# Patient Record
Sex: FEMALE | Race: WHITE | NOT HISPANIC OR LATINO | Employment: FULL TIME | URBAN - METROPOLITAN AREA
[De-identification: names, ages, dates, MRNs, and addresses within clinical notes are randomized per-mention and may not be internally consistent; named-entity substitution may affect disease eponyms.]

---

## 2023-12-12 ENCOUNTER — TRANSFERRED RECORDS (OUTPATIENT)
Dept: HEALTH INFORMATION MANAGEMENT | Facility: CLINIC | Age: 53
End: 2023-12-12

## 2023-12-27 ENCOUNTER — TRANSCRIBE ORDERS (OUTPATIENT)
Dept: OTHER | Age: 53
End: 2023-12-27

## 2023-12-27 DIAGNOSIS — K70.30 ALCOHOLIC CIRRHOSIS OF LIVER WITHOUT ASCITES (H): ICD-10-CM

## 2023-12-27 DIAGNOSIS — R60.0 LOWER EXTREMITY EDEMA: Primary | ICD-10-CM

## 2023-12-27 DIAGNOSIS — D64.9 ANEMIA, UNSPECIFIED TYPE: ICD-10-CM

## 2023-12-27 DIAGNOSIS — K70.10 ALCOHOLIC HEPATITIS WITHOUT ASCITES (H): ICD-10-CM

## 2024-04-02 DIAGNOSIS — K70.31 ALCOHOLIC CIRRHOSIS OF LIVER WITH ASCITES (H): Primary | ICD-10-CM

## 2024-04-10 ENCOUNTER — OFFICE VISIT (OUTPATIENT)
Dept: GASTROENTEROLOGY | Facility: CLINIC | Age: 54
End: 2024-04-10
Attending: PHYSICIAN ASSISTANT
Payer: COMMERCIAL

## 2024-04-10 VITALS — DIASTOLIC BLOOD PRESSURE: 60 MMHG | SYSTOLIC BLOOD PRESSURE: 99 MMHG | HEART RATE: 68 BPM | WEIGHT: 214 LBS

## 2024-04-10 DIAGNOSIS — R79.89 ELEVATED LFTS: ICD-10-CM

## 2024-04-10 DIAGNOSIS — K70.31 ALCOHOLIC CIRRHOSIS OF LIVER WITH ASCITES (H): Primary | ICD-10-CM

## 2024-04-10 DIAGNOSIS — F10.21 ALCOHOL USE DISORDER, SEVERE, IN EARLY REMISSION (H): ICD-10-CM

## 2024-04-10 PROCEDURE — 99205 OFFICE O/P NEW HI 60 MIN: CPT | Performed by: STUDENT IN AN ORGANIZED HEALTH CARE EDUCATION/TRAINING PROGRAM

## 2024-04-10 RX ORDER — MAGNESIUM OXIDE 400 MG/1
400 TABLET ORAL 2 TIMES DAILY
COMMUNITY
Start: 2024-01-30

## 2024-04-10 RX ORDER — ESCITALOPRAM OXALATE 20 MG/1
1 TABLET ORAL EVERY MORNING
COMMUNITY
Start: 2024-03-05 | End: 2025-03-05

## 2024-04-10 RX ORDER — LANOLIN ALCOHOL/MO/W.PET/CERES
100 CREAM (GRAM) TOPICAL DAILY
COMMUNITY
Start: 2023-12-29

## 2024-04-10 RX ORDER — FUROSEMIDE 20 MG
1 TABLET ORAL EVERY MORNING
COMMUNITY
Start: 2023-12-21 | End: 2024-05-21

## 2024-04-10 RX ORDER — BUDESONIDE 90 UG/1
1 AEROSOL, POWDER RESPIRATORY (INHALATION) PRN
COMMUNITY
Start: 2024-02-14 | End: 2025-02-13

## 2024-04-10 RX ORDER — PANTOPRAZOLE SODIUM 40 MG/1
1 TABLET, DELAYED RELEASE ORAL EVERY MORNING
COMMUNITY
Start: 2024-03-05

## 2024-04-10 RX ORDER — FOLIC ACID 1 MG/1
1 TABLET ORAL EVERY MORNING
COMMUNITY
Start: 2024-01-30

## 2024-04-10 RX ORDER — ALBUTEROL SULFATE 90 UG/1
2 AEROSOL, METERED RESPIRATORY (INHALATION) EVERY 4 HOURS PRN
COMMUNITY
Start: 2024-03-05

## 2024-04-10 RX ORDER — ASCORBIC ACID 100 MG
1 TABLET,CHEWABLE ORAL EVERY MORNING
COMMUNITY
Start: 2024-02-14 | End: 2025-02-08

## 2024-04-10 RX ORDER — SPIRONOLACTONE 25 MG/1
1 TABLET ORAL EVERY MORNING
COMMUNITY
Start: 2023-12-21 | End: 2024-05-21

## 2024-04-10 NOTE — PROGRESS NOTES
[facility] New Patient Visit    Date of Visit: April 10, 2024    Reason for referral: ARLD    Subjective: Ms. Sutherland is a 53-year-old woman with a history of alcohol use disorder, alcohol-related liver disease who presents to [facility] care.  She is joined by her mother today.    She was first noted to have elevated liver enzymes with her primary care provider in the past.  Also had steatosis on ultrasound.  She told me that her primary care doctor told her to cut back on her drinking, it is not clear her doctor is aware that she was drinking 2 bottles of wine a day.  She states she tried but she was unable to.  She has been drinking heavily since her 30s.  She presented the end of November with jaundice.  At that time was found to have a bilirubin of 10.9.  Iron saturation was elevated at 90%.  Ferritin was 351.  She is an H63D heterozygote she had testing for autoimmune liver disease that was negative.  KEY is positive.  Ceruloplasmin is normal.  Alpha-1 antitrypsin is normal.  Peth was 230 on admission November 29.    She has been sober since his admission.  She is doing well with her sobriety.  She engaged in alcohol treatment through [employer].  She started doing outpatient treatment in the evenings after work, has graduated to lower intensity and is about to graduate from that.  She has not done any treatment before.  She is back to working full-time.  Overall is doing better since his admission but feels fatigued at times.    Swelling in legs worse after standing all day, better with exercise.  Taking Lasix and spironolactone    Hepatitis C antibody was - February 2023 and then + November 2023.  RNA was - November 2023.    She has a history of H. pylori infection and is had multiple positive breath test.  Stool test was - March 2024.    She works full-time.  She is not .  She has 6 kids ranging 16-27    She has an apparent history of eosinophilic esophagitis versus  eosinophilia due to acid reflux.  She has had recurrent food impactions.  She had an EGD for food impaction in 2016.  Biopsy showed up to 70 eosinophils per high-power field.  She had a follow-up EGD in  that had mucosal changes concerning for EOE.  Also showed grade D esophagitis.  The biopsies at this time showed eosinophils but less concerning for eosinophilic esophagitis.  Biopsies were taken she had an EGD in  that showed steak in the lower third of esophagus.  They noted mucosal rings.  Also showed LA grade B esophagitis.  Esophageal biopsy showed mild esophagitis with fewer than 5 eosinophils per high-power field.  Biopsies showed H. Pylori. She has required dilation for esophageal stricture before.  EGD at the end of  did not show findings concerning for eosinophilic esophagitis    ROS: 14 point ROS negative except for positives noted in HPI.    PMHx:  No DM  No heart disease  No cancer  Asthma  HPV  Alcohol use disorder  Herniated lumbar disc  Meniscal tear  COVID  Depression  Concern for eosinophilic esophagitis    Past Surgical History:   Procedure Laterality Date   ARTHROSCOPIC MENISCUS REPAIR, KNEE Right 2018   ARTHROSCOPIC SURGERY Left 2023   Meniscus procedue.   TOOTH ROOT REMOVAL     FamHx:  Heart disease - MGF  of MI at age 61, other family members with heart disease  MAunt Lung cancer  No family history of liver disease, liver cancer    SocHx:  Social History     Socioeconomic History     Marital status:      Spouse name: Not on file     Number of children: Not on file     Years of education: Not on file     Highest education level: Not on file   Occupational History     Not on file   Tobacco Use     Smoking status: Not on file     Smokeless tobacco: Not on file   Substance and Sexual Activity     Alcohol use: Not on file     Drug use: Not on file     Sexual activity: Not on file   Other Topics Concern     Not on file   Social History Narrative     Not on file      Social Determinants of Health     Financial Resource Strain: Medium Risk (2/27/2024)    [facility]    Overall Financial Resource Strain (CARDIA)      Difficulty of Paying Living Expenses: Somewhat hard   Food Insecurity: No Food Insecurity (2/27/2024)    [facility]    Hunger Vital Sign      Worried About Running Out of Food in the Last Year: Never true      Ran Out of Food in the Last Year: Never true   Transportation Needs: No Transportation Needs (2/27/2024)    Received from [facility]    Transportation Needs      In the past 12 months, has lack of transportation kept you from medical appointments, meetings, work, or from getting medicines or things needed for daily living?: No   Physical Activity: Insufficiently Active (2/27/2024)    [facility]    Exercise Vital Sign      Days of Exercise per Week: 3 days      Minutes of Exercise per Session: 10 min   Stress: No Stress Concern Present (2/27/2024)    [facility]    [place] [employer] - Occupational Stress Questionnaire      Feeling of Stress : Not at all   Social Connections: Moderately Integrated (2/27/2024)    [facility]    Social Connection and Isolation Panel [NHANES]      Frequency of Communication with Friends and Family: More than three times a week      Frequency of Social Gatherings with Friends and Family: Once a week      Attends Jain Services: More than 4 times per year      Active Member of Clubs or Organizations: Yes      Attends Club or Organization Meetings: More than 4 times per year      Marital Status:    Recent Concern: Social Connections -  Moderately Isolated (12/1/2023)    [facility]    Social Connection and Isolation Panel [NHANES]      Frequency of Communication with Friends and Family: More than three times a week      Frequency of Social Gatherings with Friends and Family: Once a week      Attends Sikhism Services: More than 4 times per year      Active Member of Clubs or Organizations: No      Attends Club or Organization Meetings: Not on file      Marital Status:    Interpersonal Safety: Not At Risk (3/28/2024)    [facility]    Intimate Partner Violence      Are you in a relationship where you are physically hurt, threatened and/or made to feel afraid?: No   Housing Stability: Low Risk  (2/27/2024)    Received [facility], [facility]    Housing Stability      In the last 12 months, was there a time when you were not able to pay the mortgage or rent on time?: No      In the last 12 months, how many places have you lived?: 1      Number of Places Lived in the Last Year (Outpatient): Not on file      Number of Places Lived in the Last Year (Inpatient): Not on file      In the last 12 months, was there a time when you did not have a steady place to sleep or slept in a shelter (including now)?: No       Medications:  Current Outpatient Medications   Medication Sig Dispense Refill     albuterol (VENTOLIN HFA) 108 (90 Base) MCG/ACT inhaler Inhale 2 puffs into the lungs every 4 hours as needed for shortness of breath       budesonide (PULMICORT FLEXHALER) 90 MCG/ACT inhaler Inhale 1 puff into the lungs as needed       escitalopram (LEXAPRO) 20 MG tablet Take 1 tablet by mouth every morning       folic acid (FOLVITE) 1 MG tablet Take 1 tablet by mouth every morning       furosemide (LASIX) 20 MG tablet Take 1 tablet by mouth every morning       magnesium oxide (MAG-OX) 400 MG tablet Take 400  mg by mouth 2 times daily       Multiple Vitamin (QUINTABS) TABS Take 1 tablet by mouth every morning       pantoprazole (PROTONIX) 40 MG EC tablet Take 1 tablet by mouth every morning       spironolactone (ALDACTONE) 25 MG tablet Take 1 tablet by mouth every morning       thiamine (B-1) 100 MG tablet Take 100 mg by mouth daily       No current facility-administered medications for this visit.     No OTCs, herbals    Allergies:  Allergies   Allergen Reactions     Penicillins Rash and Unknown     Latex Other (See Comments)     History of asthma.     Erythromycin Nausea and Vomiting       Objective:  BP 99/60 (BP Location: Right arm, Patient Position: Sitting, Cuff Size: Adult Regular)   Pulse 68   Wt 97.1 kg (214 lb)   Constitutional: pleasant woman in NAD  Eyes: icteric  Respiratory: Normal respiratory excursion   MSK: normal range of motion of visualized extremities  Abd: Non distended  Ext: 1+ edema  Skin: No jaundice  Psychiatric: normal mood and orientation    Labs: Reviewed in EHR - MELD 23    Imaging:    RUQ US 1/2024      IMPRESSION:   Mild surface nodularity of the liver suggestive of underlying cirrhosis.     No gross suspicious hepatic lesions.     Layering echogenic material within the gallbladder most consistent with   sludge/tiny calculi however, no pain was reported over the gallbladder during   scanning.    MRI liver 12/2023    FINDINGS:   Abdomen is obese, which decreases image sensitivity, and there is some   breathing motion artifact also.  Liver is enlarged measuring 20.8 cm   craniocaudally.  Diffuse fatty infiltration of the liver with mildly nodular   contour, but no focal mass or abnormal enhancement is seen in the liver.  No   restricted diffusion.         Splenomegaly, with spleen measuring 14.6 cm in length.  No focal masses.   Adrenals and pancreas are unremarkable.  Gallbladder is moderately distended   with some debris in the dependent portion.  No gallbladder wall thickening or    pericholecystic inflammation.  Aorta is normal in caliber.  Kidneys are normal   in size.  No hydroureteronephrosis.  Some cortical scarring in the left upper   pole is present.  Intra and extrahepatic bile ducts are nondilated.     IMPRESSION:   No definite focal hepatic abnormality.  Diffuse fatty infiltration of the   liver.  Hepatosplenomegaly.      Endoscopy:    EGD 11/2023      Findings:        The esophagus was normal.        Diffuse mildly congested mucosa was found in the entire examined        stomach. Biopsies were taken with a pediatric cold forceps for        Helicobacter pylori cultures (In saline) and stains (in formalin)        The first portion of the duodenum and second portion of the duodenum        were normal.     Impression:            - Normal esophagus. No varices.                          - Congestive gastropathy. Biopsied.                          - Normal first portion of the duodenum and second                          portion of the duodenum.    Final Diagnosis     A. STOMACH, ANTRUM, BIOPSY  --CHRONIC, INACTIVE GASTRITIS  --POSITIVE FOR HELICOBACTER PYLORI              Colonoscopy 3/2024    Findings:        The perianal and digital rectal examinations were normal. Pertinent        negatives include normal sphincter tone, no palpable rectal lesions and        no anal lesion or abnormality.        A 6 mm polyp was found in the ascending colon. The polyp was sessile.        The polyp was removed with a hot snare. Resection and retrieval were        complete. To prevent bleeding considering her portal hypertension/plt,        one hemostatic clip was successfully placed. There was no bleeding at        the end of the procedure.        #2, 3 to 7 mm polyp was found in the descending colon. The polyp were        sessile. The 3mm polyp was removed with a jumbo cold forceps, mild        continous bleeding that stopped with clip. The 7mm polyp was removed        with a hot snare. Resection and  retrieval were complete. To prevent        bleeding post-intervention, another hemostatic clips was successfully        placed. There was no bleeding at the end of the procedure.        A 20 mm polyp was found in the sigmoid colon at about 50cm from the anal        verge. The polyp was pedunculated. The polyp was removed with a hot        snare. Resection and retrieval were complete. Mild continous bleeding.        Area was successfully injected with 2 mL of a 0.1 mg/mL solution of        epinephrine for hemostasis. To prevent bleeding post-intervention, three        hemostatic clips were successfully placed. There was no bleeding at the        end of the procedure. Area was successfully injected with 2 mL Spot        (carbon black) for tattooing.     Impression:            - One 6 mm polyp in the ascending colon, removed with                          a hot snare. Resected and retrieved. Clip was placed.                          - One 3 to 7 mm polyp in the descending colon, removed                          with a jumbo cold forceps and removed with a hot                          snare. Resected and retrieved. Clips were placed.                          - One 20 mm polyp in the sigmoid colon, removed with a                          hot snare. Resected and retrieved. Injected. Clips                          were placed.       Final Diagnosis     A. COLON, ASCENDING/RIGHT, BIOPSY  --TUBULAR ADENOMA     B. COLON, DESCENDING/LEFT, BIOPSY  --TUBULAR ADENOMAS     C. COLON, SIGMOID, BIOPSY  --TUBULOVILLOUS ADENOMA, INKED MARGIN UNINVOLVED           Independently reviewed labs and imaging.     Assessment/Plan:  Ms. Sutherland is a 53-year-old woman with a history of alcohol use disorder, alcohol-related liver disease who presents to [facility].  She is joined by her mother today.     She has a long history of alcohol use disorder and presented with decompensated alcohol-related liver disease the end of November 2023.  She  had elevated liver enzymes and been told about liver disease in the past, she was downplaying how much alcohol she was drinking at the time to provider.  She is since to engage in alcohol treatment and is doing well with this.  She is about 4 and half months sober at this point.  Her MELD is still 23.  Her bilirubin is still moderately elevated although it is partially indirect.  Will recheck her labs later this week after increasing her diuretics.  If her MELD is still greater than 20 will plan to open a liver transplant referral and schedule her eval around her 6 months sobriety point.  She has engaged and is doing well treatment.    Increase Lasix to 40 mg daily and spironolactone 50 mg daily.  Will get repeat MELD labs on Friday.  Discussed the basics of liver transplant evaluation.  She is up-to-date with her cancer screening.  Should continue to follow with [facility]row for her routine liver care including liver cancer screening.    RTC based on MELD labs, likely in transplant evaluation clinic    Elisa Santiago MD MS  Hepatology/Liver Transplant  [facility]    Approximately 30 minutes was spent for the visit with 30 minutes of non face-to-face time were spent in review of the patient's medical record on the day of the visit. This included review of previous: clinic visits, hospital records, lab results, imaging studies, and documentation.  The findings from this review are summarized in the above note.      Pap normal with negative HPV 2024    Mammogram 11/2023      IMPRESSION:   There is no mammographic evidence of malignancy.  Recommend annual screening   mammography.         RECOMMENDATION:  BL: Bilateral RC 1: Screening Mammograms

## 2024-04-12 RX ORDER — SPIRONOLACTONE 50 MG/1
50 TABLET, FILM COATED ORAL DAILY
Qty: 90 TABLET | Refills: 3 | Status: SHIPPED | OUTPATIENT
Start: 2024-04-12 | End: 2025-04-07

## 2024-04-12 RX ORDER — FUROSEMIDE 40 MG
40 TABLET ORAL DAILY
Qty: 90 TABLET | Refills: 3 | Status: SHIPPED | OUTPATIENT
Start: 2024-04-12 | End: 2025-04-07

## 2024-04-15 ENCOUNTER — REFERRAL (OUTPATIENT)
Dept: TRANSPLANT | Facility: CLINIC | Age: 54
End: 2024-04-15
Payer: COMMERCIAL

## 2024-04-15 DIAGNOSIS — K70.30 ALCOHOLIC CIRRHOSIS OF LIVER (H): Primary | ICD-10-CM

## 2024-04-15 DIAGNOSIS — K70.31 ALCOHOLIC CIRRHOSIS OF LIVER WITH ASCITES (H): ICD-10-CM

## 2024-04-15 DIAGNOSIS — K76.6 PORTAL HYPERTENSION (H): ICD-10-CM

## 2024-04-15 PROBLEM — I10 HYPERTENSION: Status: ACTIVE | Noted: 2018-10-30

## 2024-04-15 PROBLEM — K29.50 CHRONIC GASTRITIS: Status: ACTIVE | Noted: 2024-04-15

## 2024-04-15 PROBLEM — J30.2 SEASONAL ALLERGIES: Status: ACTIVE | Noted: 2024-04-15

## 2024-04-15 PROBLEM — J45.20 INTERMITTENT ASTHMA: Status: ACTIVE | Noted: 2024-04-15

## 2024-04-15 PROBLEM — R13.10 DYSPHAGIA: Status: ACTIVE | Noted: 2024-04-15

## 2024-04-15 PROBLEM — T75.3XXA MOTION SICKNESS: Status: ACTIVE | Noted: 2024-02-18

## 2024-04-15 PROBLEM — F10.20 ALCOHOL DEPENDENCE, UNCOMPLICATED (H): Status: ACTIVE | Noted: 2023-12-19

## 2024-04-15 PROBLEM — R19.7 DIARRHEA: Status: ACTIVE | Noted: 2024-02-18

## 2024-04-15 PROBLEM — R05.9 COUGH: Status: ACTIVE | Noted: 2017-02-22

## 2024-04-15 PROBLEM — H54.7 VISION LOSS: Status: ACTIVE | Noted: 2024-04-15

## 2024-04-15 PROBLEM — M25.562 CHRONIC PAIN OF LEFT KNEE: Status: ACTIVE | Noted: 2023-02-28

## 2024-04-15 PROBLEM — E83.42 HYPOMAGNESEMIA: Status: ACTIVE | Noted: 2023-11-30

## 2024-04-15 PROBLEM — M54.9 BACKACHE: Status: ACTIVE | Noted: 2024-04-15

## 2024-04-15 PROBLEM — D69.6 THROMBOCYTOPENIA (H): Status: ACTIVE | Noted: 2023-11-30

## 2024-04-15 PROBLEM — F32.A DEPRESSION: Status: ACTIVE | Noted: 2024-04-15

## 2024-04-15 PROBLEM — K21.9 GERD (GASTROESOPHAGEAL REFLUX DISEASE): Status: ACTIVE | Noted: 2024-04-15

## 2024-04-15 PROBLEM — M19.90 ARTHRITIS: Status: ACTIVE | Noted: 2024-02-18

## 2024-04-15 PROBLEM — R79.89 ELEVATED LIVER FUNCTION TESTS: Status: ACTIVE | Noted: 2024-02-18

## 2024-04-15 PROBLEM — E55.9 VITAMIN D DEFICIENCY: Status: ACTIVE | Noted: 2024-04-15

## 2024-04-15 PROBLEM — R87.619 ABNORMAL PAP SMEAR OF CERVIX: Status: ACTIVE | Noted: 2020-02-03

## 2024-04-15 PROBLEM — N95.1 PERIMENOPAUSAL: Status: ACTIVE | Noted: 2020-03-01

## 2024-04-15 PROBLEM — G89.29 CHRONIC PAIN OF LEFT KNEE: Status: ACTIVE | Noted: 2023-02-28

## 2024-04-15 PROBLEM — K72.10 CHRONIC LIVER FAILURE (H): Status: ACTIVE | Noted: 2024-02-18

## 2024-04-15 PROBLEM — K75.9 INFLAMMATORY LIVER DISEASE: Status: ACTIVE | Noted: 2023-11-29

## 2024-04-15 PROBLEM — R79.1 ABNORMAL INR: Status: ACTIVE | Noted: 2023-11-30

## 2024-04-15 PROBLEM — R17 ELEVATED BILIRUBIN: Status: ACTIVE | Noted: 2024-02-18

## 2024-04-15 PROBLEM — K20.0 EOSINOPHILIC ESOPHAGITIS: Status: ACTIVE | Noted: 2024-04-15

## 2024-04-15 PROBLEM — E78.5 HYPERLIPIDEMIA: Status: ACTIVE | Noted: 2024-03-01

## 2024-04-15 NOTE — LETTER
Hannah Sutherland  [address]  [place] [address]    Dear Hannah,    Thank you for your interest in the Transplant Center at [facility]. We look forward to being a part of your care team and assisting you through the transplant process.    As we discussed, your transplant coordinator is Torin Mendieta (Liver).  You may call your coordinator at any time with questions or concerns.  Your first scheduled call will be on 4/22 at 10:00.  If this needs to change, call [phone].    Please complete the following.    Fill out and return the enclosed forms  Authorization for Electronic Communication  Authorization to Discuss Protected Health Information  Authorization for Release of Protected Health Information    Sign up for:  [employer], access to your electronic medical record (see enclosed pamphlet)  [email or web address], a transplant education website    You can use these tools to learn more about your transplant, communicate with your care team, and track your medical details           My Transplant Place            Sincerely,      Solid Organ Transplant  [facility]    cc: Referring Physician

## 2024-04-17 ENCOUNTER — DOCUMENTATION ONLY (OUTPATIENT)
Dept: TRANSPLANT | Facility: CLINIC | Age: 54
End: 2024-04-17
Payer: COMMERCIAL

## 2024-04-17 NOTE — TELEPHONE ENCOUNTER
Patient was asked the following questions during liver intake call.     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  PCP:  ZOHREH Cordon, CNP     1)Do you know why you have liver disease: yes alcohol       If Alcoholic Cirrhosis is present when was your last drink:  11/723      Have you ever been through treatment for alcohol: yes, still attending meetings   2) Presence of Ascites: no Paracentesis: no  3) Presence of Hepatic Encephalopathy: no  Medications: no   4) History of GI Bleeding: once, stool   5) Oxygen Use: no  6) EGD: yes  7) Colonoscopy: yes  8) MELD Score: 21 per pt   9)  Labs available for review from PCP/GI:  yes  10)HCC Diagnosis: no (if no, go to #11)                                            11)Insurance information: [facility]  ID # [id number], Gr # [id number]                 Referral intake process completed.  Patient is aware that after financial approval is received, medical records will be requested.   Patient confirmed for a callback from transplant coordinator on 4/22.  Tentative evaluation date TBD.     Confirmed coordinator will discuss evaluation process in more detail at the time of their call.   Patient is aware of the need to arrange age appropriate cancer screening, vaccinations, and dental care.  Reminded patient to complete questionnaire, complete medical records release, and review packet prior to evaluation visit .  Assessed patient for special needs (ie--wheelchair, assistance, guardian, and ):   no   Patient instructed to call [phone] with questions.      Patient gave verbal consent during intake call to obtain medical records and documents outside of [facility]:   yes

## 2024-04-23 NOTE — TELEPHONE ENCOUNTER
Hannahmode Sutherland 1970  Referral initial call 4/22/2024  MELD: 23 as of 4/12/24  Blood Type: O  PCP:   ZOHREH Cordon, CNP   Referring: ZOHREH Fitzgerald- CNP   Have you had care at a [facility] facility before/seen by any of our doctors? Dr. Elisa Santiago  Insurance: [facility]    Social Hx: lives in [place]. Originally from [place].  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  Education: a Master's degree in gerontology.  Functional status: very good, working full-time  Family/social support: , son Ceasar. 6 kids (27-16). No grandkids. Parent's alive. Twin sisters, 48. Close friends.    Liver DX/History: first told of liver disease November 2023, saw PCP after Thanksgiving, hospitalized.  Dx: etoh cirrhosis of liver with ascites  HE/Meds: Yes mild HE (looses train of thoughts), No Meds  Hematemesis/GI Bleed: Yes GIB, No Hematemesis  EGD/Colonoscopy: Yes EGD, Yes Colonoscopy   Ascites/Paracentesis/Diuretics/TIPS/SBP: Yes Ascites (yes BLE), No Paracentesis, taking  Etoh & Drug use/Legal issues/Chem Dep: last drink 11/6/2023, mostly drinking wine, about 3 smaller bottles/day, occasionally thinking about drinking, no drug use, no legal problems, doing chem dep currently with [facility].   HCC diagnosis: no diagnosis  Surgeries: Knee surgeries bilaterally, no abdominal surgeries  LDLT discussed: yes discussed in-depth, email sent with Living Donor information.    Other Medical hx:   Neuro/Strokes/Seizures: none  Cardiac/MIs, Echos/Angios: HTN previously, now low. Family history of heart disease on both mother & father side.  Lungs/Smoking/O2 use: asthma, minimal problems. Never smoked. No O2.  Kidneys/Dialysis: none  Cancer/Mammo/Pap: none. Last mammogram 11/2023, last pap smear 3/2024.  Vaccines/Hepatitis/COVID: Vaccinated for Covid (had Covid X2-3, last time 11/2022), Got Hep A & B, needs shingles.  Diabetes: none  Nutrition: appetite is  good.  Dentist: has a dentist, needs a cleanings.  Mental health: mental health has been going good. Low grade depression, taking Lexapro 20mg.    Plan: Do you have someone who can join you for the evaluation? Will plan for evaluation on 5/21, will bring support person with.  Do you prefer to be contacted by scheduling via [employer] message or Phone call? Either/or.  What is your schedule availability like?  Moderately inflexible, working full time.

## 2024-05-13 NOTE — TELEPHONE ENCOUNTER
RECORDS RECEIVED FROM:    DATE RECEIVED:    NOTES STATUS DETAILS   OFFICE NOTE from referring provider  Internal 04/24/24 - Elisa Santiago MD [facility]    OFFICE NOTE from other cardiologists  N/A    RECORDS from hospital/ED Internal 05/02/24    MEDICATION LIST Internal    GENERAL CARDIO RECORDS   (ALL APPOINTMENT TYPES)     LABS (CBC,BMP,CMP, TSH) Internal    EKG (STRIPS & REPORTS) In process Scheduled 05/21/24 03/28/24 - [facility]    ECHOS (IMAGES AND REPORTS) In process Scheduled 05/21/24   CT/CTA (IMAGES AND REPORTS) In process Scheduled 05/28/24

## 2024-05-14 ENCOUNTER — PRE VISIT (OUTPATIENT)
Dept: CARDIOLOGY | Facility: CLINIC | Age: 54
End: 2024-05-14

## 2024-05-14 ENCOUNTER — OFFICE VISIT (OUTPATIENT)
Dept: CARDIOLOGY | Facility: CLINIC | Age: 54
End: 2024-05-14
Attending: STUDENT IN AN ORGANIZED HEALTH CARE EDUCATION/TRAINING PROGRAM
Payer: COMMERCIAL

## 2024-05-14 VITALS
HEART RATE: 63 BPM | WEIGHT: 193.8 LBS | DIASTOLIC BLOOD PRESSURE: 67 MMHG | OXYGEN SATURATION: 100 % | SYSTOLIC BLOOD PRESSURE: 106 MMHG

## 2024-05-14 DIAGNOSIS — Z76.82 ORGAN TRANSPLANT CANDIDATE: Primary | ICD-10-CM

## 2024-05-14 DIAGNOSIS — K76.6 PORTAL HYPERTENSION (H): ICD-10-CM

## 2024-05-14 DIAGNOSIS — K70.31 ALCOHOLIC CIRRHOSIS OF LIVER WITH ASCITES (H): ICD-10-CM

## 2024-05-14 PROCEDURE — 99203 OFFICE O/P NEW LOW 30 MIN: CPT | Performed by: INTERNAL MEDICINE

## 2024-05-14 PROCEDURE — 99213 OFFICE O/P EST LOW 20 MIN: CPT | Performed by: INTERNAL MEDICINE

## 2024-05-14 ASSESSMENT — PAIN SCALES - GENERAL: PAINLEVEL: NO PAIN (0)

## 2024-05-14 NOTE — NURSING NOTE
Chief Complaint   Patient presents with    Follow Up    New Patient      new - liver transplant eval       Vitals were taken, medications reconciled.    Paige Thurner, Facilitator   8:11 AM

## 2024-05-14 NOTE — PATIENT INSTRUCTIONS
Once you have completed already scheduled cardiac testing, Dr. Ramirez will review and advise.  As soon as results are compiled and reviewed, you will be notified.

## 2024-05-14 NOTE — LETTER
5/14/2024      RE: Hannah Sutherland  [address]  [facility] [address]       Dear Colleague,    Thank you for the opportunity to participate in the care of your patient, Hannah Sutherland, at the [facility] [place] [facility]. Please see a copy of my visit note below.       SUBJECTIVE:  Hannah Sutherland is a 53 year old female who presents for liver transplant evaluation.  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  Currently patient is fairly active working full-time at [employer].  Denied cardiac symptoms specifically denied chest pain or shortness of breath.  No prior cardiac history.  Non-smoker.  No diabetes or hypertension.  No premature coronary artery disease in family.  Father had a stent at the age of 75.  Current cardiac related medications are spironolactone and furosemide.  Even though problem list suggest hypertension and hyperlipidemia patient is not on any medication for both.    Patient Active Problem List    Diagnosis Date Noted    Backache 04/15/2024     Priority: Medium     Formatting of this note might be different from the original.   herniated disk in the lumbar area that flairs 3 times per year.      Chronic gastritis 04/15/2024     Priority: Medium    Depression 04/15/2024     Priority: Medium    Dysphagia 04/15/2024     Priority: Medium    Eosinophilic esophagitis 04/15/2024     Priority: Medium    GERD (gastroesophageal reflux disease) 04/15/2024     Priority: Medium    Intermittent asthma 04/15/2024     Priority: Medium    Seasonal allergies 04/15/2024     Priority: Medium     Formatting of this note might be different from the original.   Affecting the eyes yearround, takes OTC's      Vision loss 04/15/2024     Priority: Medium    Vitamin D deficiency 04/15/2024     Priority: Medium    Hyperlipidemia 03/01/2024     Priority: Medium    Chronic liver failure (H) 02/18/2024     Priority:  Medium    Diarrhea 02/18/2024     Priority: Medium     Formatting of this note might be different from the original.   Frequently.      Elevated bilirubin 02/18/2024     Priority: Medium    Elevated liver function tests 02/18/2024     Priority: Medium    Arthritis 02/18/2024     Priority: Medium    Motion sickness 02/18/2024     Priority: Medium    Portal hypertension (H) 02/18/2024     Priority: Medium    Alcohol dependence, uncomplicated (H) 12/19/2023     Priority: Medium    Abnormal INR 11/30/2023     Priority: Medium    Alcoholic cirrhosis (H) 11/30/2023     Priority: Medium    Hypomagnesemia 11/30/2023     Priority: Medium    Thrombocytopenia (H24) 11/30/2023     Priority: Medium    Inflammatory liver disease 11/29/2023     Priority: Medium    Chronic pain of left knee 02/28/2023     Priority: Medium    Perimenopausal 03/01/2020     Priority: Medium     Formatting of this note might be different from the original.   Including hot flashes and moodiness.      Abnormal Pap smear of cervix 02/03/2020     Priority: Medium     Formatting of this note might be different from the original.   HPV      Hypertension 10/30/2018     Priority: Medium     Formatting of this note might be different from the original.   will start meds.      Cough 02/22/2017     Priority: Medium    .  Current Outpatient Medications   Medication Sig Dispense Refill    albuterol (VENTOLIN HFA) 108 (90 Base) MCG/ACT inhaler Inhale 2 puffs into the lungs every 4 hours as needed for shortness of breath      budesonide (PULMICORT FLEXHALER) 90 MCG/ACT inhaler Inhale 1 puff into the lungs as needed      escitalopram (LEXAPRO) 20 MG tablet Take 1 tablet by mouth every morning      folic acid (FOLVITE) 1 MG tablet Take 1 tablet by mouth every morning      furosemide (LASIX) 40 MG tablet Take 1 tablet (40 mg) by mouth daily for 360 days 90 tablet 3    magnesium oxide (MAG-OX) 400 MG tablet Take 400 mg by mouth 2 times daily      Multiple Vitamin  (QUINTABS) TABS Take 1 tablet by mouth every morning      pantoprazole (PROTONIX) 40 MG EC tablet Take 1 tablet by mouth every morning      spironolactone (ALDACTONE) 50 MG tablet Take 1 tablet (50 mg) by mouth daily for 360 days 90 tablet 3    thiamine (B-1) 100 MG tablet Take 100 mg by mouth daily      furosemide (LASIX) 20 MG tablet Take 1 tablet by mouth every morning (Patient not taking: Reported on 5/14/2024)      spironolactone (ALDACTONE) 25 MG tablet Take 1 tablet by mouth every morning (Patient not taking: Reported on 5/14/2024)       No current facility-administered medications for this visit.     No past medical history on file.  No past surgical history on file.  Allergies   Allergen Reactions    Penicillins Rash and Unknown    Latex Other (See Comments)     History of asthma.    Erythromycin Nausea and Vomiting     Social History     Socioeconomic History    Marital status:      Spouse name: Not on file    Number of children: Not on file    Years of education: Not on file    Highest education level: Not on file   Occupational History    Not on file   Tobacco Use    Smoking status: Never    Smokeless tobacco: Never   Substance and Sexual Activity    Alcohol use: Not Currently     Comment: sober 11/7/23    Drug use: Never    Sexual activity: Not on file   Other Topics Concern    Not on file   Social History Narrative    Not on file     Social Determinants of Health     Financial Resource Strain: Medium Risk (2/27/2024)    [facility], [facility] and [facility]    Overall Financial Resource Strain (CARDIA)     Difficulty of Paying Living Expenses: Somewhat hard   Food Insecurity: No Food Insecurity (2/27/2024)    [email or web address], [facility] and [facility]    Hunger Vital Sign     Worried About Running Out of Food in the Last Year: Never true     Ran Out of Food in the Last Year: Never true   Transportation Needs: No  Transportation Needs (2/27/2024)    Received from [facility]    Transportation Needs     In the past 12 months, has lack of transportation kept you from medical appointments, meetings, work, or from getting medicines or things needed for daily living?: No   Physical Activity: Insufficiently Active (2/27/2024)    Received from [facility], [facility]    Exercise Vital Sign     Days of Exercise per Week: 3 days     Minutes of Exercise per Session: 10 min   Stress: No Stress Concern Present (2/27/2024)    Received from [facility], [facility]    [employer] [place] [employer] - Occupational Stress Questionnaire     Feeling of Stress : Not at all   Social Connections: Moderately Integrated (2/27/2024)    Received from [facility], [facility]    Social Connection and Isolation Panel [NHANES]     Frequency of Communication with Friends and Family: More than three times a week     Frequency of Social Gatherings with Friends and Family: Once a week     Attends Yazidism Services: More than 4 times per year     Active Member of Clubs or Organizations: Yes     Attends Club or Organization Meetings: More than 4 times per year     Marital Status:    Recent Concern: Social Connections - Moderately Isolated (12/1/2023)    Received from [facility], [facility]    Social Connection and Isolation Panel [NHANES]     Frequency of Communication with Friends and Family: More than three times a week     Frequency of Social Gatherings with Friends and Family: Once a week     Attends Yazidism Services: More than 4 times per year     Active Member of Clubs or Organizations: No     Attends Club or Organization Meetings: Not on file     Marital Status:    Interpersonal Safety: Not At Risk (5/2/2024)    Received from  [facility]    Intimate Partner Violence     Are you in a relationship where you are physically hurt, threatened and/or made to feel afraid?: No   Housing Stability: Low Risk  (2/27/2024)    Received from [facility], [facility]    Housing Stability     In the last 12 months, was there a time when you were not able to pay the mortgage or rent on time?: No     In the last 12 months, how many places have you lived?: 1     Number of Places Lived in the Last Year (Outpatient): Not on file     Number of Places Lived in the Last Year (Inpatient): Not on file     In the last 12 months, was there a time when you did not have a steady place to sleep or slept in a shelter (including now)?: No     No family history on file.       REVIEW OF SYSTEMS:  General: negative, fever, chills, night sweats  Skin: negative, acne, rash, and scaling  Eyes: negative, double vision, eye pain, and photophobia  Ears/Nose/Throat: negative, nasal congestion, and purulent rhinorrhea  Respiratory: No dyspnea on exertion, No cough, No hemoptysis, and negative  Cardiovascular: negative, palpitations, tachycardia, irregular heart beat, chest pain, exertional chest pain or pressure, paroxysmal nocturnal dyspnea, dyspnea on exertion, and orthopnea       OBJECTIVE:  Blood pressure 106/67, pulse 63, weight 87.9 kg (193 lb 12.8 oz), SpO2 100%.  General Appearance: healthy, alert, active, and no distress  Head: Normocephalic. No masses, lesions, tenderness or abnormalities  Eyes: conjuctiva clear, PERRL, EOM intact  Ears: External ears normal. Canals clear. TM's normal.  Nose: Nares normal  Mouth: normal  Neck: Supple, no cervical adenopathy, no thyromegaly  Lungs: clear to auscultation  Cardiac: regular rate and rhythm, normal S1 and S2, no murmur     ASSESSMENT/PLAN:  Patient here for liver transplant evaluation.  End-stage liver disease secondary to Laënnec cirrhosis.  Currently patient is  fairly active with no cardiac symptoms.  No prior cardiac history.  Only medications are spironolactone and furosemide.  Non-smoker.  Not on any medication for hypertension, hyperlipidemia or diabetes.  No family history of premature coronary artery disease.  Apart from EKG no other cardiac evaluation is available.  Patient's EKG from outside system suggest normal sinus rhythm.  Mild QT prolongation and partial right bundle branch block.  Patient was asked to have an echocardiogram, CT coronary angiogram.  This is scheduled for future.  If this test results are acceptable patient will be able to proceed with the transplant.  No regular follow-up has been arranged.  Total visit duration 30 minutes.  This included face-to-face interview, physical exam, chart review, review of outside records including EKG and documentation.      Please do not hesitate to contact me if you have any questions/concerns.     Sincerely,     TAMI Dowd MD

## 2024-05-14 NOTE — PROGRESS NOTES
SUBJECTIVE:  Hannah Sutherland is a 53 year old female who presents for liver transplant evaluation.  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  Currently patient is fairly active working full-time at [employer].  Denied cardiac symptoms specifically denied chest pain or shortness of breath.  No prior cardiac history.  Non-smoker.  No diabetes or hypertension.  No premature coronary artery disease in family.  Father had a stent at the age of 75.  Current cardiac related medications are spironolactone and furosemide.  Even though problem list suggest hypertension and hyperlipidemia patient is not on any medication for both.    Patient Active Problem List    Diagnosis Date Noted    Backache 04/15/2024     Priority: Medium     Formatting of this note might be different from the original.   herniated disk in the lumbar area that flairs 3 times per year.      Chronic gastritis 04/15/2024     Priority: Medium    Depression 04/15/2024     Priority: Medium    Dysphagia 04/15/2024     Priority: Medium    Eosinophilic esophagitis 04/15/2024     Priority: Medium    GERD (gastroesophageal reflux disease) 04/15/2024     Priority: Medium    Intermittent asthma 04/15/2024     Priority: Medium    Seasonal allergies 04/15/2024     Priority: Medium     Formatting of this note might be different from the original.   Affecting the eyes yearround, takes OTC's      Vision loss 04/15/2024     Priority: Medium    Vitamin D deficiency 04/15/2024     Priority: Medium    Hyperlipidemia 03/01/2024     Priority: Medium    Chronic liver failure (H) 02/18/2024     Priority: Medium    Diarrhea 02/18/2024     Priority: Medium     Formatting of this note might be different from the original.   Frequently.      Elevated bilirubin 02/18/2024     Priority: Medium    Elevated liver function tests 02/18/2024     Priority: Medium    Arthritis 02/18/2024     Priority: Medium    Motion sickness 02/18/2024     Priority: Medium    Portal  hypertension (H) 02/18/2024     Priority: Medium    Alcohol dependence, uncomplicated (H) 12/19/2023     Priority: Medium    Abnormal INR 11/30/2023     Priority: Medium    Alcoholic cirrhosis (H) 11/30/2023     Priority: Medium    Hypomagnesemia 11/30/2023     Priority: Medium    Thrombocytopenia (H24) 11/30/2023     Priority: Medium    Inflammatory liver disease 11/29/2023     Priority: Medium    Chronic pain of left knee 02/28/2023     Priority: Medium    Perimenopausal 03/01/2020     Priority: Medium     Formatting of this note might be different from the original.   Including hot flashes and moodiness.      Abnormal Pap smear of cervix 02/03/2020     Priority: Medium     Formatting of this note might be different from the original.   HPV      Hypertension 10/30/2018     Priority: Medium     Formatting of this note might be different from the original.   will start meds.      Cough 02/22/2017     Priority: Medium    .  Current Outpatient Medications   Medication Sig Dispense Refill    albuterol (VENTOLIN HFA) 108 (90 Base) MCG/ACT inhaler Inhale 2 puffs into the lungs every 4 hours as needed for shortness of breath      budesonide (PULMICORT FLEXHALER) 90 MCG/ACT inhaler Inhale 1 puff into the lungs as needed      escitalopram (LEXAPRO) 20 MG tablet Take 1 tablet by mouth every morning      folic acid (FOLVITE) 1 MG tablet Take 1 tablet by mouth every morning      furosemide (LASIX) 40 MG tablet Take 1 tablet (40 mg) by mouth daily for 360 days 90 tablet 3    magnesium oxide (MAG-OX) 400 MG tablet Take 400 mg by mouth 2 times daily      Multiple Vitamin (QUINTABS) TABS Take 1 tablet by mouth every morning      pantoprazole (PROTONIX) 40 MG EC tablet Take 1 tablet by mouth every morning      spironolactone (ALDACTONE) 50 MG tablet Take 1 tablet (50 mg) by mouth daily for 360 days 90 tablet 3    thiamine (B-1) 100 MG tablet Take 100 mg by mouth daily      furosemide (LASIX) 20 MG tablet Take 1 tablet by mouth  every morning (Patient not taking: Reported on 5/14/2024)      spironolactone (ALDACTONE) 25 MG tablet Take 1 tablet by mouth every morning (Patient not taking: Reported on 5/14/2024)       No current facility-administered medications for this visit.     No past medical history on file.  No past surgical history on file.  Allergies   Allergen Reactions    Penicillins Rash and Unknown    Latex Other (See Comments)     History of asthma.    Erythromycin Nausea and Vomiting     Social History     Socioeconomic History    Marital status:      Spouse name: Not on file    Number of children: Not on file    Years of education: Not on file    Highest education level: Not on file   Occupational History    Not on file   Tobacco Use    Smoking status: Never    Smokeless tobacco: Never   Substance and Sexual Activity    Alcohol use: Not Currently     Comment: sober 11/7/23    Drug use: Never    Sexual activity: Not on file   Other Topics Concern    Not on file   Social History Narrative    Not on file     Social Determinants of Health     Financial Resource Strain: Medium Risk (2/27/2024)    [facility], [facility]    Overall Financial Resource Strain (CARDIA)     Difficulty of Paying Living Expenses: Somewhat hard   Food Insecurity: No Food Insecurity (2/27/2024)    [facility], [facility]    Hunger Vital Sign     Worried About Running Out of Food in the Last Year: Never true     Ran Out of Food in the Last Year: Never true   Transportation Needs: No Transportation Needs (2/27/2024)    [facility]    Transportation Needs     In the past 12 months, has lack of transportation kept you from medical appointments, meetings, work, or from getting medicines or things needed for daily living?: No   Physical Activity: Insufficiently Active (2/27/2024)    [facility]  [facility], [facility]    Exercise Vital Sign     Days of Exercise per Week: 3 days     Minutes of Exercise per Session: 10 min   Stress: No Stress Concern Present (2/27/2024)    Received from [facility], [facility]    [place] [employer] - Occupational Stress Questionnaire     Feeling of Stress : Not at all   Social Connections: Moderately Integrated (2/27/2024)    Received from [facility], [facility]    Social Connection and Isolation Panel [NHANES]     Frequency of Communication with Friends and Family: More than three times a week     Frequency of Social Gatherings with Friends and Family: Once a week     Attends Islam Services: More than 4 times per year     Active Member of Clubs or Organizations: Yes     Attends Club or Organization Meetings: More than 4 times per year     Marital Status:    Recent Concern: Social Connections - Moderately Isolated (12/1/2023)    Received from [facility], [facility]    Social Connection and Isolation Panel [NHANES]     Frequency of Communication with Friends and Family: More than three times a week     Frequency of Social Gatherings with Friends and Family: Once a week     Attends Islam Services: More than 4 times per year     Active Member of Clubs or Organizations: No     Attends Club or Organization Meetings: Not on file     Marital Status:    Interpersonal Safety: Not At Risk (5/2/2024)    Received from [facility]    Intimate Partner Violence     Are you in a relationship where you are physically hurt, threatened and/or made to feel afraid?: No   Housing Stability: Low Risk  (2/27/2024)    Received from [facility], [facility]    Housing Stability     In the last 12 months, was there a time when you  were not able to pay the mortgage or rent on time?: No     In the last 12 months, how many places have you lived?: 1     Number of Places Lived in the Last Year (Outpatient): Not on file     Number of Places Lived in the Last Year (Inpatient): Not on file     In the last 12 months, was there a time when you did not have a steady place to sleep or slept in a shelter (including now)?: No     No family history on file.       REVIEW OF SYSTEMS:  General: negative, fever, chills, night sweats  Skin: negative, acne, rash, and scaling  Eyes: negative, double vision, eye pain, and photophobia  Ears/Nose/Throat: negative, nasal congestion, and purulent rhinorrhea  Respiratory: No dyspnea on exertion, No cough, No hemoptysis, and negative  Cardiovascular: negative, palpitations, tachycardia, irregular heart beat, chest pain, exertional chest pain or pressure, paroxysmal nocturnal dyspnea, dyspnea on exertion, and orthopnea       OBJECTIVE:  Blood pressure 106/67, pulse 63, weight 87.9 kg (193 lb 12.8 oz), SpO2 100%.  General Appearance: healthy, alert, active, and no distress  Head: Normocephalic. No masses, lesions, tenderness or abnormalities  Eyes: conjuctiva clear, PERRL, EOM intact  Ears: External ears normal. Canals clear. TM's normal.  Nose: Nares normal  Mouth: normal  Neck: Supple, no cervical adenopathy, no thyromegaly  Lungs: clear to auscultation  Cardiac: regular rate and rhythm, normal S1 and S2, no murmur     ASSESSMENT/PLAN:  Patient here for liver transplant evaluation.  End-stage liver disease secondary to Laënnec cirrhosis.  Currently patient is fairly active with no cardiac symptoms.  No prior cardiac history.  Only medications are spironolactone and furosemide.  Non-smoker.  Not on any medication for hypertension, hyperlipidemia or diabetes.  No family history of premature coronary artery disease.  Apart from EKG no other cardiac evaluation is available.  Patient's EKG from outside system suggest normal  sinus rhythm.  Mild QT prolongation and partial right bundle branch block.  Patient was asked to have an echocardiogram, CT coronary angiogram.  This is scheduled for future.  If this test results are acceptable patient will be able to proceed with the transplant.  No regular follow-up has been arranged.  Total visit duration 30 minutes.  This included face-to-face interview, physical exam, chart review, review of outside records including EKG and documentation.  CT coronary angiogram reviewed.  Total calcium score 0.  No significant coronary artery disease.  Patient may proceed with transplant.  Patient's echo reviewed.  Normal biventricular function.  A very small membranous ventricular septal defect is noted.  This VSD has no impact on upcoming liver transplant surgery.

## 2024-05-20 LAB
ABO/RH(D): NORMAL
ANTIBODY SCREEN: NEGATIVE
SPECIMEN EXPIRATION DATE: NORMAL

## 2024-05-20 NOTE — PROGRESS NOTES
[facility] SOLID ORGAN TRANSPLANT  OUTPATIENT MNT: LIVER TRANSPLANT EVALUATION    Current BMI: 32.9 (HT 63.5 in,  lbs/86 kg)  BMI is within recommendation of <45 for liver transplant    Handgrip Strength (average of 3 using dominant hand): 16  Frailty Assessment-- Frail (3/5 points)- low activity, unintended wt loss, low      Reference:  Score of 0-2 = Not Frail  Score of 3-5 = Frail     FraiLT-- Pre frail (LFI: 3.64)  [email or web address]     TIME SPENT: 15 minutes  VISIT TYPE: Initial   REFERRING PHYSICIAN: Angelika   PT ACCOMPANIED BY: her mom     History of previous txp: none     NUTRITION ASSESSMENT  H/o etoh, dysphagia noted 4/2024 in chart (pt reports this was many years ago after a few instances of food feeling stuck in her throat).   Pt has made significant diet changes over the past 6 months. She met with RD after her liver dz dx.     - Meal prep & grocery shopping: pt does   - Previous RD education: yes   - Appetite: good/baseline   - Food allergies/intolerances: no  - Issues chewing or swallowing: no  - N/V/D/C: no  - Food access concerns: not asked     Vitamins, Supplements, Pertinent Meds: folic acid, magnesium, MVI, thiamin  Herbal Medicines/Supplements: none   Protein supplements: protein drinks (Premier) 1/day- Jan 2024     Weight hx // fluid retention:   - 255 lbs prior UBW  - No muscle loss noted per pt; mild loss inferred from reduced  strength   - CLAUDIA improved with diuretics     Wt Readings from Last 10 Encounters:   05/21/24 85.9 kg (189 lb 6 oz)   05/14/24 87.9 kg (193 lb 12.8 oz)   04/10/24 97.1 kg (214 lb)     PHYSICAL ACTIVITY   Hand weights 3x/week for 15 min  Walking 3-5x/week 20-30 min  This is new activity X 2 months   Nods off at desk at work, although energy OK if moving around during the day     - Falls: none   - Physical therapy: none   - Cane/walker/wheelchair: no    DIET RECALL  Breakfast Protein shake    Lunch Moody with WW bread & chicken  or egg salad; chicken    Dinner Chicken with side of broccoli/carrots, salad; less beef; protein most nights     Snacks Fruit, nuts, HS- yogurt (regular)     Beverages Water    Dining out 1x/week - [employer] chicken salad      NUTRITION DIAGNOSIS   No nutrition diagnosis identified at this time     NUTRITION INTERVENTION   Nutrition education provided:  Discussed sodium intake (low sodium foods and drinks, seasoning food without salt and tips for low sodium diet).    Reviewed adequate protein intake. Encouraged receiving protein from both animal and plant based sources.     Reviewed post txp diet guidelines in brief (will review in further detail post txp):  (1) Review of proper food safety measures d/t immunosuppressant therapy post-op and increased risk for food-borne illness    (2) Avoid the following post txp d/t risk for rejection, unknown effects on the organs, and/or potential interactions with immunosuppressants:  - Herbal, Chinese, holistic, chiropractic, natural, alternative medicines and supplements  - Detoxes and cleanses  - Weight loss pills  - Protein powders or other products with extracts or herbs (ie green tea extract)    (3) Med regimen and possible side effects    Patient Understanding: Pt verbalized understanding of education provided.  Expected Engagement: Good  Follow-Up Plans: PRN     NUTRITION GOALS   No nutrition goals identified at this time    Shagufta Echeverria, RD, LD, CCTD

## 2024-05-21 ENCOUNTER — ALLIED HEALTH/NURSE VISIT (OUTPATIENT)
Dept: TRANSPLANT | Facility: CLINIC | Age: 54
End: 2024-05-21
Attending: STUDENT IN AN ORGANIZED HEALTH CARE EDUCATION/TRAINING PROGRAM
Payer: COMMERCIAL

## 2024-05-21 ENCOUNTER — LAB (OUTPATIENT)
Dept: LAB | Facility: CLINIC | Age: 54
End: 2024-05-21
Attending: STUDENT IN AN ORGANIZED HEALTH CARE EDUCATION/TRAINING PROGRAM
Payer: COMMERCIAL

## 2024-05-21 ENCOUNTER — ANCILLARY PROCEDURE (OUTPATIENT)
Dept: CARDIOLOGY | Facility: CLINIC | Age: 54
End: 2024-05-21
Attending: STUDENT IN AN ORGANIZED HEALTH CARE EDUCATION/TRAINING PROGRAM
Payer: COMMERCIAL

## 2024-05-21 ENCOUNTER — ANCILLARY PROCEDURE (OUTPATIENT)
Dept: ULTRASOUND IMAGING | Facility: CLINIC | Age: 54
End: 2024-05-21
Attending: STUDENT IN AN ORGANIZED HEALTH CARE EDUCATION/TRAINING PROGRAM
Payer: COMMERCIAL

## 2024-05-21 ENCOUNTER — ANCILLARY PROCEDURE (OUTPATIENT)
Dept: GENERAL RADIOLOGY | Facility: CLINIC | Age: 54
End: 2024-05-21
Attending: STUDENT IN AN ORGANIZED HEALTH CARE EDUCATION/TRAINING PROGRAM
Payer: COMMERCIAL

## 2024-05-21 VITALS
TEMPERATURE: 98 F | WEIGHT: 189.38 LBS | OXYGEN SATURATION: 99 % | DIASTOLIC BLOOD PRESSURE: 62 MMHG | BODY MASS INDEX: 32.33 KG/M2 | SYSTOLIC BLOOD PRESSURE: 108 MMHG | HEART RATE: 70 BPM | HEIGHT: 64 IN

## 2024-05-21 DIAGNOSIS — K70.31 ALCOHOLIC CIRRHOSIS OF LIVER WITH ASCITES (H): ICD-10-CM

## 2024-05-21 DIAGNOSIS — K70.30 ALCOHOLIC CIRRHOSIS OF LIVER (H): ICD-10-CM

## 2024-05-21 DIAGNOSIS — K76.6 PORTAL HYPERTENSION (H): ICD-10-CM

## 2024-05-21 DIAGNOSIS — K70.31 ALCOHOLIC CIRRHOSIS OF LIVER WITH ASCITES (H): Primary | ICD-10-CM

## 2024-05-21 DIAGNOSIS — K70.30 ALCOHOLIC CIRRHOSIS OF LIVER (H): Primary | ICD-10-CM

## 2024-05-21 LAB
A1 AB TITR SERPL: >256 {TITER}
A1 AB TITR SERPL: >256 {TITER}
ABO/RH(D): NORMAL
AFP SERPL-MCNC: 4.2 NG/ML
ALBUMIN MFR UR ELPH: 18.8 MG/DL
ALBUMIN SERPL BCG-MCNC: 3.2 G/DL (ref 3.5–5.2)
ALBUMIN UR-MCNC: NEGATIVE MG/DL
ALP SERPL-CCNC: 109 U/L (ref 40–150)
ALT SERPL W P-5'-P-CCNC: 13 U/L (ref 0–50)
ANION GAP SERPL CALCULATED.3IONS-SCNC: 8 MMOL/L (ref 7–15)
ANTIBODY TITER IGM SCREEN: NEGATIVE
APPEARANCE UR: CLEAR
AST SERPL W P-5'-P-CCNC: 46 U/L (ref 0–45)
B IGG TITR SERPL: >256 {TITER}
BILIRUB DIRECT SERPL-MCNC: 2.33 MG/DL (ref 0–0.3)
BILIRUB SERPL-MCNC: 5.4 MG/DL
BILIRUB UR QL STRIP: NEGATIVE
BUN SERPL-MCNC: 10.3 MG/DL (ref 6–20)
CALCIUM SERPL-MCNC: 9.3 MG/DL (ref 8.6–10)
CHLORIDE SERPL-SCNC: 100 MMOL/L (ref 98–107)
CHOLEST SERPL-MCNC: 234 MG/DL
CMV IGG SERPL IA-ACNC: >10 U/ML
CMV IGG SERPL IA-ACNC: ABNORMAL
COLOR UR AUTO: YELLOW
CREAT SERPL-MCNC: 0.71 MG/DL (ref 0.51–0.95)
CREAT UR-MCNC: 189 MG/DL
DEPRECATED HCO3 PLAS-SCNC: 27 MMOL/L (ref 22–29)
EGFRCR SERPLBLD CKD-EPI 2021: >90 ML/MIN/1.73M2
ERYTHROCYTE [DISTWIDTH] IN BLOOD BY AUTOMATED COUNT: 17.3 % (ref 10–15)
FASTING STATUS PATIENT QL REPORTED: YES
FASTING STATUS PATIENT QL REPORTED: YES
FERRITIN SERPL-MCNC: 31 NG/ML (ref 11–328)
GLUCOSE SERPL-MCNC: 104 MG/DL (ref 70–99)
GLUCOSE UR STRIP-MCNC: NEGATIVE MG/DL
HAV AB SER QL IA: REACTIVE
HBA1C MFR BLD: 4.6 %
HBV CORE AB SERPL QL IA: NONREACTIVE
HBV SURFACE AB SERPL IA-ACNC: <3.5 M[IU]/ML
HBV SURFACE AB SERPL IA-ACNC: NONREACTIVE M[IU]/ML
HBV SURFACE AG SERPL QL IA: NONREACTIVE
HCG SERPL QL: NEGATIVE
HCT VFR BLD AUTO: 26.8 % (ref 35–47)
HCV AB SERPL QL IA: NONREACTIVE
HDLC SERPL-MCNC: 57 MG/DL
HGB BLD-MCNC: 8.5 G/DL (ref 11.7–15.7)
HGB UR QL STRIP: NEGATIVE
HIV 1+2 AB+HIV1 P24 AG SERPL QL IA: NONREACTIVE
INR PPP: 1.93 (ref 0.85–1.15)
IRON BINDING CAPACITY (ROCHE): 354 UG/DL (ref 240–430)
IRON SATN MFR SERPL: 13 % (ref 15–46)
IRON SERPL-MCNC: 47 UG/DL (ref 37–145)
KETONES UR STRIP-MCNC: NEGATIVE MG/DL
LDLC SERPL CALC-MCNC: 160 MG/DL
LEUKOCYTE ESTERASE UR QL STRIP: ABNORMAL
LVEF ECHO: NORMAL
MCH RBC QN AUTO: 27.9 PG (ref 26.5–33)
MCHC RBC AUTO-ENTMCNC: 31.7 G/DL (ref 31.5–36.5)
MCV RBC AUTO: 88 FL (ref 78–100)
MUCOUS THREADS #/AREA URNS LPF: PRESENT /LPF
NITRATE UR QL: NEGATIVE
NONHDLC SERPL-MCNC: 177 MG/DL
PH UR STRIP: 6.5 [PH] (ref 5–7)
PHOSPHATE SERPL-MCNC: 3.6 MG/DL (ref 2.5–4.5)
PLATELET # BLD AUTO: 84 10E3/UL (ref 150–450)
POTASSIUM SERPL-SCNC: 4 MMOL/L (ref 3.4–5.3)
PROT SERPL-MCNC: 7.5 G/DL (ref 6.4–8.3)
PROT/CREAT 24H UR: 0.1 MG/MG CR (ref 0–0.2)
RBC # BLD AUTO: 3.05 10E6/UL (ref 3.8–5.2)
RBC URINE: <1 /HPF
SODIUM SERPL-SCNC: 135 MMOL/L (ref 135–145)
SP GR UR STRIP: 1.02 (ref 1–1.03)
SPECIMEN EXPIRATION DATE: NORMAL
SPECIMEN EXPIRATION DATE: NORMAL
SQUAMOUS EPITHELIAL: 9 /HPF
T PALLIDUM AB SER QL: NONREACTIVE
TRANSFERRIN SERPL-MCNC: 304 MG/DL (ref 200–360)
TRANSITIONAL EPI: <1 /HPF
TRIGL SERPL-MCNC: 86 MG/DL
TSH SERPL DL<=0.005 MIU/L-ACNC: 2.08 UIU/ML (ref 0.3–4.2)
UROBILINOGEN UR STRIP-MCNC: 3 MG/DL
VIT D+METAB SERPL-MCNC: 23 NG/ML (ref 20–50)
WBC # BLD AUTO: 3.2 10E3/UL (ref 4–11)
WBC URINE: 7 /HPF

## 2024-05-21 PROCEDURE — 84466 ASSAY OF TRANSFERRIN: CPT | Performed by: STUDENT IN AN ORGANIZED HEALTH CARE EDUCATION/TRAINING PROGRAM

## 2024-05-21 PROCEDURE — 84100 ASSAY OF PHOSPHORUS: CPT | Performed by: PATHOLOGY

## 2024-05-21 PROCEDURE — 86481 TB AG RESPONSE T-CELL SUSP: CPT | Performed by: STUDENT IN AN ORGANIZED HEALTH CARE EDUCATION/TRAINING PROGRAM

## 2024-05-21 PROCEDURE — 83550 IRON BINDING TEST: CPT | Performed by: PATHOLOGY

## 2024-05-21 PROCEDURE — 86706 HEP B SURFACE ANTIBODY: CPT | Performed by: STUDENT IN AN ORGANIZED HEALTH CARE EDUCATION/TRAINING PROGRAM

## 2024-05-21 PROCEDURE — 84443 ASSAY THYROID STIM HORMONE: CPT | Performed by: PATHOLOGY

## 2024-05-21 PROCEDURE — 82306 VITAMIN D 25 HYDROXY: CPT | Performed by: STUDENT IN AN ORGANIZED HEALTH CARE EDUCATION/TRAINING PROGRAM

## 2024-05-21 PROCEDURE — 83036 HEMOGLOBIN GLYCOSYLATED A1C: CPT | Performed by: STUDENT IN AN ORGANIZED HEALTH CARE EDUCATION/TRAINING PROGRAM

## 2024-05-21 PROCEDURE — 86780 TREPONEMA PALLIDUM: CPT | Performed by: STUDENT IN AN ORGANIZED HEALTH CARE EDUCATION/TRAINING PROGRAM

## 2024-05-21 PROCEDURE — 99204 OFFICE O/P NEW MOD 45 MIN: CPT | Performed by: TRANSPLANT SURGERY

## 2024-05-21 PROCEDURE — 82248 BILIRUBIN DIRECT: CPT | Performed by: PATHOLOGY

## 2024-05-21 PROCEDURE — 87340 HEPATITIS B SURFACE AG IA: CPT | Performed by: STUDENT IN AN ORGANIZED HEALTH CARE EDUCATION/TRAINING PROGRAM

## 2024-05-21 PROCEDURE — 93306 TTE W/DOPPLER COMPLETE: CPT | Performed by: INTERNAL MEDICINE

## 2024-05-21 PROCEDURE — 86644 CMV ANTIBODY: CPT | Performed by: STUDENT IN AN ORGANIZED HEALTH CARE EDUCATION/TRAINING PROGRAM

## 2024-05-21 PROCEDURE — 83540 ASSAY OF IRON: CPT | Performed by: PATHOLOGY

## 2024-05-21 PROCEDURE — 81001 URINALYSIS AUTO W/SCOPE: CPT | Mod: XU | Performed by: PATHOLOGY

## 2024-05-21 PROCEDURE — 86886 COOMBS TEST INDIRECT TITER: CPT | Performed by: STUDENT IN AN ORGANIZED HEALTH CARE EDUCATION/TRAINING PROGRAM

## 2024-05-21 PROCEDURE — 93975 VASCULAR STUDY: CPT | Mod: GC | Performed by: RADIOLOGY

## 2024-05-21 PROCEDURE — 86665 EPSTEIN-BARR CAPSID VCA: CPT | Performed by: STUDENT IN AN ORGANIZED HEALTH CARE EDUCATION/TRAINING PROGRAM

## 2024-05-21 PROCEDURE — 76700 US EXAM ABDOM COMPLETE: CPT | Mod: GC | Performed by: RADIOLOGY

## 2024-05-21 PROCEDURE — 86708 HEPATITIS A ANTIBODY: CPT | Performed by: STUDENT IN AN ORGANIZED HEALTH CARE EDUCATION/TRAINING PROGRAM

## 2024-05-21 PROCEDURE — 93000 ELECTROCARDIOGRAM COMPLETE: CPT | Performed by: INTERNAL MEDICINE

## 2024-05-21 PROCEDURE — 99213 OFFICE O/P EST LOW 20 MIN: CPT | Performed by: TRANSPLANT SURGERY

## 2024-05-21 PROCEDURE — 71046 X-RAY EXAM CHEST 2 VIEWS: CPT | Performed by: RADIOLOGY

## 2024-05-21 PROCEDURE — 84703 CHORIONIC GONADOTROPIN ASSAY: CPT | Performed by: PATHOLOGY

## 2024-05-21 PROCEDURE — 86900 BLOOD TYPING SEROLOGIC ABO: CPT | Performed by: STUDENT IN AN ORGANIZED HEALTH CARE EDUCATION/TRAINING PROGRAM

## 2024-05-21 PROCEDURE — 85610 PROTHROMBIN TIME: CPT | Performed by: PATHOLOGY

## 2024-05-21 PROCEDURE — 80053 COMPREHEN METABOLIC PANEL: CPT | Performed by: PATHOLOGY

## 2024-05-21 PROCEDURE — 36415 COLL VENOUS BLD VENIPUNCTURE: CPT | Performed by: PATHOLOGY

## 2024-05-21 PROCEDURE — 86704 HEP B CORE ANTIBODY TOTAL: CPT | Performed by: STUDENT IN AN ORGANIZED HEALTH CARE EDUCATION/TRAINING PROGRAM

## 2024-05-21 PROCEDURE — 82105 ALPHA-FETOPROTEIN SERUM: CPT | Performed by: STUDENT IN AN ORGANIZED HEALTH CARE EDUCATION/TRAINING PROGRAM

## 2024-05-21 PROCEDURE — 80307 DRUG TEST PRSMV CHEM ANLYZR: CPT | Mod: 90 | Performed by: PATHOLOGY

## 2024-05-21 PROCEDURE — 80061 LIPID PANEL: CPT | Performed by: PATHOLOGY

## 2024-05-21 PROCEDURE — 99000 SPECIMEN HANDLING OFFICE-LAB: CPT | Performed by: PATHOLOGY

## 2024-05-21 PROCEDURE — 82728 ASSAY OF FERRITIN: CPT | Performed by: PATHOLOGY

## 2024-05-21 PROCEDURE — 86803 HEPATITIS C AB TEST: CPT | Performed by: STUDENT IN AN ORGANIZED HEALTH CARE EDUCATION/TRAINING PROGRAM

## 2024-05-21 PROCEDURE — 85027 COMPLETE CBC AUTOMATED: CPT | Performed by: PATHOLOGY

## 2024-05-21 PROCEDURE — 84156 ASSAY OF PROTEIN URINE: CPT | Mod: XU | Performed by: PATHOLOGY

## 2024-05-21 PROCEDURE — G0480 DRUG TEST DEF 1-7 CLASSES: HCPCS | Mod: 90 | Performed by: PATHOLOGY

## 2024-05-21 NOTE — LETTER
"    5/21/2024         RE: Hannah Sutherland  [address]  [place] [facility] [place] [address]        Dear Colleague,    Thank you for referring your patient, Hannah Sutherland, to the [facility]. Please see a copy of my visit note below.    \"Much or all of the text in this note was generated through the use of Dragon Dictate voice to text software. Errors in spelling or words which appear to be out of context are unintentional, may be present due having escaped editing\"    Assessment and Plan:  1. liver transplant evaluation - patient is a good candidate overall. Benefits and surgical risks of a liver transplantation were discussed.  2.  End stage liver disease due to Laennec's    Surgical evaluation:  1. Portal Vein:Patent  2. Hepatic Artery: Open  3. TIPS: absent  4. Previous Abdominal Surgery: Yes  5. Hepatocellular Carcinoma: None  6. Ascites: Present - minimal  7. Costal Angle: wide  8. Portopulmonary Hypertension: absent  9. Hepatopulmonary Syndrome: Asthma  10. Cardiac Evaluation: needs stress echocardiogram  11. Nutritional Status: Moderate  12. Diabetes: no  13.Hypertension past  14. Smoker:no  14: Fraility index:no  15. Meets guidelines to receive Living Donor  Yes -   ...  16. Potential Living donors Yes -   a few potentials  MELD 3.0: 22 at 5/21/2024  7:57 AM  MELD-Na: 21 at 5/21/2024  7:57 AM  Calculated from:  Serum Creatinine: 0.71 mg/dL (Using min of 1 mg/dL) at 5/21/2024  7:57 AM  Serum Sodium: 135 mmol/L at 5/21/2024  7:57 AM  Total Bilirubin: 5.4 mg/dL at 5/21/2024  7:57 AM  Serum Albumin: 3.2 g/dL at 5/21/2024  7:57 AM  INR(ratio): 1.93 at 5/21/2024  7:57 AM  Age at listing (hypothetical): 53 years  Sex: Female at 5/21/2024  7:57 AM     Recommendations:   Complete evaluation and consider living donation.  No surgical contraindications Elisa Zimmerman overall evaluation will be discussed at the Liver Transplant selection committee meeting with a final " recommendation on the patients suitability for transplant to be made at that time.    Consult Full  Details:  Hannah Sutherland was seen in consultation at the request of Dr. Elisa Mantilla for evaluation as a potential liver transplant recipient.    Reason for Visit:  Hannah Sutherland is a 53 year old year old female with Laennec's, who presents for liver transplant evaluation.    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  She was admitted with alcoholic acute hepatitis.  She was medically managed.  She then went on to develop ascites as decompensation of her liver disease.  She is currently on diuretics.  No history of variceal bleeding.  She does give history suggestive of mild encephalopathy.    She describes her last alcoholic drink as November 2023.    Present was her mother who was very supportive.    No past medical history on file.  No past surgical history on file.  No past surgical history on file.  No family history on file.  Allergies   Allergen Reactions     Penicillins Rash and Unknown     Latex Other (See Comments)     History of asthma.     Erythromycin Nausea and Vomiting     Prior to Admission medications    Medication Sig Start Date End Date Taking? Authorizing Provider   albuterol (VENTOLIN HFA) 108 (90 Base) MCG/ACT inhaler Inhale 2 puffs into the lungs every 4 hours as needed for shortness of breath 3/5/24  Yes Reported, Patient   budesonide (PULMICORT FLEXHALER) 90 MCG/ACT inhaler Inhale 1 puff into the lungs as needed 2/14/24 2/13/25 Yes Reported, Patient   escitalopram (LEXAPRO) 20 MG tablet Take 1 tablet by mouth every morning 3/5/24 3/5/25 Yes Reported, Patient   folic acid (FOLVITE) 1 MG tablet Take 1 tablet by mouth every morning 1/30/24  Yes Reported, Patient   furosemide (LASIX) 40 MG tablet Take 1 tablet (40 mg) by mouth daily for 360 days 4/12/24 4/7/25 Yes Elisa Santiago MD   magnesium oxide (MAG-OX) 400 MG  "tablet Take 400 mg by mouth 2 times daily 1/30/24  Yes Reported, Patient   Multiple Vitamin (QUINTABS) TABS Take 1 tablet by mouth every morning 2/14/24 2/8/25 Yes Reported, Patient   pantoprazole (PROTONIX) 40 MG EC tablet Take 1 tablet by mouth every morning 3/5/24  Yes Reported, Patient   spironolactone (ALDACTONE) 50 MG tablet Take 1 tablet (50 mg) by mouth daily for 360 days 4/12/24 4/7/25 Yes Elisa Santiago MD   thiamine (B-1) 100 MG tablet Take 100 mg by mouth daily 12/29/23  Yes Reported, Patient       Previous Transplant Hx: No    Cardiovascular Hx:       h/o Cardiac Issues: No       Exercise Tolerance: no chest pain or shortness of breath with exertion.    Potential Donor(s): No    ROS:    REVIEW OF SYSTEMS (check box if normal)  [x]                GENERAL  [x]                  PULMONARY [x]                 GENITOURINARY  [x]                 CNS                 [x]                  CARDIAC  [x]                  ENDOCRINE  [x]                 EARS,NOSE,THROAT [x]                  GASTROINTESTINAL [x]                  NEUROLOGIC    [x]                 MUSCLOSKELTAL  [x]                   HEMATOLOGY    Examination:     Vitals:  /62 (BP Location: Right arm, Patient Position: Chair, Cuff Size: Adult Regular)   Pulse 70   Temp 98  F (36.7  C) (Oral)   Ht 1.615 m (5' 3.58\")   Wt 85.9 kg (189 lb 6 oz)   SpO2 99%   BMI 32.93 kg/m      GENERAL APPEARANCE: alert and no distress; icteric  EYES: PERRL  HENT: mouth without ulcers or lesions  NECK: supple, no adenopathy  RESP: lungs clear to auscultation - no rales, rhonchi or wheezes  CV: regular rhythm, normal rate, no rub   ABDOMEN:  soft, nontender, no HSM or masses and bowel sounds normal  MS: extremities normal- no gross deformities noted, no evidence of inflammation in joints, no muscle tenderness  SKIN: no rash  NEURO: Normal strength and tone, sensory exam grossly normal, mentation intact and speech normal  PSYCH: mentation appears normal. and affect " normal/bright      Results:   Recent Results (from the past 168 hour(s))   ABO and Rh    Collection Time: 05/21/24  7:55 AM   Result Value Ref Range    ABO/RH(D) O POS     SPECIMEN EXPIRATION DATE [id number]    Lipid Profile    Collection Time: 05/21/24  7:57 AM   Result Value Ref Range    Cholesterol 234 (H) <200 mg/dL    Triglycerides 86 <150 mg/dL    Direct Measure HDL 57 >=50 mg/dL    LDL Cholesterol Calculated 160 (H) <=100 mg/dL    Non HDL Cholesterol 177 (H) <130 mg/dL    Patient Fasting > 8hrs? Yes    Basic metabolic panel    Collection Time: 05/21/24  7:57 AM   Result Value Ref Range    Sodium 135 135 - 145 mmol/L    Potassium 4.0 3.4 - 5.3 mmol/L    Chloride 100 98 - 107 mmol/L    Carbon Dioxide (CO2) 27 22 - 29 mmol/L    Anion Gap 8 7 - 15 mmol/L    Urea Nitrogen 10.3 6.0 - 20.0 mg/dL    Creatinine 0.71 0.51 - 0.95 mg/dL    GFR Estimate >90 >60 mL/min/1.73m2    Calcium 9.3 8.6 - 10.0 mg/dL    Glucose 104 (H) 70 - 99 mg/dL    Patient Fasting > 8hrs? Yes    Hepatic panel    Collection Time: 05/21/24  7:57 AM   Result Value Ref Range    Protein Total 7.5 6.4 - 8.3 g/dL    Albumin 3.2 (L) 3.5 - 5.2 g/dL    Bilirubin Total 5.4 (H) <=1.2 mg/dL    Alkaline Phosphatase 109 40 - 150 U/L    AST 46 (H) 0 - 45 U/L    ALT 13 0 - 50 U/L    Bilirubin Direct 2.33 (H) 0.00 - 0.30 mg/dL   Ferritin    Collection Time: 05/21/24  7:57 AM   Result Value Ref Range    Ferritin 31 11 - 328 ng/mL   HCG qualitative (female only)    Collection Time: 05/21/24  7:57 AM   Result Value Ref Range    hCG Serum Qualitative Negative Negative   Iron and iron binding capacity    Collection Time: 05/21/24  7:57 AM   Result Value Ref Range    Iron 47 37 - 145 ug/dL    Iron Binding Capacity 354 240 - 430 ug/dL    Iron Sat Index 13 (L) 15 - 46 %   Phosphorus    Collection Time: 05/21/24  7:57 AM   Result Value Ref Range    Phosphorus 3.6 2.5 - 4.5 mg/dL   TSH with free T4 reflex    Collection Time: 05/21/24  7:57 AM   Result Value Ref Range     TSH 2.08 0.30 - 4.20 uIU/mL   INR    Collection Time: 05/21/24  7:57 AM   Result Value Ref Range    INR 1.93 (H) 0.85 - 1.15   CBC with platelets    Collection Time: 05/21/24  7:57 AM   Result Value Ref Range    WBC Count 3.2 (L) 4.0 - 11.0 10e3/uL    RBC Count 3.05 (L) 3.80 - 5.20 10e6/uL    Hemoglobin 8.5 (L) 11.7 - 15.7 g/dL    Hematocrit 26.8 (L) 35.0 - 47.0 %    MCV 88 78 - 100 fL    MCH 27.9 26.5 - 33.0 pg    MCHC 31.7 31.5 - 36.5 g/dL    RDW 17.3 (H) 10.0 - 15.0 %    Platelet Count 84 (L) 150 - 450 10e3/uL   Adult Type and Screen    Collection Time: 05/21/24  7:57 AM   Result Value Ref Range    ABO/RH(D) O POS     Antibody Screen Negative Negative    SPECIMEN EXPIRATION DATE [id number]    Protein  random urine    Collection Time: 05/21/24  7:59 AM   Result Value Ref Range    Total Protein Urine mg/dL 18.8   mg/dL    Total Protein Urine mg/mg Creat 0.10 0.00 - 0.20 mg/mg Cr    Creatinine Urine mg/dL 189.0 mg/dL   UA Macroscopic with reflex to Microscopic and Culture    Collection Time: 05/21/24  7:59 AM    Specimen: Urine, Midstream   Result Value Ref Range    Color Urine Yellow Colorless, Straw, Light Yellow, Yellow    Appearance Urine Clear Clear    Glucose Urine Negative Negative mg/dL    Bilirubin Urine Negative Negative    Ketones Urine Negative Negative mg/dL    Specific Gravity Urine 1.019 1.003 - 1.035    Blood Urine Negative Negative    pH Urine 6.5 5.0 - 7.0    Protein Albumin Urine Negative Negative mg/dL    Urobilinogen Urine 3.0 (A) Normal, 2.0 mg/dL    Nitrite Urine Negative Negative    Leukocyte Esterase Urine Trace (A) Negative    Mucus Urine Present (A) None Seen /LPF    RBC Urine <1 <=2 /HPF    WBC Urine 7 (H) <=5 /HPF    Squamous Epithelials Urine 9 (H) <=1 /HPF    Transitional Epithelials Urine <1 <=1 /HPF   EKG 12-lead, tracing only [EKG1]    Collection Time: 05/21/24  8:08 AM   Result Value Ref Range    Systolic Blood Pressure  mmHg    Diastolic Blood Pressure  mmHg    Ventricular  Rate 69 BPM    Atrial Rate 69 BPM    NC Interval 162 ms    QRS Duration 98 ms     ms    QTc 516 ms    P Axis 58 degrees    R AXIS -17 degrees    T Axis 31 degrees    Interpretation ECG       Sinus rhythm  Possible Left atrial enlargement  Septal infarct , age undetermined  Prolonged QT  Abnormal ECG  No previous ECGs available       I had a long discussion with the patient regarding liver transplantation which included but was not limited to  the following points:    Liver transplant selection committee process.  The federal rules for cadaveric waiting list, the size and blood type matching of the organ. The availability of living-related donor transplantation.  The types of donors: brain death donors, non-heart beating donors, partial liver grafts: splits and living donor grafts  Extended criteria  Donors (older age, steasosis) and the increased  risk of primary non-function using the extended criteria donors  [facility] high risk donors,  Risk of donor transmitted infections and donor transmitted malignancy  The liver transplant operation and the associated risks and technical complications which can include intraoperative death, post operative death,  Primary non-function, bleeding requiring re-operations, arterial and biliary complications, bowel perforations, and intra abdominal abscess. Some of these complicaitons may require a second operation.  The postoperative course, the ICU stay and risk of postoperative complications which can include sepsis, MI, stroke, brain injury, pneumonia, pleural effusions, and renal dysfunction.  The current 1 year and 5 year graft and patient survivals.  The need for life long immunosuppressive therapy and the side effects of these medications, including the possibility of toxicity, opportunistic infections, risk of cancer including lymphoma, and the possibility of rejection even if the patient is taking the medication exactly as prescribed.  The need for compliance with  medications and follow-up visits in the clinic and thereafter.  The patient and family understand these risks and wish to proceed to transplantation              Again, thank you for allowing me to participate in the care of your patient.        Sincerely,        Blas Carney MD

## 2024-05-21 NOTE — PROGRESS NOTES
"\"Much or all of the text in this note was generated through the use of Dragon Dictate voice to text software. Errors in spelling or words which appear to be out of context are unintentional, may be present due having escaped editing\"    Assessment and Plan:  1. liver transplant evaluation - patient is a good candidate overall. Benefits and surgical risks of a liver transplantation were discussed.  2.  End stage liver disease due to Laennec's    Surgical evaluation:  1. Portal Vein:Patent  2. Hepatic Artery: Open  3. TIPS: absent  4. Previous Abdominal Surgery: Yes  5. Hepatocellular Carcinoma: None  6. Ascites: Present - minimal  7. Costal Angle: wide  8. Portopulmonary Hypertension: absent  9. Hepatopulmonary Syndrome: Asthma  10. Cardiac Evaluation: needs stress echocardiogram  11. Nutritional Status: Moderate  12. Diabetes: no  13.Hypertension past  14. Smoker:no  14: Fraility index:no  15. Meets guidelines to receive Living Donor  Yes -   ...  16. Potential Living donors Yes -   a few potentials  MELD 3.0: 22 at 5/21/2024  7:57 AM  MELD-Na: 21 at 5/21/2024  7:57 AM  Calculated from:  Serum Creatinine: 0.71 mg/dL (Using min of 1 mg/dL) at 5/21/2024  7:57 AM  Serum Sodium: 135 mmol/L at 5/21/2024  7:57 AM  Total Bilirubin: 5.4 mg/dL at 5/21/2024  7:57 AM  Serum Albumin: 3.2 g/dL at 5/21/2024  7:57 AM  INR(ratio): 1.93 at 5/21/2024  7:57 AM  Age at listing (hypothetical): 53 years  Sex: Female at 5/21/2024  7:57 AM     Recommendations:   Complete evaluation and consider living donation.  No surgical contraindications Elisa Mantilla      Patients overall evaluation will be discussed at the Liver Transplant selection committee meeting with a final recommendation on the patients suitability for transplant to be made at that time.    Consult Full  Details:  Hannah Sutherland was seen in consultation at the request of Dr. Elisa Mantilla for evaluation as a potential liver transplant recipient.    Reason for " Visit:  Hannah Sutherland is a 53 year old year old female with Laennec's, who presents for liver transplant evaluation.    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  She was admitted with alcoholic acute hepatitis.  She was medically managed.  She then went on to develop ascites as decompensation of her liver disease.  She is currently on diuretics.  No history of variceal bleeding.  She does give history suggestive of mild encephalopathy.    She describes her last alcoholic drink as November 2023.    Present was her mother who was very supportive.    No past medical history on file.  No past surgical history on file.  No past surgical history on file.  No family history on file.  Allergies   Allergen Reactions    Penicillins Rash and Unknown    Latex Other (See Comments)     History of asthma.    Erythromycin Nausea and Vomiting     Prior to Admission medications    Medication Sig Start Date End Date Taking? Authorizing Provider   albuterol (VENTOLIN HFA) 108 (90 Base) MCG/ACT inhaler Inhale 2 puffs into the lungs every 4 hours as needed for shortness of breath 3/5/24  Yes Reported, Patient   budesonide (PULMICORT FLEXHALER) 90 MCG/ACT inhaler Inhale 1 puff into the lungs as needed 2/14/24 2/13/25 Yes Reported, Patient   escitalopram (LEXAPRO) 20 MG tablet Take 1 tablet by mouth every morning 3/5/24 3/5/25 Yes Reported, Patient   folic acid (FOLVITE) 1 MG tablet Take 1 tablet by mouth every morning 1/30/24  Yes Reported, Patient   furosemide (LASIX) 40 MG tablet Take 1 tablet (40 mg) by mouth daily for 360 days 4/12/24 4/7/25 Yes Elisa Santiago MD   magnesium oxide (MAG-OX) 400 MG tablet Take 400 mg by mouth 2 times daily 1/30/24  Yes Reported, Patient   Multiple Vitamin (QUINTABS) TABS Take 1 tablet by mouth every morning 2/14/24 2/8/25 Yes Reported, Patient   pantoprazole (PROTONIX) 40 MG EC tablet Take 1 tablet by mouth every morning 3/5/24  " Yes Reported, Patient   spironolactone (ALDACTONE) 50 MG tablet Take 1 tablet (50 mg) by mouth daily for 360 days 4/12/24 4/7/25 Yes Elisa Santiago MD   thiamine (B-1) 100 MG tablet Take 100 mg by mouth daily 12/29/23  Yes Reported, Patient       Previous Transplant Hx: No    Cardiovascular Hx:       h/o Cardiac Issues: No       Exercise Tolerance: no chest pain or shortness of breath with exertion.    Potential Donor(s): No    ROS:    REVIEW OF SYSTEMS (check box if normal)  [x]                GENERAL  [x]                  PULMONARY [x]                 GENITOURINARY  [x]                 CNS                 [x]                  CARDIAC  [x]                  ENDOCRINE  [x]                 EARS,NOSE,THROAT [x]                  GASTROINTESTINAL [x]                  NEUROLOGIC    [x]                 MUSCLOSKELTAL  [x]                   HEMATOLOGY    Examination:     Vitals:  /62 (BP Location: Right arm, Patient Position: Chair, Cuff Size: Adult Regular)   Pulse 70   Temp 98  F (36.7  C) (Oral)   Ht 1.615 m (5' 3.58\")   Wt 85.9 kg (189 lb 6 oz)   SpO2 99%   BMI 32.93 kg/m      GENERAL APPEARANCE: alert and no distress; icteric  EYES: PERRL  HENT: mouth without ulcers or lesions  NECK: supple, no adenopathy  RESP: lungs clear to auscultation - no rales, rhonchi or wheezes  CV: regular rhythm, normal rate, no rub   ABDOMEN:  soft, nontender, no HSM or masses and bowel sounds normal  MS: extremities normal- no gross deformities noted, no evidence of inflammation in joints, no muscle tenderness  SKIN: no rash  NEURO: Normal strength and tone, sensory exam grossly normal, mentation intact and speech normal  PSYCH: mentation appears normal. and affect normal/bright      Results:   Recent Results (from the past 168 hour(s))   ABO and Rh    Collection Time: 05/21/24  7:55 AM   Result Value Ref Range    ABO/RH(D) O POS     SPECIMEN EXPIRATION DATE [id number]    Lipid Profile    Collection Time: 05/21/24  7:57 AM "   Result Value Ref Range    Cholesterol 234 (H) <200 mg/dL    Triglycerides 86 <150 mg/dL    Direct Measure HDL 57 >=50 mg/dL    LDL Cholesterol Calculated 160 (H) <=100 mg/dL    Non HDL Cholesterol 177 (H) <130 mg/dL    Patient Fasting > 8hrs? Yes    Basic metabolic panel    Collection Time: 05/21/24  7:57 AM   Result Value Ref Range    Sodium 135 135 - 145 mmol/L    Potassium 4.0 3.4 - 5.3 mmol/L    Chloride 100 98 - 107 mmol/L    Carbon Dioxide (CO2) 27 22 - 29 mmol/L    Anion Gap 8 7 - 15 mmol/L    Urea Nitrogen 10.3 6.0 - 20.0 mg/dL    Creatinine 0.71 0.51 - 0.95 mg/dL    GFR Estimate >90 >60 mL/min/1.73m2    Calcium 9.3 8.6 - 10.0 mg/dL    Glucose 104 (H) 70 - 99 mg/dL    Patient Fasting > 8hrs? Yes    Hepatic panel    Collection Time: 05/21/24  7:57 AM   Result Value Ref Range    Protein Total 7.5 6.4 - 8.3 g/dL    Albumin 3.2 (L) 3.5 - 5.2 g/dL    Bilirubin Total 5.4 (H) <=1.2 mg/dL    Alkaline Phosphatase 109 40 - 150 U/L    AST 46 (H) 0 - 45 U/L    ALT 13 0 - 50 U/L    Bilirubin Direct 2.33 (H) 0.00 - 0.30 mg/dL   Ferritin    Collection Time: 05/21/24  7:57 AM   Result Value Ref Range    Ferritin 31 11 - 328 ng/mL   HCG qualitative (female only)    Collection Time: 05/21/24  7:57 AM   Result Value Ref Range    hCG Serum Qualitative Negative Negative   Iron and iron binding capacity    Collection Time: 05/21/24  7:57 AM   Result Value Ref Range    Iron 47 37 - 145 ug/dL    Iron Binding Capacity 354 240 - 430 ug/dL    Iron Sat Index 13 (L) 15 - 46 %   Phosphorus    Collection Time: 05/21/24  7:57 AM   Result Value Ref Range    Phosphorus 3.6 2.5 - 4.5 mg/dL   TSH with free T4 reflex    Collection Time: 05/21/24  7:57 AM   Result Value Ref Range    TSH 2.08 0.30 - 4.20 uIU/mL   INR    Collection Time: 05/21/24  7:57 AM   Result Value Ref Range    INR 1.93 (H) 0.85 - 1.15   CBC with platelets    Collection Time: 05/21/24  7:57 AM   Result Value Ref Range    WBC Count 3.2 (L) 4.0 - 11.0 10e3/uL    RBC Count  3.05 (L) 3.80 - 5.20 10e6/uL    Hemoglobin 8.5 (L) 11.7 - 15.7 g/dL    Hematocrit 26.8 (L) 35.0 - 47.0 %    MCV 88 78 - 100 fL    MCH 27.9 26.5 - 33.0 pg    MCHC 31.7 31.5 - 36.5 g/dL    RDW 17.3 (H) 10.0 - 15.0 %    Platelet Count 84 (L) 150 - 450 10e3/uL   Adult Type and Screen    Collection Time: 05/21/24  7:57 AM   Result Value Ref Range    ABO/RH(D) O POS     Antibody Screen Negative Negative    SPECIMEN EXPIRATION DATE [id number]    Protein  random urine    Collection Time: 05/21/24  7:59 AM   Result Value Ref Range    Total Protein Urine mg/dL 18.8   mg/dL    Total Protein Urine mg/mg Creat 0.10 0.00 - 0.20 mg/mg Cr    Creatinine Urine mg/dL 189.0 mg/dL   UA Macroscopic with reflex to Microscopic and Culture    Collection Time: 05/21/24  7:59 AM    Specimen: Urine, Midstream   Result Value Ref Range    Color Urine Yellow Colorless, Straw, Light Yellow, Yellow    Appearance Urine Clear Clear    Glucose Urine Negative Negative mg/dL    Bilirubin Urine Negative Negative    Ketones Urine Negative Negative mg/dL    Specific Gravity Urine 1.019 1.003 - 1.035    Blood Urine Negative Negative    pH Urine 6.5 5.0 - 7.0    Protein Albumin Urine Negative Negative mg/dL    Urobilinogen Urine 3.0 (A) Normal, 2.0 mg/dL    Nitrite Urine Negative Negative    Leukocyte Esterase Urine Trace (A) Negative    Mucus Urine Present (A) None Seen /LPF    RBC Urine <1 <=2 /HPF    WBC Urine 7 (H) <=5 /HPF    Squamous Epithelials Urine 9 (H) <=1 /HPF    Transitional Epithelials Urine <1 <=1 /HPF   EKG 12-lead, tracing only [EKG1]    Collection Time: 05/21/24  8:08 AM   Result Value Ref Range    Systolic Blood Pressure  mmHg    Diastolic Blood Pressure  mmHg    Ventricular Rate 69 BPM    Atrial Rate 69 BPM    WY Interval 162 ms    QRS Duration 98 ms     ms    QTc 516 ms    P Axis 58 degrees    R AXIS -17 degrees    T Axis 31 degrees    Interpretation ECG       Sinus rhythm  Possible Left atrial enlargement  Septal infarct , age  undetermined  Prolonged QT  Abnormal ECG  No previous ECGs available       I had a long discussion with the patient regarding liver transplantation which included but was not limited to  the following points:    Liver transplant selection committee process.  The federal rules for cadaveric waiting list, the size and blood type matching of the organ. The availability of living-related donor transplantation.  The types of donors: brain death donors, non-heart beating donors, partial liver grafts: splits and living donor grafts  Extended criteria  Donors (older age, steasosis) and the increased  risk of primary non-function using the extended criteria donors  [facility] high risk donors,  Risk of donor transmitted infections and donor transmitted malignancy  The liver transplant operation and the associated risks and technical complications which can include intraoperative death, post operative death,  Primary non-function, bleeding requiring re-operations, arterial and biliary complications, bowel perforations, and intra abdominal abscess. Some of these complicaitons may require a second operation.  The postoperative course, the ICU stay and risk of postoperative complications which can include sepsis, MI, stroke, brain injury, pneumonia, pleural effusions, and renal dysfunction.  The current 1 year and 5 year graft and patient survivals.  The need for life long immunosuppressive therapy and the side effects of these medications, including the possibility of toxicity, opportunistic infections, risk of cancer including lymphoma, and the possibility of rejection even if the patient is taking the medication exactly as prescribed.  The need for compliance with medications and follow-up visits in the clinic and thereafter.  The patient and family understand these risks and wish to proceed to transplantation

## 2024-05-21 NOTE — NURSING NOTE
"Chief Complaint   Patient presents with    Transplant Evaluation     Liver transplant evaluation     Vital signs:  Temp: 98  F (36.7  C) Temp src: Oral BP: 108/62 Pulse: 70     SpO2: 99 %     Height: 161.5 cm (5' 3.58\") Weight: 85.9 kg (189 lb 6 oz)  Estimated body mass index is 32.93 kg/m  as calculated from the following:    Height as of this encounter: 1.615 m (5' 3.58\").    Weight as of this encounter: 85.9 kg (189 lb 6 oz).      Carlitos Gaines RN on 5/21/2024 at 9:36 AM    "

## 2024-05-21 NOTE — PROGRESS NOTES
Psychosocial Assessment  Hannah Sutherland was seen in the Transplant Center as part of her evaluation as a potential liver transplant recipient.  Her mother, Elsa accompanied her to the appointment.   Living Situation: Hannah lives in an apartment in [place], [place] with two of her children, Hope (20), and Henryterocio (16). She does not report any concerns related to her living environment.   [place], [place] is about 72 miles away from our transplant center. We discussed remaining local post transplant with a caregiver for about a month. We reviewed lodging options, and funding. Her mother would be her primary caregiver post transplant and Hannah voiced that she would be able to support a local stay if needed.   Education/Employment:  Hannah completed her masters degree. She currently works for [employer] and previously worked for the [employer]. She's been with her current company for the past 6.5 years. She is not a .   Financial /Income: Hannah's primary and sole source of income is through her employment. She does have some financial concerns related to not working. She does not have access to short term/long term or FMLA due to the size of her company. Her mother voiced that she would be able to assist financially if needed.   Health Insurance: Hannah has BCBS through her employer. We discussed contacting HR re: potential COBRA if her employment were to be terminated. We reviewed market place options.    This writer talked with Hannah about the financial risks of transplant, particularly about the high cost of transplant related medications and the importance of maintaining adequate health insurance coverage.  Family/Social Support: Hannah is single and has six children. Ceasar ([place], [place]), Anand ([place], [place]), Gricelda ([place], [place]), Areli ([place], [place]), and Hope/Mattea live with her. Her mother, Elsa lives in [place], [place] and her father Randell lives in [facility]  [place]. She reports a close relationship with her children and mother. Hannah identified a few friends who would be able to assist.   Her primary caregiver would be her mother, Elsa.   This writer stressed the importance of having a stable and involved support network before and after transplant.  Provided Hannah  with education about the relationship between a stable support system and better surgical and post-transplant outcomes compared to patients with a limited support system.    Functional Status: No functional concerns related to transplantation.   Chemical Dependency:  Hannah's last consumption of alcohol was 11/7/2023. Prior to that she was drinking three 375mL bottles of wine per day (about six drinks per day). She reported that this level of consumption has been occurring for the past 10-12 years. No history of tobacco use, misuse/over use of pain medication or illicit substances. Hannah indicated that she was was told to stop drinking prior to November, 2023 however it wasn't presented to her in an urgent way.   Hannah is currently in treatment through [facility]. She is in phase three of this program and attends 2x/week (one group and one individual). No legal consequences related to substances.   Mental Health: Hannah reports a history of depression. She is prescribed Lexapro through her PCP. She participated in psychotherapy related to her divorce for a brief period of time. No history of psychiatric hospitalizations or current suicidal thoughts.   Adjustment to Illness:  This writer provided Hannah  with supportive counseling throughout this interview.   Impression/Recommendations:   Hannah verbalizes understanding the psychosocial risks of transplant and teaching provided during this evaluation.  Hannah has met the sobriety criteria recommended for listing. She signed a release of information for [facility]. This writer will obtain collateral from her program. MANUELA  from 11/29/23 was 230. All subsequent PEThs have been negative.   Hannah has adequate finances and health insurance for transplant, and an intact support system. Pending records from patient's program, Hannah is an acceptable transplant candidate from a psychosocial perspective.  This writer will remain available to assist patient throughout the evaluation process and will follow patient through transplant if she is listed.  It was a pleasure to evaluate this patient for liver transplant.   Teaching completed during assessment:  1.     Housing and relocation needs post transplant.  2.     Caregiver needs post transplant.  3.     Financial issues related to transplant.  4.     Risks of alcohol use post transplant.  5.     Common psychosocial stressors pre/post transplant.        6.     Liver Transplant support group availability.        7.     Advanced Health Care Directive - She does not have a current health care directive. She reported that she has one filled out, but it is not notarized. She named her Ceasar (son), and Anand (daughter) as her primary health care agents.              Psychosocial Risks of Transplant Reviewed:  1.     Increased stress related to your emotional, family, social, employment, or   financial situation.  2.     Affect on work and/or disability benefits.  3.     Affect on future health and life insurance.  4.     Transplant outcome expectations may not be met.  5.     Mental Health risks: anxiety, depression, PTSD, guilt, grief and chronic fatigue.     NEVIN Torres, LICSW

## 2024-05-22 LAB
ATRIAL RATE - MUSE: 69 BPM
DIASTOLIC BLOOD PRESSURE - MUSE: NORMAL MMHG
EBV VCA IGG SER IA-ACNC: >750 U/ML
EBV VCA IGG SER IA-ACNC: POSITIVE
ETHYL GLUCURONIDE UR QL SCN: NEGATIVE NG/ML
GAMMA INTERFERON BACKGROUND BLD IA-ACNC: 0.03 IU/ML
INTERPRETATION ECG - MUSE: NORMAL
M TB IFN-G BLD-IMP: NEGATIVE
M TB IFN-G CD4+ BCKGRND COR BLD-ACNC: 8.4 IU/ML
MITOGEN IGNF BCKGRD COR BLD-ACNC: 0.2 IU/ML
MITOGEN IGNF BCKGRD COR BLD-ACNC: 0.29 IU/ML
P AXIS - MUSE: 58 DEGREES
PR INTERVAL - MUSE: 162 MS
QRS DURATION - MUSE: 98 MS
QT - MUSE: 482 MS
QTC - MUSE: 516 MS
QUANTIFERON MITOGEN: 8.43 IU/ML
QUANTIFERON NIL TUBE: 0.03 IU/ML
QUANTIFERON TB1 TUBE: 0.23 IU/ML
QUANTIFERON TB2 TUBE: 0.32
R AXIS - MUSE: -17 DEGREES
SYSTOLIC BLOOD PRESSURE - MUSE: NORMAL MMHG
T AXIS - MUSE: 31 DEGREES
VENTRICULAR RATE- MUSE: 69 BPM

## 2024-05-23 LAB
LABORATORY REPORT: NORMAL
PETH INTERPRETATION: NORMAL
PLPETH BLD-MCNC: <10 NG/ML
POPETH BLD-MCNC: <10 NG/ML

## 2024-05-28 ENCOUNTER — HOSPITAL ENCOUNTER (OUTPATIENT)
Dept: CT IMAGING | Facility: CLINIC | Age: 54
Discharge: HOME | End: 2024-05-28
Attending: STUDENT IN AN ORGANIZED HEALTH CARE EDUCATION/TRAINING PROGRAM | Admitting: STUDENT IN AN ORGANIZED HEALTH CARE EDUCATION/TRAINING PROGRAM
Payer: COMMERCIAL

## 2024-05-28 VITALS — SYSTOLIC BLOOD PRESSURE: 90 MMHG | HEART RATE: 61 BPM | DIASTOLIC BLOOD PRESSURE: 47 MMHG | RESPIRATION RATE: 16 BRPM

## 2024-05-28 DIAGNOSIS — K76.6 PORTAL HYPERTENSION (H): ICD-10-CM

## 2024-05-28 DIAGNOSIS — K70.31 ALCOHOLIC CIRRHOSIS OF LIVER WITH ASCITES (H): ICD-10-CM

## 2024-05-28 DIAGNOSIS — K70.30 ALCOHOLIC CIRRHOSIS OF LIVER (H): ICD-10-CM

## 2024-05-28 PROCEDURE — 75574 CT ANGIO HRT W/3D IMAGE: CPT | Mod: 26 | Performed by: STUDENT IN AN ORGANIZED HEALTH CARE EDUCATION/TRAINING PROGRAM

## 2024-05-28 PROCEDURE — 250N000011 HC RX IP 250 OP 636: Performed by: STUDENT IN AN ORGANIZED HEALTH CARE EDUCATION/TRAINING PROGRAM

## 2024-05-28 PROCEDURE — 75574 CT ANGIO HRT W/3D IMAGE: CPT

## 2024-05-28 PROCEDURE — 250N000009 HC RX 250: Performed by: STUDENT IN AN ORGANIZED HEALTH CARE EDUCATION/TRAINING PROGRAM

## 2024-05-28 PROCEDURE — 250N000013 HC RX MED GY IP 250 OP 250 PS 637: Performed by: STUDENT IN AN ORGANIZED HEALTH CARE EDUCATION/TRAINING PROGRAM

## 2024-05-28 RX ORDER — METHYLPREDNISOLONE SODIUM SUCCINATE 125 MG/2ML
125 INJECTION, POWDER, LYOPHILIZED, FOR SOLUTION INTRAMUSCULAR; INTRAVENOUS
Status: DISCONTINUED | OUTPATIENT
Start: 2024-05-28 | End: 2024-05-29 | Stop reason: HOSPADM

## 2024-05-28 RX ORDER — DIPHENHYDRAMINE HYDROCHLORIDE 50 MG/ML
25-50 INJECTION INTRAMUSCULAR; INTRAVENOUS
Status: DISCONTINUED | OUTPATIENT
Start: 2024-05-28 | End: 2024-05-29 | Stop reason: HOSPADM

## 2024-05-28 RX ORDER — ACYCLOVIR 200 MG/1
0-1 CAPSULE ORAL
Status: DISCONTINUED | OUTPATIENT
Start: 2024-05-28 | End: 2024-05-29 | Stop reason: HOSPADM

## 2024-05-28 RX ORDER — METOPROLOL TARTRATE 25 MG/1
25-100 TABLET, FILM COATED ORAL
Status: DISCONTINUED | OUTPATIENT
Start: 2024-05-28 | End: 2024-05-29 | Stop reason: HOSPADM

## 2024-05-28 RX ORDER — METOPROLOL TARTRATE 1 MG/ML
5-15 INJECTION, SOLUTION INTRAVENOUS
Status: DISCONTINUED | OUTPATIENT
Start: 2024-05-28 | End: 2024-05-29 | Stop reason: HOSPADM

## 2024-05-28 RX ORDER — DILTIAZEM HYDROCHLORIDE 120 MG/1
120 TABLET, FILM COATED ORAL
Status: DISCONTINUED | OUTPATIENT
Start: 2024-05-28 | End: 2024-05-29 | Stop reason: HOSPADM

## 2024-05-28 RX ORDER — DIPHENHYDRAMINE HCL 25 MG
25 CAPSULE ORAL
Status: DISCONTINUED | OUTPATIENT
Start: 2024-05-28 | End: 2024-05-29 | Stop reason: HOSPADM

## 2024-05-28 RX ORDER — IVABRADINE 5 MG/1
5-15 TABLET, FILM COATED ORAL
Status: DISCONTINUED | OUTPATIENT
Start: 2024-05-28 | End: 2024-05-29 | Stop reason: HOSPADM

## 2024-05-28 RX ORDER — IOPAMIDOL 755 MG/ML
110 INJECTION, SOLUTION INTRAVASCULAR ONCE
Status: COMPLETED | OUTPATIENT
Start: 2024-05-28 | End: 2024-05-28

## 2024-05-28 RX ORDER — DILTIAZEM HYDROCHLORIDE 5 MG/ML
10-15 INJECTION INTRAVENOUS
Status: DISCONTINUED | OUTPATIENT
Start: 2024-05-28 | End: 2024-05-29 | Stop reason: HOSPADM

## 2024-05-28 RX ORDER — NITROGLYCERIN 0.4 MG/1
.4-.8 TABLET SUBLINGUAL
Status: DISCONTINUED | OUTPATIENT
Start: 2024-05-28 | End: 2024-05-29 | Stop reason: HOSPADM

## 2024-05-28 RX ORDER — ONDANSETRON 2 MG/ML
4 INJECTION INTRAMUSCULAR; INTRAVENOUS
Status: DISCONTINUED | OUTPATIENT
Start: 2024-05-28 | End: 2024-05-29 | Stop reason: HOSPADM

## 2024-05-28 RX ADMIN — IOPAMIDOL 110 ML: 755 INJECTION, SOLUTION INTRAVENOUS at 09:22

## 2024-05-28 RX ADMIN — METOPROLOL TARTRATE 10 MG: 5 INJECTION INTRAVENOUS at 09:07

## 2024-05-28 RX ADMIN — NITROGLYCERIN 0.8 MG: 0.4 TABLET SUBLINGUAL at 09:11

## 2024-05-28 NOTE — PROGRESS NOTES
Pt arrived for Coronary CT angiogram. Test, meds and side effects reviewed with pt. Resting HR 58 bpm. Administered 0.8 mg SL nitro and 10 mg IV metoprolol on CTA table per order. CTA completed. Patient tolerated procedure well and denies symptoms of allergic reaction. Post monitoring completed and VSS. D/C instructions reviewed with pt whom verbalized understanding of need to increase PO fluids today. D/C to gold waiting room accompanied by staff.

## 2024-05-30 ENCOUNTER — TELEPHONE (OUTPATIENT)
Dept: TRANSPLANT | Facility: CLINIC | Age: 54
End: 2024-05-30
Payer: COMMERCIAL

## 2024-05-30 NOTE — TELEPHONE ENCOUNTER
Transplant Social Work Services Phone Call    Data: Hannah is in evaluation for liver transplant   Intervention:   Huma through [facility] addiction recovery services (ph: [phone]). I spoke with Huma via phone regarding patients progress and participation in treatment. Huma confirmed that she started treatment in December 2023 and has been engaged in all sessions. Huma feels like she has made progress in her sobriety and does not have any ongoing concerns. No concerns with her participation or compliance with treatment. Huma will send me her comprehensive assessment and treatment plan. I also requested the discharge summary when available. Huma will be done with treatment near the end of June. I provided some education about transplantation with our center. No further questions.     Assessment: Hannah has been appropriately engaged in substance use treatment.   Education provided by JADEN: See above.   Plan: I will continue to follow for transplant evaluation. Waiting for comprehensive assessment, treatment plan and discharge summary (when available)     NEVIN Torres, [facility]  Liver Transplant   [facility]  Phone: [phone]

## 2024-05-31 NOTE — LETTER
PHYSICIAN ORDERS    Faxed to [facility] - Attn: Orders at [phone]  DATE & TIME ISSUED: May 31, 2024 2:25 PM  PATIENT NAME: Hannah Sutherland   : 1970     [facility] MR# : [id number]     DIAGNOSIS:  etoh cirrhosis  ICD-10 CODE: K70.30     Orders  1 year after issue. Please enter as Standing Orders to be drawn Once Weekly as needed, for up to 52 occurrences, at patient/transplant coordinator discretion.These labs must be drawn all together on the same day when done:   INR   BMP        Hepatic Panel        CBC with platelets        Phosphatidyethanol (PEth) (to be drawn once every three months, 4 occurrences)     PLEASE FAX RESULTS AS SOON AS POSSIBLE TO [phone], ATTN:LabDE    FOR CLINICAL QUESTIONS, PLEASE CALL Torin Mendieta RN at [phone].    Thank you kindly,    Elisa Santiago MD

## 2024-05-31 NOTE — PROGRESS NOTES
Pre-Liver Transplant Evaluation/Teaching    TEACHING TOPICS: Evaluation Process, Evaluation Items, Diagnostic Studies, Consultation, Chemical Dependency Policy, CD Eval, Donor Source, Liver Allocation, MELD System, UNOS, Waiting List, Follow up while on transplant list, Follow up after transplantation, Infection and Rejection, Immunosuppression , Medication Teaching, Lab Recording after transplant, Laboratory Frequency after transplant , Consent for evaluation and One year survival rates    INSTRUCTIONAL MATERIAL USED/GIVEN:   Liver Transplant Handbook, MELD Booklet, Donor Booklet, Web Sites Options, Verbal instructions, Multiple Listing Brochure , Consent for evaluation signed, One year survival rates and SRTR (Scientific Registry) Data    Person(s) involved in teaching: patient, mother Elsa  Asks Questions: YES  Eager to Learn: YES  Cooperative: YES  Receptive (willing/able to accept information): YES  Reason for the appointment, diagnosis and treatment plan: YES  Knowledge of proper use of medications and conditions for which they are ordered (with special attention to potential side effects or drug interactions): YES  Which situations necessitate calling provider and whom to contact: YES    Teaching Concerns Addressed   Comments: very attentive, asking good pertinent questions.  Nutritional needs and diet plan: YES  How and/when to access community resources: YES  Patient is aware of and agrees to required commitment to post-op care and long term follow-up: YES    Patient open to all Extended Criteria organ offers (underutilized donors): Yes or no: Yes  Details: [facility] IS NOT ACCEPTING OF DCD.    Patient has name and phone number of transplant coordinator.   Time Spent over-the-phone teachin minutes.

## 2024-06-04 ENCOUNTER — TRANSFERRED RECORDS (OUTPATIENT)
Dept: HEALTH INFORMATION MANAGEMENT | Facility: CLINIC | Age: 54
End: 2024-06-04
Payer: COMMERCIAL

## 2024-06-05 NOTE — TELEPHONE ENCOUNTER
I received patient's comprehensive assessment and treatment plan from [facility]. Will wait for discharge summary when available.   Documentation sent to HIM to be scanned into patient's EMR.

## 2024-06-21 ENCOUNTER — DOCUMENTATION ONLY (OUTPATIENT)
Dept: TRANSPLANT | Facility: CLINIC | Age: 54
End: 2024-06-21
Payer: COMMERCIAL

## 2024-06-21 NOTE — LETTER
PHYSICIAN ORDERS    [facility] - Attn: Referral at [phone]  DATE & TIME ISSUED: 2024 10:22 AM  PATIENT NAME: Hannah Sutherland   : 1970     [facility] MR# : [id number]     DIAGNOSIS:  alcoholic cirrhosis, intermittent asthma  ICD-10 CODE: K70.30, J45.20     Orders  1 year after issue. We kindly request your assistance with scheduling the following:    Pulmonary consultation; indication: for liver transplant evaluation & PFT review     PLEASE FAX RESULTS AS SOON AS POSSIBLE TO [phone], ATTN:LabDE    FOR CLINICAL QUESTIONS, PLEASE CALL Torin Mendieta RN at [phone].    Thank you,    Elisa Santiago MD

## 2024-06-21 NOTE — LETTER
PHYSICIAN ORDERS    [facility] - Attn: Referral at [phone]  DATE & TIME ISSUED: 2024 10:22 AM  PATIENT NAME: Hannah Sutherland   : 1970     [facility] MR# : [id number]     DIAGNOSIS:  alcoholic cirrhosis, intermittent asthma  ICD-10 CODE: K70.30, J45.20     Orders  1 year after issue.   Pulmonary consultation (for liver transplant & PFT review)     PLEASE FAX RESULTS AS SOON AS POSSIBLE TO [phone], ATTN:Brendan    FOR CLINICAL QUESTIONS, PLEASE CALL Torin Mendieta RN at [phone].    Thank you,    Elisa Santiago MD

## 2024-06-21 NOTE — LETTER
June 21, 2024    Hannah Sutherland  [address]  [facility] [address]    Dear Ms. Sutherland,    This letter is sent to confirm that you have completed your transplant work-up and you are a candidate in the liver transplant program at the [facility].  You were placed on the liver active waitlist on 6/21/2024.      Now that the evaluation process is complete, the general hepatology team will take over the management of your cares. This team also works with your hepatologist and will be your main contact for symptom management, appointments, medications, and any questions that are not specific to transplant. You can reach this team by calling [phone] or [employer] message your hepatology provider. The transplant team will continue to monitor MELD labs and notify you when MELD labs are due.     When you are active on the waitlist and an organ becomes available, a coordinator will need to speak to you immediately.  You could be contacted at any time during the day or night as an organ could become available at any time.  Please make certain our office always has your current telephone numbers and address.      Items we will need from you:    We have received approval from your insurance company for the transplant procedure.  It is critical that you notify us if there is any change in your insurance.  It is also important that you familiarize yourself with the details of your specific insurance policy.  Our patient  is available to assist you if you should have any questions regarding your coverage.    You will need to have labs drawn frequently while you are listed. The frequency is based on your MELD score. Your current listing MELD is 23. Below is a chart to help you understand how often to have labs drawn.  If you are uncertain of your MELD score/how often you need labs, please contact your Transplant Coordinator.  Additionally, if you  would like to have labs drawn outside the [facility] system, please notify me to make arrangements to have labs ordered.  It will be your responsibility to remind your physician to forward your results to the Transplant Office.                 MELD greater than 25 - Weekly labs              MELD 18-24 - Monthly labs        MELD 17 or less- every 3 months     During this waiting period, we may request additional periodic laboratory tests with your primary physician.  It will be your responsibility to remind your physician to forward your results to the Transplant Office.    We need to be kept informed of any changes in your medical condition such as:  changes in your medications,   significant changes in your health  significant infections (such as pneumonia or abscesses)  blood transfusions  any condition which requires hospitalization  any surgery    Remember to complete any routine cancer screening tests required before your transplant.  This includes colonoscopy; prostate screening for men, and mammogram and gynecologic testing for women, as well as dental work.  Your primary care clinic can assist you with arranging for these exams.  Remind your caregivers to forward copies of the records and final reports.    We want you to know that our program has physician and surgeon coverage 24 hours a day, 365 days a year. In addition, our transplant surgeons and physicians will not be on call for two or more transplant programs more than 30 miles apart unless the circumstances have been reviewed and approved by the [employer] (UN[employer]) Membership and Professional Standards Committee (MPSC). Finally, our primary physician and primary surgeons are not designated as the primary surgeon or primary physician at more than 1 transplant hospital. If this coverage changes or there are substantial program changes, you will be notified in writing by letter.     Attached is a letter from UNOS that describes  the services and information offered to patients by UNOS and the Organ Procurement and Transplantation Network (OPTN).    We appreciate having had the opportunity to participate in your care.  If you have questions, please feel free to call the Transplant Office at [phone] or [phone].      Sincerely,  Torin Mendieta RN  Pre-Liver Transplant Coordinator  [phone] (direct)  [phone] (fax)    Solid Organ Transplant  [facility]      Enclosures: UNOS Letter  cc: Care Team    The Organ Procurement and Transplantation Network Toll-free patient services line:  1-448.936.9321  Your resource for organ transplant information    Staffed 8:30 am - 5:00 pm ET Monday - Friday Leave a message 24/7 to receive a call back    The Organ Procurement and Transplantation Network (OPTN) is the national transplant system. It makes the policies that decide how donated organs are matched to patients waiting for a transplant. The OPTN:    Makes sure donated organs get matched to people on the transplant waiting list  Tells people about the donation and transplant processes  Makes sure that the public knows about the need for more organ and tissue donations    The OPTN has a free patient services line that you can call to:  Get more information about:  Organ donation and organ transplants  Donation and transplant policies  Get an information kit with:  A list of transplant hospitals  Waiting list information  Talk about any questions you may have about your transplant hospital or organ procurement organization. The staff will do their best to help you or point you to others who may help.  Find out how you can volunteer with the OPTN and help shape transplant policy     The patient services line number is: 9-509-525-7477    Patient services line staff CANNOT answer questions about your own medical care,  including:  Waiting list status  Test results  Medical records  You will need to call your transplant hospital for this information.    The following websites have more information about transplantation and donation:    OPTN: https://optn.transplant.hrsa.gov/    For potential living donors and transplant recipients:    Living with transplant: https://www.transplantliving.org/    Living donation process: https://optn.transplant.hrsa.gov/living-donation/    Financial assistance: https://www.livingdonorassistance.org/    Transplantation data: https://www.srtr.org/    Organ donation: https://www.organdonor.gov/    Volunteer with the OPTN: https://optn.transplant.hrsa.gov/get-involved/

## 2024-06-21 NOTE — LETTER
PHYSICIAN ORDERS    Faxed to [facility] PFT Testing -Attn: Orders at [phone].  DATE & TIME ISSUED: 2024 10:18 AM  PATIENT NAME: Hannah Sutherland   : 1970     [facility] MR# : [id number]     DIAGNOSIS:  Alcoholic cirrhosis, intermittent asthma  ICD-10 CODE: K70.30, J45.20    Orders  1 year after issue.   PFT - Pulmonary function testing as part of liver transplant evaluation     PLEASE FAX RESULTS AS SOON AS POSSIBLE TO [phone], ATTN:LabDE    FOR CLINICAL QUESTIONS, PLEASE CALL Torin Mendieta RN at [phone].    Thank you,    Elisa Santiago MD

## 2024-06-21 NOTE — PROGRESS NOTES
June 21, 2024 9:11 AM - Torin Mendieta RN:     New Liver Listing    Listing MELD: 23  ABO: O  Diagnosis: alcoholic cirrhosis of liver    Patient is NOT open to accepting DCD donors.  Patient IS Open to all other Extended Criteria (risk per PHS, HBV, HCV, HCV, 65+ age, 20%+ fatty  liver tissue).    Patient is Eligible for Living Donor.

## 2024-07-01 ENCOUNTER — TRANSFERRED RECORDS (OUTPATIENT)
Dept: HEALTH INFORMATION MANAGEMENT | Facility: CLINIC | Age: 54
End: 2024-07-01
Payer: COMMERCIAL

## 2024-07-16 ENCOUNTER — ANCILLARY PROCEDURE (OUTPATIENT)
Dept: BONE DENSITY | Facility: CLINIC | Age: 54
End: 2024-07-16
Attending: STUDENT IN AN ORGANIZED HEALTH CARE EDUCATION/TRAINING PROGRAM
Payer: COMMERCIAL

## 2024-07-16 DIAGNOSIS — K70.30 ALCOHOLIC CIRRHOSIS OF LIVER (H): ICD-10-CM

## 2024-07-16 DIAGNOSIS — K76.6 PORTAL HYPERTENSION (H): ICD-10-CM

## 2024-07-16 DIAGNOSIS — K70.31 ALCOHOLIC CIRRHOSIS OF LIVER WITH ASCITES (H): ICD-10-CM

## 2024-07-16 PROCEDURE — 77080 DXA BONE DENSITY AXIAL: CPT | Performed by: INTERNAL MEDICINE

## 2024-07-24 ENCOUNTER — DOCUMENTATION ONLY (OUTPATIENT)
Dept: TRANSPLANT | Facility: CLINIC | Age: 54
End: 2024-07-24
Payer: COMMERCIAL

## 2024-07-24 NOTE — PROGRESS NOTES
JADEN received discharge summery from [facility], faxed to [facility].     According to the discharge summary, Hannah successfully completed recommended treatment.     Marilyn ROONEY, [facility]  Specialty Float   Phone: [phone]

## 2024-07-31 ENCOUNTER — MYC MEDICAL ADVICE (OUTPATIENT)
Dept: OTHER | Age: 54
End: 2024-07-31

## 2024-07-31 ENCOUNTER — TELEPHONE (OUTPATIENT)
Dept: TRANSPLANT | Facility: CLINIC | Age: 54
End: 2024-07-31
Payer: COMMERCIAL

## 2024-07-31 ENCOUNTER — DOCUMENTATION ONLY (OUTPATIENT)
Dept: OTHER | Facility: CLINIC | Age: 54
End: 2024-07-31
Payer: COMMERCIAL

## 2024-07-31 NOTE — PROGRESS NOTES
The pharmacy has received the list of medications from Rosy Mcelroy.  Kailey would like a call back to clarify which medications are discontinued.  There are some medications on the discontinue list that were prescribed originally by a different provider.  Call Kailey back regarding this.   Transplant Social Work Services Phone Call      Data: I received a copy of Hannah's health care directive, however it is not notarized or witnessed.  Intervention: I attempted to reach Hannah, but no answer (left voice message).  I also replied to her email with education/instructions.  Assessment: Health Care Directive is currently invalid.  Plan: I will remain available to assist Hannah with her health care directive.      NEVIN Verduzco, [facility]  Liver Transplant   Phone [phone]

## 2024-08-05 ENCOUNTER — DOCUMENTATION ONLY (OUTPATIENT)
Dept: OTHER | Facility: CLINIC | Age: 54
End: 2024-08-05
Payer: COMMERCIAL

## 2024-08-05 ENCOUNTER — DOCUMENTATION ONLY (OUTPATIENT)
Dept: TRANSPLANT | Facility: CLINIC | Age: 54
End: 2024-08-05
Payer: COMMERCIAL

## 2024-08-05 NOTE — PROGRESS NOTES
Health Care Directive received and emailed to [employer] [employer].  Patient aware.        NEVIN Verduzco, [facility]  Liver Transplant   Phone [phone]

## 2024-08-22 ENCOUNTER — MYC MEDICAL ADVICE (OUTPATIENT)
Dept: OTHER | Age: 54
End: 2024-08-22

## 2024-10-03 ENCOUNTER — RESULTS ONLY (OUTPATIENT)
Dept: LAB | Facility: CLINIC | Age: 54
End: 2024-10-03
Payer: COMMERCIAL

## 2024-10-06 LAB
INR (EXTERNAL) - DO NOT USE: 1.7 (ref 0.9–1.1)
PT - PROTHROMBINE TIME (EXTERNAL): 19.8 SEC (ref 9.4–12.5)

## 2024-10-14 DIAGNOSIS — K70.31 ALCOHOLIC CIRRHOSIS OF LIVER WITH ASCITES (H): Primary | ICD-10-CM

## 2024-10-15 ENCOUNTER — OFFICE VISIT (OUTPATIENT)
Dept: GASTROENTEROLOGY | Facility: CLINIC | Age: 54
End: 2024-10-15
Attending: STUDENT IN AN ORGANIZED HEALTH CARE EDUCATION/TRAINING PROGRAM
Payer: COMMERCIAL

## 2024-10-15 VITALS
WEIGHT: 179.19 LBS | HEIGHT: 63 IN | TEMPERATURE: 98.3 F | SYSTOLIC BLOOD PRESSURE: 98 MMHG | DIASTOLIC BLOOD PRESSURE: 59 MMHG | BODY MASS INDEX: 31.75 KG/M2 | OXYGEN SATURATION: 98 %

## 2024-10-15 DIAGNOSIS — I95.1 ORTHOSTATIC HYPOTENSION: Primary | ICD-10-CM

## 2024-10-15 DIAGNOSIS — K70.31 ALCOHOLIC CIRRHOSIS OF LIVER WITH ASCITES (H): ICD-10-CM

## 2024-10-15 PROCEDURE — 99213 OFFICE O/P EST LOW 20 MIN: CPT | Performed by: STUDENT IN AN ORGANIZED HEALTH CARE EDUCATION/TRAINING PROGRAM

## 2024-10-15 PROCEDURE — 99214 OFFICE O/P EST MOD 30 MIN: CPT | Mod: GC | Performed by: STUDENT IN AN ORGANIZED HEALTH CARE EDUCATION/TRAINING PROGRAM

## 2024-10-15 RX ORDER — MIDODRINE HYDROCHLORIDE 5 MG/1
5 TABLET ORAL 3 TIMES DAILY
Qty: 270 TABLET | Refills: 0 | Status: ON HOLD | OUTPATIENT
Start: 2024-10-15 | End: 2025-01-13

## 2024-10-15 ASSESSMENT — PAIN SCALES - GENERAL: PAINLEVEL: NO PAIN (0)

## 2024-10-15 NOTE — LETTER
10/15/2024      Hannah Sutherland  [address]  [facility] [address]      Dear Colleague,    Thank you for referring your patient, Hannah Sutherland, to the [facility] [place]. Please see a copy of my visit note below.    [facility] Follow Up Visit    Date of Visit: October 15, 2024      Chief Complaint: follow up ALD    Subjective: Ms. Sutherland is a 54-year-old woman with a history of alcohol use disorder, alcohol-related liver disease, listed for transplantation, who returns for follow up.     Prior history:  She was first noted to have elevated liver enzymes with her primary care provider in the past.  Also had steatosis on ultrasound.  She told me that her primary care doctor told her to cut back on her drinking, it is not clear her doctor is aware that she was drinking 2 bottles of wine a day.  She states she tried but she was unable to.  She has been drinking heavily since her 30s.  She presented the end of November with jaundice.  At that time was found to have a bilirubin of 10.9.  Iron saturation was elevated at 90%.  Ferritin was 351.  She is an H63D heterozygote she had testing for autoimmune liver disease that was negative.  KEY is positive.  Ceruloplasmin is normal.  Alpha-1 antitrypsin is normal.  Peth was 230 on admission November 29.    She has been sober since his admission.  She is doing well with her sobriety.  She engaged in alcohol treatment through [facility].  She started doing outpatient treatment in the evenings after work, has graduated to lower intensity and is about to graduate from that.  She has not done any treatment before.  She is back to working full-time.  Overall is doing better since his admission but feels fatigued at times.    Swelling in legs worse after standing all day, better with exercise.  Taking Lasix and spironolactone    Hepatitis C antibody was - February 2023 and then + November 2023.  RNA was -  November 2023.    She has a history of H. pylori infection and is had multiple positive breath test.  Stool test was - March 2024.    She works full-time.  She is not .  She has 6 kids ranging 16-27    She has an apparent history of eosinophilic esophagitis versus eosinophilia due to acid reflux.  She has had recurrent food impactions.  She had an EGD for food impaction in 2016.  Biopsy showed up to 70 eosinophils per high-power field.  She had a follow-up EGD in 2016 that had mucosal changes concerning for EOE.  Also showed grade D esophagitis.  The biopsies at this time showed eosinophils but less concerning for eosinophilic esophagitis.  Biopsies were taken she had an EGD in 2018 that showed steak in the lower third of esophagus.  They noted mucosal rings.  Also showed LA grade B esophagitis.  Esophageal biopsy showed mild esophagitis with fewer than 5 eosinophils per high-power field.  Biopsies showed H. Pylori. She has required dilation for esophageal stricture before.  EGD at the end of 2023 did not show findings concerning for eosinophilic esophagitis    Interval history:  Last seen in clinic 6 months ago in April. At that time, MELD was 23 and pt was 4 months into her sobriety. Had ED visit in May for intermittent lightheadedness and low home blood pressures (were normal on ED arrival) and in July after mechanical ground level fall, sustaining R fifth and sixth rib fractures. No hospitalizations in past 6 months. Now listed for transplantation. Her son Chris is currently being evaluated as a potential liver donor. UTD on cancer screenings. Never treated for H. Pylori     Severe fatigue that is starting to affect her work. Endorses confusion and forgetfulness. Has not impacted her work. Itchy chest, upper back, and feet. Bee salve and lotion which only provides short term relief. Intermittent RUQ pain. Denies abdominal distention, nausea/vomiting. Regular Bms, 1-2x a day. Denies blood in stool or  vomit. Denies fevers, chills, night sweats. LE edema is improving but still present. Denies any difficulty breathing. Taking diuretics as prescribed. Endorses lightheadedness several times a day. Checks BP 1-3x a week and typically in 80s/40s.     ROS: 14 point ROS negative except for positives noted in HPI.    PMHx:  No DM  No heart disease  No cancer  Asthma  HPV  Alcohol use disorder  Herniated lumbar disc  Meniscal tear  COVID  Depression  Concern for eosinophilic esophagitis    Past Surgical History:   Procedure Laterality Date   ARTHROSCOPIC MENISCUS REPAIR, KNEE Right 2018   ARTHROSCOPIC SURGERY Left 2023   Meniscus procedue.   TOOTH ROOT REMOVAL     FamHx:  Heart disease - MGF  of MI at age 61, other family members with heart disease  MAunt Lung cancer  No family history of liver disease, liver cancer    SocHx:  Social History     Socioeconomic History     Marital status:      Spouse name: Not on file     Number of children: Not on file     Years of education: Not on file     Highest education level: Not on file   Occupational History     Not on file   Tobacco Use     Smoking status: Never     Smokeless tobacco: Never   Substance and Sexual Activity     Alcohol use: Not Currently     Comment: sober 23     Drug use: Never     Sexual activity: Not on file   Other Topics Concern     Not on file   Social History Narrative     Not on file     Social Determinants of Health     Financial Resource Strain: Medium Risk (2024)    Received from [employer] [facility], [facility]    Overall Financial Resource Strain (CARDIA)      Difficulty of Paying Living Expenses: Somewhat hard   Food Insecurity: No Food Insecurity (2024)    Received from [employer] [facility], [facility]    Hunger Vital Sign      Worried About Running Out of Food in the Last Year: Never true      Ran Out of Food in the Last Year: Never true    Transportation Needs: No Transportation Needs (2/27/2024)    Received from [facility]    Transportation Needs      In the past 12 months, has lack of transportation kept you from medical appointments, meetings, work, or from getting medicines or things needed for daily living?: No   Physical Activity: Insufficiently Active (2/27/2024)    Received from [facility], [facility]    Exercise Vital Sign      Days of Exercise per Week: 3 days      Minutes of Exercise per Session: 10 min   Stress: No Stress Concern Present (2/27/2024)    Received from [facility], [facility]    [employer] [place] [employer] - Occupational Stress Questionnaire      Feeling of Stress : Not at all   Social Connections: Moderately Integrated (2/27/2024)    Received from [facility], [facility]    Social Connection and Isolation Panel [NHANES]      Frequency of Communication with Friends and Family: More than three times a week      Frequency of Social Gatherings with Friends and Family: Once a week      Attends Hinduism Services: More than 4 times per year      Active Member of Clubs or Organizations: Yes      Attends Club or Organization Meetings: More than 4 times per year      Marital Status:    Recent Concern: Social Connections - Moderately Isolated (12/1/2023)    Received from [facility], [facility]    Social Connection and Isolation Panel [NHANES]      Frequency of Communication with Friends and Family: More than three times a week      Frequency of Social Gatherings with Friends and Family: Once a week      Attends Hinduism Services: More than 4 times per year      Active Member of Clubs or Organizations: No      Attends Club or Organization Meetings: Not on file      Marital Status:    Interpersonal Safety: Not At  Risk (7/14/2024)    [facility]    Intimate Partner Violence      Are you in a relationship where you are physically hurt, threatened and/or made to feel afraid?: No   Housing Stability: Unknown (2/27/2024)    Received from [facility]    Housing Stability Vital Sign      Unable to Pay for Housing in the Last Year: No      Number of Times Moved in the Last Year: Not on file      Homeless in the Last Year: Not on file       Medications:  Current Outpatient Medications   Medication Sig Dispense Refill     albuterol (VENTOLIN HFA) 108 (90 Base) MCG/ACT inhaler Inhale 2 puffs into the lungs every 4 hours as needed for shortness of breath       budesonide (PULMICORT FLEXHALER) 90 MCG/ACT inhaler Inhale 1 puff into the lungs as needed       escitalopram (LEXAPRO) 20 MG tablet Take 1 tablet by mouth every morning       folic acid (FOLVITE) 1 MG tablet Take 1 tablet by mouth every morning       furosemide (LASIX) 40 MG tablet Take 1 tablet (40 mg) by mouth daily for 360 days 90 tablet 3     magnesium oxide (MAG-OX) 400 MG tablet Take 400 mg by mouth 2 times daily       Multiple Vitamin (QUINTABS) TABS Take 1 tablet by mouth every morning       pantoprazole (PROTONIX) 40 MG EC tablet Take 1 tablet by mouth every morning       spironolactone (ALDACTONE) 50 MG tablet Take 1 tablet (50 mg) by mouth daily for 360 days 90 tablet 3     thiamine (B-1) 100 MG tablet Take 100 mg by mouth daily       No current facility-administered medications for this visit.     No OTCs, herbals    Allergies:  Allergies   Allergen Reactions     Penicillins Rash and Unknown     Latex Other (See Comments)     History of asthma.     Erythromycin Nausea and Vomiting       Objective:  There were no vitals taken for this visit.  Constitutional: pleasant woman in NAD  Eyes: icteric  Respiratory: Normal respiratory excursion   MSK: normal range of motion of visualized extremities  Abd: Non  distended  Ext: 1+ edema  Skin: No jaundice  Psychiatric: normal mood and orientation    Labs: Reviewed in EHR - MELD 24    Imaging:    RUQ US 1/2024      IMPRESSION:   Mild surface nodularity of the liver suggestive of underlying cirrhosis.     No gross suspicious hepatic lesions.     Layering echogenic material within the gallbladder most consistent with   sludge/tiny calculi however, no pain was reported over the gallbladder during   scanning.    MRI liver 12/2023    FINDINGS:   Abdomen is obese, which decreases image sensitivity, and there is some   breathing motion artifact also.  Liver is enlarged measuring 20.8 cm   craniocaudally.  Diffuse fatty infiltration of the liver with mildly nodular   contour, but no focal mass or abnormal enhancement is seen in the liver.  No   restricted diffusion.         Splenomegaly, with spleen measuring 14.6 cm in length.  No focal masses.   Adrenals and pancreas are unremarkable.  Gallbladder is moderately distended   with some debris in the dependent portion.  No gallbladder wall thickening or   pericholecystic inflammation.  Aorta is normal in caliber.  Kidneys are normal   in size.  No hydroureteronephrosis.  Some cortical scarring in the left upper   pole is present.  Intra and extrahepatic bile ducts are nondilated.     IMPRESSION:   No definite focal hepatic abnormality.  Diffuse fatty infiltration of the   liver.  Hepatosplenomegaly.      Endoscopy:    EGD 11/2023      Findings:        The esophagus was normal.        Diffuse mildly congested mucosa was found in the entire examined        stomach. Biopsies were taken with a pediatric cold forceps for        Helicobacter pylori cultures (In saline) and stains (in formalin)        The first portion of the duodenum and second portion of the duodenum        were normal.     Impression:            - Normal esophagus. No varices.                          - Congestive gastropathy. Biopsied.                          - Normal  first portion of the duodenum and second                          portion of the duodenum.    Final Diagnosis     A. STOMACH, ANTRUM, BIOPSY  --CHRONIC, INACTIVE GASTRITIS  --POSITIVE FOR HELICOBACTER PYLORI              Colonoscopy 3/2024    Findings:        The perianal and digital rectal examinations were normal. Pertinent        negatives include normal sphincter tone, no palpable rectal lesions and        no anal lesion or abnormality.        A 6 mm polyp was found in the ascending colon. The polyp was sessile.        The polyp was removed with a hot snare. Resection and retrieval were        complete. To prevent bleeding considering her portal hypertension/plt,        one hemostatic clip was successfully placed. There was no bleeding at        the end of the procedure.        #2, 3 to 7 mm polyp was found in the descending colon. The polyp were        sessile. The 3mm polyp was removed with a jumbo cold forceps, mild        continous bleeding that stopped with clip. The 7mm polyp was removed        with a hot snare. Resection and retrieval were complete. To prevent        bleeding post-intervention, another hemostatic clips was successfully        placed. There was no bleeding at the end of the procedure.        A 20 mm polyp was found in the sigmoid colon at about 50cm from the anal        verge. The polyp was pedunculated. The polyp was removed with a hot        snare. Resection and retrieval were complete. Mild continous bleeding.        Area was successfully injected with 2 mL of a 0.1 mg/mL solution of        epinephrine for hemostasis. To prevent bleeding post-intervention, three        hemostatic clips were successfully placed. There was no bleeding at the        end of the procedure. Area was successfully injected with 2 mL Spot        (carbon black) for tattooing.     Impression:            - One 6 mm polyp in the ascending colon, removed with                          a hot snare. Resected and  retrieved. Clip was placed.                          - One 3 to 7 mm polyp in the descending colon, removed                          with a jumbo cold forceps and removed with a hot                          snare. Resected and retrieved. Clips were placed.                          - One 20 mm polyp in the sigmoid colon, removed with a                          hot snare. Resected and retrieved. Injected. Clips                          were placed.       Final Diagnosis     A. COLON, ASCENDING/RIGHT, BIOPSY  --TUBULAR ADENOMA     B. COLON, DESCENDING/LEFT, BIOPSY  --TUBULAR ADENOMAS     C. COLON, SIGMOID, BIOPSY  --TUBULOVILLOUS ADENOMA, INKED MARGIN UNINVOLVED           Independently reviewed labs and imaging.     Assessment/Plan:  Ms. Sutherland is a 54-year-old woman with a history of alcohol use disorder and compensated alcohol-related liver disease, currently listed for transplant, who returns for follow up.      She is up-to-date with her cancer screening. HCC screening due in Nov 2024. Last EGD 11/23 w/o varices. Should continue to follow with [facility] for her routine liver care including liver cancer screening.    Reviewed again process of liver transplantation and wait list. Confirmed patient is okay with higher-risk organs. Would also still be a candidate for living organ transplantation. Son is currently being evaluated as possible organ donor.     - Start midodrine 5 mg TID for orthostasis and hypotension   - Will send message to patient's PCP requesting switch from escitalopram to sertraline to see if this can alleviate her pruritis. Second and third line options would be naltrexone and rifampin, respectively. Recommend avoiding hot showers, keeping nails trimmed, wear loose clothing, and keep skin hydrated w/ lotion/salves   - Recommend OTC knee-length compression stockings for lower extremity edema and hypotension   - HCC screening due in Nov with [facility]  - Will plan to repeat EGD post-transplant  with repeat H pylori biopsies.    RTC in 3 months     Patient seen and staffed with Dr. Jack MD, MS    Lucilleellen Price, DO  Internal Medicine, PGY-3      Approximately 30 minutes was spent for the visit with 30 minutes of non face-to-face time were spent in review of the patient's medical record on the day of the visit. This included review of previous: clinic visits, hospital records, lab results, imaging studies, and documentation.  The findings from this review are summarized in the above note.      Pap normal with negative HPV 2024    Mammogram 11/2023      IMPRESSION:   There is no mammographic evidence of malignancy.  Recommend annual screening   mammography.         RECOMMENDATION:  BL: Bilateral RC 1: Screening Mammograms         Attestation signed by Elisa Santiago MD at 10/15/2024  3:26 PM:    Physician Attestation  I saw this patient with the resident and agree with the resident/fellow's findings and plan of care as documented in the note.      Please see A&P for additional details of medical decision making.          Elisa Santiago MD  Date of Service (when I saw the patient): 10/15/24      Again, thank you for allowing me to participate in the care of your patient.        Sincerely,        Elisa Santiago MD

## 2024-10-15 NOTE — PROGRESS NOTES
[facility] Follow Up Visit    Date of Visit: October 15, 2024      Chief Complaint: follow up ALD    Subjective: Ms. Sutherland is a 54-year-old woman with a history of alcohol use disorder, alcohol-related liver disease, listed for transplantation, who returns for follow up.     Prior history:  She was first noted to have elevated liver enzymes with her primary care provider in the past.  Also had steatosis on ultrasound.  She told me that her primary care doctor told her to cut back on her drinking, it is not clear her doctor is aware that she was drinking 2 bottles of wine a day.  She states she tried but she was unable to.  She has been drinking heavily since her 30s.  She presented the end of November with jaundice.  At that time was found to have a bilirubin of 10.9.  Iron saturation was elevated at 90%.  Ferritin was 351.  She is an H63D heterozygote she had testing for autoimmune liver disease that was negative.  KEY is positive.  Ceruloplasmin is normal.  Alpha-1 antitrypsin is normal.  Peth was 230 on admission November 29.    She has been sober since his admission.  She is doing well with her sobriety.  She engaged in alcohol treatment through [facility].  She started doing outpatient treatment in the evenings after work, has graduated to lower intensity and is about to graduate from that.  She has not done any treatment before.  She is back to working full-time.  Overall is doing better since his admission but feels fatigued at times.    Swelling in legs worse after standing all day, better with exercise.  Taking Lasix and spironolactone    Hepatitis C antibody was - February 2023 and then + November 2023.  RNA was - November 2023.    She has a history of H. pylori infection and is had multiple positive breath test.  Stool test was - March 2024.    She works full-time.  She is not .  She has 6 kids ranging 16-27    She has an apparent history of eosinophilic esophagitis versus  eosinophilia due to acid reflux.  She has had recurrent food impactions.  She had an EGD for food impaction in 2016.  Biopsy showed up to 70 eosinophils per high-power field.  She had a follow-up EGD in 2016 that had mucosal changes concerning for EOE.  Also showed grade D esophagitis.  The biopsies at this time showed eosinophils but less concerning for eosinophilic esophagitis.  Biopsies were taken she had an EGD in 2018 that showed steak in the lower third of esophagus.  They noted mucosal rings.  Also showed LA grade B esophagitis.  Esophageal biopsy showed mild esophagitis with fewer than 5 eosinophils per high-power field.  Biopsies showed H. Pylori. She has required dilation for esophageal stricture before.  EGD at the end of 2023 did not show findings concerning for eosinophilic esophagitis    Interval history:  Last seen in clinic 6 months ago in April. At that time, MELD was 23 and pt was 4 months into her sobriety. Had ED visit in May for intermittent lightheadedness and low home blood pressures (were normal on ED arrival) and in July after mechanical ground level fall, sustaining R fifth and sixth rib fractures. No hospitalizations in past 6 months. Now listed for transplantation. Her son Chris is currently being evaluated as a potential liver donor. UTD on cancer screenings. Never treated for H. Pylori     Severe fatigue that is starting to affect her work. Endorses confusion and forgetfulness. Has not impacted her work. Itchy chest, upper back, and feet. Bee salve and lotion which only provides short term relief. Intermittent RUQ pain. Denies abdominal distention, nausea/vomiting. Regular Bms, 1-2x a day. Denies blood in stool or vomit. Denies fevers, chills, night sweats. LE edema is improving but still present. Denies any difficulty breathing. Taking diuretics as prescribed. Endorses lightheadedness several times a day. Checks BP 1-3x a week and typically in 80s/40s.     ROS: 14 point ROS negative  except for positives noted in HPI.    PMHx:  No DM  No heart disease  No cancer  Asthma  HPV  Alcohol use disorder  Herniated lumbar disc  Meniscal tear  COVID  Depression  Concern for eosinophilic esophagitis    Past Surgical History:   Procedure Laterality Date   ARTHROSCOPIC MENISCUS REPAIR, KNEE Right 2018   ARTHROSCOPIC SURGERY Left 2023   Meniscus procedue.   TOOTH ROOT REMOVAL     FamHx:  Heart disease - MGF  of MI at age 61, other family members with heart disease  MAunt Lung cancer  No family history of liver disease, liver cancer    SocHx:  Social History     Socioeconomic History    Marital status:      Spouse name: Not on file    Number of children: Not on file    Years of education: Not on file    Highest education level: Not on file   Occupational History    Not on file   Tobacco Use    Smoking status: Never    Smokeless tobacco: Never   Substance and Sexual Activity    Alcohol use: Not Currently     Comment: sober 23    Drug use: Never    Sexual activity: Not on file   Other Topics Concern    Not on file   Social History Narrative    Not on file     Social Determinants of Health     Financial Resource Strain: Medium Risk (2024)    Received from [facility], [email or web address] and [facility]    Overall Financial Resource Strain (CARDIA)     Difficulty of Paying Living Expenses: Somewhat hard   Food Insecurity: No Food Insecurity (2024)    Received from [facility], [facility] [email or web address] and [facility]    Hunger Vital Sign     Worried About Running Out of Food in the Last Year: Never true     Ran Out of Food in the Last Year: Never true   Transportation Needs: No Transportation Needs (2024)    Received from [facility]    Transportation Needs     In the past 12 months, has lack of transportation kept you from medical appointments, meetings, work, or from getting medicines or things needed for daily  living?: No   Physical Activity: Insufficiently Active (2/27/2024)    [facility], [facility]    Exercise Vital Sign     Days of Exercise per Week: 3 days     Minutes of Exercise per Session: 10 min   Stress: No Stress Concern Present (2/27/2024)    Received from [facility], [facility]    [employer] - Occupational Stress Questionnaire     Feeling of Stress : Not at all   Social Connections: Moderately Integrated (2/27/2024)    Received from [facility], [facility]    Social Connection and Isolation Panel [NHANES]     Frequency of Communication with Friends and Family: More than three times a week     Frequency of Social Gatherings with Friends and Family: Once a week     Attends [employer] Services: More than 4 times per year     Active Member of Clubs or Organizations: Yes     Attends Club or Organization Meetings: More than 4 times per year     Marital Status:    Recent Concern: Social Connections - Moderately Isolated (12/1/2023)    Received from [facility], [facility]    Social Connection and Isolation Panel [NHANES]     Frequency of Communication with Friends and Family: More than three times a week     Frequency of Social Gatherings with Friends and Family: Once a week     Attends [employer] Services: More than 4 times per year     Active Member of Clubs or Organizations: No     Attends Club or Organization Meetings: Not on file     Marital Status:    Interpersonal Safety: Not At Risk (7/14/2024)    Received from [facility]    Intimate Partner Violence     Are you in a relationship where you are physically hurt, threatened and/or made to feel afraid?: No   Housing Stability: Unknown (2/27/2024)    Received from [facility]    Housing  Stability Vital Sign     Unable to Pay for Housing in the Last Year: No     Number of Times Moved in the Last Year: Not on file     Homeless in the Last Year: Not on file       Medications:  Current Outpatient Medications   Medication Sig Dispense Refill    albuterol (VENTOLIN HFA) 108 (90 Base) MCG/ACT inhaler Inhale 2 puffs into the lungs every 4 hours as needed for shortness of breath      budesonide (PULMICORT FLEXHALER) 90 MCG/ACT inhaler Inhale 1 puff into the lungs as needed      escitalopram (LEXAPRO) 20 MG tablet Take 1 tablet by mouth every morning      folic acid (FOLVITE) 1 MG tablet Take 1 tablet by mouth every morning      furosemide (LASIX) 40 MG tablet Take 1 tablet (40 mg) by mouth daily for 360 days 90 tablet 3    magnesium oxide (MAG-OX) 400 MG tablet Take 400 mg by mouth 2 times daily      Multiple Vitamin (QUINTABS) TABS Take 1 tablet by mouth every morning      pantoprazole (PROTONIX) 40 MG EC tablet Take 1 tablet by mouth every morning      spironolactone (ALDACTONE) 50 MG tablet Take 1 tablet (50 mg) by mouth daily for 360 days 90 tablet 3    thiamine (B-1) 100 MG tablet Take 100 mg by mouth daily       No current facility-administered medications for this visit.     No OTCs, herbals    Allergies:  Allergies   Allergen Reactions    Penicillins Rash and Unknown    Latex Other (See Comments)     History of asthma.    Erythromycin Nausea and Vomiting       Objective:  There were no vitals taken for this visit.  Constitutional: pleasant woman in NAD  Eyes: icteric  Respiratory: Normal respiratory excursion   MSK: normal range of motion of visualized extremities  Abd: Non distended  Ext: 1+ edema  Skin: No jaundice  Psychiatric: normal mood and orientation    Labs: Reviewed in EHR - MELD 24    Imaging:    RUQ US 1/2024      IMPRESSION:   Mild surface nodularity of the liver suggestive of underlying cirrhosis.     No gross suspicious hepatic lesions.     Layering echogenic material within the  gallbladder most consistent with   sludge/tiny calculi however, no pain was reported over the gallbladder during   scanning.    MRI liver 12/2023    FINDINGS:   Abdomen is obese, which decreases image sensitivity, and there is some   breathing motion artifact also.  Liver is enlarged measuring 20.8 cm   craniocaudally.  Diffuse fatty infiltration of the liver with mildly nodular   contour, but no focal mass or abnormal enhancement is seen in the liver.  No   restricted diffusion.         Splenomegaly, with spleen measuring 14.6 cm in length.  No focal masses.   Adrenals and pancreas are unremarkable.  Gallbladder is moderately distended   with some debris in the dependent portion.  No gallbladder wall thickening or   pericholecystic inflammation.  Aorta is normal in caliber.  Kidneys are normal   in size.  No hydroureteronephrosis.  Some cortical scarring in the left upper   pole is present.  Intra and extrahepatic bile ducts are nondilated.     IMPRESSION:   No definite focal hepatic abnormality.  Diffuse fatty infiltration of the   liver.  Hepatosplenomegaly.      Endoscopy:    EGD 11/2023      Findings:        The esophagus was normal.        Diffuse mildly congested mucosa was found in the entire examined        stomach. Biopsies were taken with a pediatric cold forceps for        Helicobacter pylori cultures (In saline) and stains (in formalin)        The first portion of the duodenum and second portion of the duodenum        were normal.     Impression:            - Normal esophagus. No varices.                          - Congestive gastropathy. Biopsied.                          - Normal first portion of the duodenum and second                          portion of the duodenum.    Final Diagnosis     A. STOMACH, ANTRUM, BIOPSY  --CHRONIC, INACTIVE GASTRITIS  --POSITIVE FOR HELICOBACTER PYLORI              Colonoscopy 3/2024    Findings:        The perianal and digital rectal examinations were normal. Pertinent         negatives include normal sphincter tone, no palpable rectal lesions and        no anal lesion or abnormality.        A 6 mm polyp was found in the ascending colon. The polyp was sessile.        The polyp was removed with a hot snare. Resection and retrieval were        complete. To prevent bleeding considering her portal hypertension/plt,        one hemostatic clip was successfully placed. There was no bleeding at        the end of the procedure.        #2, 3 to 7 mm polyp was found in the descending colon. The polyp were        sessile. The 3mm polyp was removed with a jumbo cold forceps, mild        continous bleeding that stopped with clip. The 7mm polyp was removed        with a hot snare. Resection and retrieval were complete. To prevent        bleeding post-intervention, another hemostatic clips was successfully        placed. There was no bleeding at the end of the procedure.        A 20 mm polyp was found in the sigmoid colon at about 50cm from the anal        verge. The polyp was pedunculated. The polyp was removed with a hot        snare. Resection and retrieval were complete. Mild continous bleeding.        Area was successfully injected with 2 mL of a 0.1 mg/mL solution of        epinephrine for hemostasis. To prevent bleeding post-intervention, three        hemostatic clips were successfully placed. There was no bleeding at the        end of the procedure. Area was successfully injected with 2 mL Spot        (carbon black) for tattooing.     Impression:            - One 6 mm polyp in the ascending colon, removed with                          a hot snare. Resected and retrieved. Clip was placed.                          - One 3 to 7 mm polyp in the descending colon, removed                          with a jumbo cold forceps and removed with a hot                          snare. Resected and retrieved. Clips were placed.                          - One 20 mm polyp in the sigmoid colon, removed with  a                          hot snare. Resected and retrieved. Injected. Clips                          were placed.       Final Diagnosis     A. COLON, ASCENDING/RIGHT, BIOPSY  --TUBULAR ADENOMA     B. COLON, DESCENDING/LEFT, BIOPSY  --TUBULAR ADENOMAS     C. COLON, SIGMOID, BIOPSY  --TUBULOVILLOUS ADENOMA, INKED MARGIN UNINVOLVED           Independently reviewed labs and imaging.     Assessment/Plan:  Ms. Sutherland is a 54-year-old woman with a history of alcohol use disorder and compensated alcohol-related liver disease, currently listed for transplant, who returns for follow up.      She is up-to-date with her cancer screening. HCC screening due in Nov 2024. Last EGD 11/23 w/o varices. Should continue to follow with [facility] for her routine liver care including liver cancer screening.    Reviewed again process of liver transplantation and wait list. Confirmed patient is okay with higher-risk organs. Would also still be a candidate for living organ transplantation. Son is currently being evaluated as possible organ donor.     - Start midodrine 5 mg TID for orthostasis and hypotension   - Will send message to patient's PCP requesting switch from escitalopram to sertraline to see if this can alleviate her pruritis. Second and third line options would be naltrexone and rifampin, respectively. Recommend avoiding hot showers, keeping nails trimmed, wear loose clothing, and keep skin hydrated w/ lotion/salves   - Recommend OTC knee-length compression stockings for lower extremity edema and hypotension   - HCC screening due in Nov with [facility]  - Will plan to repeat EGD post-transplant with repeat H pylori biopsies.    RTC in 3 months     Patient seen and staffed with Dr. Jack MD, MS Lucille Price, DO  Internal Medicine, PGY-3      Approximately 30 minutes was spent for the visit with 30 minutes of non face-to-face time were spent in review of the patient's medical record on the day of the visit. This included  review of previous: clinic visits, hospital records, lab results, imaging studies, and documentation.  The findings from this review are summarized in the above note.      Pap normal with negative HPV 2024    Mammogram 11/2023      IMPRESSION:   There is no mammographic evidence of malignancy.  Recommend annual screening   mammography.         RECOMMENDATION:  BL: Bilateral RC 1: Screening Mammograms

## 2024-10-15 NOTE — NURSING NOTE
"Chief Complaint   Patient presents with    Non Stress Test    RECHECK     Pre liver transplant      BP 98/59 (BP Location: Left arm, Cuff Size: Adult Regular)   Temp 98.3  F (36.8  C) (Oral)   Ht 1.6 m (5' 3\")   Wt 81.3 kg (179 lb 3 oz)   SpO2 98%   BMI 31.74 kg/m    Berna Hussein, HOLLY on 10/15/2024 at 10:48 AM    "

## 2024-10-17 ENCOUNTER — TELEPHONE (OUTPATIENT)
Dept: GASTROENTEROLOGY | Facility: CLINIC | Age: 54
End: 2024-10-17
Payer: COMMERCIAL

## 2024-10-17 NOTE — TELEPHONE ENCOUNTER
Patient confirmed scheduled appointment:     Date: 1/28/25  Time: 10:00 am  Appointment Type: Liver Eval Return HEPT  Provider: Dr. Elisa Santiago  Location: [place]  Testing/imaging: Labs done locally  Additional Notes:

## 2024-11-10 ENCOUNTER — MYC MEDICAL ADVICE (OUTPATIENT)
Dept: TRANSPLANT | Facility: CLINIC | Age: 54
End: 2024-11-10
Payer: COMMERCIAL

## 2024-11-12 ENCOUNTER — ORGAN (OUTPATIENT)
Dept: TRANSPLANT | Facility: CLINIC | Age: 54
End: 2024-11-12
Payer: COMMERCIAL

## 2024-11-12 ENCOUNTER — HOSPITAL ENCOUNTER (INPATIENT)
Facility: CLINIC | Age: 54
Setting detail: SURGERY ADMIT
End: 2024-11-12
Attending: TRANSPLANT SURGERY | Admitting: TRANSPLANT SURGERY
Payer: COMMERCIAL

## 2024-11-12 ENCOUNTER — ANESTHESIA EVENT (OUTPATIENT)
Dept: SURGERY | Facility: CLINIC | Age: 54
End: 2024-11-12
Payer: COMMERCIAL

## 2024-11-12 RX ORDER — ACETAMINOPHEN 325 MG/1
975 TABLET ORAL ONCE
Status: CANCELLED | OUTPATIENT
Start: 2024-11-13 | End: 2024-11-13

## 2024-11-12 RX ORDER — SODIUM CHLORIDE, SODIUM LACTATE, POTASSIUM CHLORIDE, CALCIUM CHLORIDE 600; 310; 30; 20 MG/100ML; MG/100ML; MG/100ML; MG/100ML
INJECTION, SOLUTION INTRAVENOUS CONTINUOUS
Status: CANCELLED | OUTPATIENT
Start: 2024-11-13

## 2024-11-12 RX ORDER — FIBRINOGEN (HUMAN) 700-1300MG
4 KIT INTRAVENOUS ONCE
Status: CANCELLED | OUTPATIENT
Start: 2024-11-13 | End: 2024-11-13

## 2024-11-12 RX ORDER — LIDOCAINE 40 MG/G
CREAM TOPICAL
Status: CANCELLED | OUTPATIENT
Start: 2024-11-12

## 2024-11-13 ENCOUNTER — APPOINTMENT (OUTPATIENT)
Dept: GENERAL RADIOLOGY | Facility: CLINIC | Age: 54
End: 2024-11-13
Attending: STUDENT IN AN ORGANIZED HEALTH CARE EDUCATION/TRAINING PROGRAM
Payer: COMMERCIAL

## 2024-11-13 ENCOUNTER — MEDICAL CORRESPONDENCE (OUTPATIENT)
Dept: SURGERY | Facility: CLINIC | Age: 54
End: 2024-11-13

## 2024-11-13 ENCOUNTER — DOCUMENTATION ONLY (OUTPATIENT)
Dept: TRANSPLANT | Facility: CLINIC | Age: 54
End: 2024-11-13

## 2024-11-13 ENCOUNTER — APPOINTMENT (OUTPATIENT)
Dept: ULTRASOUND IMAGING | Facility: CLINIC | Age: 54
End: 2024-11-13
Attending: STUDENT IN AN ORGANIZED HEALTH CARE EDUCATION/TRAINING PROGRAM
Payer: COMMERCIAL

## 2024-11-13 ENCOUNTER — HOSPITAL ENCOUNTER (INPATIENT)
Facility: CLINIC | Age: 54
End: 2024-11-13
Attending: TRANSPLANT SURGERY | Admitting: TRANSPLANT SURGERY
Payer: COMMERCIAL

## 2024-11-13 ENCOUNTER — APPOINTMENT (OUTPATIENT)
Dept: GENERAL RADIOLOGY | Facility: CLINIC | Age: 54
End: 2024-11-13
Attending: TRANSPLANT SURGERY
Payer: COMMERCIAL

## 2024-11-13 ENCOUNTER — ANESTHESIA (OUTPATIENT)
Dept: SURGERY | Facility: CLINIC | Age: 54
End: 2024-11-13
Payer: COMMERCIAL

## 2024-11-13 DIAGNOSIS — Z94.4 LIVER REPLACED BY TRANSPLANT (H): Primary | ICD-10-CM

## 2024-11-13 PROBLEM — Z01.818 PRE-TRANSPLANT EVALUATION FOR LIVER TRANSPLANT: Status: ACTIVE | Noted: 2024-11-13

## 2024-11-13 LAB
ABO/RH(D): NORMAL
ABO/RH(D): NORMAL
ALBUMIN SERPL BCG-MCNC: 3 G/DL (ref 3.5–5.2)
ALBUMIN SERPL BCG-MCNC: 3 G/DL (ref 3.5–5.2)
ALBUMIN UR-MCNC: NEGATIVE MG/DL
ALLEN'S TEST: ABNORMAL
ALP SERPL-CCNC: 100 U/L (ref 40–150)
ALP SERPL-CCNC: 147 U/L (ref 40–150)
ALT SERPL W P-5'-P-CCNC: 15 U/L (ref 0–50)
ALT SERPL W P-5'-P-CCNC: 404 U/L (ref 0–50)
AMYLASE SERPL-CCNC: 92 U/L (ref 28–100)
ANION GAP SERPL CALCULATED.3IONS-SCNC: 10 MMOL/L (ref 7–15)
ANION GAP SERPL CALCULATED.3IONS-SCNC: 11 MMOL/L (ref 7–15)
ANION GAP SERPL CALCULATED.3IONS-SCNC: 13 MMOL/L (ref 7–15)
ANTIBODY SCREEN: NEGATIVE
ANTIBODY SCREEN: NEGATIVE
APPEARANCE UR: CLEAR
APTT PPP: 39 SECONDS (ref 22–38)
APTT PPP: 43 SECONDS (ref 22–38)
APTT PPP: 47 SECONDS (ref 22–38)
AST SERPL W P-5'-P-CCNC: 53 U/L (ref 0–45)
AST SERPL W P-5'-P-CCNC: 612 U/L (ref 0–45)
BACTERIA SPEC CULT: NORMAL
BASE EXCESS BLDA CALC-SCNC: 0.9 MMOL/L (ref -3–3)
BASE EXCESS BLDA CALC-SCNC: 1.6 MMOL/L (ref -3–3)
BASE EXCESS BLDA CALC-SCNC: 3.6 MMOL/L (ref -3–3)
BASE EXCESS BLDA CALC-SCNC: 3.8 MMOL/L (ref -3–3)
BASE EXCESS BLDA CALC-SCNC: 4.1 MMOL/L (ref -3–3)
BASE EXCESS BLDA CALC-SCNC: 4.3 MMOL/L (ref -3–3)
BASOPHILS # BLD AUTO: 0 10E3/UL (ref 0–0.2)
BASOPHILS NFR BLD AUTO: 0 %
BASOPHILS NFR BLD AUTO: 0 %
BASOPHILS NFR BLD AUTO: 1 %
BILIRUB DIRECT SERPL-MCNC: 3.53 MG/DL (ref 0–0.3)
BILIRUB SERPL-MCNC: 5.4 MG/DL
BILIRUB SERPL-MCNC: 6.3 MG/DL
BILIRUB UR QL STRIP: NEGATIVE
BLD PROD TYP BPU: NORMAL
BLOOD COMPONENT TYPE: NORMAL
BUN SERPL-MCNC: 12.4 MG/DL (ref 6–20)
BUN SERPL-MCNC: 16.6 MG/DL (ref 6–20)
BUN SERPL-MCNC: 19.8 MG/DL (ref 6–20)
BURR CELLS BLD QL SMEAR: SLIGHT
BURR CELLS BLD QL SMEAR: SLIGHT
CA-I BLD-MCNC: 4.2 MG/DL (ref 4.4–5.2)
CA-I BLD-MCNC: 4.4 MG/DL (ref 4.4–5.2)
CA-I BLD-MCNC: 5.6 MG/DL (ref 4.4–5.2)
CA-I BLD-MCNC: 5.6 MG/DL (ref 4.4–5.2)
CA-I BLD-MCNC: 7 MG/DL (ref 4.4–5.2)
CALCIUM SERPL-MCNC: 8.9 MG/DL (ref 8.8–10.4)
CALCIUM SERPL-MCNC: 9.2 MG/DL (ref 8.8–10.4)
CALCIUM SERPL-MCNC: 9.8 MG/DL (ref 8.8–10.4)
CHLORIDE SERPL-SCNC: 100 MMOL/L (ref 98–107)
CHLORIDE SERPL-SCNC: 103 MMOL/L (ref 98–107)
CHLORIDE SERPL-SCNC: 104 MMOL/L (ref 98–107)
CLOT INIT KAOL IND TO POST HEP NEUT TRTO: 1 {RATIO}
CLOT INIT KAOL IND TO POST HEP NEUT TRTO: 1 {RATIO}
CLOT INIT KAOLIN IND BLD US: 148 SEC (ref 113–166)
CLOT INIT KAOLIN IND BLD US: 164 SEC (ref 121–175)
CLOT INIT KAOLIN IND BLD US: 176 SEC (ref 113–166)
CLOT INIT KAOLIN IND P HEP NEUT BLD US: 100 PERCENT (ref 92–100)
CLOT INIT KAOLIN IND P HEP NEUT BLD US: 141 SEC (ref 103–153)
CLOT INIT KAOLIN IND P HEP NEUT BLD US: 173 SEC (ref 103–153)
CLOT STIFF PLT CONT BLD CALC: 10.4 HPA (ref 11.9–29.8)
CLOT STIFF PLT CONT BLD CALC: 8.2 HPA (ref 11.9–29.8)
CLOT STIFF PLT CONT BLD CALC: 8.6 HPA (ref 12.8–32.3)
CLOT STIFF TF IND P HEP NEUT BLD US: 10.1 HPA (ref 14–35.4)
CLOT STIFF TF IND P HEP NEUT BLD US: 11.8 HPA (ref 13–33.2)
CLOT STIFF TF IND P HEP NEUT BLD US: 9.3 HPA (ref 13–33.2)
CLOT STIFF TF IND+IIB-IIIA INH P HEP NEU: 1.1 HPA (ref 1–3.7)
CLOT STIFF TF IND+IIB-IIIA INH P HEP NEU: 1.4 HPA (ref 1–3.7)
CLOT STIFF TF IND+IIB-IIIA INH P HEP NEU: 1.5 HPA (ref 0.9–4.2)
CMV IGG SERPL IA-ACNC: >10 U/ML
CMV IGG SERPL IA-ACNC: ABNORMAL
CMV IGM SERPL IA-ACNC: 11 AU/ML
CMV IGM SERPL IA-ACNC: NEGATIVE
CODING SYSTEM: NORMAL
COHGB MFR BLD: >100 % (ref 96–97)
COLOR UR AUTO: ABNORMAL
CREAT SERPL-MCNC: 0.73 MG/DL (ref 0.51–0.95)
CREAT SERPL-MCNC: 0.87 MG/DL (ref 0.51–0.95)
CREAT SERPL-MCNC: 0.89 MG/DL (ref 0.51–0.95)
CROSSMATCH: NORMAL
EBV VCA IGG SER IA-ACNC: >750 U/ML
EBV VCA IGG SER IA-ACNC: POSITIVE
EBV VCA IGM SER IA-ACNC: 12.1 U/ML
EBV VCA IGM SER IA-ACNC: NORMAL
EGFRCR SERPLBLD CKD-EPI 2021: 78 ML/MIN/1.73M2
EGFRCR SERPLBLD CKD-EPI 2021: 80 ML/MIN/1.73M2
EGFRCR SERPLBLD CKD-EPI 2021: >90 ML/MIN/1.73M2
EOSINOPHIL # BLD AUTO: 0 10E3/UL (ref 0–0.7)
EOSINOPHIL # BLD AUTO: 0 10E3/UL (ref 0–0.7)
EOSINOPHIL # BLD AUTO: 0.1 10E3/UL (ref 0–0.7)
EOSINOPHIL NFR BLD AUTO: 0 %
EOSINOPHIL NFR BLD AUTO: 0 %
EOSINOPHIL NFR BLD AUTO: 3 %
ERYTHROCYTE [DISTWIDTH] IN BLOOD BY AUTOMATED COUNT: 17.6 % (ref 10–15)
ERYTHROCYTE [DISTWIDTH] IN BLOOD BY AUTOMATED COUNT: 17.7 % (ref 10–15)
ERYTHROCYTE [DISTWIDTH] IN BLOOD BY AUTOMATED COUNT: 18.6 % (ref 10–15)
FIBRINOGEN PPP-MCNC: 156 MG/DL (ref 170–510)
FIBRINOGEN PPP-MCNC: 190 MG/DL (ref 170–510)
FIBRINOGEN PPP-MCNC: 211 MG/DL (ref 170–510)
GLUCOSE BLD-MCNC: 105 MG/DL (ref 70–99)
GLUCOSE BLD-MCNC: 112 MG/DL (ref 70–99)
GLUCOSE BLD-MCNC: 127 MG/DL (ref 70–99)
GLUCOSE BLD-MCNC: 141 MG/DL (ref 70–99)
GLUCOSE BLD-MCNC: 147 MG/DL (ref 70–99)
GLUCOSE BLDC GLUCOMTR-MCNC: 103 MG/DL (ref 70–99)
GLUCOSE BLDC GLUCOMTR-MCNC: 143 MG/DL (ref 70–99)
GLUCOSE BLDC GLUCOMTR-MCNC: 165 MG/DL (ref 70–99)
GLUCOSE BLDC GLUCOMTR-MCNC: 178 MG/DL (ref 70–99)
GLUCOSE BLDC GLUCOMTR-MCNC: 209 MG/DL (ref 70–99)
GLUCOSE BLDC GLUCOMTR-MCNC: 222 MG/DL (ref 70–99)
GLUCOSE SERPL-MCNC: 106 MG/DL (ref 70–99)
GLUCOSE SERPL-MCNC: 151 MG/DL (ref 70–99)
GLUCOSE SERPL-MCNC: 208 MG/DL (ref 70–99)
GLUCOSE UR STRIP-MCNC: NEGATIVE MG/DL
GRAM STAIN RESULT: NORMAL
GRAM STAIN RESULT: NORMAL
HBV CORE AB SERPL QL IA: NONREACTIVE
HBV SURFACE AB SERPL IA-ACNC: 54.8 M[IU]/ML
HBV SURFACE AB SERPL IA-ACNC: REACTIVE M[IU]/ML
HBV SURFACE AG SERPL QL IA: NONREACTIVE
HCO3 BLD-SCNC: 30 MMOL/L (ref 21–28)
HCO3 BLDA-SCNC: 24 MMOL/L (ref 21–28)
HCO3 BLDA-SCNC: 26 MMOL/L (ref 21–28)
HCO3 BLDA-SCNC: 27 MMOL/L (ref 21–28)
HCO3 BLDA-SCNC: 28 MMOL/L (ref 21–28)
HCO3 BLDA-SCNC: 28 MMOL/L (ref 21–28)
HCO3 SERPL-SCNC: 25 MMOL/L (ref 22–29)
HCO3 SERPL-SCNC: 26 MMOL/L (ref 22–29)
HCO3 SERPL-SCNC: 27 MMOL/L (ref 22–29)
HCT VFR BLD AUTO: 23.8 % (ref 35–47)
HCT VFR BLD AUTO: 30 % (ref 35–47)
HCT VFR BLD AUTO: 31.6 % (ref 35–47)
HCV AB SERPL QL IA: NONREACTIVE
HGB BLD-MCNC: 10.3 G/DL (ref 11.7–15.7)
HGB BLD-MCNC: 5.8 G/DL (ref 11.7–15.7)
HGB BLD-MCNC: 7.2 G/DL (ref 11.7–15.7)
HGB BLD-MCNC: 8.5 G/DL (ref 11.7–15.7)
HGB BLD-MCNC: 8.6 G/DL (ref 11.7–15.7)
HGB BLD-MCNC: 8.9 G/DL (ref 11.7–15.7)
HGB BLD-MCNC: 9 G/DL (ref 11.7–15.7)
HGB BLD-MCNC: 9.2 G/DL (ref 11.7–15.7)
HGB BLD-MCNC: 9.7 G/DL (ref 11.7–15.7)
HGB UR QL STRIP: NEGATIVE
HIV 1+2 AB+HIV1 P24 AG SERPL QL IA: NONREACTIVE
HOLD SPECIMEN: NORMAL
HYALINE CASTS: 1 /LPF
IMM GRANULOCYTES # BLD: 0 10E3/UL
IMM GRANULOCYTES # BLD: 0 10E3/UL
IMM GRANULOCYTES # BLD: 0.1 10E3/UL
IMM GRANULOCYTES NFR BLD: 0 %
IMM GRANULOCYTES NFR BLD: 0 %
IMM GRANULOCYTES NFR BLD: 1 %
INR PPP: 1.49 (ref 0.85–1.15)
INR PPP: 1.66 (ref 0.85–1.15)
INR PPP: 1.79 (ref 0.85–1.15)
INR PPP: 1.89 (ref 0.85–1.15)
ISSUE DATE AND TIME: NORMAL
KETONES UR STRIP-MCNC: NEGATIVE MG/DL
LACTATE BLD-SCNC: 0.9 MMOL/L (ref 0.7–2)
LACTATE BLD-SCNC: 1.2 MMOL/L (ref 0.7–2)
LACTATE BLD-SCNC: 1.6 MMOL/L (ref 0.7–2)
LACTATE BLD-SCNC: 1.7 MMOL/L (ref 0.7–2)
LACTATE BLD-SCNC: 2.1 MMOL/L (ref 0.7–2)
LACTATE SERPL-SCNC: 1.2 MMOL/L (ref 0.7–2)
LEUKOCYTE ESTERASE UR QL STRIP: NEGATIVE
LYMPHOCYTES # BLD AUTO: 0.1 10E3/UL (ref 0.8–5.3)
LYMPHOCYTES # BLD AUTO: 0.1 10E3/UL (ref 0.8–5.3)
LYMPHOCYTES # BLD AUTO: 1.5 10E3/UL (ref 0.8–5.3)
LYMPHOCYTES NFR BLD AUTO: 1 %
LYMPHOCYTES NFR BLD AUTO: 2 %
LYMPHOCYTES NFR BLD AUTO: 44 %
MAGNESIUM SERPL-MCNC: 1.7 MG/DL (ref 1.7–2.3)
MCH RBC QN AUTO: 28.7 PG (ref 26.5–33)
MCH RBC QN AUTO: 28.8 PG (ref 26.5–33)
MCH RBC QN AUTO: 29.1 PG (ref 26.5–33)
MCHC RBC AUTO-ENTMCNC: 30.3 G/DL (ref 31.5–36.5)
MCHC RBC AUTO-ENTMCNC: 32.3 G/DL (ref 31.5–36.5)
MCHC RBC AUTO-ENTMCNC: 32.6 G/DL (ref 31.5–36.5)
MCV RBC AUTO: 89 FL (ref 78–100)
MCV RBC AUTO: 89 FL (ref 78–100)
MCV RBC AUTO: 95 FL (ref 78–100)
MONOCYTES # BLD AUTO: 0.1 10E3/UL (ref 0–1.3)
MONOCYTES # BLD AUTO: 0.3 10E3/UL (ref 0–1.3)
MONOCYTES # BLD AUTO: 0.3 10E3/UL (ref 0–1.3)
MONOCYTES NFR BLD AUTO: 1 %
MONOCYTES NFR BLD AUTO: 5 %
MONOCYTES NFR BLD AUTO: 9 %
NEUTROPHILS # BLD AUTO: 1.5 10E3/UL (ref 1.6–8.3)
NEUTROPHILS # BLD AUTO: 10 10E3/UL (ref 1.6–8.3)
NEUTROPHILS # BLD AUTO: 5.3 10E3/UL (ref 1.6–8.3)
NEUTROPHILS NFR BLD AUTO: 43 %
NEUTROPHILS NFR BLD AUTO: 92 %
NEUTROPHILS NFR BLD AUTO: 97 %
NITRATE UR QL: NEGATIVE
NRBC # BLD AUTO: 0 10E3/UL
NRBC BLD AUTO-RTO: 0 /100
O2/TOTAL GAS SETTING VFR VENT: 33 %
O2/TOTAL GAS SETTING VFR VENT: 40 %
O2/TOTAL GAS SETTING VFR VENT: 44 %
O2/TOTAL GAS SETTING VFR VENT: 44 %
O2/TOTAL GAS SETTING VFR VENT: 50 %
O2/TOTAL GAS SETTING VFR VENT: 50 %
OXYHGB MFR BLDA: 97 % (ref 92–100)
OXYHGB MFR BLDA: 97 % (ref 92–100)
OXYHGB MFR BLDA: 98 % (ref 92–100)
PCO2 BLD: 52 MM HG (ref 35–45)
PCO2 BLDA: 33 MM HG (ref 35–45)
PCO2 BLDA: 34 MM HG (ref 35–45)
PCO2 BLDA: 39 MM HG (ref 35–45)
PCO2 BLDA: 40 MM HG (ref 35–45)
PCO2 BLDA: 41 MM HG (ref 35–45)
PH BLD: 7.37 [PH] (ref 7.35–7.45)
PH BLDA: 7.42 [PH] (ref 7.35–7.45)
PH BLDA: 7.46 [PH] (ref 7.35–7.45)
PH BLDA: 7.47 [PH] (ref 7.35–7.45)
PH BLDA: 7.48 [PH] (ref 7.35–7.45)
PH BLDA: 7.52 [PH] (ref 7.35–7.45)
PH UR STRIP: 7.5 [PH] (ref 5–7)
PHOSPHATE SERPL-MCNC: 3.7 MG/DL (ref 2.5–4.5)
PLAT MORPH BLD: ABNORMAL
PLAT MORPH BLD: ABNORMAL
PLATELET # BLD AUTO: 60 10E3/UL (ref 150–450)
PLATELET # BLD AUTO: 62 10E3/UL (ref 150–450)
PLATELET # BLD AUTO: 63 10E3/UL (ref 150–450)
PLATELET # BLD AUTO: 80 10E3/UL (ref 150–450)
PO2 BLD: 191 MM HG (ref 80–105)
PO2 BLDA: 116 MM HG (ref 80–105)
PO2 BLDA: 130 MM HG (ref 80–105)
PO2 BLDA: 150 MM HG (ref 80–105)
PO2 BLDA: 203 MM HG (ref 80–105)
PO2 BLDA: 210 MM HG (ref 80–105)
POTASSIUM BLD-SCNC: 3.1 MMOL/L (ref 3.4–5.3)
POTASSIUM BLD-SCNC: 3.3 MMOL/L (ref 3.4–5.3)
POTASSIUM BLD-SCNC: 3.5 MMOL/L (ref 3.4–5.3)
POTASSIUM SERPL-SCNC: 3.6 MMOL/L (ref 3.4–5.3)
POTASSIUM SERPL-SCNC: 3.7 MMOL/L (ref 3.4–5.3)
POTASSIUM SERPL-SCNC: 4.2 MMOL/L (ref 3.4–5.3)
PROT SERPL-MCNC: 5.5 G/DL (ref 6.4–8.3)
PROT SERPL-MCNC: 6.8 G/DL (ref 6.4–8.3)
RBC # BLD AUTO: 2.51 10E6/UL (ref 3.8–5.2)
RBC # BLD AUTO: 3.37 10E6/UL (ref 3.8–5.2)
RBC # BLD AUTO: 3.54 10E6/UL (ref 3.8–5.2)
RBC MORPH BLD: ABNORMAL
RBC MORPH BLD: ABNORMAL
RBC URINE: 0 /HPF
SAO2 % BLDA: 100 % (ref 96–97)
SAO2 % BLDA: 98 % (ref 92–100)
SODIUM BLD-SCNC: 138 MMOL/L (ref 135–145)
SODIUM BLD-SCNC: 139 MMOL/L (ref 135–145)
SODIUM BLD-SCNC: 140 MMOL/L (ref 135–145)
SODIUM BLD-SCNC: 140 MMOL/L (ref 135–145)
SODIUM BLD-SCNC: 142 MMOL/L (ref 135–145)
SODIUM SERPL-SCNC: 136 MMOL/L (ref 135–145)
SODIUM SERPL-SCNC: 141 MMOL/L (ref 135–145)
SODIUM SERPL-SCNC: 142 MMOL/L (ref 135–145)
SP GR UR STRIP: 1 (ref 1–1.03)
SPECIMEN EXPIRATION DATE: NORMAL
SPECIMEN EXPIRATION DATE: NORMAL
SQUAMOUS EPITHELIAL: 1 /HPF
UNIT ABO/RH: NORMAL
UNIT NUMBER: NORMAL
UNIT STATUS: NORMAL
UNIT TYPE ISBT: 5100
UNIT TYPE ISBT: 600
UNIT TYPE ISBT: 600
UNIT TYPE ISBT: 6200
UNIT TYPE ISBT: 8400
UNIT TYPE ISBT: 8400
UNIT TYPE ISBT: 9500
UROBILINOGEN UR STRIP-MCNC: NORMAL MG/DL
WBC # BLD AUTO: 10.3 10E3/UL (ref 4–11)
WBC # BLD AUTO: 3.4 10E3/UL (ref 4–11)
WBC # BLD AUTO: 5.8 10E3/UL (ref 4–11)
WBC URINE: <1 /HPF

## 2024-11-13 PROCEDURE — 258N000003 HC RX IP 258 OP 636: Performed by: TRANSPLANT SURGERY

## 2024-11-13 PROCEDURE — 0FY00Z0 TRANSPLANTATION OF LIVER, ALLOGENEIC, OPEN APPROACH: ICD-10-PCS | Performed by: TRANSPLANT SURGERY

## 2024-11-13 PROCEDURE — 87516 HEPATITIS B DNA AMP PROBE: CPT

## 2024-11-13 PROCEDURE — 82310 ASSAY OF CALCIUM: CPT | Performed by: PHYSICIAN ASSISTANT

## 2024-11-13 PROCEDURE — 86665 EPSTEIN-BARR CAPSID VCA: CPT

## 2024-11-13 PROCEDURE — 87340 HEPATITIS B SURFACE AG IA: CPT

## 2024-11-13 PROCEDURE — 87389 HIV-1 AG W/HIV-1&-2 AB AG IA: CPT

## 2024-11-13 PROCEDURE — 86706 HEP B SURFACE ANTIBODY: CPT

## 2024-11-13 PROCEDURE — 88313 SPECIAL STAINS GROUP 2: CPT | Mod: TC | Performed by: TRANSPLANT SURGERY

## 2024-11-13 PROCEDURE — 82565 ASSAY OF CREATININE: CPT | Performed by: STUDENT IN AN ORGANIZED HEALTH CARE EDUCATION/TRAINING PROGRAM

## 2024-11-13 PROCEDURE — 82040 ASSAY OF SERUM ALBUMIN: CPT

## 2024-11-13 PROCEDURE — 83605 ASSAY OF LACTIC ACID: CPT | Performed by: STUDENT IN AN ORGANIZED HEALTH CARE EDUCATION/TRAINING PROGRAM

## 2024-11-13 PROCEDURE — 86850 RBC ANTIBODY SCREEN: CPT | Performed by: STUDENT IN AN ORGANIZED HEALTH CARE EDUCATION/TRAINING PROGRAM

## 2024-11-13 PROCEDURE — 250N000013 HC RX MED GY IP 250 OP 250 PS 637: Performed by: TRANSPLANT SURGERY

## 2024-11-13 PROCEDURE — 250N000009 HC RX 250: Performed by: ANESTHESIOLOGY

## 2024-11-13 PROCEDURE — 80053 COMPREHEN METABOLIC PANEL: CPT

## 2024-11-13 PROCEDURE — 85025 COMPLETE CBC W/AUTO DIFF WBC: CPT

## 2024-11-13 PROCEDURE — 250N000011 HC RX IP 250 OP 636: Performed by: TRANSPLANT SURGERY

## 2024-11-13 PROCEDURE — 258N000003 HC RX IP 258 OP 636

## 2024-11-13 PROCEDURE — 88313 SPECIAL STAINS GROUP 2: CPT | Mod: 26 | Performed by: STUDENT IN AN ORGANIZED HEALTH CARE EDUCATION/TRAINING PROGRAM

## 2024-11-13 PROCEDURE — 999N000065 XR CHEST PORT 1 VIEW

## 2024-11-13 PROCEDURE — 82565 ASSAY OF CREATININE: CPT | Performed by: PHYSICIAN ASSISTANT

## 2024-11-13 PROCEDURE — 85610 PROTHROMBIN TIME: CPT | Performed by: STUDENT IN AN ORGANIZED HEALTH CARE EDUCATION/TRAINING PROGRAM

## 2024-11-13 PROCEDURE — 85730 THROMBOPLASTIN TIME PARTIAL: CPT

## 2024-11-13 PROCEDURE — 87535 HIV-1 PROBE&REVERSE TRNSCRPJ: CPT

## 2024-11-13 PROCEDURE — P9045 ALBUMIN (HUMAN), 5%, 250 ML: HCPCS | Mod: JZ

## 2024-11-13 PROCEDURE — 87081 CULTURE SCREEN ONLY: CPT

## 2024-11-13 PROCEDURE — 85384 FIBRINOGEN ACTIVITY: CPT | Performed by: STUDENT IN AN ORGANIZED HEALTH CARE EDUCATION/TRAINING PROGRAM

## 2024-11-13 PROCEDURE — 82570 ASSAY OF URINE CREATININE: CPT | Performed by: SURGERY

## 2024-11-13 PROCEDURE — 82310 ASSAY OF CALCIUM: CPT

## 2024-11-13 PROCEDURE — 250N000011 HC RX IP 250 OP 636: Performed by: ANESTHESIOLOGY

## 2024-11-13 PROCEDURE — 71045 X-RAY EXAM CHEST 1 VIEW: CPT

## 2024-11-13 PROCEDURE — 250N000013 HC RX MED GY IP 250 OP 250 PS 637: Performed by: STUDENT IN AN ORGANIZED HEALTH CARE EDUCATION/TRAINING PROGRAM

## 2024-11-13 PROCEDURE — 85730 THROMBOPLASTIN TIME PARTIAL: CPT | Performed by: TRANSPLANT SURGERY

## 2024-11-13 PROCEDURE — 250N000009 HC RX 250: Performed by: NURSE ANESTHETIST, CERTIFIED REGISTERED

## 2024-11-13 PROCEDURE — 258N000003 HC RX IP 258 OP 636: Performed by: STUDENT IN AN ORGANIZED HEALTH CARE EDUCATION/TRAINING PROGRAM

## 2024-11-13 PROCEDURE — 83735 ASSAY OF MAGNESIUM: CPT

## 2024-11-13 PROCEDURE — 84132 ASSAY OF SERUM POTASSIUM: CPT

## 2024-11-13 PROCEDURE — 88304 TISSUE EXAM BY PATHOLOGIST: CPT | Mod: TC | Performed by: TRANSPLANT SURGERY

## 2024-11-13 PROCEDURE — 04733ZZ DILATION OF HEPATIC ARTERY, PERCUTANEOUS APPROACH: ICD-10-PCS | Performed by: TRANSPLANT SURGERY

## 2024-11-13 PROCEDURE — 85025 COMPLETE CBC W/AUTO DIFF WBC: CPT | Performed by: PHYSICIAN ASSISTANT

## 2024-11-13 PROCEDURE — 86645 CMV ANTIBODY IGM: CPT

## 2024-11-13 PROCEDURE — 93975 VASCULAR STUDY: CPT | Mod: 26 | Performed by: STUDENT IN AN ORGANIZED HEALTH CARE EDUCATION/TRAINING PROGRAM

## 2024-11-13 PROCEDURE — 87521 HEPATITIS C PROBE&RVRS TRNSC: CPT

## 2024-11-13 PROCEDURE — 85384 FIBRINOGEN ACTIVITY: CPT | Performed by: TRANSPLANT SURGERY

## 2024-11-13 PROCEDURE — 80053 COMPREHEN METABOLIC PANEL: CPT | Performed by: STUDENT IN AN ORGANIZED HEALTH CARE EDUCATION/TRAINING PROGRAM

## 2024-11-13 PROCEDURE — 36415 COLL VENOUS BLD VENIPUNCTURE: CPT

## 2024-11-13 PROCEDURE — 250N000011 HC RX IP 250 OP 636: Mod: JZ | Performed by: NURSE ANESTHETIST, CERTIFIED REGISTERED

## 2024-11-13 PROCEDURE — 250N000012 HC RX MED GY IP 250 OP 636 PS 637: Performed by: TRANSPLANT SURGERY

## 2024-11-13 PROCEDURE — 71045 X-RAY EXAM CHEST 1 VIEW: CPT | Mod: 26 | Performed by: RADIOLOGY

## 2024-11-13 PROCEDURE — 82248 BILIRUBIN DIRECT: CPT | Performed by: STUDENT IN AN ORGANIZED HEALTH CARE EDUCATION/TRAINING PROGRAM

## 2024-11-13 PROCEDURE — P9037 PLATE PHERES LEUKOREDU IRRAD: HCPCS

## 2024-11-13 PROCEDURE — 250N000013 HC RX MED GY IP 250 OP 250 PS 637: Performed by: PHYSICIAN ASSISTANT

## 2024-11-13 PROCEDURE — 250N000025 HC SEVOFLURANE, PER MIN: Performed by: TRANSPLANT SURGERY

## 2024-11-13 PROCEDURE — 93005 ELECTROCARDIOGRAM TRACING: CPT

## 2024-11-13 PROCEDURE — 87075 CULTR BACTERIA EXCEPT BLOOD: CPT | Performed by: TRANSPLANT SURGERY

## 2024-11-13 PROCEDURE — 370N000017 HC ANESTHESIA TECHNICAL FEE, PER MIN: Performed by: TRANSPLANT SURGERY

## 2024-11-13 PROCEDURE — P9045 ALBUMIN (HUMAN), 5%, 250 ML: HCPCS | Performed by: TRANSPLANT SURGERY

## 2024-11-13 PROCEDURE — 250N000011 HC RX IP 250 OP 636: Performed by: PHYSICIAN ASSISTANT

## 2024-11-13 PROCEDURE — 85049 AUTOMATED PLATELET COUNT: CPT | Performed by: TRANSPLANT SURGERY

## 2024-11-13 PROCEDURE — 250N000009 HC RX 250

## 2024-11-13 PROCEDURE — 88304 TISSUE EXAM BY PATHOLOGIST: CPT | Mod: 26 | Performed by: STUDENT IN AN ORGANIZED HEALTH CARE EDUCATION/TRAINING PROGRAM

## 2024-11-13 PROCEDURE — 87205 SMEAR GRAM STAIN: CPT | Performed by: TRANSPLANT SURGERY

## 2024-11-13 PROCEDURE — 82150 ASSAY OF AMYLASE: CPT

## 2024-11-13 PROCEDURE — 85396 CLOTTING ASSAY WHOLE BLOOD: CPT

## 2024-11-13 PROCEDURE — 86923 COMPATIBILITY TEST ELECTRIC: CPT

## 2024-11-13 PROCEDURE — P9016 RBC LEUKOCYTES REDUCED: HCPCS

## 2024-11-13 PROCEDURE — 250N000011 HC RX IP 250 OP 636: Performed by: NURSE ANESTHETIST, CERTIFIED REGISTERED

## 2024-11-13 PROCEDURE — 0DNU0ZZ RELEASE OMENTUM, OPEN APPROACH: ICD-10-PCS | Performed by: TRANSPLANT SURGERY

## 2024-11-13 PROCEDURE — C1757 CATH, THROMBECTOMY/EMBOLECT: HCPCS | Performed by: TRANSPLANT SURGERY

## 2024-11-13 PROCEDURE — 85018 HEMOGLOBIN: CPT | Performed by: STUDENT IN AN ORGANIZED HEALTH CARE EDUCATION/TRAINING PROGRAM

## 2024-11-13 PROCEDURE — 86900 BLOOD TYPING SEROLOGIC ABO: CPT

## 2024-11-13 PROCEDURE — 410N000003 HC PER-PERFUSION 1ST 30 MIN: Performed by: TRANSPLANT SURGERY

## 2024-11-13 PROCEDURE — 250N000012 HC RX MED GY IP 250 OP 636 PS 637: Performed by: STUDENT IN AN ORGANIZED HEALTH CARE EDUCATION/TRAINING PROGRAM

## 2024-11-13 PROCEDURE — 88309 TISSUE EXAM BY PATHOLOGIST: CPT | Mod: TC | Performed by: TRANSPLANT SURGERY

## 2024-11-13 PROCEDURE — 82805 BLOOD GASES W/O2 SATURATION: CPT | Performed by: STUDENT IN AN ORGANIZED HEALTH CARE EDUCATION/TRAINING PROGRAM

## 2024-11-13 PROCEDURE — 250N000012 HC RX MED GY IP 250 OP 636 PS 637

## 2024-11-13 PROCEDURE — 812N000006 HC ACQUISITION LIVER CADAVER DONOR

## 2024-11-13 PROCEDURE — 86704 HEP B CORE ANTIBODY TOTAL: CPT

## 2024-11-13 PROCEDURE — 86644 CMV ANTIBODY: CPT

## 2024-11-13 PROCEDURE — 88309 TISSUE EXAM BY PATHOLOGIST: CPT | Mod: 26 | Performed by: STUDENT IN AN ORGANIZED HEALTH CARE EDUCATION/TRAINING PROGRAM

## 2024-11-13 PROCEDURE — 84100 ASSAY OF PHOSPHORUS: CPT

## 2024-11-13 PROCEDURE — 85610 PROTHROMBIN TIME: CPT | Performed by: TRANSPLANT SURGERY

## 2024-11-13 PROCEDURE — 258N000003 HC RX IP 258 OP 636: Performed by: NURSE ANESTHETIST, CERTIFIED REGISTERED

## 2024-11-13 PROCEDURE — 272N000085 HC PACK CELL SAVER CSP: Performed by: TRANSPLANT SURGERY

## 2024-11-13 PROCEDURE — 85610 PROTHROMBIN TIME: CPT

## 2024-11-13 PROCEDURE — 87102 FUNGUS ISOLATION CULTURE: CPT | Performed by: TRANSPLANT SURGERY

## 2024-11-13 PROCEDURE — 250N000009 HC RX 250: Performed by: TRANSPLANT SURGERY

## 2024-11-13 PROCEDURE — P9059 PLASMA, FRZ BETWEEN 8-24HOUR: HCPCS

## 2024-11-13 PROCEDURE — 410N000004: Performed by: TRANSPLANT SURGERY

## 2024-11-13 PROCEDURE — 250N000013 HC RX MED GY IP 250 OP 250 PS 637

## 2024-11-13 PROCEDURE — 85730 THROMBOPLASTIN TIME PARTIAL: CPT | Performed by: STUDENT IN AN ORGANIZED HEALTH CARE EDUCATION/TRAINING PROGRAM

## 2024-11-13 PROCEDURE — 86803 HEPATITIS C AB TEST: CPT

## 2024-11-13 PROCEDURE — 250N000011 HC RX IP 250 OP 636: Mod: JZ

## 2024-11-13 PROCEDURE — 82947 ASSAY GLUCOSE BLOOD QUANT: CPT | Performed by: PHYSICIAN ASSISTANT

## 2024-11-13 PROCEDURE — 250N000011 HC RX IP 250 OP 636

## 2024-11-13 PROCEDURE — 272N000086 HC PACK RI-RAPID INFUSION: Performed by: TRANSPLANT SURGERY

## 2024-11-13 PROCEDURE — 80048 BASIC METABOLIC PNL TOTAL CA: CPT | Performed by: STUDENT IN AN ORGANIZED HEALTH CARE EDUCATION/TRAINING PROGRAM

## 2024-11-13 PROCEDURE — 82962 GLUCOSE BLOOD TEST: CPT

## 2024-11-13 PROCEDURE — 272N000001 HC OR GENERAL SUPPLY STERILE: Performed by: TRANSPLANT SURGERY

## 2024-11-13 PROCEDURE — 81001 URINALYSIS AUTO W/SCOPE: CPT

## 2024-11-13 PROCEDURE — 360N000078 HC SURGERY LEVEL 5, PER MIN: Performed by: TRANSPLANT SURGERY

## 2024-11-13 PROCEDURE — 84300 ASSAY OF URINE SODIUM: CPT | Performed by: SURGERY

## 2024-11-13 PROCEDURE — 82330 ASSAY OF CALCIUM: CPT

## 2024-11-13 PROCEDURE — 85025 COMPLETE CBC W/AUTO DIFF WBC: CPT | Performed by: STUDENT IN AN ORGANIZED HEALTH CARE EDUCATION/TRAINING PROGRAM

## 2024-11-13 PROCEDURE — 85384 FIBRINOGEN ACTIVITY: CPT

## 2024-11-13 PROCEDURE — 87070 CULTURE OTHR SPECIMN AEROBIC: CPT | Performed by: TRANSPLANT SURGERY

## 2024-11-13 PROCEDURE — 999N000248 HC STATISTIC IV INSERT WITH US BY RN

## 2024-11-13 PROCEDURE — 93975 VASCULAR STUDY: CPT

## 2024-11-13 PROCEDURE — 86900 BLOOD TYPING SEROLOGIC ABO: CPT | Performed by: STUDENT IN AN ORGANIZED HEALTH CARE EDUCATION/TRAINING PROGRAM

## 2024-11-13 PROCEDURE — 250N000011 HC RX IP 250 OP 636: Performed by: STUDENT IN AN ORGANIZED HEALTH CARE EDUCATION/TRAINING PROGRAM

## 2024-11-13 RX ORDER — NALOXONE HYDROCHLORIDE 0.4 MG/ML
0.4 INJECTION, SOLUTION INTRAMUSCULAR; INTRAVENOUS; SUBCUTANEOUS
Status: DISCONTINUED | OUTPATIENT
Start: 2024-11-13 | End: 2024-11-20 | Stop reason: HOSPADM

## 2024-11-13 RX ORDER — GUAIFENESIN 600 MG/1
15 TABLET, EXTENDED RELEASE ORAL DAILY
Status: DISCONTINUED | OUTPATIENT
Start: 2024-11-13 | End: 2024-11-13 | Stop reason: ALTCHOICE

## 2024-11-13 RX ORDER — POTASSIUM CHLORIDE 1.5 G/1.58G
20 POWDER, FOR SOLUTION ORAL ONCE
Status: COMPLETED | OUTPATIENT
Start: 2024-11-13 | End: 2024-11-13

## 2024-11-13 RX ORDER — DEXTROSE MONOHYDRATE AND SODIUM CHLORIDE 5; .45 G/100ML; G/100ML
INJECTION, SOLUTION INTRAVENOUS CONTINUOUS
Status: DISCONTINUED | OUTPATIENT
Start: 2024-11-13 | End: 2024-11-13

## 2024-11-13 RX ORDER — VALGANCICLOVIR HYDROCHLORIDE 50 MG/ML
450 POWDER, FOR SOLUTION ORAL DAILY
Status: DISCONTINUED | OUTPATIENT
Start: 2024-11-13 | End: 2024-11-13 | Stop reason: ALTCHOICE

## 2024-11-13 RX ORDER — MYCOPHENOLATE MOFETIL 200 MG/ML
750 POWDER, FOR SUSPENSION ORAL
Status: DISCONTINUED | OUTPATIENT
Start: 2024-11-13 | End: 2024-11-13 | Stop reason: ALTCHOICE

## 2024-11-13 RX ORDER — ONDANSETRON 2 MG/ML
INJECTION INTRAMUSCULAR; INTRAVENOUS
Status: COMPLETED
Start: 2024-11-13 | End: 2024-11-13

## 2024-11-13 RX ORDER — VERAPAMIL HYDROCHLORIDE 2.5 MG/ML
INJECTION, SOLUTION INTRAVENOUS PRN
Status: DISCONTINUED | OUTPATIENT
Start: 2024-11-13 | End: 2024-11-13 | Stop reason: HOSPADM

## 2024-11-13 RX ORDER — SULFAMETHOXAZOLE AND TRIMETHOPRIM 400; 80 MG/1; MG/1
1 TABLET ORAL DAILY
Status: DISCONTINUED | OUTPATIENT
Start: 2024-11-13 | End: 2024-11-20 | Stop reason: HOSPADM

## 2024-11-13 RX ORDER — METHYLPREDNISOLONE SODIUM SUCCINATE 125 MG/2ML
100 INJECTION INTRAMUSCULAR; INTRAVENOUS ONCE
Status: COMPLETED | OUTPATIENT
Start: 2024-11-15 | End: 2024-11-15

## 2024-11-13 RX ORDER — LIDOCAINE 40 MG/G
CREAM TOPICAL
Status: DISCONTINUED | OUTPATIENT
Start: 2024-11-13 | End: 2024-11-13 | Stop reason: HOSPADM

## 2024-11-13 RX ORDER — HYDROMORPHONE HCL IN WATER/PF 6 MG/30 ML
.2-.4 PATIENT CONTROLLED ANALGESIA SYRINGE INTRAVENOUS
Status: DISCONTINUED | OUTPATIENT
Start: 2024-11-13 | End: 2024-11-14

## 2024-11-13 RX ORDER — FLUCONAZOLE 2 MG/ML
400 INJECTION, SOLUTION INTRAVENOUS EVERY 24 HOURS
Status: DISCONTINUED | OUTPATIENT
Start: 2024-11-14 | End: 2024-11-14

## 2024-11-13 RX ORDER — VANCOMYCIN HYDROCHLORIDE 1 G/200ML
1000 INJECTION, SOLUTION INTRAVENOUS EVERY 12 HOURS
Status: DISCONTINUED | OUTPATIENT
Start: 2024-11-13 | End: 2024-11-14

## 2024-11-13 RX ORDER — PREDNISONE 5 MG/1
5 TABLET ORAL ONCE
Status: COMPLETED | OUTPATIENT
Start: 2024-11-19 | End: 2024-11-19

## 2024-11-13 RX ORDER — FIBRINOGEN (HUMAN) 700-1300MG
1150 KIT INTRAVENOUS ONCE
Status: COMPLETED | OUTPATIENT
Start: 2024-11-13 | End: 2024-11-13

## 2024-11-13 RX ORDER — VALGANCICLOVIR 450 MG/1
450 TABLET, FILM COATED ORAL DAILY
Status: DISCONTINUED | OUTPATIENT
Start: 2024-11-13 | End: 2024-11-15

## 2024-11-13 RX ORDER — MYCOPHENOLATE MOFETIL 250 MG/1
750 CAPSULE ORAL 2 TIMES DAILY
Status: DISCONTINUED | OUTPATIENT
Start: 2024-11-13 | End: 2024-11-20 | Stop reason: HOSPADM

## 2024-11-13 RX ORDER — ONDANSETRON 2 MG/ML
4 INJECTION INTRAMUSCULAR; INTRAVENOUS EVERY 6 HOURS PRN
Status: DISCONTINUED | OUTPATIENT
Start: 2024-11-13 | End: 2024-11-13

## 2024-11-13 RX ORDER — ESCITALOPRAM OXALATE 20 MG/1
20 TABLET ORAL DAILY
Status: DISCONTINUED | OUTPATIENT
Start: 2024-11-14 | End: 2024-11-17

## 2024-11-13 RX ORDER — FENTANYL CITRATE 50 UG/ML
INJECTION, SOLUTION INTRAMUSCULAR; INTRAVENOUS PRN
Status: DISCONTINUED | OUTPATIENT
Start: 2024-11-13 | End: 2024-11-13

## 2024-11-13 RX ORDER — FLUCONAZOLE 2 MG/ML
400 INJECTION, SOLUTION INTRAVENOUS ONCE
Status: COMPLETED | OUTPATIENT
Start: 2024-11-13 | End: 2024-11-13

## 2024-11-13 RX ORDER — OXYCODONE HYDROCHLORIDE 5 MG/1
5 TABLET ORAL EVERY 4 HOURS PRN
Status: DISCONTINUED | OUTPATIENT
Start: 2024-11-13 | End: 2024-11-13

## 2024-11-13 RX ORDER — SODIUM CHLORIDE, SODIUM GLUCONATE, SODIUM ACETATE, POTASSIUM CHLORIDE AND MAGNESIUM CHLORIDE 526; 502; 368; 37; 30 MG/100ML; MG/100ML; MG/100ML; MG/100ML; MG/100ML
INJECTION, SOLUTION INTRAVENOUS CONTINUOUS PRN
Status: DISCONTINUED | OUTPATIENT
Start: 2024-11-13 | End: 2024-11-13

## 2024-11-13 RX ORDER — SODIUM CHLORIDE, SODIUM LACTATE, POTASSIUM CHLORIDE, CALCIUM CHLORIDE 600; 310; 30; 20 MG/100ML; MG/100ML; MG/100ML; MG/100ML
INJECTION, SOLUTION INTRAVENOUS CONTINUOUS
Status: DISCONTINUED | OUTPATIENT
Start: 2024-11-13 | End: 2024-11-13 | Stop reason: HOSPADM

## 2024-11-13 RX ORDER — TACROLIMUS 1 MG/1
2 CAPSULE ORAL
Status: DISCONTINUED | OUTPATIENT
Start: 2024-11-13 | End: 2024-11-17

## 2024-11-13 RX ORDER — FIBRINOGEN (HUMAN) 700-1300MG
1150 KIT INTRAVENOUS ONCE
Status: DISCONTINUED | OUTPATIENT
Start: 2024-11-13 | End: 2024-11-13

## 2024-11-13 RX ORDER — ONDANSETRON 2 MG/ML
INJECTION INTRAMUSCULAR; INTRAVENOUS PRN
Status: DISCONTINUED | OUTPATIENT
Start: 2024-11-13 | End: 2024-11-13

## 2024-11-13 RX ORDER — LEVOFLOXACIN 5 MG/ML
500 INJECTION, SOLUTION INTRAVENOUS EVERY 24 HOURS
Status: DISCONTINUED | OUTPATIENT
Start: 2024-11-14 | End: 2024-11-14

## 2024-11-13 RX ORDER — PANTOPRAZOLE SODIUM 40 MG/1
40 TABLET, DELAYED RELEASE ORAL
Status: DISCONTINUED | OUTPATIENT
Start: 2024-11-13 | End: 2024-11-18

## 2024-11-13 RX ORDER — VANCOMYCIN HYDROCHLORIDE
1500 EVERY 8 HOURS PRN
Status: DISCONTINUED | OUTPATIENT
Start: 2024-11-13 | End: 2024-11-13 | Stop reason: HOSPADM

## 2024-11-13 RX ORDER — NALOXONE HYDROCHLORIDE 0.4 MG/ML
0.2 INJECTION, SOLUTION INTRAMUSCULAR; INTRAVENOUS; SUBCUTANEOUS
Status: DISCONTINUED | OUTPATIENT
Start: 2024-11-13 | End: 2024-11-20 | Stop reason: HOSPADM

## 2024-11-13 RX ORDER — OXYCODONE HYDROCHLORIDE 10 MG/1
10 TABLET ORAL EVERY 4 HOURS PRN
Status: DISCONTINUED | OUTPATIENT
Start: 2024-11-13 | End: 2024-11-17

## 2024-11-13 RX ORDER — PROPOFOL 10 MG/ML
INJECTION, EMULSION INTRAVENOUS PRN
Status: DISCONTINUED | OUTPATIENT
Start: 2024-11-13 | End: 2024-11-13

## 2024-11-13 RX ORDER — FIBRINOGEN (HUMAN) 700-1300MG
4 KIT INTRAVENOUS ONCE
Status: DISCONTINUED | OUTPATIENT
Start: 2024-11-13 | End: 2024-11-13

## 2024-11-13 RX ORDER — LEVOFLOXACIN 5 MG/ML
500 INJECTION, SOLUTION INTRAVENOUS ONCE
Status: DISCONTINUED | OUTPATIENT
Start: 2024-11-13 | End: 2024-11-13 | Stop reason: DRUGHIGH

## 2024-11-13 RX ORDER — NOREPINEPHRINE BITARTRATE 0.02 MG/ML
INJECTION, SOLUTION INTRAVENOUS CONTINUOUS PRN
Status: DISCONTINUED | OUTPATIENT
Start: 2024-11-13 | End: 2024-11-13

## 2024-11-13 RX ORDER — OXYCODONE HYDROCHLORIDE 5 MG/1
5 TABLET ORAL EVERY 4 HOURS PRN
Status: DISCONTINUED | OUTPATIENT
Start: 2024-11-13 | End: 2024-11-20 | Stop reason: HOSPADM

## 2024-11-13 RX ORDER — PAPAVERINE HYDROCHLORIDE 30 MG/ML
INJECTION INTRAMUSCULAR; INTRAVENOUS PRN
Status: DISCONTINUED | OUTPATIENT
Start: 2024-11-13 | End: 2024-11-13 | Stop reason: HOSPADM

## 2024-11-13 RX ORDER — VANCOMYCIN HYDROCHLORIDE
1500 ONCE
Status: DISCONTINUED | OUTPATIENT
Start: 2024-11-13 | End: 2024-11-13 | Stop reason: DRUGHIGH

## 2024-11-13 RX ORDER — ASPIRIN 325 MG
325 TABLET ORAL DAILY
Status: DISCONTINUED | OUTPATIENT
Start: 2024-11-13 | End: 2024-11-20 | Stop reason: HOSPADM

## 2024-11-13 RX ORDER — DEXTROSE MONOHYDRATE 25 G/50ML
25-50 INJECTION, SOLUTION INTRAVENOUS
Status: DISCONTINUED | OUTPATIENT
Start: 2024-11-13 | End: 2024-11-20 | Stop reason: HOSPADM

## 2024-11-13 RX ORDER — ONDANSETRON 2 MG/ML
4 INJECTION INTRAMUSCULAR; INTRAVENOUS EVERY 4 HOURS PRN
Status: DISCONTINUED | OUTPATIENT
Start: 2024-11-13 | End: 2024-11-14

## 2024-11-13 RX ORDER — LIDOCAINE HYDROCHLORIDE 20 MG/ML
INJECTION, SOLUTION INFILTRATION; PERINEURAL PRN
Status: DISCONTINUED | OUTPATIENT
Start: 2024-11-13 | End: 2024-11-13

## 2024-11-13 RX ORDER — LEVOFLOXACIN 5 MG/ML
500 INJECTION, SOLUTION INTRAVENOUS EVERY 8 HOURS PRN
Status: DISCONTINUED | OUTPATIENT
Start: 2024-11-13 | End: 2024-11-13 | Stop reason: HOSPADM

## 2024-11-13 RX ORDER — NICOTINE POLACRILEX 4 MG
15-30 LOZENGE BUCCAL
Status: DISCONTINUED | OUTPATIENT
Start: 2024-11-13 | End: 2024-11-20 | Stop reason: HOSPADM

## 2024-11-13 RX ORDER — PREDNISONE 10 MG/1
10 TABLET ORAL ONCE
Status: COMPLETED | OUTPATIENT
Start: 2024-11-18 | End: 2024-11-18

## 2024-11-13 RX ORDER — HYDROMORPHONE HCL IN WATER/PF 6 MG/30 ML
.2-.4 PATIENT CONTROLLED ANALGESIA SYRINGE INTRAVENOUS EVERY 4 HOURS PRN
Status: DISCONTINUED | OUTPATIENT
Start: 2024-11-13 | End: 2024-11-13

## 2024-11-13 RX ORDER — METHYLPREDNISOLONE SODIUM SUCCINATE 125 MG/2ML
50 INJECTION INTRAMUSCULAR; INTRAVENOUS ONCE
Status: COMPLETED | OUTPATIENT
Start: 2024-11-16 | End: 2024-11-16

## 2024-11-13 RX ORDER — ACETAMINOPHEN 325 MG/1
975 TABLET ORAL ONCE
Status: DISCONTINUED | OUTPATIENT
Start: 2024-11-13 | End: 2024-11-13

## 2024-11-13 RX ORDER — MULTIPLE VITAMINS W/ MINERALS TAB 9MG-400MCG
1 TAB ORAL DAILY
Status: DISCONTINUED | OUTPATIENT
Start: 2024-11-13 | End: 2024-11-20 | Stop reason: HOSPADM

## 2024-11-13 RX ORDER — ACETAMINOPHEN 325 MG/1
975 TABLET ORAL ONCE
Status: COMPLETED | OUTPATIENT
Start: 2024-11-13 | End: 2024-11-13

## 2024-11-13 RX ADMIN — DEXTROSE AND SODIUM CHLORIDE: 5; 450 INJECTION, SOLUTION INTRAVENOUS at 09:55

## 2024-11-13 RX ADMIN — Medication 100 MG: at 09:06

## 2024-11-13 RX ADMIN — Medication 100 MG: at 02:37

## 2024-11-13 RX ADMIN — Medication 1000 MG: at 02:49

## 2024-11-13 RX ADMIN — NOREPINEPHRINE BITARTRATE 12.8 MCG: 1 INJECTION, SOLUTION, CONCENTRATE INTRAVENOUS at 05:25

## 2024-11-13 RX ADMIN — PROPOFOL 150 MG: 10 INJECTION, EMULSION INTRAVENOUS at 02:37

## 2024-11-13 RX ADMIN — Medication 20 MG: at 07:33

## 2024-11-13 RX ADMIN — HYDROMORPHONE HYDROCHLORIDE 0.4 MG: 0.2 INJECTION, SOLUTION INTRAMUSCULAR; INTRAVENOUS; SUBCUTANEOUS at 10:06

## 2024-11-13 RX ADMIN — ONDANSETRON 4 MG: 2 INJECTION INTRAMUSCULAR; INTRAVENOUS at 14:07

## 2024-11-13 RX ADMIN — Medication 1 TABLET: at 11:57

## 2024-11-13 RX ADMIN — NOREPINEPHRINE BITARTRATE 12.8 MCG: 1 INJECTION, SOLUTION, CONCENTRATE INTRAVENOUS at 06:14

## 2024-11-13 RX ADMIN — NOREPINEPHRINE BITARTRATE 0.03 MCG/KG/MIN: 0.02 INJECTION, SOLUTION INTRAVENOUS at 05:23

## 2024-11-13 RX ADMIN — FIBRINOGEN (HUMAN) 1150 MG: KIT INTRAVENOUS at 06:20

## 2024-11-13 RX ADMIN — FIBRINOGEN (HUMAN) 1150 MG: KIT INTRAVENOUS at 06:18

## 2024-11-13 RX ADMIN — PHENYLEPHRINE HYDROCHLORIDE 100 MCG: 10 INJECTION INTRAVENOUS at 02:38

## 2024-11-13 RX ADMIN — CALCIUM CHLORIDE 1 G: 100 INJECTION, SOLUTION INTRAVENOUS at 03:51

## 2024-11-13 RX ADMIN — FENTANYL CITRATE 150 MCG: 50 INJECTION INTRAMUSCULAR; INTRAVENOUS at 04:31

## 2024-11-13 RX ADMIN — EPINEPHRINE 10 MCG: 1 INJECTION INTRAMUSCULAR; INTRAVENOUS; SUBCUTANEOUS at 06:13

## 2024-11-13 RX ADMIN — Medication 20 MG: at 07:55

## 2024-11-13 RX ADMIN — LIDOCAINE HYDROCHLORIDE 100 MG: 20 INJECTION, SOLUTION INFILTRATION; PERINEURAL at 02:37

## 2024-11-13 RX ADMIN — MYCOPHENOLATE MOFETIL 750 MG: 250 CAPSULE ORAL at 19:52

## 2024-11-13 RX ADMIN — FENTANYL CITRATE 50 MCG: 50 INJECTION INTRAMUSCULAR; INTRAVENOUS at 09:08

## 2024-11-13 RX ADMIN — SULFAMETHOXAZOLE AND TRIMETHOPRIM 1 TABLET: 400; 80 TABLET ORAL at 19:52

## 2024-11-13 RX ADMIN — OXYCODONE HYDROCHLORIDE 10 MG: 10 TABLET ORAL at 23:59

## 2024-11-13 RX ADMIN — VASOPRESSIN 2 UNITS/HR: 20 INJECTION, SOLUTION INTRAMUSCULAR; SUBCUTANEOUS at 05:27

## 2024-11-13 RX ADMIN — ONDANSETRON 4 MG: 2 INJECTION INTRAMUSCULAR; INTRAVENOUS at 08:45

## 2024-11-13 RX ADMIN — FENTANYL CITRATE 50 MCG: 50 INJECTION INTRAMUSCULAR; INTRAVENOUS at 09:20

## 2024-11-13 RX ADMIN — SODIUM CHLORIDE, SODIUM GLUCONATE, SODIUM ACETATE, POTASSIUM CHLORIDE AND MAGNESIUM CHLORIDE: 526; 502; 368; 37; 30 INJECTION, SOLUTION INTRAVENOUS at 03:20

## 2024-11-13 RX ADMIN — FENTANYL CITRATE 100 MCG: 50 INJECTION INTRAMUSCULAR; INTRAVENOUS at 02:36

## 2024-11-13 RX ADMIN — TACROLIMUS 2 MG: 1 CAPSULE ORAL at 11:57

## 2024-11-13 RX ADMIN — CALCIUM CHLORIDE 1 G: 100 INJECTION, SOLUTION INTRAVENOUS at 05:24

## 2024-11-13 RX ADMIN — SODIUM CHLORIDE, SODIUM GLUCONATE, SODIUM ACETATE, POTASSIUM CHLORIDE AND MAGNESIUM CHLORIDE: 526; 502; 368; 37; 30 INJECTION, SOLUTION INTRAVENOUS at 08:18

## 2024-11-13 RX ADMIN — CALCIUM CHLORIDE 1 G: 100 INJECTION, SOLUTION INTRAVENOUS at 05:55

## 2024-11-13 RX ADMIN — SODIUM BICARBONATE 50 MEQ: 84 INJECTION, SOLUTION INTRAVENOUS at 06:13

## 2024-11-13 RX ADMIN — VANCOMYCIN HYDROCHLORIDE 1000 MG: 1 INJECTION, SOLUTION INTRAVENOUS at 14:59

## 2024-11-13 RX ADMIN — ASPIRIN 325 MG ORAL TABLET 325 MG: 325 PILL ORAL at 11:57

## 2024-11-13 RX ADMIN — SODIUM CHLORIDE 1 G: 9 INJECTION, SOLUTION INTRAVENOUS at 06:40

## 2024-11-13 RX ADMIN — ONDANSETRON 4 MG: 2 INJECTION INTRAMUSCULAR; INTRAVENOUS at 23:59

## 2024-11-13 RX ADMIN — VALGANCICLOVIR HYDROCHLORIDE 450 MG: 450 TABLET ORAL at 19:53

## 2024-11-13 RX ADMIN — INSULIN ASPART 2 UNITS: 100 INJECTION, SOLUTION INTRAVENOUS; SUBCUTANEOUS at 17:32

## 2024-11-13 RX ADMIN — Medication 20 MG: at 06:09

## 2024-11-13 RX ADMIN — ALBUMIN (HUMAN) 50 G: 12.5 INJECTION, SOLUTION INTRAVENOUS at 01:46

## 2024-11-13 RX ADMIN — HYDROMORPHONE HYDROCHLORIDE 0.4 MG: 0.2 INJECTION, SOLUTION INTRAMUSCULAR; INTRAVENOUS; SUBCUTANEOUS at 15:00

## 2024-11-13 RX ADMIN — HYDROMORPHONE HYDROCHLORIDE 0.4 MG: 0.2 INJECTION, SOLUTION INTRAMUSCULAR; INTRAVENOUS; SUBCUTANEOUS at 17:24

## 2024-11-13 RX ADMIN — Medication 20 MG: at 07:01

## 2024-11-13 RX ADMIN — MYCOPHENOLATE MOFETIL 750 MG: 250 CAPSULE ORAL at 11:57

## 2024-11-13 RX ADMIN — FENTANYL CITRATE 100 MCG: 50 INJECTION INTRAMUSCULAR; INTRAVENOUS at 03:58

## 2024-11-13 RX ADMIN — ACETAMINOPHEN 975 MG: 325 TABLET, FILM COATED ORAL at 01:23

## 2024-11-13 RX ADMIN — HYDROMORPHONE HYDROCHLORIDE 0.5 MG: 1 INJECTION, SOLUTION INTRAMUSCULAR; INTRAVENOUS; SUBCUTANEOUS at 08:30

## 2024-11-13 RX ADMIN — TACROLIMUS 2 MG: 1 CAPSULE ORAL at 17:17

## 2024-11-13 RX ADMIN — EPINEPHRINE 0.03 MCG/KG/MIN: 1 INJECTION INTRAMUSCULAR; INTRAVENOUS; SUBCUTANEOUS at 06:11

## 2024-11-13 RX ADMIN — FLUCONAZOLE IN SODIUM CHLORIDE 400 MG: 2 INJECTION, SOLUTION INTRAVENOUS at 03:33

## 2024-11-13 RX ADMIN — PANTOPRAZOLE SODIUM 40 MG: 40 TABLET, DELAYED RELEASE ORAL at 11:57

## 2024-11-13 RX ADMIN — Medication 100 MG: at 09:04

## 2024-11-13 RX ADMIN — LEVOFLOXACIN 500 MG: 5 INJECTION, SOLUTION INTRAVENOUS at 03:00

## 2024-11-13 RX ADMIN — HYDROMORPHONE HYDROCHLORIDE 0.4 MG: 0.2 INJECTION, SOLUTION INTRAMUSCULAR; INTRAVENOUS; SUBCUTANEOUS at 19:52

## 2024-11-13 RX ADMIN — POTASSIUM CHLORIDE 20 MEQ: 1.5 POWDER, FOR SOLUTION ORAL at 15:00

## 2024-11-13 RX ADMIN — FENTANYL CITRATE 150 MCG: 50 INJECTION INTRAMUSCULAR; INTRAVENOUS at 03:37

## 2024-11-13 RX ADMIN — SODIUM CHLORIDE, SODIUM GLUCONATE, SODIUM ACETATE, POTASSIUM CHLORIDE AND MAGNESIUM CHLORIDE: 526; 502; 368; 37; 30 INJECTION, SOLUTION INTRAVENOUS at 02:21

## 2024-11-13 RX ADMIN — OXYCODONE HYDROCHLORIDE 5 MG: 5 TABLET ORAL at 12:00

## 2024-11-13 RX ADMIN — MIDAZOLAM 2 MG: 1 INJECTION INTRAMUSCULAR; INTRAVENOUS at 02:31

## 2024-11-13 RX ADMIN — EPINEPHRINE 20 MCG: 1 INJECTION INTRAMUSCULAR; INTRAVENOUS; SUBCUTANEOUS at 06:14

## 2024-11-13 RX ADMIN — OXYCODONE HYDROCHLORIDE 5 MG: 5 TABLET ORAL at 19:19

## 2024-11-13 NOTE — ANESTHESIA PREPROCEDURE EVALUATION
Anesthesia Pre-Procedure Evaluation    Patient: Hannah Sutherland   MRN: [id number] : 1970        Procedure : Procedure(s):  Transplant liver recipient  donor          No past medical history on file.   No past surgical history on file.   Allergies   Allergen Reactions    Penicillins Rash and Unknown    Latex Other (See Comments)     History of asthma.    Erythromycin Nausea and Vomiting      Social History     Tobacco Use    Smoking status: Never    Smokeless tobacco: Never   Substance Use Topics    Alcohol use: Not Currently     Comment: sober 23      Wt Readings from Last 1 Encounters:   10/15/24 81.3 kg (179 lb 3 oz)        Anesthesia Evaluation   Pt has had prior anesthetic. Type: General.    History of anesthetic complications  - PONV.      ROS/MED HX  ENT/Pulmonary: Comment: Vision loss    (+)                      asthma                  Neurologic:  - neg neurologic ROS     Cardiovascular:     (+) Dyslipidemia hypertension- -   -  - -                                 Previous cardiac testing   Echo: Date: 24 Results:  Interpretation Summary  Global and regional left ventricular function is normal with an EF of 60-65%.  Global right ventricular function is normal.  No significant valvular abnormalities present.  Estimated mean right atrial pressure is normal.  No pericardial effusion is present.  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    Stress Test:  Date: Results:    ECG Reviewed:  Date: Results:    Cath:  Date: Results:      METS/Exercise Tolerance:     Hematologic:     (+)      anemia,          Musculoskeletal:   (+)  arthritis,             GI/Hepatic: Comment: Eosinophilic esophagitis   Dysphagia  MELD 3.0: 23 at 2024  9:16 AM  MELD-Na: 20 at 2024  9:16 AM  Calculated from:  Serum Creatinine: 0.75 mg/dL (Using min of 1 mg/dL) at 2024  9:16 AM  Serum Sodium: 137 mmol/L at 2024  9:16 AM  Total Bilirubin: 7.3 mg/dL at  2024  9:16 AM  Serum Albumin: 2.8 g/dL at 2024  9:16 AM  INR(ratio): 1.7 at 2024  9:16 AM  Age at listin years  Sex: Female at 2024  9:16 AM      (+) GERD,          hepatitis type Alcoholic, liver disease,       Renal/Genitourinary:  - neg Renal ROS     Endo:  - neg endo ROS     Psychiatric/Substance Use:     (+) psychiatric history depression       Infectious Disease:  - neg infectious disease ROS     Malignancy:  - neg malignancy ROS     Other:            Physical Exam    Airway        Mallampati: II   TM distance: > 3 FB   Neck ROM: full   Mouth opening: > 3 cm    Respiratory Devices and Support         Dental           Cardiovascular          Rhythm and rate: regular and normal     Pulmonary           breath sounds clear to auscultation           OUTSIDE LABS:  CBC:   Lab Results   Component Value Date    WBC 3.2 (L) 2024    HGB 8.5 (L) 2024    HCT 26.8 (L) 2024    PLT 84 (L) 2024     BMP:   Lab Results   Component Value Date     2024    POTASSIUM 4.0 2024    CHLORIDE 104 2024    CHLORIDE 104 2024    CO2 27 2024    BUN 10.3 2024    CR 0.71 2024     (H) 2024     COAGS:   Lab Results   Component Value Date    INR 1.8 (H) 2024     POC:   Lab Results   Component Value Date    HCGS Negative 2024     HEPATIC:   Lab Results   Component Value Date    ALBUMIN 3.2 (L) 2024    PROTTOTAL 7.5 2024    ALT 13 2024    AST 46 (H) 2024    ALKPHOS 109 2024    BILITOTAL 5.4 (H) 2024     OTHER:   Lab Results   Component Value Date    A1C 4.6 2024    TIMOTHY 9.3 2024    PHOS 3.6 2024    TSH 2.08 2024       Anesthesia Plan    ASA Status:  4       Anesthesia Type: General.     - Airway: ETT   Induction: Intravenous.      Techniques and Equipment:     - Lines/Monitors: Arterial Line, Central Line, CVP, BIS, NIRS, KALLI            KALLI Absolute Contra-indication:  "NONE            KALLI Relative Contra-indication: Thrombocytopenia, Coagulopathy, Esophagitis, Dysphagia     - Blood: Blood in Room, PRBC, Cell Saver, PLT, T&S     - Drips/Meds: Norepi, Vasopressin, Epinephrine (calcium)     Consents    Anesthesia Plan(s) and associated risks, benefits, and realistic alternatives discussed. Questions answered and patient/representative(s) expressed understanding.     - Discussed: Risks, Benefits and Alternatives for the PROCEDURE were discussed     - Discussed with:       - Extended Intubation/Ventilatory Support Discussed: Yes.      - Patient is DNR/DNI Status: No     Use of blood products discussed: Yes.     - Discussed with: Patient.     - Consented: consented to blood products     Postoperative Care    Pain management: IV analgesics, Oral pain medications, Multi-modal analgesia.        Comments:               Valeriano Santiago MD    I have reviewed the pertinent notes and labs in the chart from the past 30 days and (re)examined the patient.  Any updates or changes from those notes are reflected in this note.               # Hypertension: Noted on problem list           # Obesity: Estimated body mass index is 31.74 kg/m  as calculated from the following:    Height as of 10/15/24: 1.6 m (5' 3\").    Weight as of 10/15/24: 81.3 kg (179 lb 3 oz).       # Asthma: noted on problem list       "

## 2024-11-13 NOTE — OP NOTE
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 Donation after Brain Death liver transplant   2. Lysis of adhesions taking an additional 30 minutes of operating time      Surgeon:  Surgeons and Role:     * Blas Carney MD - Primary     * Nura East MD - Resident - Assisting     * Fabrizio Colmenares MD - Fellow - Assisting    Fellow/assistant: Dr. Fabrizio Colmenares MD, PhD assisted the entire procedure,  There was no qualified resident to assist with this procedure    Anesthesia:  General    Specimen: Explant liver    Drains: 1 PRISCILA drain    Urine output: Please see anesthesiology chart    Estimated blood loss: 500 mL.  Patient started off with a hemoglobin of 5 so received a total of 7 units of PRBC    Fluids administered:        Intraoperative Events: None    Complications: None    Findings:   #1.  Large liver with micronodular cirrhosis  #2.  Significant adhesions between the omentum and colon to the gallbladder  #3.  After the arterial anastomosis, there was low flow in the hepatic artery.  The anastomosis looked okay, there appeared to be low flow in the inflow from the recipient.  We ballooned the recipient hepatic artery using a 4 Azerbaijani balloon, injected intra-arterial verapamil and after that the flows increased to 250 mL/min.  Portal vein flows were close to 1.9 L    Brief description of the procedure:  Orthotopic liver transplant, caval replacement, portal vein end-to-end anastomosis, donor celiac axis anastomosed to the recipient hepatic artery at bifurcation, bile duct end-to-end anastomosis no stent placed    Indication: Hannah Sutherland with a history of End Stage Liver Disease due to Laennec's who presents for Donation after Brain Death Whole Liver transplant. A suitable donor offer has become available. After discussing  the risks and benefits of proceeding, the patient agreed to proceed with surgery and provided informed consent.    Final ABO/Crossmatch verification: After the donor organ arrived to the operating room and prior to anastomosis, I participated in the transplant pre-verification upon organ receipt timeout by visually verifying the donor ID, organ and laterality, donor blood type, recipient unique identifier, recipient blood type, and that the donor and recipient are blood type compatible.     Donor UNOS ID:  [id number]    Donor ABO:  O    Donor arterial clamp on:  11/12/2024 10:51 PM    Preservation fluid: UW      Vessels with organ:  Yes    Donor organ arrival to recipient room:  11/13/2024  3:14 AM    Total ischemic time:  424    Cold ischemic time:  424    Warm ischemic time:  20                 Back Table Preparation:   Procedure:  Bench preparation of the liver allograft for transplantation    Preoperative diagnosis:  End Stage Liver Disease due to Laennec's    Postoperative diagnosis:  Same,     Surgeon:  Surgeons and Role:     * Blas Carney MD - Primary     * Nura East MD - Resident - Assisting     * Fabrizio Colmenares MD - Fellow - Assisting    Co-Surgeon:  Blas Carney M.D.    Fellow/Assistant: Dr. Nura Oneill assisted the procedure,   There was no qualified resident to assist with this procedure    Anesthesia:  None    Graft biopsy:  Yes-less than 10% macrosteatosis    Macroscopic steatosis: None    Back table reconstruction: No    Intimal flap repair: No    # of hepatic arteries: 1    # of portal veins: 1    Accessory arteries: 0    # of hepatic veins: 3    # of bile ducts: 1    Graft weight: Medium size liver     Findings:   Liver Laceration: No  Overall quality of liver: Excellent    Back Table Procedure: The liver allograft was received and inspected and the aforementioned findings were noted. It had been previously flushed with UW. The donor liver was placed in fresh ice-cold  preservation solution. We identified the inferior vena cava. Two stay sutures were placed on the supra-hepatic portion of the cava. Two stay sutures were placed on the infra-hepatic portion of the cava. The fibro-fatty tissue and adrenal gland was cleared of inferior vena cava. The phrenic vein was ligated. The adrenal vein was ligated. The IVC was tested for leaks by using a bulb syringe. We suture ligated all identified leaks. The portal vein was identified. All the fibro-fatty area or tissue around the portal vein was removed and the portal vein was dissected up to its bifurcation. An 8-Lithuanian cannula was placed in the portal vein and fixed with a stitch. The portal vein was tested for leaks. We suture ligated all identified leaks. The cannula was left in place to be used for flushing the liver at the time of implantation. The hepatic artery anatomy was identified. The celiac axis  was traced all the way from the aortic patch to the level of the gastro-duodenal artery. Dissection was stopped at the level of the gastro-duodenal artery. All the leaks in the hepatic artery tributaries were suture ligated. The bile duct was inspected and flushed. No reconstruction was required. The liver was placed back in ice-cold preservation solution until ready for transplantation. Faculty was present for the critical portions of the procedure.    Findings:    Operative Procedure:   Arterial anastomosis start:  11/13/2024  5:35 AM    Recipient arterial unclamp:  11/13/2024  6:14 AM    Extra vessels used:   No    Extra vessels banked:  Yes    Previous upper abd surgery:  No    Previous cholecystectomy?  No    Portal vein:  Thrombus: No   Patent: Yes-     Specify:     On portal bypass:  Yes-flows greater than a liter    Arterial flow:  Sufficient: Yes-donor celiac axis anastomosed to the recipient hepatic artery at bifurcation flows about 250 mL/min    Bile duct anastomosis:  To bowel: No   Specify:    To duct: Yes-the donor bile  duct was about 6 mm the recipient bile duct was about 8 mm end-to-end anastomosis using continuous 6-0 Prolene with no stent   Specify:      Hannah Sutherland was brought to the operating room, placed in a supine position, and a time out was performed. Sequential compression devices were placed on both lower extremities and general endotracheal anesthesia was induced. The patient was given IV antibiotics, and Solumedrol. A Young catheter was placed. A central line was placed by Anesthesia service. The abdomen was then shaved, prepped, and draped in the usual sterile fashion.  The backtable preparation occurred prior to implantation.    We entered the abdominal cavity using Vertical Extension (Martine) incision. The abdomen was examined. Lysis of adhesions took  an additional 30  minutes of operating time. We proceeded to mobilization of the liver first by dividing falciform ligament, next dividing the triangular ligaments and mobilizing the left lobe with further evaluation of the right lobe of the liver. Next the right lobe of the liver was mobilized. We elected to proceed with a hilar dissection. The right and left hepatic arteries were identified, ligated and divided, followed by ligation and division of the common bile duct. The portal vein was cleared from tissue and small branches were tied off and divided. The left and right portal veins were separately ligated and the portal vein was divided. At this point we went on the bypass with bypass flow of 1 liter per minute. Next, we continued mobilizing the right lobe. The adhesions between the right lobe and the right diaphragm were divided and the lobe was mobilized up to the vena cava. The hepatic veins were identified. Next, we dissected out the caudate lobe and infrahepatic IVC.     We applied Infrahepatic and suprahepatic clamps to the IVC and the liver was excised with curved Marin scissors. The recipient's liver was passed off from the operating table.  Next, complete hemostasis was obtained. The donor liver was brought into the field. The suprahepatic IVC anastomosis was constructed with 3-0 Prolene followed by the construction of infrahepatic anastomosis with 4-0 Prolene. Next, we came off the bypass and end-to-end portal anastomosis was constructed between the donor and recipient portal veins using 6-0 Prolene. During the construction of the infrahepatic IVC anastomosis, the liver was flushed with 5% albumin. Once the portal anastomosis was constructed, the liver was reperfused. Complete hemostasis was obtained and arterial anastomosis was performed between the donor celiac trunk patch using Keenan technique and the bifurcation of the right and left recipient hepatic arteries. After clamps were released, there was a good flow within the donor artery. Again, all the arterial branches that were bleeding were ligated and complete hemostasis was obtained. After the anastomosis was performed, good flow was re-established, hemostasis was obtained. Next, the biliary anastomosis was performed using a 6-0 running PDS suture over the stent.     Next again the abdomen was irrigated and hemostasis was obtained. We closed the abdomen with #1 PDS in 2 layers. All needle, sponge and instrument counts were correct x 2. Faculty was present for key portions of the procedure. The patient was awakened, extubated, and transferred to the ICU for post-op monitoring.

## 2024-11-13 NOTE — BRIEF OP NOTE
[facility]    Brief Operative Note    Pre-operative diagnosis: End stage liver disease (H) [K72.10]  Post-operative diagnosis Same as pre-operative diagnosis    Procedure: Transplant liver recipient  donor, N/A - Abdomen    Surgeon: Surgeons and Role:     * Blas Carney MD - Primary     * Nura East MD - Resident - Assisting     * Fabrizio Colmenares MD - Fellow - Assisting  Anesthesia: General   Estimated Blood Loss: 500 ml    Bicaval liver transplant  No unusual findings.    Drains: 19 fr brittnee drain in the right upper quadrant behind the alli  Specimens:   ID Type Source Tests Collected by Time Destination   1 : NATIVE LIVER with GALLBLADDER Tissue Liver SURGICAL PATHOLOGY EXAM Blas Carney MD 2024  5:24 AM    2 : donor gallbladder Tissue Gallbladder SURGICAL PATHOLOGY EXAM Blas Carney MD 2024  6:52 AM    A : Ascites Fluid Ascites Fluid Abdomen ANAEROBIC BACTERIAL CULTURE ROUTINE, GRAM STAIN, FUNGAL OR YEAST CULTURE ROUTINE, AEROBIC BACTERIAL CULTURE ROUTINE Blas Carney MD 2024  3:56 AM      Findings:   None.  Complications: None.  Implants: * No implants in log *

## 2024-11-13 NOTE — CONSULTS
Transplant Admission Psychosocial Assessment    Patient Name: Hannah Sutherland  : 1970  Age: 54 year old  MRN: [id number]  Date of Initial Social Work Evaluation: 2024    Patient underwent a  donor liver transplant on 2024.  Met with patient at bedside, her daughter and daughters boyfriend to update psychosocial assessment and provide education about SW role while inpatient, and to begin discussion of expectations/requirements, caregiver needs and follow up needs post-transplant.     Presenting Information   Living Situation: Hannah lives in an apartment in [place], [place]  If not local, plans for short term stay:  Patient was placed on the [place] waitlist. Other resources was reviewed at bedside with daughter.   Previous Functional Status: Prior to hospitalization, patient was independent with all ADL/IADLs.   Cultural/Language/Spiritual Considerations: None identified at this time.     Support System  Primary Support Person Mother - Elsa   Other support:  Children  Plan for support in immediate post-transplant period: Mother will be her primary caregiver post transplantation.     Health Care Directive  Decision Maker: Pt when able   Alternate Decision Maker: Per health care directive - Son (Ceasar) and daughter (Anand)  Health Care Directive: Copy in Chart    Mental Health/Coping:   History of Mental Health: Hannah has a history of depression and is prescribed Lexapro by her PCP.   History of Chemical Health: Last consumption of alcohol was 11/3/2023. 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   Current status: Stable  Coping: Appropriate to situation   Services Needed/Recommended: None at this time. Will review liver transplant support group    Financial   Income: Hannah continues to work  Impact of transplant on income: Patient will be unable to work. Her mother has noted that she can financially assist if needed. Patient does not have  access to FMLA/short term/long term disability due to the size of her company  Insurance and medication coverage: BCBS through her employer  Financial concerns: None at this time   Resources needed: None at this time     Education provided by JADEN: Social Work role inpatient setting, availability of support groups, parking information     Assessment and recommendations and plan:    I met with patient, her daughter and her daughters boyfriend at bedside. No psychosocial changes noted. I reviewed lodging options with daughter for patient's caregiver (hospital rate and which ones use our shuttle). I also noted that FRANKIE Portillo is just outside our guidelines for a local stay, and the team could potentially clear her to return home. Patient was added to the [place] waitlist.     This writer will continue to follow for psychosocial needs and TCU/ARU if recommended.     NEVIN Torres, [facility]  Liver Transplant   [facility]  Phone: [phone]

## 2024-11-13 NOTE — ANESTHESIA PROCEDURE NOTES
Central Line/PA Catheter Placement    Pre-Procedure   Staff -        Anesthesiologist:  Wilson Gould MD       Performed By: anesthesiologist       Location: OR       Pre-Anesthestic Checklist: patient identified, IV checked, site marked, risks and benefits discussed, informed consent, monitors and equipment checked, pre-op evaluation and at physician/surgeon's request  Timeout:       Correct Patient: Yes        Correct Procedure: Yes        Correct Site: Yes        Correct Position: Yes        Correct Laterality: Yes   Line Placement:   This line was placed Post Induction    Procedure   Procedure: central line       Laterality: right       Insertion Site: internal jugular.       Patient Position: Trendelenburg  Sterile Prep        All elements of maximal sterile barrier technique followed       Patient Prep/Sterile Barriers: draped, hand hygiene, gloves , hat , mask , draped, gown, sterile gel and probe cover       Skin prep: Chloraprep  Insertion/Injection        Technique: ultrasound guided and Seldinger Technique        1. Ultrasound was used to evaluate the access site.       2. Vein evaluated via ultrasound for patency/adequacy.       3. Using real-time ultrasound the needle/catheter was observed entering the artery/vein.       5. The visualized structures were anatomically normal.       6. There were no apparent abnormal pathologic findings.       Introducer Type: 9 Fr, 2-lumen MAC        Type: Introducer  Narrative         Secured by: suture       Tegaderm and Biopatch dressing used.       Complications: None apparent,        blood aspirated from all lumens,        All lumens flushed: Yes       Verification method: Ultrasound

## 2024-11-13 NOTE — PROGRESS NOTES
"CLINICAL NUTRITION SERVICES - ASSESSMENT NOTE     Nutrition Prescription      Malnutrition Status:    Patient does not meet two of the established criteria necessary for diagnosing malnutrition    Recommendations already ordered by Registered Dietitian (RD):  None today    Future/Additional Recommendations:  ONS and calorie counts when diet advanced beyond clears  Post transplant diet education as able     REASON FOR ASSESSMENT  Hannah Sutherland is a/an 54 year old female assessed by the dietitian for Provider Order - Pre-Op Liver Transplant, auto consult post op for TF assess and order    Patient with a history of 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 Appetite good. Remote history of dysphagia (food getting stuck). UBW of 255 lbs (unknown timeframe)     Per outpatient RD visit 12/2023 - pt had made many changes to her diet, focused on limiting sodium, increasing protein, adding more fruits and veggies.    Pt groggy at time of visit, reports weight loss was intentional and related to diuresis    CURRENT NUTRITION ORDERS  Diet: NPO.  Clears to start this evening    LABS  Labs reviewed    MEDICATIONS  Medications reviewed and notable for MVI with minerals tablet, D5 0.45 NaCl @ 75 ml/hr  PTA meds include MVI, Thiamine and folate    ANTHROPOMETRICS  Height: 163 cm (5' 4.173\")  Most Recent Weight: 77.6 kg (171 lb)    IBW: 55 kg  BMI: Overweight BMI 25-29.9  Weight History: UBW of 255 lbs / 116 kg - unknown time frame    Wt Readings from Last 20 Encounters:   11/13/24 77.6 kg (171 lb)   10/15/24 81.3 kg (179 lb 3 oz)   05/21/24 85.9 kg (189 lb 6 oz)   05/14/24 87.9 kg (193 lb 12.8 oz)   04/10/24 97.1 kg (214 lb)     Dosing Weight: 61 kg (adjusted using admit weight of 77 kg and IBW of 55 kg)    ASSESSED NUTRITION " NEEDS  Estimated Energy Needs: 1830 - 2135 kcals/day (30 - 35 kcals/kg)  Justification: Increased needs s/p potential liver tx  Estimated Protein Needs: 92 - 122 grams protein/day (1.5 - 2 grams of pro/kg)  Justification: Increased needs s/p potential liver tx  Estimated Fluid Needs: per MD pending fluid status    MALNUTRITION  % Intake: No decreased intake noted  % Weight Loss: > 20% in 1 year (severe) - intentional per patient, + diuresis  Subcutaneous Fat Loss: None observed  Muscle Loss: None observed  Fluid Accumulation/Edema: Does not meet criteria  Malnutrition Diagnosis: Patient does not meet two of the established criteria necessary for diagnosing malnutrition    NUTRITION DIAGNOSIS  Increased nutrient needs (calories / protein) related to post SOT as evidenced by assessed needs of 30 kcal/kg, 1.5g/kg protein      INTERVENTIONS  Implementation  Collaboration with providers - SICU rounds  Medical food supplement therapy     Goals  Diet adv v nutrition support within 2-3 days.     Monitoring/Evaluation  Progress toward goals will be monitored and evaluated per protocol.

## 2024-11-13 NOTE — ANESTHESIA PROCEDURE NOTES
Central Line/PA Catheter Placement    Pre-Procedure   Staff -        Anesthesiologist:  Wilson Gould MD       Performed By: anesthesiologist       Location: OR       Pre-Anesthestic Checklist: patient identified, IV checked, site marked, risks and benefits discussed, informed consent, monitors and equipment checked, pre-op evaluation and at physician/surgeon's request  Timeout:       Correct Patient: Yes        Correct Procedure: Yes        Correct Site: Yes        Correct Position: Yes        Correct Laterality: Yes   Line Placement:   This line was placed Post Induction    Procedure   Procedure: central line       Laterality: left       Insertion Site: internal jugular.       Patient Position: Trendelenburg  Sterile Prep        All elements of maximal sterile barrier technique followed       Patient Prep/Sterile Barriers: draped, hand hygiene, gloves , hat , mask , draped, gown, sterile gel and probe cover       Skin prep: Chloraprep  Insertion/Injection        Technique: ultrasound guided and Seldinger Technique        1. Ultrasound was used to evaluate the access site.       2. Vein evaluated via ultrasound for patency/adequacy.       3. Using real-time ultrasound the needle/catheter was observed entering the artery/vein.       5. The visualized structures were anatomically normal.       6. There were no apparent abnormal pathologic findings.       Introducer Type: 9 Fr, 2-lumen MAC        Type: Introducer  Narrative         Secured by: suture       Tegaderm and Biopatch dressing used.       Complications: None apparent,        blood aspirated from all lumens,        All lumens flushed: Yes       Verification method: Ultrasound

## 2024-11-13 NOTE — TELEPHONE ENCOUNTER
"Organ Offer Encounter Information    Organ Offer Information  Organ offer date & time: 11/12/2024  9:22 PM  Coordinator/Fellow/Attending name: Barb Levin RN   Organ(s):  Organ UNOS ID Match Run ID Comment Organ Laterality   Liver [id number] MNOP primary         Recent infections?: Yes (Comment: cold symptoms 1 week ago, resolved. Covid and flu were negative) Recent IV antibiotics?: Neg     New medications?: No Recent pregnancy?: No     Angicoagulation medications?: No Recent vaccinations?: No     Recent blood transfusions?: No Recent hospitalizations?: No   Has your insurance changed in the last 6-12 months?: Neg    Discussed organ offer with: Patient  Patient/Caregiver name: Hannah  Discussed risk category with Patient/Other: N/A  Patient/Other asked to speak to a surgeon?: No  Discussed program-specific outcomes: Asked questions regarding SRTR, verbalized understanding  Right to decline organ offer without penalty, Patient/Other: Aware of option to decline without penalty  Organ offer decision status Patient/Other: Accepted Offer  Organ disposition: Transplanted  Additional Comments: 11/12/2024 9:23 PM  Liver: local DBD liver offer  MD: Angelika  [place] [facility] [place]/Recip HCV Status: neg/neg  (HCV+ Donors - Send Epic in basket message to [facility] HCV POOL - Include Donor UNOS ID)  Donor Nutritional Status: D5 continous  Local or Import Donor: local  (For import cases, utilize the \"TRANSPORTSETUP\" smart phrase to arrange transportation)  Using [employer] OCS: no  (For OCS Cases, utilize the \"OCSLIVER\" smart phrase)  Plan (NPO, Donor OR): Donor currently in OR  - - -   COVID Screening  In the past month, have you:  Or anyone close to you had a positive COVID test or suspected to have COVID: no   Had any COVID symptoms (Fever, Cough, Short of Breath, Loss of Taste/Smell, Rash): cold symptoms, tested negative greater than 1 week ago    9:30 PM  Patient accepted offer. Last PO fluid 2130, last " PO food 2030. NPO starting 2130. ETA admission 2330. Admission instructions provided.   Barb Levin RN    Admissions: 2137, [place]  Unit: 4E  Update Provider Entering Orders (XM Plan & COVID Testing): 2202, page sent to YESSENIA MACHUCA   Immunology: 2139, [place]  Book OR: 2149, [place]. OR booked for 0200  Vessel Storage Confirmation (PA/KP/LI): yes ok to bank  Blood Bank: [id number]  TransNet/ABO Verification: 2154 labels printed, confirmed receipt  Add Organ: 2204, organ added  Barb Levin RN      Attestation I have discussed all of the above with the Patient/Legal Guardian/Caregiver regarding this organ offer.: Yes  Coordinator/Fellow/Attending name: Barb Levin, RN

## 2024-11-13 NOTE — ANESTHESIA PROCEDURE NOTES
Arterial Line Procedure Note    Pre-Procedure   Staff -        Anesthesiologist:  Wilson Gould MD       Performed By: anesthesiologist       Location: OR       Pre-Anesthestic Checklist: patient identified, IV checked, risks and benefits discussed, informed consent, monitors and equipment checked, pre-op evaluation and at physician/surgeon's request  Timeout:       Correct Patient: Yes        Correct Procedure: Yes        Correct Site: Yes        Correct Position: Yes   Line Placement:   This line was placed Post Induction  Procedure   Procedure: arterial line       Laterality: right       Insertion Site: radial.  Sterile Prep        Standard elements of sterile barrier followed       Skin prep: Chloraprep  Insertion/Injection        Technique: ultrasound guided and Seldinger Technique        1. Ultrasound was used to evaluate the access site.       2. Artery evaluated via ultrasound for patency/adequacy.       3. Using real-time ultrasound the needle/catheter was observed entering the artery/vein.       5. The visualized structures were anatomically normal.       6. There were no apparent abnormal pathologic findings.       Catheter Type/Size: 20 G, 1.75 in/4.5 cm quick cath (integral wire)  Narrative        Tegaderm dressing used.       Complications: None apparent,        Arterial waveform: Yes        IBP within 10% of NIBP: Yes

## 2024-11-13 NOTE — H&P
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 She was hospitalized in 2023 for acute hepatitis 2/2 to alcohol use at which time she was diagnosed with underlying cirrhosis. Over the last year she has had progressive edema requiring higher doses of diuretics, developed RUQ abdominal discomfort and fatigue. She does not have a history of variceal bleeding. She was seen in Dr. Carney's clinic on 24 for liver transplant evaluation at which time MELD was 22 and she was determined to be a suitable candidate for liver transplant.     She denies RUQ abdominal pain but reports edema if she does not take diuretics. She has never had a paracentesis before, denies prior abdominal surgeries. She has not had any alcohol for about 1 year. Denies past surgical history, past medical history is significant for asthma but she says she has not required an inhaler in over a year. She denies recent travel or sick contacts.     ROS:   CONSTITUTIONAL: Denies fever, chills, fatigue, or weight fluctuations  HEAD: Denies headache.  EENT: Denies vision changes, hearing changes, dysphagia, or sore throat.   NECK: Denies lymphadenopathy.   CV:Denies chest pain, palpitations, or shortness of breath.   PULMONARY:Denies shortness of breath, cough, or previous TB exposure.   GI:Denies nausea, vomiting, diarrhea, and abdominal pain. Bowel movements are regular.   :Denies urinary alterations, dysuria, urinary frequency, hematuria, and abnormal drainage.   EXT:Denies lower extremity edema.   SKIN:Denies abnormal rashes or lesions.    MUSCULOSKELETAL:Denies upper or lower extremity weakness and pain.   NEUROLOGIC:Denies lightheadedness, dizziness, seizures, or upper or lower extremity paresthesia.   HEMATOLOGICAL:No abnormal bruising or bleeding.   PSYCHIATRIC:Denies any mood alterations.     PMH:  No past medical history on file.    PSH:  No past surgical history on file.    MEDICATIONS:  Prior to Admission Medications   Prescriptions Last Dose Informant Patient Reported? Taking?   Multiple Vitamin (QUINTABS) TABS   Yes No   Sig: Take 1 tablet by mouth every morning   albuterol (VENTOLIN HFA) 108 (90 Base) MCG/ACT inhaler   Yes No   Sig: Inhale 2 puffs into the lungs every 4 hours as needed for shortness of breath   budesonide (PULMICORT FLEXHALER) 90 MCG/ACT inhaler   Yes No   Sig: Inhale 1 puff into the lungs as needed   escitalopram (LEXAPRO) 20 MG tablet   Yes No   Sig: Take 1 tablet by mouth every morning   folic acid (FOLVITE) 1 MG tablet   Yes No   Sig: Take 1 tablet by mouth every morning   furosemide (LASIX) 40 MG tablet   No No   Sig: Take 1 tablet (40 mg) by mouth daily for 360 days   magnesium oxide (MAG-OX) 400 MG tablet   Yes No   Sig: Take 400 mg by mouth 2 times daily   midodrine (PROAMATINE) 5 MG tablet   No No   Sig: Take 1 tablet (5 mg) by mouth 3 times daily.   pantoprazole (PROTONIX) 40 MG EC tablet   Yes No   Sig: Take 1 tablet by mouth every morning   spironolactone (ALDACTONE) 50 MG tablet   No No   Sig: Take 1 tablet (50 mg) by mouth daily for 360 days   thiamine (B-1) 100 MG tablet   Yes No   Sig: Take 100 mg by mouth daily      Facility-Administered Medications: None        ALLERGIES:     Allergies   Allergen Reactions    Penicillins Rash and Unknown    Latex Other (See Comments)     History of asthma.    Erythromycin Nausea and Vomiting       FAMILY HISTORY:  No family history on file.    SOCIAL HISTORY:  Social History     Socioeconomic History    Marital status:      Spouse name: Not on file    Number of  children: Not on file    Years of education: Not on file    Highest education level: Not on file   Occupational History    Not on file   Tobacco Use    Smoking status: Never    Smokeless tobacco: Never   Substance and Sexual Activity    Alcohol use: Not Currently     Comment: sober 11/7/23    Drug use: Never    Sexual activity: Not on file   Other Topics Concern    Not on file   Social History Narrative    Not on file     Social Drivers of Health     Financial Resource Strain: Medium Risk (2/27/2024)    Received from [facility], [facility]    Overall Financial Resource Strain (CARDIA)     Difficulty of Paying Living Expenses: Somewhat hard   Food Insecurity: No Food Insecurity (2/27/2024)    Received from [facility], [facility]    Hunger Vital Sign     Worried About Running Out of Food in the Last Year: Never true     Ran Out of Food in the Last Year: Never true   Transportation Needs: No Transportation Needs (2/27/2024)    Received from [facility]    Transportation Needs     In the past 12 months, has lack of transportation kept you from medical appointments, meetings, work, or from getting medicines or things needed for daily living?: No   Physical Activity: Insufficiently Active (2/27/2024)    Received from [facility], [facility]    Exercise Vital Sign     Days of Exercise per Week: 3 days     Minutes of Exercise per Session: 10 min   Stress: No Stress Concern Present (2/27/2024)    Received from [facility], [facility]    [employer] - Occupational Stress Questionnaire     Feeling of Stress : Not at all   Social Connections: Moderately Integrated (2/27/2024)    Received from [facility], [facility]    Social Connection and Isolation Panel [NHANES]      Frequency of Communication with Friends and Family: More than three times a week     Frequency of Social Gatherings with Friends and Family: Once a week     Attends [employer] Services: More than 4 times per year     Active Member of Clubs or Organizations: Yes     Attends Club or Organization Meetings: More than 4 times per year     Marital Status:    Recent Concern: Social Connections - Moderately Isolated (12/1/2023)    [facility] [employer]    Social Connection and Isolation Panel [NHANES]     Frequency of Communication with Friends and Family: More than three times a week     Frequency of Social Gatherings with Friends and Family: Once a week     Attends Scientology Services: More than 4 times per year     Active Member of Clubs or Organizations: No     Attends Club or Organization Meetings: Not on file     Marital Status:    Interpersonal Safety: Not At Risk (7/14/2024)    Received from [employer]    Intimate Partner Violence     Are you in a relationship where you are physically hurt, threatened and/or made to feel afraid?: No   Housing Stability: Unknown (2/27/2024)    Received from [employer]    Housing Stability Vital Sign     Unable to Pay for Housing in the Last Year: No     Number of Times Moved in the Last Year: Not on file     Homeless in the Last Year: Not on file       PHYSICAL EXAM:  There were no vitals taken for this visit.  GENERAL: Appears alert and oriented times three.   HEENT: Eye symmetrical and free of discharge bilaterally. Mucous membranes moist and without lesions.  NECK: Supple and without lymphadenopathy.  CV: RRR, S1S2 present without murmur, rub, or gallop.   RESPIRATORY: Respirations regular, even, and unlabored. Lungs CTA throughout.   GI: Soft and non distended with normoactive bowel sounds present in all quadrants. No tenderness, rebound, guarding. No  organomegaly.   EXTREMITIES: No peripheral edema. 2+ bilateral pedal pulses.   NEUROLOGIC: Alert and orientated x 3. CN II-XII grossly intact. No focal deficits.   MUSCULOSKELETAL: No joint swelling or tenderness.   SKIN: No jaundice. No rashes or lesions.     LABS:  CBC:  Recent Labs   Lab Test 05/21/24  0757   WBC 3.2*   RBC 3.05*   HGB 8.5*   HCT 26.8*   MCV 88   MCH 27.9   MCHC 31.7   RDW 17.3*   PLT 84*       CMP:  Recent Labs   Lab Test 09/24/24  0916 06/14/24  1025 05/21/24  0757   NA  --   --  135   POTASSIUM  --   --  4.0   CHLORIDE 104   < > 100   TIMOTHY  --   --  9.3   CO2  --   --  27   BUN  --   --  10.3   CR  --   --  0.71   GLC  --   --  104*   AST  --   --  46*   ALT  --   --  13   BILITOTAL  --   --  5.4*   ALBUMIN  --   --  3.2*   PROTTOTAL  --   --  7.5   ALKPHOS  --   --  109    < > = values in this interval not displayed.       IMAGING:    INR        Component Value Flag Ref Range Units Status    INR 1.89      0.85 - 1.15  Final                  Partial thromboplastin time        Component Value Flag Ref Range Units Status    aPTT 43      22 - 38 Seconds Final                  Fibrinogen activity        Component Value Flag Ref Range Units Status    Fibrinogen Activity 156      170 - 510 mg/dL Final    Comment:    Effective 7/30/2024, the reference range for this assay has changed. There may be differences in the flagging of prior results with similar values performed with this method.                  EKG 12-lead, tracing only        Component Value Flag Ref Range Units Status    Systolic Blood Pressure        mmHg Incomplete    Diastolic Blood Pressure        mmHg Incomplete    Ventricular Rate 70       BPM Incomplete    Atrial Rate 70       BPM Incomplete    OK Interval 170       ms Incomplete    QRS Duration 104       ms Incomplete           ms Incomplete    QTc 496       ms Incomplete    P Axis 72       degrees Incomplete    R AXIS 12       degrees Incomplete    T Axis 55       degrees  Incomplete    Interpretation ECG Sinus rhythm  Incomplete right bundle branch block  QTcB >= 480 msec  Abnormal ECG  When compared with ECG of 21-May-2024 08:08,  Criteria for Septal infarct are no longer Present        Incomplete                  Glucose by meter        Component Value Flag Ref Range Units Status    GLUCOSE BY METER POCT 103      70 - 99 mg/dL Final                  CBC with platelets and differential        Component Value Flag Ref Range Units Status    WBC Count 3.4      4.0 - 11.0 10e3/uL Final    RBC Count 2.51      3.80 - 5.20 10e6/uL Final    Hemoglobin 7.2      11.7 - 15.7 g/dL Final    Hematocrit 23.8      35.0 - 47.0 % Final    MCV 95      78 - 100 fL Final    MCH 28.7      26.5 - 33.0 pg Final    MCHC 30.3      31.5 - 36.5 g/dL Final    RDW 18.6      10.0 - 15.0 % Final    Platelet Count 62      150 - 450 10e3/uL Final    % Neutrophils 43       % Final    % Lymphocytes 44       % Final    % Monocytes 9       % Final    % Eosinophils 3       % Final    % Basophils 1       % Final    % Immature Granulocytes 0       % Final    NRBCs per 100 WBC 0      <1 /100 Final    Absolute Neutrophils 1.5      1.6 - 8.3 10e3/uL Final    Absolute Lymphocytes 1.5      0.8 - 5.3 10e3/uL Final    Absolute Monocytes 0.3      0.0 - 1.3 10e3/uL Final    Absolute Eosinophils 0.1      0.0 - 0.7 10e3/uL Final    Absolute Basophils 0.0      0.0 - 0.2 10e3/uL Final    Absolute Immature Granulocytes 0.0      <=0.4 10e3/uL Final    Absolute NRBCs 0.0       10e3/uL Final                  Adult Type and Screen        Component    SPECIMEN EXPIRATION DATE    [id number]                        ASSESSMENT & PLAN:  Hannah Sutherland is a 54 year old female with decompensated cirrhosis 2/2 to alcohol use c/b ascites and mild encephalopathy who presents for possible liver transplant, no contraindications to proceed with liver transplantation.     -Pre-operative labs and work-up ordered  -CXR, EKG  -Dottie-operative  antibiotics and Immunosuppression ordered   -Discussed risk/benefits/alternatives of liver transplant and blood transfusions, all questions were answered and consent was obtained  -Admit to Transplant Surgery  -Code status: Full code     Seen and discussed with transplant fellow, Dr. Chino Gil.   Attending Surgeon, Dr. Washington.     Gera Galloway MD  General Surgery PGY-1    **For on call schedules and contact information, please refer to [facility]**

## 2024-11-13 NOTE — ANESTHESIA CARE TRANSFER NOTE
Patient: Hannah Sutherland    Procedure: Procedure(s):  Transplant liver recipient  donor       Diagnosis: End stage liver disease (H) [K72.10]  Diagnosis Additional Information: No value filed.    Anesthesia Type:   General     Note:    Oropharynx: oropharynx clear of all foreign objects and spontaneously breathing  Level of Consciousness: awake  Oxygen Supplementation: face mask  Level of Supplemental Oxygen (L/min / FiO2): 6  Independent Airway: airway patency satisfactory and stable  Dentition: dentition unchanged  Vital Signs Stable: post-procedure vital signs reviewed and stable  Report to RN Given: handoff report given  Patient transferred to: ICU    ICU Handoff: Call for PAUSE to initiate/utilize ICU HANDOFF, Identified Patient, Identified Responsible Provider, Reviewed the Pertinent Medical History, Discussed Surgical Course, Reviewed Intra-OP Anesthesia Management and Issues during Anesthesia, Set Expectations for Post Procedure Period and Allowed Opportunity for Questions and Acknowledgement of Understanding  Vitals:  Vitals Value Taken Time   BP     Temp     Pulse 92 24 0938   Resp     SpO2 98 % 24 0938   Vitals shown include unfiled device data.    Electronically Signed By: ZOHREH Garnica CRNA  2024  9:39 AM

## 2024-11-13 NOTE — PROGRESS NOTES
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 She was hospitalized in November 2023 for acute hepatitis 2/2 to alcohol use at which time she was diagnosed with underlying cirrhosis. Ms. Sutherland has remained abstinent from alcohol for the last year. MELD at time of transplant 21.     Brief post- op note. Patient seen and examined in ICU; doing well for duration of recovery. Full cares per SICU.     ADELFO/Fellow/Resident Provider:   ZOHREH Vitale, CNP  Adult Solid Organ Transplant   Contact: Vocera Web Console    Faculty: Blas Carney M.D.        Temp:  [98  F (36.7  C)] 98  F (36.7  C)  Resp:  [16] 16  BP: (123)/(57) 123/57  SpO2:  [100 %] 100 %    General Appearance: in no apparent distress.   Skin: Warm, dry.   Heart: She appears adequately perfused.   Lungs: She requires a small amount of supplemental oxygen.   Abdomen: Deferred.   : Young in place.   Extremities: edema: minimal   Neurologic: Awake but sleepy, no obvious focal deficit. Tremor absent..

## 2024-11-13 NOTE — PROGRESS NOTES
Patient removed from OS waitlist after  donor liver transplant. OS ID [id number].    Donor Has Risk Criteria for Transmission of HIV/HCV/HBV: No  Recipient Notified of Risk Criteria: N/A

## 2024-11-13 NOTE — ANESTHESIA PROCEDURE NOTES
Airway       Patient location during procedure: OR  Staff -        Performed By: CRNA  Consent for Airway        Urgency: elective  Indications and Patient Condition       Indications for airway management: lee-procedural       Induction type:intravenous       Mask difficulty assessment: 1 - vent by mask    Final Airway Details       Final airway type: endotracheal airway       Successful airway: ETT - single  Endotracheal Airway Details        ETT size (mm): 8.0       Cuffed: yes       Successful intubation technique: direct laryngoscopy       DL Blade Type: MAC 3       Grade View of Cords: 2 (with cricoid)       Adjucts: stylet       Position: Right       Measured from: lips       Secured at (cm): 23       Bite block used: None    Post intubation assessment        Placement verified by: capnometry, equal breath sounds and chest rise        Number of attempts at approach: 1       Secured with: silk tape       Ease of procedure: easy       Dentition: Unchanged

## 2024-11-13 NOTE — PLAN OF CARE
"Goal Outcome Evaluation:      Plan of Care Reviewed With: patient    Overall Patient Progress: improvingOverall Patient Progress: improving     Cares [id number]    Status: stable: adx 11/13 pre op DDLT  ISO: none  Diet: FLD, tolerating  Vitals: /57   Pulse 69   Temp 97.9  F (36.6  C) (Oral)   Resp 11   Ht 1.63 m (5' 4.17\")   Wt 77.6 kg (171 lb)   SpO2 96%   BMI 29.19 kg/m  2LNC    Neuros: Axox4, Follows commands, moves all extremities  Resp/cardiac: 2L NC sating at 96%  Endocrine:Q4H BG last , MD notified sliding scale initiated  GI/: :LBM: PTA, Calzada AUOP, - flatus  Skin: WDL mepilex on sacrum for preventative care  Pain/nausea: zofran given x1 with relief  Activity: Q2HT, not OOB this shift  IV/drains: R/L PIV- SL, R internal jugular CVC-TKO/abx, R internal jugular introducer, R radial art line, calzada  Labs/Electrolytes: K 3.6 this morning and replaced  Drips:none  Plan: start sliding scale to manage BG, 209 2units given  Updates this shift: passed dysphagia screening this shift, L internal jugular removed, MIVF discontinued, K replaced this shift    "

## 2024-11-13 NOTE — ANESTHESIA PROCEDURE NOTES
Perioperative KALLI Procedure Note    Staff -        Anesthesiologist:  Wilson Gould MD       Performed By: anesthesiologist  Preanesthesia Checklist:  Patient identified, IV assessed, risks and benefits discussed, monitors and equipment assessed, procedure being performed at surgeon's request and anesthesia consent obtained.    KALLI Probe Insertion    Probe Status PRE Insertion: NO obvious damage  Probe type:  Adult 3D  Bite block used:   Soft  Insertion Technique: Easy, no oropharyngeal manipulation  Insertion complications: None obvious  Billing Report:KALLI report by Anesthesiologist (See Separate Report note)  Probe Status POST Removal: NO obvious damage

## 2024-11-13 NOTE — H&P
[facility]    ICU History and Physical    Primary Team: Transplant Surgery  Reason for Critical Care Admission: Post-operative liver transplant  Admitting Physician: Blas Carney MD  Date of Admission:  11/13/2024    Assessment: Critical Care   Hannah Sutherland is a 54 year old female with PMHx dyslipidemia, HTN, depression, and decompensated alcoholic cirrhosis c/b ascites and mild encephalopathy, MELD 22, who is now status post DDLT. Pt arrived to SICU extubated and not requiring pressors. Pt admitted to the SICU for close monitoring post operatively.       Plan: Critical Care   Neuro/ pain/ sedation:  #Acute pain  #Hx mild hepatic encephalopathy  - Monitor neurological status. Notify provider for any acute changes in exam  - PRN oxycodone, dilaudid    #Depression  - PTA escitalopram    Pulmonary:  #Acute respiratory insufficiency, resolved  On RA.  - supplemental oxygen PRN to keep saturation about 92%    Cardiovascular:  #Hx HTN  #Hx HLD  - Monitor hemodynamic status.  -  mg qdaily  - Lactate 11/13 AM 1.2    GI/Nutrition:  - Diet: Room Service  NPO for Medical/Clinical Reasons Except for: Meds, Ice ChipsNPO except for meds and ice chips, likely advance to CLD  - Nutrition consulted. Appreciate recommendations.     #S/p DDLT  #Hypoalbuminemia  - Liver US ordered 11/13  - Transplant recs: MMF, tacrolimus, steroid taper  - PTA pantoprazole  - Trend amylase, lipase, hepatic panel    Renal/ Fluid Balance:   - Will monitor intake and output  - Daily BMP, Mg, Phos, iCal  - ICU electrolyte replacement protocol  - Zofran PRN    Endocrine:  #Stress and steroid induced hyperglycemia   - BG < 180 for optimal wound healing  - No current insulin need, continue to monitor    ID:  #DDLT  - Fluconazole x 7 days  - Levofloxacin x 48 hrs  - Vancomycin x 48 hrs  - Immunosuppression: Methylpred taper, Tacrolimus, MMF   -Tacro level daily  - Opportunistic pathogen  "prophylaxis: Valcyte, Bactrim    Hematology:  #Acute Blood Loss Anemia  - Intraoperatively:   with . Received 3L crystalloid, 7U RBC, 7U FFP, 2U PLT, 2 Fibryga.  - Daily CBC;   - Q4 through 11/14 @0000: Hgb,INR  - Hgb goal > 7, transfuse PRN to maintain    MSK:   - PT and OT consulted. Appreciate recommendations.     Lines/ tubes/ drains:  R IJ CVC  R radial arterial line  PIV x2   PRISCILA x1  Young     Prophylaxis:  - DVT Prophylaxis: Pneumatic Compression Devices  - PUD Prophylaxis: PTA PPI    Code Status: Full Code      Disposition:  - SICU    The patient's care was discussed with the Attending Physician, Dr. Singh .    Juany Saeed MD  Neurosurgery PGY-1  [facility]      Clinically Significant Risk Factors Present on Admission        # Hypokalemia: Lowest K = 3.1 mmol/L in last 2 days, will replace as needed    # Hypocalcemia: Lowest iCa = 4.2 mg/dL in last 2 days, will monitor and replace as appropriate  # Hypercalcemia: Highest iCa = 7 mg/dL in last 2 days, will monitor as appropriate    # Hypoalbuminemia: Lowest albumin = 3 g/dL at 11/13/2024  9:31 AM, will monitor as appropriate    # Coagulation Defect: INR = 1.66 (Ref range: 0.85 - 1.15) and/or PTT = 39 Seconds (Ref range: 22 - 38 Seconds), will monitor for bleeding  # Thrombocytopenia: Lowest platelets = 60 in last 2 days, will monitor for bleeding   # Hypertension: Noted on problem list      # Anemia: based on hgb <11       # Overweight: Estimated body mass index is 29.19 kg/m  as calculated from the following:    Height as of this encounter: 1.63 m (5' 4.17\").    Weight as of this encounter: 77.6 kg (171 lb).       # Asthma: noted on problem list     # Anemia: based on hgb <11         Elizabeth Landin MD  [facility]  Securely message with Sho (more info)  Text page via [facility] Paging/Directory     ______________________________________________________________________    Chief Complaint   Post liver " transplant      History of Present Illness   Hannah Sutherland is a 54 year old female with PMHx dyslipidemia, HTN, depression, and decompensated alcoholic cirrhosis c/b ascites and mild encephalopathy, MELD 22, who is now status post DDLT by Dr. Carney.      Past Medical History    I have reviewed this patient's medical history and updated it with pertinent information if needed.   No past medical history on file.    Past Surgical History   I have reviewed this patient's surgical history and updated it with pertinent information if needed.  No past surgical history on file.    Social History   I have reviewed this patient's social history and updated it with pertinent information if needed.  Social History     Tobacco Use    Smoking status: Never    Smokeless tobacco: Never   Substance Use Topics    Alcohol use: Not Currently     Comment: sober 11/7/23    Drug use: Never       Family History     No significant family history    Prior to Admission Medications   Prior to Admission Medications   Prescriptions Last Dose Informant Patient Reported? Taking?   Multiple Vitamin (QUINTABS) TABS   Yes No   Sig: Take 1 tablet by mouth every morning   albuterol (VENTOLIN HFA) 108 (90 Base) MCG/ACT inhaler   Yes No   Sig: Inhale 2 puffs into the lungs every 4 hours as needed for shortness of breath   budesonide (PULMICORT FLEXHALER) 90 MCG/ACT inhaler   Yes No   Sig: Inhale 1 puff into the lungs as needed   escitalopram (LEXAPRO) 20 MG tablet   Yes No   Sig: Take 1 tablet by mouth every morning   folic acid (FOLVITE) 1 MG tablet   Yes No   Sig: Take 1 tablet by mouth every morning   furosemide (LASIX) 40 MG tablet   No No   Sig: Take 1 tablet (40 mg) by mouth daily for 360 days   magnesium oxide (MAG-OX) 400 MG tablet   Yes No   Sig: Take 400 mg by mouth 2 times daily   midodrine (PROAMATINE) 5 MG tablet   No No   Sig: Take 1 tablet (5 mg) by mouth 3 times daily.   pantoprazole (PROTONIX) 40 MG EC tablet   Yes No   Sig:  Take 1 tablet by mouth every morning   spironolactone (ALDACTONE) 50 MG tablet   No No   Sig: Take 1 tablet (50 mg) by mouth daily for 360 days   thiamine (B-1) 100 MG tablet   Yes No   Sig: Take 100 mg by mouth daily      Facility-Administered Medications: None     Allergies   Allergies   Allergen Reactions    Penicillins Rash and Unknown    Latex Other (See Comments)     History of asthma.    Erythromycin Nausea and Vomiting       Physical Exam   Vital Signs: Temp: 98.3  F (36.8  C) Temp src: Axillary BP: 123/57 Pulse: 81   Resp: 20 SpO2: 95 % O2 Device: Nasal cannula Oxygen Delivery: 2 LPM  Weight: 171 lbs 0 oz    General: Alert, in NAD  Neuro: Following commands, appropriately resposive to questions, VALDEZ  CV: RR on tele  Pulm: Breathing comfortably on RA  Abd: Obese, soft, appropriately tender to palpation. Clamshell incision covered with dressing with minimal strikethrough. PRISCILA on right abdomen with sanguineous output  : Young in place  Extremities: Moderate peripheral edema  Skin: Warm and dry      Data   I reviewed all medications, new labs and imaging results over the last 24 hours.  Arterial Blood Gases   Recent Labs   Lab 11/13/24  0931 11/13/24  0802 11/13/24  0700 11/13/24  0558   PH 7.37 7.46* 7.47* 7.48*   PCO2 52* 40 39 33*   PO2 191* 116* 130* 150*   HCO3 30* 28 28 24       Complete Blood Count   Recent Labs   Lab 11/13/24  0931 11/13/24  0802 11/13/24  0700 11/13/24  0629 11/13/24  0558 11/13/24 0328 11/13/24  0116   WBC 5.8  --   --   --   --   --  3.4*   HGB 10.3* 8.9* 8.6*  --  9.0*   < > 7.2*   PLT 60*  --   --  80*  --   --  62*    < > = values in this interval not displayed.       Basic Metabolic Panel  Recent Labs   Lab 11/13/24  1214 11/13/24  0936 11/13/24  0931 11/13/24  0802 11/13/24  0700 11/13/24  0558 11/13/24  0328 11/13/24  0116   NA  --   --  142 142 140 139   < > 136   POTASSIUM  --   --  3.6 3.5 3.3* 3.5   < > 3.7   CHLORIDE  --   --  104  --   --   --   --  100   CO2  --   --   27  --   --   --   --  26   BUN  --   --  16.6  --   --   --   --  12.4   CR  --   --  0.73  --   --   --   --  0.89   * 143* 151* 147* 141* 112*   < > 106*    < > = values in this interval not displayed.       Liver Function Tests  Recent Labs   Lab 11/13/24  0931 11/13/24  0629 11/13/24  0116 11/07/24  0857   *  --  53*  --    *  --  15  --    ALKPHOS 100  --  147  --    BILITOTAL 5.4*  --  6.3*  --    ALBUMIN 3.0*  --  3.0*  --    INR 1.66* 1.79* 1.89* 1.8*       Pancreatic Enzymes  Recent Labs   Lab 11/13/24  0116   AMYLASE 92       Coagulation Profile  Recent Labs   Lab 11/13/24  0931 11/13/24  0629 11/13/24  0116 11/07/24  0857   INR 1.66* 1.79* 1.89* 1.8*   PTT 39* 47* 43*  --        IMAGING:  Recent Results (from the past 24 hours)   XR Chest Port 1 View    Narrative    Exam: XR CHEST PORT 1 VIEW, 11/13/2024 2:20 AM    Comparison: Chest radiograph 5/21/2024    History: pre-transplant eval    Findings:  Portable AP view of the chest. Trachea is approximately midline.  Enlarged cardiac silhouette when compared to prior. Costophrenic  angles are clear. No new focal consolidation or appreciable  pneumothorax.       Impression    Impression: No acute airspace disease.     I have personally reviewed the examination and initial interpretation  and I agree with the findings.    EMILY VENTURA MD         SYSTEM ID:  [id number]   XR Chest Port 1 View    Narrative    Portable chest 11/13/2024 at 0947 hours    INDICATION: Central venous line    COMPARISON: 0216 hours earlier today    FINDINGS: Right IJ sheath tip in the upper SVC. No pneumothorax. Left  IJ sheath tip in the left innominate vein. No pneumothorax. Heart size  normal. No suspicious opacities. Bony structures appear unremarkable.      Impression    IMPRESSION: Apparent bilateral IJ catheters without pneumothorax.    JEYSON VILLARREAL MD         SYSTEM ID:  [id number]

## 2024-11-13 NOTE — ANESTHESIA POSTPROCEDURE EVALUATION
Patient: Hannah Sutherland    Procedure: Procedure(s):  Transplant liver recipient  donor       Anesthesia Type:  General    Note:  Disposition: ICU   Postop Pain Control: Uneventful            Sign Out: Well controlled pain   PONV: No   Neuro/Psych: Uneventful            Sign Out: Acceptable/Baseline neuro status   Airway/Respiratory: Uneventful            Sign Out: Acceptable/Baseline resp. status   CV/Hemodynamics: Uneventful            Sign Out: Acceptable CV status; No obvious hypovolemia; No obvious fluid overload   Other NRE: NONE   DID A NON-ROUTINE EVENT OCCUR? No    Event details/Postop Comments:  Extubated, stable, reported given to the ICU physician           Last vitals:  Vitals:    24 1345 24 1400 24 1415   BP:      Pulse: 81 81 81   Resp: 19 19 20   Temp:      SpO2: 95% 96% 95%       Electronically Signed By: Alf Huynh DO  2024  2:52 PM  
Clear

## 2024-11-14 ENCOUNTER — APPOINTMENT (OUTPATIENT)
Dept: ULTRASOUND IMAGING | Facility: CLINIC | Age: 54
End: 2024-11-14
Attending: TRANSPLANT SURGERY
Payer: COMMERCIAL

## 2024-11-14 ENCOUNTER — APPOINTMENT (OUTPATIENT)
Dept: ULTRASOUND IMAGING | Facility: CLINIC | Age: 54
End: 2024-11-14
Attending: STUDENT IN AN ORGANIZED HEALTH CARE EDUCATION/TRAINING PROGRAM
Payer: COMMERCIAL

## 2024-11-14 ENCOUNTER — APPOINTMENT (OUTPATIENT)
Dept: GENERAL RADIOLOGY | Facility: CLINIC | Age: 54
End: 2024-11-14
Attending: TRANSPLANT SURGERY
Payer: COMMERCIAL

## 2024-11-14 PROBLEM — K72.10 END STAGE LIVER DISEASE (H): Status: ACTIVE | Noted: 2024-11-14

## 2024-11-14 LAB
ALBUMIN SERPL BCG-MCNC: 2.6 G/DL (ref 3.5–5.2)
ALBUMIN SERPL BCG-MCNC: 2.7 G/DL (ref 3.5–5.2)
ALBUMIN UR-MCNC: 30 MG/DL
ALP SERPL-CCNC: 70 U/L (ref 40–150)
ALP SERPL-CCNC: 73 U/L (ref 40–150)
ALT SERPL W P-5'-P-CCNC: 573 U/L (ref 0–50)
ALT SERPL W P-5'-P-CCNC: 924 U/L (ref 0–50)
AMYLASE SERPL-CCNC: 51 U/L (ref 28–100)
ANION GAP SERPL CALCULATED.3IONS-SCNC: 11 MMOL/L (ref 7–15)
ANION GAP SERPL CALCULATED.3IONS-SCNC: 11 MMOL/L (ref 7–15)
ANION GAP SERPL CALCULATED.3IONS-SCNC: 12 MMOL/L (ref 7–15)
APPEARANCE UR: ABNORMAL
APTT PPP: 33 SECONDS (ref 22–38)
AST SERPL W P-5'-P-CCNC: 1037 U/L (ref 0–45)
AST SERPL W P-5'-P-CCNC: 670 U/L (ref 0–45)
ATRIAL RATE - MUSE: 70 BPM
BACTERIA #/AREA URNS HPF: ABNORMAL /HPF
BACTERIA SPEC CULT: NORMAL
BASOPHILS # BLD AUTO: 0 10E3/UL (ref 0–0.2)
BASOPHILS # BLD AUTO: 0 10E3/UL (ref 0–0.2)
BASOPHILS NFR BLD AUTO: 0 %
BASOPHILS NFR BLD AUTO: 0 %
BILIRUB DIRECT SERPL-MCNC: 2.99 MG/DL (ref 0–0.3)
BILIRUB DIRECT SERPL-MCNC: 3.11 MG/DL (ref 0–0.3)
BILIRUB SERPL-MCNC: 3.6 MG/DL
BILIRUB SERPL-MCNC: 3.9 MG/DL
BILIRUB UR QL STRIP: ABNORMAL
BUN SERPL-MCNC: 31.9 MG/DL (ref 6–20)
BUN SERPL-MCNC: 37.6 MG/DL (ref 6–20)
BUN SERPL-MCNC: 43 MG/DL (ref 6–20)
BURR CELLS BLD QL SMEAR: SLIGHT
CA-I BLD-MCNC: 4.5 MG/DL (ref 4.4–5.2)
CALCIUM SERPL-MCNC: 8.2 MG/DL (ref 8.8–10.4)
CALCIUM SERPL-MCNC: 8.2 MG/DL (ref 8.8–10.4)
CALCIUM SERPL-MCNC: 8.3 MG/DL (ref 8.8–10.4)
CHLORIDE SERPL-SCNC: 95 MMOL/L (ref 98–107)
CHLORIDE SERPL-SCNC: 97 MMOL/L (ref 98–107)
CHLORIDE SERPL-SCNC: 98 MMOL/L (ref 98–107)
COLOR UR AUTO: YELLOW
CREAT SERPL-MCNC: 1.5 MG/DL (ref 0.51–0.95)
CREAT SERPL-MCNC: 1.89 MG/DL (ref 0.51–0.95)
CREAT SERPL-MCNC: 2.2 MG/DL (ref 0.51–0.95)
CREAT UR-MCNC: 17.1 MG/DL
CREAT UR-MCNC: 95 MG/DL
DIASTOLIC BLOOD PRESSURE - MUSE: NORMAL MMHG
EGFRCR SERPLBLD CKD-EPI 2021: 26 ML/MIN/1.73M2
EGFRCR SERPLBLD CKD-EPI 2021: 31 ML/MIN/1.73M2
EGFRCR SERPLBLD CKD-EPI 2021: 41 ML/MIN/1.73M2
EOSINOPHIL # BLD AUTO: 0 10E3/UL (ref 0–0.7)
EOSINOPHIL # BLD AUTO: 0 10E3/UL (ref 0–0.7)
EOSINOPHIL NFR BLD AUTO: 0 %
EOSINOPHIL NFR BLD AUTO: 0 %
ERYTHROCYTE [DISTWIDTH] IN BLOOD BY AUTOMATED COUNT: 17.2 % (ref 10–15)
ERYTHROCYTE [DISTWIDTH] IN BLOOD BY AUTOMATED COUNT: 17.3 % (ref 10–15)
FIBRINOGEN PPP-MCNC: 244 MG/DL (ref 170–510)
GLUCOSE BLDC GLUCOMTR-MCNC: 124 MG/DL (ref 70–99)
GLUCOSE BLDC GLUCOMTR-MCNC: 133 MG/DL (ref 70–99)
GLUCOSE BLDC GLUCOMTR-MCNC: 144 MG/DL (ref 70–99)
GLUCOSE BLDC GLUCOMTR-MCNC: 157 MG/DL (ref 70–99)
GLUCOSE BLDC GLUCOMTR-MCNC: 171 MG/DL (ref 70–99)
GLUCOSE SERPL-MCNC: 126 MG/DL (ref 70–99)
GLUCOSE SERPL-MCNC: 150 MG/DL (ref 70–99)
GLUCOSE SERPL-MCNC: 183 MG/DL (ref 70–99)
GLUCOSE UR STRIP-MCNC: NEGATIVE MG/DL
HBV DNA SERPL QL NAA+PROBE: NORMAL
HCO3 SERPL-SCNC: 23 MMOL/L (ref 22–29)
HCT VFR BLD AUTO: 26.5 % (ref 35–47)
HCT VFR BLD AUTO: 28.6 % (ref 35–47)
HCV RNA SERPL QL NAA+PROBE: NORMAL
HGB BLD-MCNC: 8.6 G/DL (ref 11.7–15.7)
HGB BLD-MCNC: 9.3 G/DL (ref 11.7–15.7)
HGB BLD-MCNC: 9.3 G/DL (ref 11.7–15.7)
HGB UR QL STRIP: ABNORMAL
HIV1+2 RNA SERPL QL NAA+PROBE: NORMAL
HYALINE CASTS: 1 /LPF
IMM GRANULOCYTES # BLD: 0.1 10E3/UL
IMM GRANULOCYTES # BLD: 0.1 10E3/UL
IMM GRANULOCYTES NFR BLD: 1 %
IMM GRANULOCYTES NFR BLD: 1 %
INR PPP: 1.44 (ref 0.85–1.15)
INR PPP: 1.55 (ref 0.85–1.15)
INR PPP: 1.77 (ref 0.85–1.15)
INTERPRETATION ECG - MUSE: NORMAL
KETONES UR STRIP-MCNC: NEGATIVE MG/DL
LACTATE SERPL-SCNC: 1.1 MMOL/L (ref 0.7–2)
LEUKOCYTE ESTERASE UR QL STRIP: NEGATIVE
LIPASE SERPL-CCNC: 26 U/L (ref 13–60)
LYMPHOCYTES # BLD AUTO: 0.2 10E3/UL (ref 0.8–5.3)
LYMPHOCYTES # BLD AUTO: 0.2 10E3/UL (ref 0.8–5.3)
LYMPHOCYTES NFR BLD AUTO: 2 %
LYMPHOCYTES NFR BLD AUTO: 2 %
MAGNESIUM SERPL-MCNC: 1.5 MG/DL (ref 1.7–2.3)
MAGNESIUM SERPL-MCNC: 2.7 MG/DL (ref 1.7–2.3)
MCH RBC QN AUTO: 28.7 PG (ref 26.5–33)
MCH RBC QN AUTO: 28.7 PG (ref 26.5–33)
MCHC RBC AUTO-ENTMCNC: 32.5 G/DL (ref 31.5–36.5)
MCHC RBC AUTO-ENTMCNC: 32.5 G/DL (ref 31.5–36.5)
MCV RBC AUTO: 88 FL (ref 78–100)
MCV RBC AUTO: 88 FL (ref 78–100)
MONOCYTES # BLD AUTO: 0.2 10E3/UL (ref 0–1.3)
MONOCYTES # BLD AUTO: 0.3 10E3/UL (ref 0–1.3)
MONOCYTES NFR BLD AUTO: 2 %
MONOCYTES NFR BLD AUTO: 3 %
MUCOUS THREADS #/AREA URNS LPF: PRESENT /LPF
NEUTROPHILS # BLD AUTO: 11.1 10E3/UL (ref 1.6–8.3)
NEUTROPHILS # BLD AUTO: 9.4 10E3/UL (ref 1.6–8.3)
NEUTROPHILS NFR BLD AUTO: 95 %
NEUTROPHILS NFR BLD AUTO: 96 %
NITRATE UR QL: NEGATIVE
NRBC # BLD AUTO: 0 10E3/UL
NRBC # BLD AUTO: 0 10E3/UL
NRBC BLD AUTO-RTO: 0 /100
NRBC BLD AUTO-RTO: 0 /100
P AXIS - MUSE: 72 DEGREES
PH UR STRIP: 5 [PH] (ref 5–7)
PHOSPHATE SERPL-MCNC: 6.2 MG/DL (ref 2.5–4.5)
PLAT MORPH BLD: ABNORMAL
PLATELET # BLD AUTO: 50 10E3/UL (ref 150–450)
PLATELET # BLD AUTO: 53 10E3/UL (ref 150–450)
POLYCHROMASIA BLD QL SMEAR: SLIGHT
POTASSIUM SERPL-SCNC: 5.1 MMOL/L (ref 3.4–5.3)
POTASSIUM SERPL-SCNC: 5.2 MMOL/L (ref 3.4–5.3)
POTASSIUM SERPL-SCNC: 5.3 MMOL/L (ref 3.4–5.3)
PR INTERVAL - MUSE: 170 MS
PROT SERPL-MCNC: 4.9 G/DL (ref 6.4–8.3)
PROT SERPL-MCNC: 5.2 G/DL (ref 6.4–8.3)
QRS DURATION - MUSE: 104 MS
QT - MUSE: 460 MS
QTC - MUSE: 496 MS
R AXIS - MUSE: 12 DEGREES
RBC # BLD AUTO: 3 10E6/UL (ref 3.8–5.2)
RBC # BLD AUTO: 3.24 10E6/UL (ref 3.8–5.2)
RBC MORPH BLD: ABNORMAL
RBC URINE: 28 /HPF
SODIUM SERPL-SCNC: 129 MMOL/L (ref 135–145)
SODIUM SERPL-SCNC: 131 MMOL/L (ref 135–145)
SODIUM SERPL-SCNC: 133 MMOL/L (ref 135–145)
SODIUM UR-SCNC: 90 MMOL/L
SODIUM UR-SCNC: <20 MMOL/L
SP GR UR STRIP: 1.02 (ref 1–1.03)
SQUAMOUS EPITHELIAL: <1 /HPF
SYSTOLIC BLOOD PRESSURE - MUSE: NORMAL MMHG
T AXIS - MUSE: 55 DEGREES
TACROLIMUS BLD-MCNC: 5.3 UG/L (ref 5–15)
TME LAST DOSE: NORMAL H
TME LAST DOSE: NORMAL H
UROBILINOGEN UR STRIP-MCNC: NORMAL MG/DL
VENTRICULAR RATE- MUSE: 70 BPM
WBC # BLD AUTO: 11.5 10E3/UL (ref 4–11)
WBC # BLD AUTO: 9.9 10E3/UL (ref 4–11)
WBC URINE: 2 /HPF

## 2024-11-14 PROCEDURE — 93975 VASCULAR STUDY: CPT | Mod: 26 | Performed by: RADIOLOGY

## 2024-11-14 PROCEDURE — 84100 ASSAY OF PHOSPHORUS: CPT | Performed by: STUDENT IN AN ORGANIZED HEALTH CARE EDUCATION/TRAINING PROGRAM

## 2024-11-14 PROCEDURE — 83690 ASSAY OF LIPASE: CPT | Performed by: STUDENT IN AN ORGANIZED HEALTH CARE EDUCATION/TRAINING PROGRAM

## 2024-11-14 PROCEDURE — 258N000003 HC RX IP 258 OP 636

## 2024-11-14 PROCEDURE — 250N000011 HC RX IP 250 OP 636

## 2024-11-14 PROCEDURE — 82248 BILIRUBIN DIRECT: CPT | Performed by: STUDENT IN AN ORGANIZED HEALTH CARE EDUCATION/TRAINING PROGRAM

## 2024-11-14 PROCEDURE — 85041 AUTOMATED RBC COUNT: CPT | Performed by: PHYSICIAN ASSISTANT

## 2024-11-14 PROCEDURE — 82248 BILIRUBIN DIRECT: CPT

## 2024-11-14 PROCEDURE — 250N000011 HC RX IP 250 OP 636: Performed by: STUDENT IN AN ORGANIZED HEALTH CARE EDUCATION/TRAINING PROGRAM

## 2024-11-14 PROCEDURE — 80076 HEPATIC FUNCTION PANEL: CPT | Performed by: STUDENT IN AN ORGANIZED HEALTH CARE EDUCATION/TRAINING PROGRAM

## 2024-11-14 PROCEDURE — 81001 URINALYSIS AUTO W/SCOPE: CPT | Performed by: SURGERY

## 2024-11-14 PROCEDURE — 80048 BASIC METABOLIC PNL TOTAL CA: CPT | Performed by: PHYSICIAN ASSISTANT

## 2024-11-14 PROCEDURE — 71045 X-RAY EXAM CHEST 1 VIEW: CPT | Mod: 26 | Performed by: RADIOLOGY

## 2024-11-14 PROCEDURE — 93975 VASCULAR STUDY: CPT

## 2024-11-14 PROCEDURE — 250N000013 HC RX MED GY IP 250 OP 250 PS 637

## 2024-11-14 PROCEDURE — 81015 MICROSCOPIC EXAM OF URINE: CPT | Performed by: SURGERY

## 2024-11-14 PROCEDURE — 83735 ASSAY OF MAGNESIUM: CPT | Performed by: STUDENT IN AN ORGANIZED HEALTH CARE EDUCATION/TRAINING PROGRAM

## 2024-11-14 PROCEDURE — 76770 US EXAM ABDO BACK WALL COMP: CPT

## 2024-11-14 PROCEDURE — 85384 FIBRINOGEN ACTIVITY: CPT

## 2024-11-14 PROCEDURE — 250N000013 HC RX MED GY IP 250 OP 250 PS 637: Performed by: STUDENT IN AN ORGANIZED HEALTH CARE EDUCATION/TRAINING PROGRAM

## 2024-11-14 PROCEDURE — 82150 ASSAY OF AMYLASE: CPT | Performed by: STUDENT IN AN ORGANIZED HEALTH CARE EDUCATION/TRAINING PROGRAM

## 2024-11-14 PROCEDURE — 82962 GLUCOSE BLOOD TEST: CPT

## 2024-11-14 PROCEDURE — 250N000013 HC RX MED GY IP 250 OP 250 PS 637: Performed by: PHYSICIAN ASSISTANT

## 2024-11-14 PROCEDURE — 82040 ASSAY OF SERUM ALBUMIN: CPT

## 2024-11-14 PROCEDURE — 82248 BILIRUBIN DIRECT: CPT | Performed by: PHYSICIAN ASSISTANT

## 2024-11-14 PROCEDURE — 85610 PROTHROMBIN TIME: CPT | Performed by: STUDENT IN AN ORGANIZED HEALTH CARE EDUCATION/TRAINING PROGRAM

## 2024-11-14 PROCEDURE — 83605 ASSAY OF LACTIC ACID: CPT

## 2024-11-14 PROCEDURE — 80053 COMPREHEN METABOLIC PANEL: CPT | Performed by: PHYSICIAN ASSISTANT

## 2024-11-14 PROCEDURE — 80048 BASIC METABOLIC PNL TOTAL CA: CPT

## 2024-11-14 PROCEDURE — 250N000013 HC RX MED GY IP 250 OP 250 PS 637: Performed by: NURSE PRACTITIONER

## 2024-11-14 PROCEDURE — 999N000065 XR CHEST PORT 1 VIEW

## 2024-11-14 PROCEDURE — 120N000011 HC R&B TRANSPLANT UMMC

## 2024-11-14 PROCEDURE — 82330 ASSAY OF CALCIUM: CPT | Performed by: STUDENT IN AN ORGANIZED HEALTH CARE EDUCATION/TRAINING PROGRAM

## 2024-11-14 PROCEDURE — 250N000011 HC RX IP 250 OP 636: Performed by: TRANSPLANT SURGERY

## 2024-11-14 PROCEDURE — 250N000011 HC RX IP 250 OP 636: Mod: JZ | Performed by: NURSE PRACTITIONER

## 2024-11-14 PROCEDURE — 85004 AUTOMATED DIFF WBC COUNT: CPT | Performed by: PHYSICIAN ASSISTANT

## 2024-11-14 PROCEDURE — 80197 ASSAY OF TACROLIMUS: CPT | Performed by: TRANSPLANT SURGERY

## 2024-11-14 PROCEDURE — 76770 US EXAM ABDO BACK WALL COMP: CPT | Mod: 26 | Performed by: RADIOLOGY

## 2024-11-14 PROCEDURE — 36592 COLLECT BLOOD FROM PICC: CPT | Performed by: PHYSICIAN ASSISTANT

## 2024-11-14 PROCEDURE — 83735 ASSAY OF MAGNESIUM: CPT | Performed by: TRANSPLANT SURGERY

## 2024-11-14 PROCEDURE — 258N000003 HC RX IP 258 OP 636: Performed by: STUDENT IN AN ORGANIZED HEALTH CARE EDUCATION/TRAINING PROGRAM

## 2024-11-14 PROCEDURE — 85014 HEMATOCRIT: CPT | Performed by: PHYSICIAN ASSISTANT

## 2024-11-14 PROCEDURE — 85025 COMPLETE CBC W/AUTO DIFF WBC: CPT | Performed by: STUDENT IN AN ORGANIZED HEALTH CARE EDUCATION/TRAINING PROGRAM

## 2024-11-14 PROCEDURE — 82374 ASSAY BLOOD CARBON DIOXIDE: CPT

## 2024-11-14 PROCEDURE — 85610 PROTHROMBIN TIME: CPT

## 2024-11-14 PROCEDURE — 85730 THROMBOPLASTIN TIME PARTIAL: CPT

## 2024-11-14 PROCEDURE — 250N000012 HC RX MED GY IP 250 OP 636 PS 637: Performed by: STUDENT IN AN ORGANIZED HEALTH CARE EDUCATION/TRAINING PROGRAM

## 2024-11-14 PROCEDURE — 250N000011 HC RX IP 250 OP 636: Performed by: PHYSICIAN ASSISTANT

## 2024-11-14 PROCEDURE — 84300 ASSAY OF URINE SODIUM: CPT | Performed by: SURGERY

## 2024-11-14 PROCEDURE — 258N000003 HC RX IP 258 OP 636: Performed by: NURSE PRACTITIONER

## 2024-11-14 PROCEDURE — 82570 ASSAY OF URINE CREATININE: CPT | Performed by: SURGERY

## 2024-11-14 PROCEDURE — 85018 HEMOGLOBIN: CPT | Performed by: STUDENT IN AN ORGANIZED HEALTH CARE EDUCATION/TRAINING PROGRAM

## 2024-11-14 RX ORDER — SENNOSIDES 8.6 MG
8.6 TABLET ORAL 2 TIMES DAILY
Status: DISCONTINUED | OUTPATIENT
Start: 2024-11-14 | End: 2024-11-16

## 2024-11-14 RX ORDER — FLUCONAZOLE 2 MG/ML
200 INJECTION, SOLUTION INTRAVENOUS EVERY 24 HOURS
Status: DISCONTINUED | OUTPATIENT
Start: 2024-11-15 | End: 2024-11-14

## 2024-11-14 RX ORDER — NYSTATIN 100000 [USP'U]/ML
500000 SUSPENSION ORAL 4 TIMES DAILY
Status: DISCONTINUED | OUTPATIENT
Start: 2024-11-15 | End: 2024-11-20 | Stop reason: HOSPADM

## 2024-11-14 RX ORDER — HYDROXYZINE HYDROCHLORIDE 25 MG/1
25 TABLET, FILM COATED ORAL EVERY 6 HOURS PRN
Status: DISCONTINUED | OUTPATIENT
Start: 2024-11-14 | End: 2024-11-20 | Stop reason: HOSPADM

## 2024-11-14 RX ORDER — ASCORBIC ACID 250 MG
250 TABLET,CHEWABLE ORAL DAILY
COMMUNITY

## 2024-11-14 RX ORDER — POLYETHYLENE GLYCOL 3350 17 G/17G
17 POWDER, FOR SOLUTION ORAL DAILY
Status: DISCONTINUED | OUTPATIENT
Start: 2024-11-14 | End: 2024-11-16

## 2024-11-14 RX ORDER — ACETAMINOPHEN 500 MG
500-1000 TABLET ORAL
Status: ON HOLD | COMMUNITY
End: 2024-11-20

## 2024-11-14 RX ORDER — HYDROXYZINE HCL 25 MG
12.5 TABLET ORAL EVERY 6 HOURS PRN
Status: DISCONTINUED | OUTPATIENT
Start: 2024-11-14 | End: 2024-11-20 | Stop reason: HOSPADM

## 2024-11-14 RX ORDER — VANCOMYCIN HYDROCHLORIDE 1 G/200ML
1000 INJECTION, SOLUTION INTRAVENOUS EVERY 24 HOURS
Status: DISCONTINUED | OUTPATIENT
Start: 2024-11-15 | End: 2024-11-14

## 2024-11-14 RX ORDER — PREDNISONE 5 MG/1
5 TABLET ORAL DAILY
Status: DISCONTINUED | OUTPATIENT
Start: 2024-11-20 | End: 2024-11-20 | Stop reason: HOSPADM

## 2024-11-14 RX ORDER — SENNOSIDES 8.6 MG
8.6 TABLET ORAL 2 TIMES DAILY PRN
Status: DISCONTINUED | OUTPATIENT
Start: 2024-11-14 | End: 2024-11-14

## 2024-11-14 RX ORDER — METHOCARBAMOL 500 MG/1
500 TABLET, FILM COATED ORAL 3 TIMES DAILY PRN
Status: DISCONTINUED | OUTPATIENT
Start: 2024-11-14 | End: 2024-11-15

## 2024-11-14 RX ORDER — ONDANSETRON 2 MG/ML
4 INJECTION INTRAMUSCULAR; INTRAVENOUS EVERY 6 HOURS PRN
Status: DISCONTINUED | OUTPATIENT
Start: 2024-11-14 | End: 2024-11-15

## 2024-11-14 RX ORDER — MAGNESIUM SULFATE HEPTAHYDRATE 40 MG/ML
4 INJECTION, SOLUTION INTRAVENOUS ONCE
Status: COMPLETED | OUTPATIENT
Start: 2024-11-14 | End: 2024-11-14

## 2024-11-14 RX ORDER — LEVOFLOXACIN 5 MG/ML
250 INJECTION, SOLUTION INTRAVENOUS EVERY 24 HOURS
Status: COMPLETED | OUTPATIENT
Start: 2024-11-15 | End: 2024-11-15

## 2024-11-14 RX ORDER — FUROSEMIDE 10 MG/ML
120 INJECTION INTRAMUSCULAR; INTRAVENOUS ONCE
Status: COMPLETED | OUTPATIENT
Start: 2024-11-14 | End: 2024-11-14

## 2024-11-14 RX ADMIN — ESCITALOPRAM OXALATE 20 MG: 20 TABLET ORAL at 07:50

## 2024-11-14 RX ADMIN — MAGNESIUM SULFATE HEPTAHYDRATE 4 G: 40 INJECTION, SOLUTION INTRAVENOUS at 06:14

## 2024-11-14 RX ADMIN — SODIUM CHLORIDE 20 MG: 9 INJECTION, SOLUTION INTRAVENOUS at 17:05

## 2024-11-14 RX ADMIN — Medication 5 MG: at 20:03

## 2024-11-14 RX ADMIN — SENNOSIDES 8.6 MG: 8.6 TABLET, FILM COATED ORAL at 09:26

## 2024-11-14 RX ADMIN — VANCOMYCIN HYDROCHLORIDE 1000 MG: 1 INJECTION, SOLUTION INTRAVENOUS at 03:04

## 2024-11-14 RX ADMIN — MYCOPHENOLATE MOFETIL 750 MG: 250 CAPSULE ORAL at 07:50

## 2024-11-14 RX ADMIN — HYDROMORPHONE HYDROCHLORIDE 0.4 MG: 0.2 INJECTION, SOLUTION INTRAMUSCULAR; INTRAVENOUS; SUBCUTANEOUS at 11:01

## 2024-11-14 RX ADMIN — SODIUM CHLORIDE, POTASSIUM CHLORIDE, SODIUM LACTATE AND CALCIUM CHLORIDE 500 ML: 600; 310; 30; 20 INJECTION, SOLUTION INTRAVENOUS at 09:26

## 2024-11-14 RX ADMIN — OXYCODONE HYDROCHLORIDE 10 MG: 10 TABLET ORAL at 14:29

## 2024-11-14 RX ADMIN — HYDROMORPHONE HYDROCHLORIDE 0.4 MG: 0.2 INJECTION, SOLUTION INTRAMUSCULAR; INTRAVENOUS; SUBCUTANEOUS at 02:02

## 2024-11-14 RX ADMIN — Medication 1 TABLET: at 11:57

## 2024-11-14 RX ADMIN — OXYCODONE HYDROCHLORIDE 5 MG: 5 TABLET ORAL at 17:58

## 2024-11-14 RX ADMIN — VALGANCICLOVIR HYDROCHLORIDE 450 MG: 450 TABLET ORAL at 20:03

## 2024-11-14 RX ADMIN — HYDROMORPHONE HYDROCHLORIDE 0.4 MG: 0.2 INJECTION, SOLUTION INTRAMUSCULAR; INTRAVENOUS; SUBCUTANEOUS at 08:54

## 2024-11-14 RX ADMIN — ONDANSETRON 4 MG: 2 INJECTION INTRAMUSCULAR; INTRAVENOUS at 13:16

## 2024-11-14 RX ADMIN — FLUCONAZOLE 400 MG: 2 INJECTION, SOLUTION INTRAVENOUS at 04:08

## 2024-11-14 RX ADMIN — PANTOPRAZOLE SODIUM 40 MG: 40 TABLET, DELAYED RELEASE ORAL at 07:51

## 2024-11-14 RX ADMIN — OXYCODONE HYDROCHLORIDE 5 MG: 5 TABLET ORAL at 23:03

## 2024-11-14 RX ADMIN — SENNOSIDES 8.6 MG: 8.6 TABLET, FILM COATED ORAL at 20:20

## 2024-11-14 RX ADMIN — HYDROMORPHONE HYDROCHLORIDE 0.4 MG: 0.2 INJECTION, SOLUTION INTRAMUSCULAR; INTRAVENOUS; SUBCUTANEOUS at 06:23

## 2024-11-14 RX ADMIN — OXYCODONE HYDROCHLORIDE 10 MG: 10 TABLET ORAL at 04:54

## 2024-11-14 RX ADMIN — ONDANSETRON 4 MG: 2 INJECTION INTRAMUSCULAR; INTRAVENOUS at 03:55

## 2024-11-14 RX ADMIN — POLYETHYLENE GLYCOL 3350 17 G: 17 POWDER, FOR SOLUTION ORAL at 09:26

## 2024-11-14 RX ADMIN — METHOCARBAMOL 500 MG: 500 TABLET ORAL at 20:03

## 2024-11-14 RX ADMIN — MYCOPHENOLATE MOFETIL 750 MG: 250 CAPSULE ORAL at 20:03

## 2024-11-14 RX ADMIN — FUROSEMIDE 120 MG: 10 INJECTION, SOLUTION INTRAVENOUS at 13:16

## 2024-11-14 RX ADMIN — HYDROMORPHONE HYDROCHLORIDE 0.4 MG: 0.2 INJECTION, SOLUTION INTRAMUSCULAR; INTRAVENOUS; SUBCUTANEOUS at 13:16

## 2024-11-14 RX ADMIN — OXYCODONE HYDROCHLORIDE 10 MG: 10 TABLET ORAL at 09:26

## 2024-11-14 RX ADMIN — TACROLIMUS 2 MG: 1 CAPSULE ORAL at 07:50

## 2024-11-14 RX ADMIN — HYDROXYZINE HYDROCHLORIDE 25 MG: 25 TABLET, FILM COATED ORAL at 17:01

## 2024-11-14 RX ADMIN — METHYLPREDNISOLONE SODIUM SUCCINATE 200 MG: 125 INJECTION, POWDER, LYOPHILIZED, FOR SOLUTION INTRAMUSCULAR; INTRAVENOUS at 08:09

## 2024-11-14 RX ADMIN — ONDANSETRON 4 MG: 2 INJECTION INTRAMUSCULAR; INTRAVENOUS at 07:50

## 2024-11-14 RX ADMIN — ASPIRIN 325 MG ORAL TABLET 325 MG: 325 PILL ORAL at 07:50

## 2024-11-14 RX ADMIN — LEVOFLOXACIN 500 MG: 5 INJECTION, SOLUTION INTRAVENOUS at 02:04

## 2024-11-14 RX ADMIN — HYDROXYZINE HYDROCHLORIDE 25 MG: 25 TABLET, FILM COATED ORAL at 11:57

## 2024-11-14 RX ADMIN — HYDROMORPHONE HYDROCHLORIDE 0.4 MG: 0.2 INJECTION, SOLUTION INTRAMUSCULAR; INTRAVENOUS; SUBCUTANEOUS at 03:55

## 2024-11-14 RX ADMIN — INSULIN ASPART 1 UNITS: 100 INJECTION, SOLUTION INTRAVENOUS; SUBCUTANEOUS at 17:14

## 2024-11-14 RX ADMIN — TACROLIMUS 2 MG: 1 CAPSULE ORAL at 17:51

## 2024-11-14 NOTE — PROGRESS NOTES
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 She was hospitalized in November 2023 for acute hepatitis 2/2 to alcohol use at which time she was diagnosed with underlying cirrhosis. Ms. Sutherland has remained abstinent from alcohol for the last year. MELD at time of transplant 21. She underwent DDLT w/o stent on 11/13/2024 with Dr. Blas Carney.       S/p DDLT w/o stent 11/13/24: POD #1  Post-op US doppler showing patent hepatic vasculature   - Repeat US 11/14 due to elevated LFTs, shows similar results as above and multiple fluid collections adjacent to the right lobe of liver, most likely postoperative hematomas   - Continue  mg daily     Immunosuppressed status secondary to medications:  Induction: Steroid taper and basiliximab POD 1 and POD 5  Maintenance:    -  mg BID   - Tacro 2 mg BID, goal 6-8 (RUDY)   - Prednisone 5mg daily following taper    Neuro:  Acute post-op pain: PRN dilaudid, robaxin, oxycodone    Hematology:   Acute blood loss anemia: Hgb 8.6 (down from 9.3), no transfusions required post-op   - Continue to monitor  Thrombocytopenia: Plt 50 (down from 63)   - Continue to monitor    Cardiorespiratory:   Hypoxemia: requiring 2L NC   - Wean oxygen as tolerated to keep O2 sat > 92%    GI/Nutrition: Clear/Full liquid diet  Bowel regimen: scheduled Miralax and senokot.    Endocrine:   Steroid induced hyperglycemia: Hgb A1c 4.6 05/2024. Glucoses ~ 130   - Continue sliding scale insulin    Fluid/Electrolytes:   RUDY; Oliguric: creatinine 1.89 (up from 0.87 pre-op).   - Fluid challenge and diuresis per SICU   - Nephrology consult placed   - Monitor BMP   - Renal sparing protocol    : Young in place    Infectious disease: No issues    Prophylaxis: DVT(mechanical), fall, GI (pantoprazole), fungal (fluconazole), viral  (valganciclovir), pneumocystis (Bactrim, on hold for RUDY)    Disposition: Transferred to     ADELFO/Fellow/Resident Provider: Fabiola Aleman NP pager #[phone]      Faculty: Blas Carney M.D.    __________________________________________________________________  Transplant History:    11/13/2024 (Liver), Postoperative day: 1     Interval History:   Overnight events: No acute events overnight.  Patient expressing that she's having 8/10 pain at her incision site. She's using PRNs and states they are somewhat effective. She endorses no other complaints.    ROS:   A 10-point review of systems was negative except as noted above.    Curent Meds:  Current Facility-Administered Medications   Medication Dose Route Frequency Provider Last Rate Last Admin    aspirin (ASA) tablet 325 mg  325 mg Oral Daily Fabrizio Colmenares MD   325 mg at 11/14/24 0750    escitalopram (LEXAPRO) tablet 20 mg  20 mg Oral Daily Juany Saeed MD   20 mg at 11/14/24 0750    [START ON 11/15/2024] fluconazole (DIFLUCAN) intermittent infusion 200 mg  200 mg Intravenous Q24H Blas Carney MD        insulin aspart (NovoLOG) injection (RAPID ACTING)  1-7 Units Subcutaneous TID AC Elizabeth Landin MD   2 Units at 11/13/24 1732    insulin aspart (NovoLOG) injection (RAPID ACTING)  1-5 Units Subcutaneous At Bedtime Elizabeth Landin MD        [START ON 11/15/2024] levofloxacin (LEVAQUIN) infusion 250 mg  250 mg Intravenous Q24H Blas Carney MD        [START ON 11/15/2024] methylPREDNISolone Na Suc (solu-MEDROL) injection 100 mg  100 mg Intravenous Once Fabrizio Colmenares MD        Followed by    [START ON 11/16/2024] methylPREDNISolone Na Suc (solu-MEDROL) injection 50 mg  50 mg Intravenous Once Fabrizio Colmenares MD        Followed by    [START ON 11/17/2024] predniSONE (DELTASONE) tablet 25 mg  25 mg Oral Once Fabrizio Colmenares MD        Followed by    [START ON 11/18/2024] predniSONE (DELTASONE) tablet 10 mg  10 mg Oral Once Fabrizio Colmenares MD  "       Followed by    [START ON 11/19/2024] predniSONE (DELTASONE) tablet 5 mg  5 mg Oral Once Fabrizio Colmenares MD        multivitamin w/minerals (THERA-VIT-M) tablet 1 tablet  1 tablet Oral Daily Fabrizio Colmenares MD   1 tablet at 11/14/24 1157    mycophenolate (GENERIC EQUIVALENT) capsule 750 mg  750 mg Oral BID Fabrizio Colmenares MD   750 mg at 11/14/24 0750    pantoprazole (PROTONIX) EC tablet 40 mg  40 mg Oral QAM AC Fabrizio Colmenares MD   40 mg at 11/14/24 0751    polyethylene glycol (MIRALAX) Packet 17 g  17 g Oral Daily Elizabeth Landin MD   17 g at 11/14/24 0926    sennosides (SENOKOT) tablet 8.6 mg  8.6 mg Oral BID Elizabeth Landin MD   8.6 mg at 11/14/24 0926    sodium chloride (PF) 0.9% PF flush 3 mL  3 mL Intravenous Q8H Fabrizio Colmenares MD   3 mL at 11/14/24 0202    [Held by provider] sulfamethoxazole-trimethoprim (BACTRIM) 400-80 MG per tablet 1 tablet  1 tablet Oral or Feeding Tube Daily Fabrizio Colmenares MD   1 tablet at 11/13/24 1952    tacrolimus (GENERIC EQUIVALENT) capsule 2 mg  2 mg Oral BID IS Fabrizio Colmenares MD   2 mg at 11/14/24 0750    valGANciclovir (VALCYTE) tablet 450 mg  450 mg Oral Daily Fabrizio Colmenares MD   450 mg at 11/13/24 1953    vancomycin place velazquez - receiving intermittent dosing  1 each Intravenous See Admin Instructions Blas Carney MD           Physical Exam:     Admit Weight: 77.6 kg (171 lb)    Current Vitals:   /57   Pulse 76   Temp 98  F (36.7  C) (Oral)   Resp 16   Ht 1.63 m (5' 4.17\")   Wt 81.7 kg (180 lb 1.9 oz)   SpO2 100%   BMI 30.75 kg/m      Vital sign ranges:    Temp:  [97.5  F (36.4  C)-98.3  F (36.8  C)] 98  F (36.7  C)  Pulse:  [68-85] 76  Resp:  [8-21] 16  MAP:  [75 mmHg-96 mmHg] 91 mmHg  Arterial Line BP: (119-148)/(49-69) 140/61  SpO2:  [94 %-100 %] 100 %    General Appearance: in no apparent distress.   Skin: normal, warm, No rashes, induration, or jaundice  Heart: Perfused, NSR  Lungs: Breathing comfortably on 2L NC  Abdomen: Soft, " incision/dressing dry and intact, some shadowing noted on dressing. PRISCILA drain x1  : calzada present  Extremities: edema: none There is no skin breakdown.  Neurologic: A&O x4. Tremor absent..     Frailty Scores          10/14/2024 5/21/2024   Frailty Scores   Final Score Not Frail Frail   Final Score Number 1 3      Details                   Data:   CMP  Recent Labs   Lab 11/14/24  1314 11/14/24  1010 11/14/24  0407 11/14/24  0404 11/13/24  0931 11/13/24  0802 11/13/24  0328 11/13/24  0116   NA  --  131*  --  133*   < > 142   < > 136   POTASSIUM  --  5.2  --  5.1   < > 3.5   < > 3.7   CHLORIDE  --  97*  --  98   < >  --   --  100   CO2  --  23  --  23   < >  --   --  26   * 126*   < > 183*   < > 147*   < > 106*   BUN  --  37.6*  --  31.9*   < >  --   --  12.4   CR  --  1.89*  --  1.50*   < >  --   --  0.89   GFRESTIMATED  --  31*  --  41*   < >  --   --  78   TIMOTHY  --  8.2*  --  8.2*   < >  --   --  8.9   ICAW  --   --   --  4.5  --  5.6*   < >  --    MAG  --  2.7*  --  1.5*  --   --   --  1.7   PHOS  --   --   --  6.2*  --   --   --  3.7   AMYLASE  --   --   --  51  --   --   --  92   LIPASE  --   --   --  26  --   --   --   --    ALBUMIN  --  2.7*  --  2.6*   < >  --   --  3.0*   BILITOTAL  --  3.6*  --  3.9*   < >  --   --  6.3*   ALKPHOS  --  73  --  70   < >  --   --  147   AST  --  1,037*  --  670*   < >  --   --  53*   ALT  --  924*  --  573*   < >  --   --  15    < > = values in this interval not displayed.     CBC  Recent Labs   Lab 11/14/24  0404 11/14/24  0005 11/13/24 2003 11/13/24  1515   HGB 8.6* 9.3*   < > 9.7*   WBC 9.9  --   --  10.3   PLT 50*  --   --  63*    < > = values in this interval not displayed.

## 2024-11-14 NOTE — PROCEDURES
[facility]    Central line    Date/Time: 11/14/2024 1:33 PM    Performed by: Juany Saeed MD  Authorized by: Alexis Singh MD  Indications: vascular access      UNIVERSAL PROTOCOL   Site Marked: NA  Prior Images Obtained and Reviewed:  Yes  Required items: Required blood products, implants, devices and special equipment available    Patient identity confirmed:  Verbally with patient and arm band  NA - No sedation, light sedation, or local anesthesia  Confirmation Checklist:  Patient's identity using two indicators and correct equipment/implants were available  Universal Protocol: the Joint Commission Universal Protocol was followed    Preparation: Patient was prepped and draped in usual sterile fashion    ESBL (mL):  5     ANESTHESIA    Anesthesia:  Local infiltration  Local Anesthetic:  Lidocaine 1% without epinephrine  Anesthetic Total (mL):  5      SEDATION    Patient Sedated: No      Preparation: skin prepped with ChloraPrep  Sterile barriers: all five maximum sterile barriers used - cap, mask, sterile gown, sterile gloves, and large sterile sheet  Patient position: Trendelenburg  Catheter type: triple lumen  Catheter size: 7 Fr  Number of attempts: 1  Successful placement: yes  Post-procedure: line sutured and dressing applied  Assessment: blood return through all ports and placement verified by x-ray      PROCEDURE    Patient Tolerance:  Patient tolerated the procedure well with no immediate complications  Length of time physician/provider present for 1:1 monitoring during sedation: 0      Juany Saeed MD  Neurosurgery PGY-1  [facility]

## 2024-11-14 NOTE — PLAN OF CARE
D/I: Patient on unit 4A Surgical/Neuro ICU   Neuro- A&Ox4. Neuro intact.  CV- NSR, normotensive, afebrile, good pulses in all ext  Pulm- 2l NC  GI- Clear liq diet. Intermittent nausea  - Calzada. SICU resident paged twice of low urine output (10-20mL/hr). Resident came to bedside, no new orders. Cr. Elevated slightly this morning.  Gtts- none  Skin- Abd incision. Dressing marked.  Pain- Incisional. Managed with PRN's  Lines and drains- R internal jugular, PIV x2, R rad román, R abd PRISCILA drain, calzada.  See flow sheets for further interventions and assessments.   A: Stable   P: Continue to monitor pt closely. Notify MD of significant changesGoal Outcome Evaluation:

## 2024-11-14 NOTE — PHARMACY-TRANSPLANT NOTE
Adult Liver Transplant Post Operative Note    54 year old female s/p  donor liver transplant on 24 for alcoholic liver disease.      Planned immunosuppression regimen to include:   INDUCTION with: methylprednisolone/prednisone taper through POD #6.  MAINTENANCE to include mycophenolate and tacrolimus with initial goal trough levels of 8-12 (closer to 10) mcg/L for 0-3 months post-transplant.  Patient may continue prednisone at 5 mg PO daily until tacrolimus level is therapeutic.     Surgical prophylaxis includes: vancomycin plus levofloxacin IV for 48 hours and fluconazole IV for 24 hours.      Opportunistic pathogen prophylaxis includes: trimethoprim/sulfamethoxazole, valganciclovir, and nystatin (topical antifungal coverage to begin once systemic antifungal therapy is complete).    Patient is not enrolled in medication study.    Pharmacy will monitor for medication interactions and immunosuppression levels in conjunction with the team. Medication therapy needs for discharge planning will continue to be addressed throughout the current admission via multidisciplinary rounds and order review.  Pharmacy will make recommendations as appropriate.    Avis Jackson, PharmD, BCCCP     Tazorac Counseling:  Patient advised that medication is irritating and drying.  Patient may need to apply sparingly and wash off after an hour before eventually leaving it on overnight.  The patient verbalized understanding of the proper use and possible adverse effects of tazorac.  All of the patient's questions and concerns were addressed.

## 2024-11-14 NOTE — PROGRESS NOTES
[facility]    ICU Progress Note       Date of Admission:  11/13/2024    Assessment: Critical Care   Hannah Sutherland is a 54 year old female with PMHx dyslipidemia, HTN, depression, and decompensated alcoholic cirrhosis c/b ascites and mild encephalopathy, MELD 22, who is now status post DDLT. Pt arrived to SICU extubated and not requiring pressors. Pt admitted to the SICU for close monitoring post operatively.        CHANGES and INTERVAL UPDATES:  - Liver US today per transplant  - Recheck Bmp, hepatic panel, coags, lactate @ 10am  - 500ml LR fluid bolus challenge   - Free water fluid restriction < 2L due to hyponatremia   - bowel regimen ordered     PLAN:    Neuro/ pain/ sedation:  #Acute pain  #Hx mild hepatic encephalopathy  - Monitor neurological status. Notify provider for any acute changes in exam  - PRN oxycodone, dilaudid     #Depression  - PTA escitalopram     Pulmonary:  #Acute respiratory insufficiency, resolved  On 2 L NC intermittently.  - supplemental oxygen PRN to keep saturation about 92%     Cardiovascular:  #Hx HTN  #Hx HLD  - Monitor hemodynamic status.    GI/Nutrition:  - Diet: CLD advance as tolerated  - Nutrition consulted. Appreciate recommendations.   #S/p DDLT  #Hypoalbuminemia  - 11/14 Liver US ordered  - 11/14 Recheck Bmp, hepatic panel, coags, lactate @ 10am  -  mg qdaily  - Transplant recs: MMF, tacrolimus, steroid taper  - PTA pantoprazole  - Daily hepatic panel  - Bowel reg  - Zofran PRN    Renal/ Fluid Balance:   #Hyponatremia  #RUDY  - 11/14 Cr 1.89 (1.50), Na 131 (133) --> 500 ml fluid challenge, follow response  - Fluid restriction 2L  - q12 BMP, daily Mg, Phos, iCal  - ICU electrolyte replacement protocol      Endocrine:  #Stress and steroid induced hyperglycemia   - BG < 180 for optimal wound healing  - No current insulin need, continue to monitor     ID:  #DDLT  - Fluconazole x 7 days  - Levofloxacin x 48 hrs  - Vancomycin  x 48 hrs  - Immunosuppression: Methylpred taper, Tacrolimus, MMF              -Tacro level daily  - Opportunistic pathogen prophylaxis: Valcyte, Bactrim (holding)     Hematology:  #Acute Blood Loss Anemia  - Intraoperatively:   with . Received 3L crystalloid, 7U RBC, 7U FFP, 2U PLT, 2 Fibryga.  - Daily CBC, q12 INR  - Hgb goal > 7, transfuse PRN to maintain     MSK:   - PT and OT consulted. Appreciate recommendations.    Lines/ tubes/ drains:  R IJ CVC  R radial arterial line  PIV x2   PRISCILA x1  Young      Prophylaxis:  - DVT Prophylaxis: Pneumatic Compression Devices  - PUD Prophylaxis: PTA PPI      Code Status: Full Code       Disposition:  - Floor        MD Juany Don MD  Neurosurgery PGY-1  [employer] [facility]  Securely message with [email or web address] (more info)  Text page via [employer] Paging/Directory     Clinically Significant Risk Factors Present on Admission        # Hypokalemia: Lowest K = 3.1 mmol/L in last 2 days, will replace as needed  # Hyponatremia: Lowest Na = 133 mmol/L in last 2 days, will monitor as appropriate   # Hypocalcemia: Lowest iCa = 4.2 mg/dL in last 2 days, will monitor and replace as appropriate  # Hypercalcemia: Highest iCa = 7 mg/dL in last 2 days, will monitor as appropriate  # Hypomagnesemia: Lowest Mg = 1.5 mg/dL in last 2 days, will replace as needed   # Hypoalbuminemia: Lowest albumin = 2.6 g/dL at 11/14/2024  4:04 AM, will monitor as appropriate  # Coagulation Defect: INR = 1.55 (Ref range: 0.85 - 1.15) and/or PTT = 39 Seconds (Ref range: 22 - 38 Seconds), will monitor for bleeding  # Thrombocytopenia: Lowest platelets = 50 in last 2 days, will monitor for bleeding  # Acute Kidney Injury, unspecified: based on a >150% or 0.3 mg/dL increase in last creatinine compared to past 90 day average, will monitor renal function  # Hypertension: Noted on problem list      # Anemia: based on hgb <11       # Obesity:  "Estimated body mass index is 30.75 kg/m  as calculated from the following:    Height as of this encounter: 1.63 m (5' 4.17\").    Weight as of this encounter: 81.7 kg (180 lb 1.9 oz).       # Asthma: noted on problem list     # Anemia: based on hgb <11         ______________________________________________________________________      Physical Exam   Vital Signs: Temp: 98.3  F (36.8  C) Temp src: Oral   Pulse: 75   Resp: 15 SpO2: 95 % O2 Device: Nasal cannula Oxygen Delivery: 2 LPM  Weight: 180 lbs 1.85 oz    General: Alert, in NAD  Neuro: Following commands, appropriately resposive to questions, VALDEZ  CV: RR  Pulm: Breathing comfortably on RA  Abd: Soft, appropriately tender to palpation. Clamshell incision covered with dressing with minimal strikethrough. PRISCILA on right abdomen with sanguineous output  : Young in place  Extremities: Moderate peripheral edema  Skin: Warm and dry      Data   I reviewed all medications, new labs and imaging results over the last 24 hours.  Arterial Blood Gases   Recent Labs   Lab 11/13/24  0931 11/13/24  0802 11/13/24  0700 11/13/24  0558   PH 7.37 7.46* 7.47* 7.48*   PCO2 52* 40 39 33*   PO2 191* 116* 130* 150*   HCO3 30* 28 28 24       Complete Blood Count   Recent Labs   Lab 11/14/24  0404 11/14/24  0005 11/13/24  2003 11/13/24  1515 11/13/24  0931 11/13/24  0700 11/13/24  0629 11/13/24  0328 11/13/24  0116   WBC 9.9  --   --  10.3 5.8  --   --   --  3.4*   HGB 8.6* 9.3* 9.2* 9.7* 10.3*   < >  --    < > 7.2*   PLT 50*  --   --  63* 60*  --  80*  --  62*    < > = values in this interval not displayed.       Basic Metabolic Panel  Recent Labs   Lab 11/14/24  0407 11/14/24  0404 11/13/24  2205 11/13/24  1716 11/13/24  1553 11/13/24  1515 11/13/24  0936 11/13/24  0931 11/13/24  0802 11/13/24  0328 11/13/24  0116   NA  --  133*  --   --   --  141  --  142 142   < > 136   POTASSIUM  --  5.1  --   --   --  4.2  --  3.6 3.5   < > 3.7   CHLORIDE  --  98  --   --   --  103  --  104  --   --  " 100   CO2  --  23  --   --   --  25  --  27  --   --  26   BUN  --  31.9*  --   --   --  19.8  --  16.6  --   --  12.4   CR  --  1.50*  --   --   --  0.87  --  0.73  --   --  0.89   * 183* 165* 209*   < > 208*   < > 151* 147*   < > 106*    < > = values in this interval not displayed.       Liver Function Tests  Recent Labs   Lab 11/14/24  0404 11/13/24  1515 11/13/24  0931 11/13/24  0629 11/13/24  0116   *  --  612*  --  53*   *  --  404*  --  15   ALKPHOS 70  --  100  --  147   BILITOTAL 3.9*  --  5.4*  --  6.3*   ALBUMIN 2.6*  --  3.0*  --  3.0*   INR 1.55* 1.49* 1.66* 1.79* 1.89*       Pancreatic Enzymes  Recent Labs   Lab 11/14/24  0404 11/13/24  0116   LIPASE 26  --    AMYLASE 51 92       Coagulation Profile  Recent Labs   Lab 11/14/24  0404 11/13/24  1515 11/13/24  0931 11/13/24 0629 11/13/24  0116   INR 1.55* 1.49* 1.66* 1.79* 1.89*   PTT  --   --  39* 47* 43*       IMAGING:  Recent Results (from the past 24 hours)   XR Chest Port 1 View    Narrative    Portable chest 11/13/2024 at 0947 hours    INDICATION: Central venous line    COMPARISON: 0216 hours earlier today    FINDINGS: Right IJ sheath tip in the upper SVC. No pneumothorax. Left  IJ sheath tip in the left innominate vein. No pneumothorax. Heart size  normal. No suspicious opacities. Bony structures appear unremarkable.      Impression    IMPRESSION: Apparent bilateral IJ catheters without pneumothorax.    JEYSON VILLARREAL MD         SYSTEM ID:  [id number]   US Liver Transplant    Narrative    EXAMINATION:  LIVER TRANSPLANT, 11/13/2024 10:47 AM     COMPARISON: None.    HISTORY: Liver transplant; bicaval anastomosis, single artery    TECHNIQUE:  Gray-scale, color Doppler and spectral flow analysis.    FINDINGS:   There is no ascites. Right-sided pleural effusion, trace to small.    Liver:   The liver demonstrates normal homogeneous echotexture. No  evidence of a focal hepatic mass.     Bile Ducts: No intrahepatic biliary ductal  dilatation visualized  common bile duct measures 3 mm in diameter.    Gallbladder: The gallbladder is surgically absent.    Kidneys:   Right kidney: Not well-visualized Left kidney:  The left kidney shows  no mass or hydronephrosis.   9.8 cm in long axis dimension.    Pancreas: Visualized portions of the pancreas are normal in  appearance.  Pancreas is partially obscured    Spleen:  The spleen measures 15.8 cm.    LIVER DOPPLER:    Splenic vein:  Patent continuous normal antegrade direction flow  towards the liver, 63 cm/sec.  Extrahepatic portal vein:  Patent continuous antegrade flow, 67  cm/sec.  Portal vein at anastomosis: Patent continuous antegrade flow, 93  cm/sec.  Intrahepatic portal vein:  Patent continuous antegrade flow, 96  cm/sec.  Right portal vein flow is antegrade, measuring 108 cm/sec.  Left portal vein flow is antegrade, measuring 62 cm/sec.    Inferior vena cava: patent with flow toward the heart throughout..  IVC above anastomosis: Not visualized  IVC at anastomosis:  69 cm/sec.  Intrahepatic IVC:  89 cm/sec.  IVC. Anastomosis: 110 cm/s  Extrahepatic IVC:  83 cm/sec.    Right, mid, left hepatic veins: Patent with flow towards the inferior  vena cava.    Extrahepatic hepatic artery: Low resistance waveform with flow towards  the liver. 46 cm/sec with resistive index 0.8.  Right hepatic artery: 40 cm/sec with resistive index 0.64.  Left hepatic artery: 27 cm/sec with resistive index 0.6.        Impression    Impression:     1.  IVC above the anastomosis not demonstrated, otherwise patent  hepatic vasculature by Doppler evaluation.  2.  Right pleural effusion.  3.  Mild splenomegaly.    GUNNER JO MD         SYSTEM ID:  [id number]

## 2024-11-14 NOTE — PLAN OF CARE
Major Shift Events: AOx4. Generalized weakness. Oxycodone and dilaudid prn for pain. Refused PT this am. SR 70-80s. MAPs >65 without intervention. Afebrile. 2L NC. LS clear. Diet advance as tolerated, 2L free water restriction - encourage fluids other than water. Na 131; trending down. Young in place. 120 mg lasix given x1. Clamshell incision WNL, R PRISCILA x1. Renal + liver ultrasound done. R internal jugular re-lined, x-ray done, needs okay to use order. R radial art line removed.   Plan: Transfer to , report given to Annika.   For vital signs and complete assessments, please see documentation flowsheets.

## 2024-11-14 NOTE — PHARMACY-ADMISSION MEDICATION HISTORY
Pharmacist Admission Medication History    Admission medication history is complete. The information provided in this note is only as accurate as the sources available at the time of the update.    Information Source(s): Patient and CareEverywhere/SureScripts via in-person    Pertinent Information: pt is a reliable historian    Changes made to PTA medication list:  Added:   Ca-Vit D-Vit K (Viactiv) supplement  Vitamin C chews  Acetaminophen 500-1000 mg at bedtime   Deleted:   Budesonide Pulmicort flexhaler - pt reports not taking for >7 months  Changed: None    Allergies reviewed with patient and updates made in EHR: yes    Medication History Completed By: Shagufta Vega, PharmD PGY1 resident 11/14/2024 10:57 AM    PTA Med List   Medication Sig Last Dose/Taking    acetaminophen (TYLENOL) 500 MG tablet Take 500-1,000 mg by mouth nightly as needed for mild pain. Past Week    albuterol (VENTOLIN HFA) 108 (90 Base) MCG/ACT inhaler Inhale 2 puffs into the lungs every 4 hours as needed for shortness of breath More than a month    ascorbic acid (VITAMIN C) 250 MG CHEW chewable tablet Take 250 mg by mouth daily. 11/12/2024    calcium-vitamin D-vitamin K (VIACTIV) 500-500-40 MG-UNT-MCG CHEW Take 1 tablet by mouth 2 times daily. 11/12/2024 Evening    escitalopram (LEXAPRO) 20 MG tablet Take 1 tablet by mouth every morning 11/12/2024 Morning    folic acid (FOLVITE) 1 MG tablet Take 1 tablet by mouth every morning 11/12/2024 Morning    furosemide (LASIX) 40 MG tablet Take 1 tablet (40 mg) by mouth daily for 360 days 11/12/2024 Morning    magnesium oxide (MAG-OX) 400 MG tablet Take 400 mg by mouth 2 times daily 11/12/2024 Evening    midodrine (PROAMATINE) 5 MG tablet Take 1 tablet (5 mg) by mouth 3 times daily. 11/12/2024    Multiple Vitamin (QUINTABS) TABS Take 1 tablet by mouth every morning Past Week    pantoprazole (PROTONIX) 40 MG EC tablet Take 1 tablet by mouth every morning 11/12/2024 Morning    spironolactone (ALDACTONE)  50 MG tablet Take 1 tablet (50 mg) by mouth daily for 360 days 11/12/2024 Morning    thiamine (B-1) 100 MG tablet Take 100 mg by mouth daily 11/12/2024 Morning

## 2024-11-15 ENCOUNTER — APPOINTMENT (OUTPATIENT)
Dept: ULTRASOUND IMAGING | Facility: CLINIC | Age: 54
End: 2024-11-15
Attending: NURSE PRACTITIONER
Payer: COMMERCIAL

## 2024-11-15 ENCOUNTER — APPOINTMENT (OUTPATIENT)
Dept: INTERVENTIONAL RADIOLOGY/VASCULAR | Facility: CLINIC | Age: 54
End: 2024-11-15
Attending: NURSE PRACTITIONER
Payer: COMMERCIAL

## 2024-11-15 LAB
ALBUMIN SERPL BCG-MCNC: 3 G/DL (ref 3.5–5.2)
ALP SERPL-CCNC: 82 U/L (ref 40–150)
ALT SERPL W P-5'-P-CCNC: 1275 U/L (ref 0–50)
ANION GAP SERPL CALCULATED.3IONS-SCNC: 13 MMOL/L (ref 7–15)
ANION GAP SERPL CALCULATED.3IONS-SCNC: 14 MMOL/L (ref 7–15)
AST SERPL W P-5'-P-CCNC: 912 U/L (ref 0–45)
BASOPHILS # BLD AUTO: 0 10E3/UL (ref 0–0.2)
BASOPHILS # BLD AUTO: 0 10E3/UL (ref 0–0.2)
BASOPHILS NFR BLD AUTO: 0 %
BASOPHILS NFR BLD AUTO: 0 %
BILIRUB DIRECT SERPL-MCNC: 2.35 MG/DL (ref 0–0.3)
BILIRUB SERPL-MCNC: 3.1 MG/DL
BUN SERPL-MCNC: 57.7 MG/DL (ref 6–20)
BUN SERPL-MCNC: 69.2 MG/DL (ref 6–20)
CA-I BLD-MCNC: 4.4 MG/DL (ref 4.4–5.2)
CALCIUM SERPL-MCNC: 8.2 MG/DL (ref 8.8–10.4)
CALCIUM SERPL-MCNC: 8.3 MG/DL (ref 8.8–10.4)
CHLORIDE SERPL-SCNC: 89 MMOL/L (ref 98–107)
CHLORIDE SERPL-SCNC: 93 MMOL/L (ref 98–107)
CREAT SERPL-MCNC: 2.94 MG/DL (ref 0.51–0.95)
CREAT SERPL-MCNC: 3.66 MG/DL (ref 0.51–0.95)
EGFRCR SERPLBLD CKD-EPI 2021: 14 ML/MIN/1.73M2
EGFRCR SERPLBLD CKD-EPI 2021: 18 ML/MIN/1.73M2
EOSINOPHIL # BLD AUTO: 0 10E3/UL (ref 0–0.7)
EOSINOPHIL # BLD AUTO: 0 10E3/UL (ref 0–0.7)
EOSINOPHIL NFR BLD AUTO: 0 %
EOSINOPHIL NFR BLD AUTO: 0 %
ERYTHROCYTE [DISTWIDTH] IN BLOOD BY AUTOMATED COUNT: 17.3 % (ref 10–15)
ERYTHROCYTE [DISTWIDTH] IN BLOOD BY AUTOMATED COUNT: 17.4 % (ref 10–15)
GLUCOSE BLDC GLUCOMTR-MCNC: 125 MG/DL (ref 70–99)
GLUCOSE BLDC GLUCOMTR-MCNC: 128 MG/DL (ref 70–99)
GLUCOSE BLDC GLUCOMTR-MCNC: 131 MG/DL (ref 70–99)
GLUCOSE BLDC GLUCOMTR-MCNC: 147 MG/DL (ref 70–99)
GLUCOSE BLDC GLUCOMTR-MCNC: 156 MG/DL (ref 70–99)
GLUCOSE BLDC GLUCOMTR-MCNC: 162 MG/DL (ref 70–99)
GLUCOSE BLDC GLUCOMTR-MCNC: 179 MG/DL (ref 70–99)
GLUCOSE BLDC GLUCOMTR-MCNC: 205 MG/DL (ref 70–99)
GLUCOSE BLDC GLUCOMTR-MCNC: 219 MG/DL (ref 70–99)
GLUCOSE SERPL-MCNC: 149 MG/DL (ref 70–99)
GLUCOSE SERPL-MCNC: 208 MG/DL (ref 70–99)
HCO3 SERPL-SCNC: 22 MMOL/L (ref 22–29)
HCO3 SERPL-SCNC: 23 MMOL/L (ref 22–29)
HCT VFR BLD AUTO: 25.1 % (ref 35–47)
HCT VFR BLD AUTO: 28.4 % (ref 35–47)
HGB BLD-MCNC: 8.3 G/DL (ref 11.7–15.7)
HGB BLD-MCNC: 9 G/DL (ref 11.7–15.7)
HOLD SPECIMEN: NORMAL
IMM GRANULOCYTES # BLD: 0.1 10E3/UL
IMM GRANULOCYTES # BLD: 0.1 10E3/UL
IMM GRANULOCYTES NFR BLD: 1 %
IMM GRANULOCYTES NFR BLD: 1 %
LACTATE SERPL-SCNC: 0.5 MMOL/L (ref 0.7–2)
LACTATE SERPL-SCNC: 1.4 MMOL/L (ref 0.7–2)
LYMPHOCYTES # BLD AUTO: 0.2 10E3/UL (ref 0.8–5.3)
LYMPHOCYTES # BLD AUTO: 0.2 10E3/UL (ref 0.8–5.3)
LYMPHOCYTES NFR BLD AUTO: 2 %
LYMPHOCYTES NFR BLD AUTO: 2 %
MAGNESIUM SERPL-MCNC: 2.9 MG/DL (ref 1.7–2.3)
MCH RBC QN AUTO: 28.5 PG (ref 26.5–33)
MCH RBC QN AUTO: 28.8 PG (ref 26.5–33)
MCHC RBC AUTO-ENTMCNC: 31.7 G/DL (ref 31.5–36.5)
MCHC RBC AUTO-ENTMCNC: 33.1 G/DL (ref 31.5–36.5)
MCV RBC AUTO: 87 FL (ref 78–100)
MCV RBC AUTO: 90 FL (ref 78–100)
MONOCYTES # BLD AUTO: 0.2 10E3/UL (ref 0–1.3)
MONOCYTES # BLD AUTO: 0.4 10E3/UL (ref 0–1.3)
MONOCYTES NFR BLD AUTO: 3 %
MONOCYTES NFR BLD AUTO: 4 %
NEUTROPHILS # BLD AUTO: 11.2 10E3/UL (ref 1.6–8.3)
NEUTROPHILS # BLD AUTO: 7.7 10E3/UL (ref 1.6–8.3)
NEUTROPHILS NFR BLD AUTO: 94 %
NEUTROPHILS NFR BLD AUTO: 94 %
NRBC # BLD AUTO: 0 10E3/UL
NRBC # BLD AUTO: 0 10E3/UL
NRBC BLD AUTO-RTO: 0 /100
NRBC BLD AUTO-RTO: 0 /100
PHOSPHATE SERPL-MCNC: 9.2 MG/DL (ref 2.5–4.5)
PLATELET # BLD AUTO: 45 10E3/UL (ref 150–450)
PLATELET # BLD AUTO: 55 10E3/UL (ref 150–450)
POTASSIUM SERPL-SCNC: 5.5 MMOL/L (ref 3.4–5.3)
POTASSIUM SERPL-SCNC: 5.5 MMOL/L (ref 3.4–5.3)
POTASSIUM SERPL-SCNC: 5.6 MMOL/L (ref 3.4–5.3)
POTASSIUM SERPL-SCNC: 5.7 MMOL/L (ref 3.4–5.3)
POTASSIUM SERPL-SCNC: 6.1 MMOL/L (ref 3.4–5.3)
PROT SERPL-MCNC: 5.8 G/DL (ref 6.4–8.3)
RBC # BLD AUTO: 2.88 10E6/UL (ref 3.8–5.2)
RBC # BLD AUTO: 3.16 10E6/UL (ref 3.8–5.2)
SODIUM SERPL-SCNC: 125 MMOL/L (ref 135–145)
SODIUM SERPL-SCNC: 129 MMOL/L (ref 135–145)
TACROLIMUS BLD-MCNC: 10.7 UG/L (ref 5–15)
TME LAST DOSE: NORMAL H
TME LAST DOSE: NORMAL H
WBC # BLD AUTO: 11.9 10E3/UL (ref 4–11)
WBC # BLD AUTO: 8.1 10E3/UL (ref 4–11)

## 2024-11-15 PROCEDURE — 93975 VASCULAR STUDY: CPT | Mod: 26 | Performed by: RADIOLOGY

## 2024-11-15 PROCEDURE — 84132 ASSAY OF SERUM POTASSIUM: CPT | Performed by: TRANSPLANT SURGERY

## 2024-11-15 PROCEDURE — 77001 FLUOROGUIDE FOR VEIN DEVICE: CPT | Mod: 26 | Performed by: STUDENT IN AN ORGANIZED HEALTH CARE EDUCATION/TRAINING PROGRAM

## 2024-11-15 PROCEDURE — 36592 COLLECT BLOOD FROM PICC: CPT | Performed by: STUDENT IN AN ORGANIZED HEALTH CARE EDUCATION/TRAINING PROGRAM

## 2024-11-15 PROCEDURE — 36592 COLLECT BLOOD FROM PICC: CPT | Performed by: TRANSPLANT SURGERY

## 2024-11-15 PROCEDURE — 82248 BILIRUBIN DIRECT: CPT | Performed by: STUDENT IN AN ORGANIZED HEALTH CARE EDUCATION/TRAINING PROGRAM

## 2024-11-15 PROCEDURE — 84132 ASSAY OF SERUM POTASSIUM: CPT | Performed by: NURSE PRACTITIONER

## 2024-11-15 PROCEDURE — 250N000011 HC RX IP 250 OP 636: Performed by: NURSE PRACTITIONER

## 2024-11-15 PROCEDURE — 76937 US GUIDE VASCULAR ACCESS: CPT | Mod: 26 | Performed by: STUDENT IN AN ORGANIZED HEALTH CARE EDUCATION/TRAINING PROGRAM

## 2024-11-15 PROCEDURE — 120N000011 HC R&B TRANSPLANT UMMC

## 2024-11-15 PROCEDURE — C1752 CATH,HEMODIALYSIS,SHORT-TERM: HCPCS

## 2024-11-15 PROCEDURE — 250N000011 HC RX IP 250 OP 636

## 2024-11-15 PROCEDURE — 250N000013 HC RX MED GY IP 250 OP 250 PS 637: Performed by: STUDENT IN AN ORGANIZED HEALTH CARE EDUCATION/TRAINING PROGRAM

## 2024-11-15 PROCEDURE — 36556 INSERT NON-TUNNEL CV CATH: CPT | Mod: LT | Performed by: STUDENT IN AN ORGANIZED HEALTH CARE EDUCATION/TRAINING PROGRAM

## 2024-11-15 PROCEDURE — 82947 ASSAY GLUCOSE BLOOD QUANT: CPT | Performed by: PHYSICIAN ASSISTANT

## 2024-11-15 PROCEDURE — 82565 ASSAY OF CREATININE: CPT | Performed by: PHYSICIAN ASSISTANT

## 2024-11-15 PROCEDURE — 83735 ASSAY OF MAGNESIUM: CPT | Performed by: STUDENT IN AN ORGANIZED HEALTH CARE EDUCATION/TRAINING PROGRAM

## 2024-11-15 PROCEDURE — 250N000011 HC RX IP 250 OP 636: Performed by: TRANSPLANT SURGERY

## 2024-11-15 PROCEDURE — 272N000192 HC ACCESSORY CR2

## 2024-11-15 PROCEDURE — 250N000013 HC RX MED GY IP 250 OP 250 PS 637: Performed by: NURSE PRACTITIONER

## 2024-11-15 PROCEDURE — 272N000504 HC NEEDLE CR4

## 2024-11-15 PROCEDURE — 80069 RENAL FUNCTION PANEL: CPT | Performed by: STUDENT IN AN ORGANIZED HEALTH CARE EDUCATION/TRAINING PROGRAM

## 2024-11-15 PROCEDURE — 250N000012 HC RX MED GY IP 250 OP 636 PS 637: Performed by: NURSE PRACTITIONER

## 2024-11-15 PROCEDURE — 02HV33Z INSERTION OF INFUSION DEVICE INTO SUPERIOR VENA CAVA, PERCUTANEOUS APPROACH: ICD-10-PCS | Performed by: STUDENT IN AN ORGANIZED HEALTH CARE EDUCATION/TRAINING PROGRAM

## 2024-11-15 PROCEDURE — 250N000013 HC RX MED GY IP 250 OP 250 PS 637

## 2024-11-15 PROCEDURE — 250N000013 HC RX MED GY IP 250 OP 250 PS 637: Performed by: PHYSICIAN ASSISTANT

## 2024-11-15 PROCEDURE — 77001 FLUOROGUIDE FOR VEIN DEVICE: CPT

## 2024-11-15 PROCEDURE — 250N000009 HC RX 250: Performed by: NURSE PRACTITIONER

## 2024-11-15 PROCEDURE — 258N000001 HC RX 258: Performed by: NURSE PRACTITIONER

## 2024-11-15 PROCEDURE — 258N000003 HC RX IP 258 OP 636: Performed by: NURSE PRACTITIONER

## 2024-11-15 PROCEDURE — 82330 ASSAY OF CALCIUM: CPT | Performed by: STUDENT IN AN ORGANIZED HEALTH CARE EDUCATION/TRAINING PROGRAM

## 2024-11-15 PROCEDURE — 83605 ASSAY OF LACTIC ACID: CPT | Performed by: TRANSPLANT SURGERY

## 2024-11-15 PROCEDURE — 258N000003 HC RX IP 258 OP 636

## 2024-11-15 PROCEDURE — 250N000011 HC RX IP 250 OP 636: Performed by: STUDENT IN AN ORGANIZED HEALTH CARE EDUCATION/TRAINING PROGRAM

## 2024-11-15 PROCEDURE — 258N000003 HC RX IP 258 OP 636: Performed by: INTERNAL MEDICINE

## 2024-11-15 PROCEDURE — 85025 COMPLETE CBC W/AUTO DIFF WBC: CPT | Performed by: PHYSICIAN ASSISTANT

## 2024-11-15 PROCEDURE — 250N000011 HC RX IP 250 OP 636: Performed by: INTERNAL MEDICINE

## 2024-11-15 PROCEDURE — 80197 ASSAY OF TACROLIMUS: CPT | Performed by: TRANSPLANT SURGERY

## 2024-11-15 PROCEDURE — 5A1D70Z PERFORMANCE OF URINARY FILTRATION, INTERMITTENT, LESS THAN 6 HOURS PER DAY: ICD-10-PCS | Performed by: INTERNAL MEDICINE

## 2024-11-15 PROCEDURE — 99223 1ST HOSP IP/OBS HIGH 75: CPT | Mod: FS | Performed by: PHYSICIAN ASSISTANT

## 2024-11-15 PROCEDURE — 80048 BASIC METABOLIC PNL TOTAL CA: CPT | Performed by: PHYSICIAN ASSISTANT

## 2024-11-15 PROCEDURE — 90935 HEMODIALYSIS ONE EVALUATION: CPT

## 2024-11-15 PROCEDURE — 85041 AUTOMATED RBC COUNT: CPT | Performed by: PHYSICIAN ASSISTANT

## 2024-11-15 PROCEDURE — 80053 COMPREHEN METABOLIC PANEL: CPT | Performed by: PHYSICIAN ASSISTANT

## 2024-11-15 PROCEDURE — C1769 GUIDE WIRE: HCPCS

## 2024-11-15 PROCEDURE — 250N000009 HC RX 250: Performed by: STUDENT IN AN ORGANIZED HEALTH CARE EDUCATION/TRAINING PROGRAM

## 2024-11-15 PROCEDURE — 258N000001 HC RX 258

## 2024-11-15 PROCEDURE — 250N000012 HC RX MED GY IP 250 OP 636 PS 637

## 2024-11-15 PROCEDURE — 250N000012 HC RX MED GY IP 250 OP 636 PS 637: Performed by: STUDENT IN AN ORGANIZED HEALTH CARE EDUCATION/TRAINING PROGRAM

## 2024-11-15 PROCEDURE — P9045 ALBUMIN (HUMAN), 5%, 250 ML: HCPCS | Performed by: INTERNAL MEDICINE

## 2024-11-15 PROCEDURE — 93975 VASCULAR STUDY: CPT

## 2024-11-15 PROCEDURE — 82374 ASSAY BLOOD CARBON DIOXIDE: CPT | Performed by: PHYSICIAN ASSISTANT

## 2024-11-15 PROCEDURE — 82247 BILIRUBIN TOTAL: CPT | Performed by: STUDENT IN AN ORGANIZED HEALTH CARE EDUCATION/TRAINING PROGRAM

## 2024-11-15 PROCEDURE — 36592 COLLECT BLOOD FROM PICC: CPT | Performed by: NURSE PRACTITIONER

## 2024-11-15 PROCEDURE — 84100 ASSAY OF PHOSPHORUS: CPT | Performed by: STUDENT IN AN ORGANIZED HEALTH CARE EDUCATION/TRAINING PROGRAM

## 2024-11-15 RX ORDER — SEVELAMER CARBONATE FOR ORAL SUSPENSION 800 MG/1
0.8 POWDER, FOR SUSPENSION ORAL
Status: DISCONTINUED | OUTPATIENT
Start: 2024-11-15 | End: 2024-11-20 | Stop reason: HOSPADM

## 2024-11-15 RX ORDER — VALGANCICLOVIR 450 MG/1
450 TABLET, FILM COATED ORAL
Status: DISCONTINUED | OUTPATIENT
Start: 2024-11-18 | End: 2024-11-20

## 2024-11-15 RX ORDER — PROCHLORPERAZINE MALEATE 5 MG/1
5 TABLET ORAL EVERY 6 HOURS PRN
Status: DISCONTINUED | OUTPATIENT
Start: 2024-11-15 | End: 2024-11-20 | Stop reason: HOSPADM

## 2024-11-15 RX ORDER — METHOCARBAMOL 750 MG/1
750 TABLET, FILM COATED ORAL 4 TIMES DAILY PRN
Status: DISCONTINUED | OUTPATIENT
Start: 2024-11-15 | End: 2024-11-20 | Stop reason: HOSPADM

## 2024-11-15 RX ORDER — BISACODYL 10 MG
10 SUPPOSITORY, RECTAL RECTAL ONCE
Status: DISCONTINUED | OUTPATIENT
Start: 2024-11-15 | End: 2024-11-18

## 2024-11-15 RX ORDER — LIDOCAINE HYDROCHLORIDE 10 MG/ML
1-30 INJECTION, SOLUTION EPIDURAL; INFILTRATION; INTRACAUDAL; PERINEURAL
Status: COMPLETED | OUTPATIENT
Start: 2024-11-15 | End: 2024-11-15

## 2024-11-15 RX ORDER — HYDROMORPHONE HYDROCHLORIDE 1 MG/ML
0.3 INJECTION, SOLUTION INTRAMUSCULAR; INTRAVENOUS; SUBCUTANEOUS ONCE
Status: COMPLETED | OUTPATIENT
Start: 2024-11-15 | End: 2024-11-15

## 2024-11-15 RX ORDER — HEPARIN SODIUM 1000 [USP'U]/ML
3 INJECTION, SOLUTION INTRAVENOUS; SUBCUTANEOUS ONCE
Status: COMPLETED | OUTPATIENT
Start: 2024-11-15 | End: 2024-11-15

## 2024-11-15 RX ORDER — FENTANYL CITRATE 50 UG/ML
50 INJECTION, SOLUTION INTRAMUSCULAR; INTRAVENOUS ONCE
Status: COMPLETED | OUTPATIENT
Start: 2024-11-15 | End: 2024-11-15

## 2024-11-15 RX ORDER — LIDOCAINE 4 G/G
1-2 PATCH TOPICAL
Status: DISCONTINUED | OUTPATIENT
Start: 2024-11-15 | End: 2024-11-20 | Stop reason: HOSPADM

## 2024-11-15 RX ORDER — PROCHLORPERAZINE 25 MG
25 SUPPOSITORY, RECTAL RECTAL EVERY 12 HOURS PRN
Status: DISCONTINUED | OUTPATIENT
Start: 2024-11-15 | End: 2024-11-20 | Stop reason: HOSPADM

## 2024-11-15 RX ORDER — ALBUMIN (HUMAN) 12.5 G/50ML
50 SOLUTION INTRAVENOUS
Status: DISCONTINUED | OUTPATIENT
Start: 2024-11-15 | End: 2024-11-15

## 2024-11-15 RX ORDER — DEXTROSE MONOHYDRATE 25 G/50ML
25 INJECTION, SOLUTION INTRAVENOUS ONCE
Status: COMPLETED | OUTPATIENT
Start: 2024-11-15 | End: 2024-11-15

## 2024-11-15 RX ORDER — SCOLOPAMINE TRANSDERMAL SYSTEM 1 MG/1
1 PATCH, EXTENDED RELEASE TRANSDERMAL
Status: DISCONTINUED | OUTPATIENT
Start: 2024-11-15 | End: 2024-11-17

## 2024-11-15 RX ADMIN — ESCITALOPRAM OXALATE 20 MG: 20 TABLET ORAL at 08:00

## 2024-11-15 RX ADMIN — METHOCARBAMOL 500 MG: 500 TABLET ORAL at 08:24

## 2024-11-15 RX ADMIN — PROCHLORPERAZINE MALEATE 5 MG: 5 TABLET ORAL at 21:37

## 2024-11-15 RX ADMIN — FENTANYL CITRATE 50 MCG: 50 INJECTION, SOLUTION INTRAMUSCULAR; INTRAVENOUS at 15:18

## 2024-11-15 RX ADMIN — MYCOPHENOLATE MOFETIL 750 MG: 250 CAPSULE ORAL at 07:59

## 2024-11-15 RX ADMIN — OXYCODONE HYDROCHLORIDE 10 MG: 10 TABLET ORAL at 16:35

## 2024-11-15 RX ADMIN — OXYCODONE HYDROCHLORIDE 5 MG: 5 TABLET ORAL at 10:02

## 2024-11-15 RX ADMIN — INSULIN ASPART 1 UNITS: 100 INJECTION, SOLUTION INTRAVENOUS; SUBCUTANEOUS at 08:44

## 2024-11-15 RX ADMIN — SODIUM CHLORIDE 200 ML: 9 INJECTION, SOLUTION INTRAVENOUS at 17:32

## 2024-11-15 RX ADMIN — TACROLIMUS 2 MG: 1 CAPSULE ORAL at 18:44

## 2024-11-15 RX ADMIN — ONDANSETRON 4 MG: 2 INJECTION INTRAMUSCULAR; INTRAVENOUS at 01:59

## 2024-11-15 RX ADMIN — NYSTATIN 500000 UNITS: 100000 SUSPENSION ORAL at 21:42

## 2024-11-15 RX ADMIN — SODIUM CHLORIDE 7.8 UNITS: 9 INJECTION, SOLUTION INTRAVENOUS at 13:29

## 2024-11-15 RX ADMIN — HEPARIN SODIUM 1400 UNITS: 1000 INJECTION INTRAVENOUS; SUBCUTANEOUS at 20:41

## 2024-11-15 RX ADMIN — ALBUMIN HUMAN 250 ML: 0.05 INJECTION, SOLUTION INTRAVENOUS at 17:31

## 2024-11-15 RX ADMIN — HEPARIN SODIUM 1500 UNITS: 1000 INJECTION INTRAVENOUS; SUBCUTANEOUS at 15:46

## 2024-11-15 RX ADMIN — SCOLOPAMINE TRANSDERMAL SYSTEM 1 PATCH: 1 PATCH, EXTENDED RELEASE TRANSDERMAL at 13:25

## 2024-11-15 RX ADMIN — HEPARIN SODIUM 1500 UNITS: 1000 INJECTION INTRAVENOUS; SUBCUTANEOUS at 15:45

## 2024-11-15 RX ADMIN — DEXTROSE MONOHYDRATE 300 ML: 100 INJECTION, SOLUTION INTRAVENOUS at 13:28

## 2024-11-15 RX ADMIN — Medication 1 TABLET: at 11:38

## 2024-11-15 RX ADMIN — LEVOFLOXACIN 250 MG: 5 INJECTION, SOLUTION INTRAVENOUS at 02:59

## 2024-11-15 RX ADMIN — DEXTROSE MONOHYDRATE 300 ML: 100 INJECTION, SOLUTION INTRAVENOUS at 05:43

## 2024-11-15 RX ADMIN — LIDOCAINE HYDROCHLORIDE 10 ML: 10 INJECTION, SOLUTION EPIDURAL; INFILTRATION; INTRACAUDAL; PERINEURAL at 15:49

## 2024-11-15 RX ADMIN — PANTOPRAZOLE SODIUM 40 MG: 40 TABLET, DELAYED RELEASE ORAL at 07:59

## 2024-11-15 RX ADMIN — HYDROXYZINE HYDROCHLORIDE 25 MG: 25 TABLET, FILM COATED ORAL at 03:01

## 2024-11-15 RX ADMIN — HYDROXYZINE HYDROCHLORIDE 25 MG: 25 TABLET, FILM COATED ORAL at 08:24

## 2024-11-15 RX ADMIN — DEXTROSE MONOHYDRATE 25 G: 25 INJECTION, SOLUTION INTRAVENOUS at 13:29

## 2024-11-15 RX ADMIN — POLYETHYLENE GLYCOL 3350 17 G: 17 POWDER, FOR SOLUTION ORAL at 07:59

## 2024-11-15 RX ADMIN — DEXTROSE MONOHYDRATE 25 G: 25 INJECTION, SOLUTION INTRAVENOUS at 05:43

## 2024-11-15 RX ADMIN — NYSTATIN 500000 UNITS: 100000 SUSPENSION ORAL at 07:59

## 2024-11-15 RX ADMIN — SENNOSIDES 8.6 MG: 8.6 TABLET, FILM COATED ORAL at 07:59

## 2024-11-15 RX ADMIN — DEXTROSE MONOHYDRATE 50 ML: 25 INJECTION, SOLUTION INTRAVENOUS at 05:27

## 2024-11-15 RX ADMIN — ONDANSETRON 4 MG: 2 INJECTION INTRAMUSCULAR; INTRAVENOUS at 10:03

## 2024-11-15 RX ADMIN — OXYCODONE HYDROCHLORIDE 5 MG: 5 TABLET ORAL at 05:36

## 2024-11-15 RX ADMIN — MYCOPHENOLATE MOFETIL 750 MG: 250 CAPSULE ORAL at 21:38

## 2024-11-15 RX ADMIN — SEVELAMER CARBONATE 0.8 G: 800 POWDER, FOR SUSPENSION ORAL at 22:56

## 2024-11-15 RX ADMIN — SODIUM CHLORIDE 7.8 UNITS: 9 INJECTION, SOLUTION INTRAVENOUS at 05:28

## 2024-11-15 RX ADMIN — INSULIN ASPART 2 UNITS: 100 INJECTION, SOLUTION INTRAVENOUS; SUBCUTANEOUS at 17:40

## 2024-11-15 RX ADMIN — HYDROMORPHONE HYDROCHLORIDE 0.3 MG: 1 INJECTION, SOLUTION INTRAMUSCULAR; INTRAVENOUS; SUBCUTANEOUS at 11:59

## 2024-11-15 RX ADMIN — METHYLPREDNISOLONE SODIUM SUCCINATE 100 MG: 125 INJECTION, POWDER, FOR SOLUTION INTRAMUSCULAR; INTRAVENOUS at 08:00

## 2024-11-15 RX ADMIN — ASPIRIN 325 MG ORAL TABLET 325 MG: 325 PILL ORAL at 07:59

## 2024-11-15 RX ADMIN — TACROLIMUS 2 MG: 1 CAPSULE ORAL at 07:59

## 2024-11-15 RX ADMIN — OXYCODONE HYDROCHLORIDE 10 MG: 10 TABLET ORAL at 21:36

## 2024-11-15 NOTE — PROVIDER NOTIFICATION
[id number] Hannah Sutherland: Pts calzada output past two hours was 5mL fyi. -Kadi SAUCEDO RN    Provider Notified: RUBI DUNNE

## 2024-11-15 NOTE — PROGRESS NOTES
Admitted/transferred from: Neuro ICU  Time of arrival on unit 1600  2 RN full  skin assessment completed by Kadi SAUCEDO and Annika GRAHAM RNs  Skin assessment finding: issues found abd incision covered with dressing, art line removal site covered with foam dressing, PRISCILA, internal jugular.    Interventions/actions: Meplix on saccrum.      Will continue to monitor.

## 2024-11-15 NOTE — PRE-PROCEDURE
GENERAL PRE-PROCEDURE:   Procedure:  Non-tunneled central venous catheter placement with imaging guidance  Date/Time:  11/15/2024 3:00 PM    Verbal consent obtained?: Yes    Written consent obtained?: Yes    Risks and benefits: Risks, benefits and alternatives were discussed    Consent given by:  Patient  Patient states understanding of procedure being performed: Yes    Patient's understanding of procedure matches consent: Yes    Procedure consent matches procedure scheduled: Yes    History & Physical reviewed:  History and physical reviewed and no updates needed

## 2024-11-15 NOTE — PROGRESS NOTES
CLINICAL NUTRITION SERVICES - BRIEF NOTE     Nutrition Prescription    RECOMMENDATIONS FOR MDs/PROVIDERS TO ORDER:  Recommend nutrition support if patient is not able to consistently meet a minimum of 1200 calories and 60 gram protein     Recommendations already ordered by Registered Dietitian (RD)  Calorie counts 11/16-11/18    Future/Additional Recommendations:  -- monitor calorie counts and intake of meals  -- order supplements prn when diet is advanced     EVALUATION OF THE PROGRESS TOWARD GOALS   Diet: NPO (for procedure - tunnel HD line - Previous diet order: 2 gram K+ diet with 2000 ml Fluid restriction)  Intake: poor intake per nursing documentation. 100% intake of apple juice and Jello       NEW FINDINGS   Labs 911/15): K+ 5.5 mmol/L (H), BUN 57.7 mg/dL (H), Cr 2.94 mg/dL (H), phos 9.2 mg/dL (H)     Meds:   Insulin aspart  Insuling gtt  Thera-vit-m  Miralax  Senokot    GI: LBM; unknown     INTERVENTIONS  - Medical nutrition supplement: Allow patient to order supplements prn. Provided handout with lower K+ options starred.     Monitoring/Evaluation  Progress toward goals will be monitored and evaluated per protocol.     Kristi Zayas MS/RD/LD/CNSC  Available on [employer]   M-F (7am-3:30pm) - 7A/7B Clinical Dietitian  Weekend/Holiday Dietitian (7am-3:30pm)    ** Clinical Dietitians no longer available on pager

## 2024-11-15 NOTE — PLAN OF CARE
"Goal Outcome Evaluation:    Plan of Care Reviewed With: patient  Overall Patient Progress: no change  Outcome Evaluation: Pain manged with PRN's overnight, Very minimal urine ouput. Full liquid diet.    Shift: 7160-0639  /72   Pulse 80   Temp 97.9  F (36.6  C) (Oral)   Resp 16   Ht 1.63 m (5' 4.17\")   Wt 81.7 kg (180 lb 1.9 oz)   SpO2 98%   BMI 30.75 kg/m       VS- Stable on 1L NC   BG- ACHS 157, 147  Labs- Lactic 1.4, Potassium 6.1, shifted recheck at 0700  Pain/Nausea/PRN'S- PRN oxy, Robaxin and atarax for pain. PRN Zofran for nausea  Diet- Full liquid diet, Advance as tolerated  LDA- PIV X2, Right internal jugular. R PRISCILA , stripped x2  Gtt/IVF- none, scheduled antibiotics    GI/- Young in place,Minimal output, bladder scanned for 23 ml. Not yet passing Radha  Skin- clamshell incision, Op dressing on,   Activity- assist x2, not yet OOB,   Plan-  continur with POC.    "

## 2024-11-15 NOTE — PROGRESS NOTES
Care Management Follow Up    Length of Stay (days): 1    Expected Discharge Date: 11/22/2024     Concerns to be Addressed: discharge planning     Patient plan of care discussed at interdisciplinary rounds: Yes    Anticipated Discharge Disposition: Home Care, Transitional Care              Anticipated Discharge Services: Home Care  Anticipated Discharge DME: None    Patient/family educated on Medicare website which has current facility and service quality ratings: no  Education Provided on the Discharge Plan:    Patient/Family in Agreement with the Plan: other (see comments) (tBD)    Referrals Placed by CM/SW: External Care Coordination  Private pay costs discussed: Not applicable    Discussed  Partnership in Safe Discharge Planning  document with patient/family: No     Handoff Completed: No, handoff not indicated or clinically appropriate    Additional Information:  Patient s/p liver transplant 11/13/2024.  PT/OT consulted.   Per care team rounds pt with RUDY possible need for HD.  Final discharge plan pending therapy recommendations.  Per SW note pt on the list for [facility].     Met briefly with pt, her mom and pt daughter.  Introduced RNCC role.  Per pt Mom she is looking into [address] for when pt is medically cleared for discharge.  Agreed to follow up with pt/family on Monday.  Final discharge plan pending medical stability, PT/OT recommendations.     Next Steps:   RNCC following for discharge planning  SOT SW following for discharge planning/psychosocial needs  Follow up on PT/OT recommendations.    Cassia Varela RN BSN, PHN, ACM-RN  November 15, 2024  7A Nurse Coordinator    Phone: [phone]  Available on [employer] 7A SOT RNCC  Weekend/Holiday 7A SOT RNCC    11/15/2024

## 2024-11-15 NOTE — IR NOTE
Patient Name: Hannah Sutherland  Medical Record Number: [id number]  Today's Date: 11/15/2024    Procedure: Non-tunneled CVC placement  Proceduralist: Dr. Gato VALLEJO, Dr Jass VALLEJO  Pathology present: NA    Procedure Start: 1536  Procedure end: 1544  Sedation medications administered: 50 mcg fentanyl     Report given to:  RN 7A  : JUDY    Other Notes: Pt arrived to IR room 2 from ultrasound. Consent reviewed. Pt denies any questions or concerns regarding procedure. Pt positioned supine and monitored per protocol. Pt tolerated procedure without any noted complications. Pt transferred back to . 14.5 fr Non-tunneled catheter placed.  Catheter tip placement verified with imaging and ready for immediate use.  both ports flushed with 1.5 mls of 1000 unit/ml heparin.

## 2024-11-15 NOTE — CONSULTS
[facility]  Transplant Nephrology Consult Note  Date of Admission:  11/13/2024  Today's Date: 11/15/2024  Requesting physician: Blas Carney MD    Reason for Consult:  RUDY    Recommendations:   - Once tunneled line has been placed, will plan for 3hr HD run today given hyperkalemia and oliguria. Will continue to assess for HD needs daily.  - Recommend targeting a slightly lower tacrolimus goal due to RUDY, as permitted by Transplant Surgery.  - Recommend starting sevelamer 800 mg tid with meals.    Assessment & Plan   # Liver Tx: RUDY. Baseline creatinine prior to liver transplant 0.6-0.9 mg/dl. Creatinine increased to 2.9 mg/dl today   - RUYD felt secondary to liver transplant with hemodynamic changes and hypotension. Renal US 11/13/24 without hydronephrosis or signs of obstruction.   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  Transaminases Trend up.  Followed by Transplant Surgery.    # Immunosuppression: Tacrolimus immediate release (goal 8-10), Mycophenolate mofetil (dose 750 mg every 12 hours), and Methylprednisolone (dose taper)   - Induction with Recent Transplant:  Per Liver Tx Protocol   - Continue with intensive monitoring of immunosuppression for efficacy and toxicity.   - Goal tacrolimus level lower due to RUDY.   - Changes: Not at this time; Management per Transplant Surgery.    # Infection Prophylaxis:   - PJP: Sulfa/TMP (Bactrim) (held due to RUDY and hyperkalemia)  - CMV: Valganciclovir (Valcyte)  - Thrush: Nystatin (Mycostatin) swish and swallow  - Fungal: Nystatin (Mycostatin) swish and swallow      - CMV IgG Ab High Risk Discordance (D+/R-): No  CMV Serostatus: Positive  - EBV IgG Ab High Risk Discordance (D+/R-): No  EBV Serostatus: Positive    # Hypertension: Controlled;  Goal BP: < 150/90   - Changes: No    #  Elevated Blood Glucose: Glucose generally running ~ 120-200. On insulin.    # Anemia in Chronic Disease/Surgery: Hgb: Stable, low      MAGUI: No   - Iron studies: Not checked recently    # Thrombocytopenia: stable around 55k    # Mineral Bone Disorder:   - Vitamin D; level: Low normal        On supplement: No  - Calcium; level: Normal        On supplement: No  - Phosphorus; level: High        On supplement:  No, recommend starting sevelamer    # Electrolytes:   - Potassium; level: High        On supplement: No. Will adjust with dialysis  - Magnesium; level: High        On supplement: No  - Bicarbonate; level: Normal        On supplement: No  - Sodium; level: Low    # Other Significant PMH:   - Depression: lexapro on hold given prolonged QTc     # Transplant History:  Etiology of Organ Failure: Alcohol-related liver disease  Tx: Liver Tx  Transplant: 11/13/2024 (Liver)  Significant changes in immunosuppression: Slightly lower tacrolimus level goal due to RUDY.  Significant transplant-related complications: None    Recommendations were communicated to the primary team verbally.    Seen and discussed with Dr. Malorie Mcnair PAMuniraC  Transplant Nephrology    Physician Attestation     I saw and evaluated Hannah Sutherland as part of a shared APRN/PA visit.     I personally reviewed the vital signs, medications, and labs.    I personally provided a substantive portion of care for this patient and I approve the care plan as written by the ADELFO.  I was involved with Medical Decision Making including: Please see A&P for additional details of medical decision making.  MANAGEMENT DISCUSSED with the following over the past 24 hours: With oliguria and hyperkalemia, will plan hemodialysis run today following dialysis line placement.  Recommend targeting slightly lower tacrolimus level due to RUDY.  Would start phosphorus binder.     Corky Espana MD  Date of Service (when I saw the patient): 11/15/24    History of  Present Illness  Ms. 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 She was hospitalized in November 2023 for acute hepatitis 2/2 to alcohol use at which time she was diagnosed with underlying cirrhosis. Ms. Sutherland has remained abstinent from alcohol for the last year. MELD at time of transplant 21. She underwent DDLT w/o stent on 11/13/2024 with Dr. Blas Carney. Her baseline creatinine prior to liver transplant was 0.6-0.9 mg/dl, but has since increased to 2.9 mg/dl. No known history of proteinuria. Renal US is without signs of obstruction. She is oliguric. IR has already been consulted for tunneled line placement. Today she complains mostly of incisional pain. She denies f/s/c, n/v/d, chest pain or SOB.     Review of Systems   The 10 point Review of Systems is negative other than noted in the HPI or here.      MEDICATIONS:  Current Facility-Administered Medications   Medication Dose Route Frequency Provider Last Rate Last Admin    0.9% Sodium Chloride for DIALYSIS Catheter LOCK - BLUE lumen  10 mL Intracatheter During Dialysis/CRRT (from stock) Corky Espana MD        0.9% Sodium Chloride for DIALYSIS Catheter LOCK - RED lumen  10 mL Intracatheter During Dialysis/CRRT (from stock) Corky Espana MD        albumin human 5 % injection 250 mL  250 mL Intravenous Once in dialysis/CRRT Corky Espana MD        aspirin (ASA) tablet 325 mg  325 mg Oral Daily Fabrizio Colmenares MD   325 mg at 11/15/24 0759    [START ON 11/18/2024] basiliximab (SIMULECT) 20 mg in sodium chloride 0.9 % 50 mL infusion  20 mg Intravenous Once Abena Mccray NP        bisacodyl (DULCOLAX) suppository 10 mg  10 mg Rectal Once Aebna Mccray NP        dextrose 10% BOLUS 300 mL  300 mL Intravenous Once Abena Mccray NP 75 mL/hr at 11/15/24 1328 300 mL at 11/15/24 1328    [Held by provider] escitalopram (LEXAPRO) tablet  20 mg  20 mg Oral Daily Juany Saeed MD   20 mg at 11/15/24 0800    insulin aspart (NovoLOG) injection (RAPID ACTING)  1-7 Units Subcutaneous TID  Elizabeth Landin MD   1 Units at 11/15/24 0844    insulin aspart (NovoLOG) injection (RAPID ACTING)  1-5 Units Subcutaneous At Bedtime Elizabeth Landin MD        Lidocaine (LIDOCARE) 4 % Patch 1-2 patch  1-2 patch Transdermal Q24H Abena Mccray NP        [START ON 11/16/2024] methylPREDNISolone Na Suc (solu-MEDROL) injection 50 mg  50 mg Intravenous Once Fabrizio Colmenares MD        Followed by    [START ON 11/17/2024] predniSONE (DELTASONE) tablet 25 mg  25 mg Oral Once Fabrizio Colmenares MD        Followed by    [START ON 11/18/2024] predniSONE (DELTASONE) tablet 10 mg  10 mg Oral Once Fabrizio Colmenares MD        Followed by    [START ON 11/19/2024] predniSONE (DELTASONE) tablet 5 mg  5 mg Oral Once Fabrizio Colmenares MD        multivitamin w/minerals (THERA-VIT-M) tablet 1 tablet  1 tablet Oral Daily Fabrizio Colmenares MD   1 tablet at 11/15/24 1138    mycophenolate (GENERIC EQUIVALENT) capsule 750 mg  750 mg Oral BID Fabrizio Colmenares MD   750 mg at 11/15/24 0759    No heparin via hemodialysis machine   Does not apply Once Corky Espana MD        nystatin (MYCOSTATIN) suspension 500,000 Units  500,000 Units Oral 4x Daily Abena Mccray NP   500,000 Units at 11/15/24 0759    pantoprazole (PROTONIX) EC tablet 40 mg  40 mg Oral QAM  Fabrizio Colmenares MD   40 mg at 11/15/24 0759    polyethylene glycol (MIRALAX) Packet 17 g  17 g Oral Daily Elizabeth Landin MD   17 g at 11/15/24 0759    [START ON 11/20/2024] predniSONE (DELTASONE) tablet 5 mg  5 mg Oral Daily Fabiola Aleman APRN CNP        scopolamine (TRANSDERM) 72 hr patch 1 patch  1 patch Transdermal Q72H Abena Mccray NP   1 patch at 11/15/24 1325    And    scopolamine (TRANSDERM-SCOP) Patch in Place   Transdermal Q8H Abena Mccray, NP        sennosides (SENOKOT) tablet 8.6 mg  8.6 mg Oral  "BID Elizabeth Landin MD   8.6 mg at 11/15/24 0759    sodium chloride (PF) 0.9% PF flush 3 mL  3 mL Intravenous Q8H Fabrizio Colmenares MD   3 mL at 11/15/24 1003    sodium chloride (PF) 0.9% PF flush 9 mL  9 mL Intracatheter During Dialysis/CRRT (from stock) Corky Espana MD        sodium chloride (PF) 0.9% PF flush 9 mL  9 mL Intracatheter During Dialysis/CRRT (from stock) Corky Espana MD        sodium chloride 0.9% BOLUS 200 mL  200 mL Hemodialysis Machine Once Corky Espana MD        sodium chloride 0.9% BOLUS 500 mL  500 mL Hemodialysis Machine Once Corky Espana MD        [Held by provider] sulfamethoxazole-trimethoprim (BACTRIM) 400-80 MG per tablet 1 tablet  1 tablet Oral or Feeding Tube Daily Fabrizio Colmenares MD   1 tablet at 24    tacrolimus (GENERIC EQUIVALENT) capsule 2 mg  2 mg Oral BID IS Fabrizio Colmenares MD   2 mg at 11/15/24 0759    [START ON 2024] valGANciclovir (VALCYTE) tablet 450 mg  450 mg Oral Once per day on  Blas Carney MD         Current Facility-Administered Medications   Medication Dose Route Frequency Provider Last Rate Last Admin       Physical Exam   Temp  Av  F (36.7  C)  Min: 97.5  F (36.4  C)  Max: 98.4  F (36.9  C)  Arterial Line BP  Min: 119/56  Max: 157/65  Arterial Line MAP (mmHg)  Av.9 mmHg  Min: 75 mmHg  Max: 96 mmHg      Pulse  Av.1  Min: 68  Max: 95 Resp  Av.5  Min: 8  Max: 21  SpO2  Av.6 %  Min: 92 %  Max: 100 %    CVP (mmHg): 1 mmHgBP (!) 159/86   Pulse 85   Temp 98.4  F (36.9  C) (Oral)   Resp 16   Ht 1.63 m (5' 4.17\")   Wt 81.7 kg (180 lb 1.9 oz)   SpO2 99%   BMI 30.75 kg/m     Date 11/15/24 07 - 24 0659   Shift 7150-3659 8055-3961 8288-9350 24 Hour Total   INTAKE   P.O. 118   118   I.V. 10   10   Shift Total(mL/kg) 128(1.57)   128(1.57)   OUTPUT   Urine 30   30   Drains 100   100   Shift Total(mL/kg) 130(1.59)   130(1.59)   Weight (kg) 81.7 81.7 81.7 81.7    "   Admit Weight: 77.6 kg (171 lb)     GENERAL APPEARANCE: alert and no distress  HENT: mouth without ulcers or lesions  RESP: lungs clear to auscultation - no rales, rhonchi or wheezes  CV: regular rhythm, normal rate, no rub, no murmur  EDEMA: no LE edema bilaterally  ABDOMEN: soft, nondistended, diffusely tender, bowel sounds normal  MS: extremities normal - no gross deformities noted, no evidence of inflammation in joints, no muscle tenderness  SKIN: no rash  ACCESS: none    Data   All labs reviewed by me.  CMP  Recent Labs   Lab 11/15/24  1334 11/15/24  1206 11/15/24  0912 11/15/24  0844 11/15/24  0642 11/15/24  0554 11/15/24  0525 11/15/24  0348 11/14/24  1713 11/14/24  1642 11/14/24  1314 11/14/24  1010 11/14/24  0407 11/14/24  0404 11/13/24  0936 11/13/24  0931 11/13/24  0328 11/13/24  0116   NA  --   --   --   --   --   --   --  129*  --  129*  --  131*  --  133*   < > 142   < > 136   POTASSIUM  --   --  5.7*  --  5.5*  --   --  6.1*  --  5.3  --  5.2  --  5.1   < > 3.6   < > 3.7   CHLORIDE  --   --   --   --   --   --   --  93*  --  95*  --  97*  --  98   < > 104  --  100   CO2  --   --   --   --   --   --   --  22  --  23  --  23  --  23   < > 27  --  26   ANIONGAP  --   --   --   --   --   --   --  14  --  11  --  11  --  12   < > 11  --  10   * 125*  --  156*  --  219*   < > 149*   < > 150*   < > 126*   < > 183*   < > 151*   < > 106*   BUN  --   --   --   --   --   --   --  57.7*  --  43.0*  --  37.6*  --  31.9*   < > 16.6  --  12.4   CR  --   --   --   --   --   --   --  2.94*  --  2.20*  --  1.89*  --  1.50*   < > 0.73  --  0.89   GFRESTIMATED  --   --   --   --   --   --   --  18*  --  26*  --  31*  --  41*   < > >90  --  78   TIMOTHY  --   --   --   --   --   --   --  8.3*  --  8.3*  --  8.2*  --  8.2*   < > 9.8  --  8.9   MAG  --   --   --   --   --   --   --  2.9*  --   --   --  2.7*  --  1.5*  --   --   --  1.7   PHOS  --   --   --   --   --   --   --  9.2*  --   --   --   --   --  6.2*  --   --    --  3.7   PROTTOTAL  --   --   --   --   --   --   --  5.8*  --   --   --  5.2*  --  4.9*  --  5.5*  --  6.8   ALBUMIN  --   --   --   --   --   --   --  3.0*  --   --   --  2.7*  --  2.6*  --  3.0*  --  3.0*   BILITOTAL  --   --   --   --   --   --   --  3.1*  --   --   --  3.6*  --  3.9*  --  5.4*  --  6.3*   ALKPHOS  --   --   --   --   --   --   --  82  --   --   --  73  --  70  --  100  --  147   AST  --   --   --   --   --   --   --  912*  --   --   --  1,037*  --  670*  --  612*  --  53*   ALT  --   --   --   --   --   --   --  1,275*  --   --   --  924*  --  573*  --  404*  --  15    < > = values in this interval not displayed.     CBC  Recent Labs   Lab 11/15/24  0348 11/14/24 1642 11/14/24  0404 11/14/24  0005 11/13/24 2003 11/13/24  1515   HGB 9.0* 9.3* 8.6* 9.3*   < > 9.7*   WBC 11.9* 11.5* 9.9  --   --  10.3   RBC 3.16* 3.24* 3.00*  --   --  3.37*   HCT 28.4* 28.6* 26.5*  --   --  30.0*   MCV 90 88 88  --   --  89   MCH 28.5 28.7 28.7  --   --  28.8   MCHC 31.7 32.5 32.5  --   --  32.3   RDW 17.4* 17.3* 17.2*  --   --  17.7*   PLT 55* 53* 50*  --   --  63*    < > = values in this interval not displayed.     INR  Recent Labs   Lab 11/14/24 1642 11/14/24  1010 11/14/24  0404 11/13/24  1515 11/13/24  0931 11/13/24  0629 11/13/24  0116   INR 1.77* 1.44* 1.55* 1.49* 1.66* 1.79* 1.89*   PTT  --  33  --   --  39* 47* 43*     ABG  Recent Labs   Lab 11/13/24  0931 11/13/24  0802 11/13/24  0700 11/13/24  0558   PH 7.37 7.46* 7.47* 7.48*   PCO2 52* 40 39 33*   PO2 191* 116* 130* 150*   HCO3 30* 28 28 24   O2PER 50 50.0 40.0 33.0      Urine Studies  Recent Labs   Lab Test 11/14/24  0808 11/13/24  0124 05/21/24  0759   COLOR Yellow Light Yellow Yellow   APPEARANCE Slightly Cloudy* Clear Clear   URINEGLC Negative Negative Negative   URINEBILI Small* Negative Negative   URINEKETONE Negative Negative Negative   SG 1.023 1.005 1.019   UBLD Moderate* Negative Negative   URINEPH 5.0 7.5* 6.5   PROTEIN 30* Negative  Negative   NITRITE Negative Negative Negative   LEUKEST Negative Negative Trace*   RBCU 28* 0 <1   WBCU 2 <1 7*     No lab results found.  PTH  No lab results found.  Iron Studies  Recent Labs   Lab Test 24  0757   IRON 47      IRONSAT 13*   RANDOLPH 31       IMAGING:  All imaging studies reviewed by me.    Past Medical History    I have reviewed this patient's medical history and updated it with pertinent information if needed.   No past medical history on file.    Past Surgical History   I have reviewed this patient's surgical history and updated it with pertinent information if needed.  Past Surgical History:   Procedure Laterality Date    BENCH KIDNEY  2024    Procedure: Bench kidney;  Surgeon: Blas Carney MD;  Location: [facility] OR    TRANSPLANT LIVER RECIPIENT  DONOR N/A 2024    Procedure: Transplant liver recipient  donor;  Surgeon: Blas Carney MD;  Location: [facility] OR       Family History   I have reviewed this patient's family history and updated it with pertinent information if needed.   No family history on file.    Social History   I have reviewed this patient's social history and updated it with pertinent information if needed. Hannah Sutherland  reports that she has never smoked. She has never used smokeless tobacco. She reports that she does not currently use alcohol. She reports that she does not use drugs.    Prior to Admission Medications   Medications Prior to Admission   Medication Sig Dispense Refill Last Dose/Taking    acetaminophen (TYLENOL) 500 MG tablet Take 500-1,000 mg by mouth nightly as needed for mild pain.   Past Week    albuterol (VENTOLIN HFA) 108 (90 Base) MCG/ACT inhaler Inhale 2 puffs into the lungs every 4 hours as needed for shortness of breath   More than a month    ascorbic acid (VITAMIN C) 250 MG CHEW chewable tablet Take 250 mg by mouth daily.   2024    calcium-vitamin D-vitamin K (VIACTIV) 500-500-40 MG-UNT-MCG CHEW  Take 1 tablet by mouth 2 times daily.   11/12/2024 Evening    escitalopram (LEXAPRO) 20 MG tablet Take 1 tablet by mouth every morning   11/12/2024 Morning    folic acid (FOLVITE) 1 MG tablet Take 1 tablet by mouth every morning   11/12/2024 Morning    furosemide (LASIX) 40 MG tablet Take 1 tablet (40 mg) by mouth daily for 360 days 90 tablet 3 11/12/2024 Morning    magnesium oxide (MAG-OX) 400 MG tablet Take 400 mg by mouth 2 times daily   11/12/2024 Evening    midodrine (PROAMATINE) 5 MG tablet Take 1 tablet (5 mg) by mouth 3 times daily. 270 tablet 0 11/12/2024    Multiple Vitamin (QUINTABS) TABS Take 1 tablet by mouth every morning   Past Week    pantoprazole (PROTONIX) 40 MG EC tablet Take 1 tablet by mouth every morning   11/12/2024 Morning    spironolactone (ALDACTONE) 50 MG tablet Take 1 tablet (50 mg) by mouth daily for 360 days 90 tablet 3 11/12/2024 Morning    thiamine (B-1) 100 MG tablet Take 100 mg by mouth daily   11/12/2024 Morning

## 2024-11-15 NOTE — PROGRESS NOTES
Transplant Social Work Services Progress Note    Date of Initial Social Work Evaluation: 2024  Collaborated with: Patient at bedside, patient's mother, sister and son.     Data: Hannah is s/p  donor liver transplant  Intervention: I received a call from patient's mother on  inquiring about lodging. I met with family at bedside on 11/15 to review. Mother reported that she is staying at the [facility] and has a reservation there until Friday. She noted that she will extend if needed. Hannah is on the waitlist for the [employer]. I provided [employer]'s number for [address] and they are also looking into [employer]. She has access to $50/day lodging benefit through her insurance. Family inquire if that could go to any type of lodging. I advised them to call her insurance directly to inquire.   I reviewed potential for ARU/TCU and will continue to follow her progress during her hospitalization.   Assessment: No new assessment   Education provided by SW: See above  Plan:    Discharge Plans in Progress: TBD - PT/OT consulted    Barriers to d/c plan: Medical stability    Follow up Plan: This writer will continue to follow for psychosocial needs and discharge to TCU/ARU if needed.      NEVIN Torres, [facility]  Liver Transplant   [facility]  Phone: [phone]

## 2024-11-15 NOTE — PLAN OF CARE
"Goal Outcome Evaluation:      Plan of Care Reviewed With: patient    Overall Patient Progress: no change    Outcome Evaluation: prns for pain given, dressing saturated, LFTs trending up    BP (!) 141/81   Pulse 76   Temp 98.2  F (36.8  C) (Oral)   Resp 14   Ht 1.63 m (5' 4.17\")   Wt 81.7 kg (180 lb 1.9 oz)   SpO2 97%   BMI 30.75 kg/m      Shift: 4201-4153  VS: VSS, afebrile  Neuro: Aox4  BG: Q4H:   Labs: ALT and AST doubled.  Respiratory: RA  Cardiac: WDL  Pain/Nausea: 7-9/10 pain reported   PRN: oxycodone, atarax   Diet: full liquid, 2 L FR   IV Access: PIV X2, single lumen IJ  Infusion(s): basiliximab   Lines/Drains: R PRISCILA   GI/: Young with low UOP. LBM PTOR. Passing gas.   Skin: abd incision covered with dressing   Mobility: Ax2 (not oob during shift)   Plan: Continue with POC and notify team of changes.    "

## 2024-11-15 NOTE — CONSULTS
"    Interventional Radiology  [facility] Consult Service Note    Hannah Sutherland  [id number]    11/15/24   9:01 AM    Consult Requested:    Consult Reason? Tunneled HD line    Patients clinical information/history? s/p DDLT now with RUDY, anuric.    Interventional Radiology Adult/Peds IP Consult: Which location will this be scheduled at? [place]; When do you want the patient seen? Routine within 24 hours; Consult Reason? Tunneled HD line; Patients clinical information/history? s/p DDLT now... [[id number]]    Electronically signed by: Abena Mccray NP on 11/15/24 0840  Call Back # [phone]; pager [phone]    Is the patient on aspirin, Plavix or blood thinners? Yes - Patient may be asked to stop taking medications  Is the patient currently NPO ? Yes    ===  INR - 1.77  Plts - 55  AC - none    ===  Recommendations/Plan:    Duration of RUDY and hemodialysis uncertain per referring and comment on d/w nephrology. IR rec non-tunneled CVC. Referring agreeable.    Patient is on IR ADD-ON schedule TODAY FRIDAY 11/15 for a Non-tunneled central line placement    *Please note the date of procedure is tentative and that the Timing of Procedure is TBD Based on Staffing/Schedule and Triage.*    Labs WNL for procedure    Orders entered for procedure, No NPO required.    Medications to be held include: None    Consent will be done prior to procedure    Please contact the IR charge RN at [phone] for estimated time of procedure    Case and imaging discussed with IR Dr. Pedro Luis Regan    ===  Pertinent Imaging Reviewed:          ===  Vitals:   /72   Pulse 80   Temp 97.9  F (36.6  C) (Oral)   Resp 16   Ht 1.63 m (5' 4.17\")   Wt 81.7 kg (180 lb 1.9 oz)   SpO2 98%   BMI 30.75 kg/m      Pertinent Labs:   Lab Results   Component Value Date    WBC 11.9 (H) 11/15/2024    WBC 11.5 (H) 11/14/2024    WBC 9.9 11/14/2024     Lab Results   Component Value Date    HGB 9.0 11/15/2024    HGB 9.3 11/14/2024 "    HGB 8.6 11/14/2024     Lab Results   Component Value Date    PLT 55 11/15/2024    PLT 53 11/14/2024    PLT 50 11/14/2024     Lab Results   Component Value Date    INR 1.77 (H) 11/14/2024    INR 1.8 (H) 11/07/2024    PTT 33 11/14/2024     Lab Results   Component Value Date    POTASSIUM 5.5 (H) 11/15/2024    POTASSIUM 3.5 11/13/2024        COVID-19 Antibody Results, Testing for Immunity           No data to display              COVID-19 PCR Results          10/25/2024    15:55   COVID-19 PCR Results   COVID-19 Virus by PCR (External Result) Negative          Details          This result is from an external source.                 Jayme Rodriguez PA-C  Interventional Radiology  Pager: [phone]

## 2024-11-15 NOTE — PROGRESS NOTES
Transplant Surgery  Inpatient Daily Progress Note  11/15/2024    Assessment & Plan: Hannah Sutherland is a 54 year old female with decompensated cirrhosis 2/2 to alcohol use c/b ascites and mild encephalopathy. She was hospitalized in November 2023 for acute hepatitis 2/2 to alcohol use at which time she was diagnosed with underlying cirrhosis. Ms. Sutherland has remained abstinent from alcohol for the last year. MELD at time of transplant 21. She underwent DDLT w/o stent on 11/13/2024 with Dr. Blas Carney.    Changes today:   - IR consult for tunneled HD line placement   - Add adjuvant pain medications   - Follow up Nephrology recs   - Repeat Liver US   - No change in tacrolimus dosing today (fluconazole completed 11/14).    - AXR, suppository x1.    - Remove Young catheter, bladder scan Q8H  ____________________________________________    s/p DDLT (no stent) 11/13/24: Transaminases remain elevated, Tbili trending down.    - Continue  mg daily.   - Repeat Liver US today.      Immunosuppressed status secondary to medications:  Induction: Steroid taper and basiliximab POD 1 and POD 5 per renal sparing protocol.   Maintenance:    -  mg BID   - Tacrolimus 2 mg BID. Goal level 6-8 d/t RUDY.    - Prednisone 5 mg daily following taper    Neuro/Psych:  Depression: Hold PTA Lexapro until QTc improves.   Acute post-op pain: Continue PRN oxycodone, Robaxin, Atarax.    - Add Lidocaine patches, increase Robaxin dose/frequency.     Hematology:   Acute blood loss anemia: Hgb ~9, stable.   Thrombocytopenia: Plt 50's, monitor.     Cardiorespiratory:   Hypoxemia: Requiring 1-2L NC.    - Continue IS/CDB. Wean O2 as able.   Prolonged QTc: QTc 496 on EKG 11/13.    - Avoid QTc prolonging meds (Zofran discontinued).     GI/Nutrition:   At risk for malnutrition: Nutrition consulted.    - NPO for procedure, then start renal diet.   Nausea; Constipation: Continue scheduled Miralax and senokot.   - AXR today.    -  "Suppository x1.    - Scopolamine patch, small dose PRN Compazine for nausea (avoid Zofran with prolonged QTc).    Endocrine:   Steroid induced hyperglycemia: Hgb A1c 4.6 05/2024.   - Continue sliding scale insulin.     Fluid/Electrolytes:   RUDY; Oliguric: Likely r/t hemodynamic changes intra-op. Transplant Nephrology consulted. Minimal response from Lasix. Renal sparing protocol as above. Renal US WNL.    - IR consult for tunneled line placement.   Hyperkalemia: K >5.5. Shifted overnight. Minimal response from Lasix.    - Plan for tunneled line placement for HD.    - Hold Bactrim   - Renal diet when no longer NPO   - Cardiac monitoring ordered  Hyperphosphatemia: K 9.    - Start Renvela with meals.     : Remove Young and bladder scan qshift    Infectious disease: No issues    Prophylaxis: DVT(mechanical), fall, GI (pantoprazole), fungal (Nystatin), viral (valganciclovir), pneumocystis (Bactrim, on hold for RUDY)    Disposition: 7A    ADELFO/Fellow/Resident Provider: Abena Mccray NP Pager #[phone]    Faculty: Blas Carney M.D.  __________________________________________________________________  Transplant History:    11/13/2024 (Liver), Postoperative day: 2     Interval History:   Overnight events: Worsening RUDY overnight with hyperkalemia, shift with improvement. Today she reports generalized pain (in neck, back, abdomen, \"all over\") unrelieved on current regimen. Has not been out of bed. Has not passed gas, had BM. No appetite; endorses nausea.     ROS:   A 10-point review of systems was negative except as noted above.    Curent Meds:  Current Facility-Administered Medications   Medication Dose Route Frequency Provider Last Rate Last Admin    aspirin (ASA) tablet 325 mg  325 mg Oral Daily Fabrizio Colmenares MD   325 mg at 11/15/24 0759    [START ON 11/18/2024] basiliximab (SIMULECT) 20 mg in sodium chloride 0.9 % 50 mL infusion  20 mg Intravenous Once Abena Mccray, NP        dextrose 10% BOLUS 300 mL  " 300 mL Intravenous Once Abena Mccray NP        dextrose 50 % injection 25 g  25 g Intravenous Once Abena Mccray NP        escitalopram (LEXAPRO) tablet 20 mg  20 mg Oral Daily Juany Saeed MD   20 mg at 11/15/24 0800    insulin aspart (NovoLOG) injection (RAPID ACTING)  1-7 Units Subcutaneous TID AC Elizabeth Landin MD   1 Units at 11/15/24 0844    insulin aspart (NovoLOG) injection (RAPID ACTING)  1-5 Units Subcutaneous At Bedtime Elizabeth Landin MD        insulin regular 1 unit/mL injection 7.8 Units  0.1 Units/kg (Dosing Weight) Intravenous Once Abena Mccray NP        Lidocaine (LIDOCARE) 4 % Patch 1-2 patch  1-2 patch Transdermal Q24H Abena Mccray NP        [START ON 11/16/2024] methylPREDNISolone Na Suc (solu-MEDROL) injection 50 mg  50 mg Intravenous Once Fabrizio Colmenares MD        Followed by    [START ON 11/17/2024] predniSONE (DELTASONE) tablet 25 mg  25 mg Oral Once Fabrizio Colmenares MD        Followed by    [START ON 11/18/2024] predniSONE (DELTASONE) tablet 10 mg  10 mg Oral Once Fabrizio Colmenares MD        Followed by    [START ON 11/19/2024] predniSONE (DELTASONE) tablet 5 mg  5 mg Oral Once Fabrizio Colmenares MD        multivitamin w/minerals (THERA-VIT-M) tablet 1 tablet  1 tablet Oral Daily Fabrizio Colmenares MD   1 tablet at 11/15/24 1138    mycophenolate (GENERIC EQUIVALENT) capsule 750 mg  750 mg Oral BID Fabrizio Colmenares MD   750 mg at 11/15/24 0759    nystatin (MYCOSTATIN) suspension 500,000 Units  500,000 Units Oral 4x Daily Abena Mccray NP   500,000 Units at 11/15/24 0759    pantoprazole (PROTONIX) EC tablet 40 mg  40 mg Oral QAM AC Fabrizio Colmenares MD   40 mg at 11/15/24 0759    polyethylene glycol (MIRALAX) Packet 17 g  17 g Oral Daily Elizabeth Landin MD   17 g at 11/15/24 0759    [START ON 11/20/2024] predniSONE (DELTASONE) tablet 5 mg  5 mg Oral Daily Snidarich, Fabiola, APRN CNP        scopolamine (TRANSDERM) 72 hr patch 1 patch  1 patch Transdermal Q72H  "Abena Mccray NP        And    scopolamine (TRANSDERM-SCOP) Patch in Place   Transdermal Q8H Abena Mccray NP        sennosides (SENOKOT) tablet 8.6 mg  8.6 mg Oral BID Elizabeth Landin MD   8.6 mg at 11/15/24 0759    sodium chloride (PF) 0.9% PF flush 3 mL  3 mL Intravenous Q8H Fabrizio Colmenares MD   3 mL at 11/15/24 1003    [Held by provider] sulfamethoxazole-trimethoprim (BACTRIM) 400-80 MG per tablet 1 tablet  1 tablet Oral or Feeding Tube Daily Fabrizio Colmenares MD   1 tablet at 11/13/24 1952    tacrolimus (GENERIC EQUIVALENT) capsule 2 mg  2 mg Oral BID IS Fabrizio Colmenares MD   2 mg at 11/15/24 0759    [START ON 11/18/2024] valGANciclovir (VALCYTE) tablet 450 mg  450 mg Oral Once per day on Monday Thursday Blas Carney MD           Physical Exam:     Admit Weight: 77.6 kg (171 lb)    Current Vitals:   BP (!) 159/86   Pulse 85   Temp 98.4  F (36.9  C) (Oral)   Resp 16   Ht 1.63 m (5' 4.17\")   Wt 81.7 kg (180 lb 1.9 oz)   SpO2 99%   BMI 30.75 kg/m      Vital sign ranges:    Temp:  [97.7  F (36.5  C)-98.4  F (36.9  C)] 98.4  F (36.9  C)  Pulse:  [73-85] 85  Resp:  [10-20] 16  BP: (131-159)/(72-93) 159/86  SpO2:  [94 %-99 %] 99 %    General Appearance: in no apparent distress.   Skin: normal, warm, No rashes, induration, or jaundice  Heart: Perfused  Lungs: Breathing comfortably on 1-2L NC  Abdomen: Soft. Incision closed with staples and open to air. PRISCILA drain x1 with serosanguinous output.   : calzada present; minimal output  Extremities: edema: none There is no skin breakdown.  Neurologic: Drowsy; oriented x4. Tremor absent.    Frailty Scores          10/14/2024 5/21/2024   Frailty Scores   Final Score Not Frail Frail   Final Score Number 1 3      Details                   Data:   CMP  Recent Labs   Lab 11/15/24  1206 11/15/24  0912 11/15/24  0844 11/15/24  0642 11/15/24  0525 11/15/24  0348 11/14/24  1713 11/14/24  1642 11/14/24  1314 11/14/24  1010 11/14/24  0407 11/14/24  0404 " 11/13/24  0328 11/13/24  0116   NA  --   --   --   --   --  129*  --  129*  --  131*  --  133*   < > 136   POTASSIUM  --  5.7*  --  5.5*  --  6.1*  --  5.3  --  5.2  --  5.1   < > 3.7   CHLORIDE  --   --   --   --   --  93*  --  95*  --  97*  --  98   < > 100   CO2  --   --   --   --   --  22  --  23  --  23  --  23   < > 26   *  --  156*  --    < > 149*   < > 150*   < > 126*   < > 183*   < > 106*   BUN  --   --   --   --   --  57.7*  --  43.0*  --  37.6*  --  31.9*   < > 12.4   CR  --   --   --   --   --  2.94*  --  2.20*  --  1.89*  --  1.50*   < > 0.89   GFRESTIMATED  --   --   --   --   --  18*  --  26*  --  31*  --  41*   < > 78   TIMOTHY  --   --   --   --   --  8.3*  --  8.3*  --  8.2*  --  8.2*   < > 8.9   ICAW  --   --   --   --   --  4.4  --   --   --   --   --  4.5   < >  --    MAG  --   --   --   --   --  2.9*  --   --   --  2.7*  --  1.5*  --  1.7   PHOS  --   --   --   --   --  9.2*  --   --   --   --   --  6.2*  --  3.7   AMYLASE  --   --   --   --   --   --   --   --   --   --   --  51  --  92   LIPASE  --   --   --   --   --   --   --   --   --   --   --  26  --   --    ALBUMIN  --   --   --   --   --  3.0*  --   --   --  2.7*  --  2.6*   < > 3.0*   BILITOTAL  --   --   --   --   --  3.1*  --   --   --  3.6*  --  3.9*   < > 6.3*   ALKPHOS  --   --   --   --   --  82  --   --   --  73  --  70   < > 147   AST  --   --   --   --   --  912*  --   --   --  1,037*  --  670*   < > 53*   ALT  --   --   --   --   --  1,275*  --   --   --  924*  --  573*   < > 15    < > = values in this interval not displayed.     CBC  Recent Labs   Lab 11/15/24  0348 11/14/24  1642   HGB 9.0* 9.3*   WBC 11.9* 11.5*   PLT 55* 53*

## 2024-11-15 NOTE — PROCEDURES
[facility]    Procedure: IR Procedure Note    Date/Time: 11/15/2024 3:51 PM    Performed by: Mark Hoskins MD  Authorized by: Mark Hoskins MD  IR Fellow Physician: Mark Hoskins  Other(s) attending procedure: Pedro Luis Regan    Pre Procedure Diagnosis: need for hemodialysis for RUDY  Post Procedure Diagnosis: same    UNIVERSAL PROTOCOL   Site Marked: NA  Prior Images Obtained and Reviewed:  Yes  Required items: Required blood products, implants, devices and special equipment available    Patient identity confirmed:  Arm band, provided demographic data, hospital-assigned identification number and verbally with patient  Patient was reevaluated immediately before administering moderate or deep sedation or anesthesia  Confirmation Checklist:  Correct equipment/implants were available, procedure was appropriate and matched the consent or emergent situation, relevant allergies and patient's identity using two indicators  Time out: Immediately prior to the procedure a time out was called    Universal Protocol: the Joint Commission Universal Protocol was followed    Preparation: Patient was prepped and draped in usual sterile fashion       ANESTHESIA    Anesthesia:  Local infiltration  Local Anesthetic:  Lidocaine 1% without epinephrine      SEDATION    Patient Sedated: No    See dictated procedure note for full details.  Findings: Successful left internal jugular TCVC insertion, heparin locked and ready for use.    Specimens: none    Procedural Complications: None    Condition: Stable    Plan: Line may be used      PROCEDURE  Describe Procedure: Successful left internal jugular TCVC insertion, heparin locked and ready for use.  Patient Tolerance:  Patient tolerated the procedure well with no immediate complications  Length of time physician/provider present for 1:1 monitoring during sedation:  0 min

## 2024-11-16 ENCOUNTER — APPOINTMENT (OUTPATIENT)
Dept: OCCUPATIONAL THERAPY | Facility: CLINIC | Age: 54
End: 2024-11-16
Attending: STUDENT IN AN ORGANIZED HEALTH CARE EDUCATION/TRAINING PROGRAM
Payer: COMMERCIAL

## 2024-11-16 ENCOUNTER — APPOINTMENT (OUTPATIENT)
Dept: PHYSICAL THERAPY | Facility: CLINIC | Age: 54
End: 2024-11-16
Attending: STUDENT IN AN ORGANIZED HEALTH CARE EDUCATION/TRAINING PROGRAM
Payer: COMMERCIAL

## 2024-11-16 VITALS
SYSTOLIC BLOOD PRESSURE: 144 MMHG | HEIGHT: 64 IN | RESPIRATION RATE: 18 BRPM | WEIGHT: 186.1 LBS | OXYGEN SATURATION: 95 % | TEMPERATURE: 98 F | BODY MASS INDEX: 31.77 KG/M2 | DIASTOLIC BLOOD PRESSURE: 85 MMHG | HEART RATE: 73 BPM

## 2024-11-16 LAB
ALBUMIN SERPL BCG-MCNC: 2.9 G/DL (ref 3.5–5.2)
ALP SERPL-CCNC: 93 U/L (ref 40–150)
ALT SERPL W P-5'-P-CCNC: 851 U/L (ref 0–50)
ANION GAP SERPL CALCULATED.3IONS-SCNC: 10 MMOL/L (ref 7–15)
AST SERPL W P-5'-P-CCNC: 362 U/L (ref 0–45)
BILIRUB DIRECT SERPL-MCNC: 1.81 MG/DL (ref 0–0.3)
BILIRUB SERPL-MCNC: 2.4 MG/DL
BUN SERPL-MCNC: 41 MG/DL (ref 6–20)
CALCIUM SERPL-MCNC: 8.4 MG/DL (ref 8.8–10.4)
CHLORIDE SERPL-SCNC: 96 MMOL/L (ref 98–107)
CREAT SERPL-MCNC: 3.11 MG/DL (ref 0.51–0.95)
EGFRCR SERPLBLD CKD-EPI 2021: 17 ML/MIN/1.73M2
ERYTHROCYTE [DISTWIDTH] IN BLOOD BY AUTOMATED COUNT: 17.2 % (ref 10–15)
GLUCOSE BLDC GLUCOMTR-MCNC: 110 MG/DL (ref 70–99)
GLUCOSE BLDC GLUCOMTR-MCNC: 139 MG/DL (ref 70–99)
GLUCOSE BLDC GLUCOMTR-MCNC: 149 MG/DL (ref 70–99)
GLUCOSE SERPL-MCNC: 111 MG/DL (ref 70–99)
HCO3 SERPL-SCNC: 24 MMOL/L (ref 22–29)
HCT VFR BLD AUTO: 24.4 % (ref 35–47)
HGB BLD-MCNC: 7.8 G/DL (ref 11.7–15.7)
MAGNESIUM SERPL-MCNC: 2.2 MG/DL (ref 1.7–2.3)
MCH RBC QN AUTO: 28.1 PG (ref 26.5–33)
MCHC RBC AUTO-ENTMCNC: 32 G/DL (ref 31.5–36.5)
MCV RBC AUTO: 88 FL (ref 78–100)
PHOSPHATE SERPL-MCNC: 5.4 MG/DL (ref 2.5–4.5)
PLATELET # BLD AUTO: 47 10E3/UL (ref 150–450)
POTASSIUM SERPL-SCNC: 4.5 MMOL/L (ref 3.4–5.3)
PROT SERPL-MCNC: 5.5 G/DL (ref 6.4–8.3)
RBC # BLD AUTO: 2.78 10E6/UL (ref 3.8–5.2)
SODIUM SERPL-SCNC: 130 MMOL/L (ref 135–145)
TACROLIMUS BLD-MCNC: 9.5 UG/L (ref 5–15)
TME LAST DOSE: NORMAL H
TME LAST DOSE: NORMAL H
WBC # BLD AUTO: 8.2 10E3/UL (ref 4–11)

## 2024-11-16 PROCEDURE — 250N000012 HC RX MED GY IP 250 OP 636 PS 637: Performed by: STUDENT IN AN ORGANIZED HEALTH CARE EDUCATION/TRAINING PROGRAM

## 2024-11-16 PROCEDURE — 80053 COMPREHEN METABOLIC PANEL: CPT | Performed by: NURSE PRACTITIONER

## 2024-11-16 PROCEDURE — 85041 AUTOMATED RBC COUNT: CPT | Performed by: NURSE PRACTITIONER

## 2024-11-16 PROCEDURE — 250N000013 HC RX MED GY IP 250 OP 250 PS 637

## 2024-11-16 PROCEDURE — 97165 OT EVAL LOW COMPLEX 30 MIN: CPT | Mod: GO | Performed by: OCCUPATIONAL THERAPIST

## 2024-11-16 PROCEDURE — 80197 ASSAY OF TACROLIMUS: CPT | Performed by: TRANSPLANT SURGERY

## 2024-11-16 PROCEDURE — 84100 ASSAY OF PHOSPHORUS: CPT | Performed by: NURSE PRACTITIONER

## 2024-11-16 PROCEDURE — 250N000013 HC RX MED GY IP 250 OP 250 PS 637: Performed by: STUDENT IN AN ORGANIZED HEALTH CARE EDUCATION/TRAINING PROGRAM

## 2024-11-16 PROCEDURE — 250N000013 HC RX MED GY IP 250 OP 250 PS 637: Performed by: NURSE PRACTITIONER

## 2024-11-16 PROCEDURE — 97116 GAIT TRAINING THERAPY: CPT | Mod: GP

## 2024-11-16 PROCEDURE — 83735 ASSAY OF MAGNESIUM: CPT | Performed by: NURSE PRACTITIONER

## 2024-11-16 PROCEDURE — 82040 ASSAY OF SERUM ALBUMIN: CPT | Performed by: NURSE PRACTITIONER

## 2024-11-16 PROCEDURE — 250N000011 HC RX IP 250 OP 636: Performed by: STUDENT IN AN ORGANIZED HEALTH CARE EDUCATION/TRAINING PROGRAM

## 2024-11-16 PROCEDURE — 82248 BILIRUBIN DIRECT: CPT | Performed by: STUDENT IN AN ORGANIZED HEALTH CARE EDUCATION/TRAINING PROGRAM

## 2024-11-16 PROCEDURE — 97530 THERAPEUTIC ACTIVITIES: CPT | Mod: GP

## 2024-11-16 PROCEDURE — 99233 SBSQ HOSP IP/OBS HIGH 50: CPT | Performed by: STUDENT IN AN ORGANIZED HEALTH CARE EDUCATION/TRAINING PROGRAM

## 2024-11-16 PROCEDURE — 36592 COLLECT BLOOD FROM PICC: CPT | Performed by: TRANSPLANT SURGERY

## 2024-11-16 PROCEDURE — 97161 PT EVAL LOW COMPLEX 20 MIN: CPT | Mod: GP

## 2024-11-16 PROCEDURE — 97535 SELF CARE MNGMENT TRAINING: CPT | Mod: GO | Performed by: OCCUPATIONAL THERAPIST

## 2024-11-16 PROCEDURE — 85027 COMPLETE CBC AUTOMATED: CPT | Performed by: NURSE PRACTITIONER

## 2024-11-16 PROCEDURE — 120N000011 HC R&B TRANSPLANT UMMC

## 2024-11-16 PROCEDURE — 250N000013 HC RX MED GY IP 250 OP 250 PS 637: Performed by: PHYSICIAN ASSISTANT

## 2024-11-16 RX ORDER — AMOXICILLIN 250 MG
2 CAPSULE ORAL 2 TIMES DAILY
Status: DISCONTINUED | OUTPATIENT
Start: 2024-11-16 | End: 2024-11-20 | Stop reason: HOSPADM

## 2024-11-16 RX ORDER — POLYETHYLENE GLYCOL 3350 17 G/17G
17 POWDER, FOR SOLUTION ORAL 2 TIMES DAILY
Status: DISCONTINUED | OUTPATIENT
Start: 2024-11-16 | End: 2024-11-20 | Stop reason: HOSPADM

## 2024-11-16 RX ORDER — CALCIUM CARBONATE 500 MG/1
500 TABLET, CHEWABLE ORAL 3 TIMES DAILY PRN
Status: DISCONTINUED | OUTPATIENT
Start: 2024-11-16 | End: 2024-11-20 | Stop reason: HOSPADM

## 2024-11-16 RX ADMIN — SENNOSIDES AND DOCUSATE SODIUM 2 TABLET: 8.6; 5 TABLET ORAL at 20:15

## 2024-11-16 RX ADMIN — OXYCODONE HYDROCHLORIDE 10 MG: 10 TABLET ORAL at 01:43

## 2024-11-16 RX ADMIN — HYDROXYZINE HYDROCHLORIDE 25 MG: 25 TABLET, FILM COATED ORAL at 00:18

## 2024-11-16 RX ADMIN — METHOCARBAMOL TABLETS 750 MG: 750 TABLET, COATED ORAL at 22:55

## 2024-11-16 RX ADMIN — Medication 1 TABLET: at 12:49

## 2024-11-16 RX ADMIN — INSULIN ASPART 1 UNITS: 100 INJECTION, SOLUTION INTRAVENOUS; SUBCUTANEOUS at 18:02

## 2024-11-16 RX ADMIN — NYSTATIN 500000 UNITS: 100000 SUSPENSION ORAL at 15:57

## 2024-11-16 RX ADMIN — PANTOPRAZOLE SODIUM 40 MG: 40 TABLET, DELAYED RELEASE ORAL at 06:34

## 2024-11-16 RX ADMIN — POLYETHYLENE GLYCOL 3350 17 G: 17 POWDER, FOR SOLUTION ORAL at 08:46

## 2024-11-16 RX ADMIN — NYSTATIN 500000 UNITS: 100000 SUSPENSION ORAL at 12:49

## 2024-11-16 RX ADMIN — OXYCODONE HYDROCHLORIDE 5 MG: 5 TABLET ORAL at 22:58

## 2024-11-16 RX ADMIN — MYCOPHENOLATE MOFETIL 750 MG: 250 CAPSULE ORAL at 20:15

## 2024-11-16 RX ADMIN — ANTACID TABLETS 500 MG: 500 TABLET, CHEWABLE ORAL at 15:57

## 2024-11-16 RX ADMIN — MYCOPHENOLATE MOFETIL 750 MG: 250 CAPSULE ORAL at 08:45

## 2024-11-16 RX ADMIN — SEVELAMER CARBONATE 0.8 G: 800 POWDER, FOR SUSPENSION ORAL at 18:33

## 2024-11-16 RX ADMIN — OXYCODONE HYDROCHLORIDE 10 MG: 10 TABLET ORAL at 06:36

## 2024-11-16 RX ADMIN — TACROLIMUS 2 MG: 1 CAPSULE ORAL at 18:02

## 2024-11-16 RX ADMIN — NYSTATIN 500000 UNITS: 100000 SUSPENSION ORAL at 08:46

## 2024-11-16 RX ADMIN — OXYCODONE HYDROCHLORIDE 10 MG: 10 TABLET ORAL at 12:56

## 2024-11-16 RX ADMIN — SEVELAMER CARBONATE 0.8 G: 800 POWDER, FOR SUSPENSION ORAL at 09:31

## 2024-11-16 RX ADMIN — HYDROXYZINE HYDROCHLORIDE 25 MG: 25 TABLET, FILM COATED ORAL at 22:59

## 2024-11-16 RX ADMIN — NYSTATIN 500000 UNITS: 100000 SUSPENSION ORAL at 20:15

## 2024-11-16 RX ADMIN — METHOCARBAMOL TABLETS 750 MG: 750 TABLET, COATED ORAL at 11:04

## 2024-11-16 RX ADMIN — LIDOCAINE 2 PATCH: 4 PATCH TOPICAL at 08:46

## 2024-11-16 RX ADMIN — HYDROXYZINE HYDROCHLORIDE 25 MG: 25 TABLET, FILM COATED ORAL at 15:56

## 2024-11-16 RX ADMIN — ASPIRIN 325 MG ORAL TABLET 325 MG: 325 PILL ORAL at 08:45

## 2024-11-16 RX ADMIN — SENNOSIDES AND DOCUSATE SODIUM 2 TABLET: 8.6; 5 TABLET ORAL at 08:46

## 2024-11-16 RX ADMIN — METHYLPREDNISOLONE SODIUM SUCCINATE 50 MG: 125 INJECTION, POWDER, FOR SOLUTION INTRAMUSCULAR; INTRAVENOUS at 08:47

## 2024-11-16 RX ADMIN — HYDROXYZINE HYDROCHLORIDE 25 MG: 25 TABLET, FILM COATED ORAL at 06:34

## 2024-11-16 RX ADMIN — OXYCODONE HYDROCHLORIDE 10 MG: 10 TABLET ORAL at 18:01

## 2024-11-16 RX ADMIN — METHOCARBAMOL TABLETS 750 MG: 750 TABLET, COATED ORAL at 02:53

## 2024-11-16 RX ADMIN — PROCHLORPERAZINE MALEATE 5 MG: 5 TABLET ORAL at 02:28

## 2024-11-16 RX ADMIN — POLYETHYLENE GLYCOL 3350 17 G: 17 POWDER, FOR SOLUTION ORAL at 20:14

## 2024-11-16 RX ADMIN — TACROLIMUS 2 MG: 1 CAPSULE ORAL at 08:45

## 2024-11-16 ASSESSMENT — ACTIVITIES OF DAILY LIVING (ADL)
ADLS_ACUITY_SCORE: 0
PREVIOUS_RESPONSIBILITIES: MEAL PREP;HOUSEKEEPING;LAUNDRY;SHOPPING;YARDWORK;MEDICATION MANAGEMENT;DRIVING
ADLS_ACUITY_SCORE: 0

## 2024-11-16 NOTE — PROGRESS NOTES
11/16/24 1100   Appointment Info   Signing Clinician's Name / Credentials (PT) VIVIAN Still   Student Supervision Direct Patient Contact Provided   Rehab Comments (PT) Abdominal precautions   Living Environment   People in Home child(brook), adult   Current Living Arrangements apartment   Home Accessibility no concerns   Transportation Anticipated family or friend will provide;car, drives self   Living Environment Comments Pt reports she lives in 1st floor apartment with 17 yo daughter every other 2 weeks. Laundry room is on 2nd floor up a full flight with a handrail on the R going up. Pt initially states she lives in a house, but is corrected by mother present in the room, able to answer remaining questions without corrections.   Self-Care   Usual Activity Tolerance good   Current Activity Tolerance moderate   Regular Exercise Yes   Activity/Exercise Type walking   Exercise Amount/Frequency 30 mins;3-5 times/wk   Equipment Currently Used at Home none   Fall history within last six months yes   Number of times patient has fallen within last six months 1  (Fell going to bathroom in crocs, multiple broken ribs from fall)   Activity/Exercise/Self-Care Comment Pt reports exercising 5x a week involving walking for 30 min to a walking video. Is IND with all mobility and does not use AD for ambulation.   General Information   Onset of Illness/Injury or Date of Surgery 11/13/24   Referring Physician Fabrizio Colmenares MD   Patient/Family Therapy Goals Statement (PT) To go home   Pertinent History of Current Problem (include personal factors and/or comorbidities that impact the POC) Per pt's chart Hannah Sutherland is a 54 year old female with decompensated cirrhosis 2/2 to alcohol use c/b ascites and mild encephalopathy. She was hospitalized in November 2023 for acute hepatitis 2/2 to alcohol use at which time she was diagnosed with underlying cirrhosis. Ms. Sutherland has remained abstinent from alcohol for the last  year. MELD at time of transplant 21. She underwent DDLT w/o stent on 11/13/2024.   Existing Precautions/Restrictions abdominal;fall   Heart Disease Risk Factors High blood pressure;Dislipidemia;Overweight   Cognition   Affect/Mental Status (Cognition) low arousal/lethargic   Orientation Status (Cognition) oriented x 3   Follows Commands (Cognition) over 90% accuracy;verbal cues/prompting required   Cognitive Status Comments Pt tends to speak softly and occasionally speaks off-topic making responses difficult to understand at times.   Pain Assessment   Patient Currently in Pain Yes, see Vital Sign flowsheet   Posture    Posture Forward head position;Protracted shoulders   Range of Motion (ROM)   ROM Comment UE and LE grossly WFL   Strength (Manual Muscle Testing)   Strength (Manual Muscle Testing) Deficits observed during functional mobility   Bed Mobility   Comment, (Bed Mobility) Sit > supine SBA   Transfers   Comment, (Transfers) Sit <> stand CGA   Gait/Stairs (Locomotion)   Comment, (Gait/Stairs) 5' with FWW and CGA   Balance   Balance Comments Good static sitting and standing balance, impaired dynamic balance without UE support   Clinical Impression   Criteria for Skilled Therapeutic Intervention Yes, treatment indicated   PT Diagnosis (PT) Impaired functional mobility   Influenced by the following impairments Strength, pain, balance, precautions, activity tolerance, medication side effects   Functional limitations due to impairments Walking, stairs, functional endurance, bed mobility, transfers   Clinical Presentation (PT Evaluation Complexity) stable   Clinical Presentation Rationale Clinical reasoning   Clinical Decision Making (Complexity) low complexity   Planned Therapy Interventions (PT) balance training;bed mobility training;gait training;home exercise program;neuromuscular re-education;patient/family education;stair training;strengthening;stretching;transfer training;progressive activity/exercise;risk  factor education;home program guidelines   Risk & Benefits of therapy have been explained evaluation/treatment results reviewed;care plan/treatment goals reviewed;risks/benefits reviewed;patient;daughter;mother   PT Total Evaluation Time   PT Eval, Low Complexity Minutes (90368) 10   Physical Therapy Goals   PT Frequency 5x/week   PT Predicted Duration/Target Date for Goal Attainment 11/26/24   PT Goals Bed Mobility;Transfers;Stairs;Gait   PT: Bed Mobility Modified independent;Supine to/from sit;Rolling;Bridging;Within precautions   PT: Transfers Modified independent;Sit to/from stand;Bed to/from chair;Within precautions   PT: Gait Independent;Greater than 200 feet   PT: Stairs Modified independent;Greater than 10 stairs;Rail on right   PT Discharge Planning   PT Plan Gait without AD, bed mobility within abd prec, stairs   PT Discharge Recommendation (DC Rec) [facility]-Motivated patient will benefit from intensive, interdisciplinary therapy.  Anticipate will be able to tolerate 3 hours of therapy per day;home with outpatient physical therapy;home with assist   PT Rationale for DC Rec Pt is below baseline level of mobility, is limited in gait distance and speed d/t fatigue and dynamic balance deficits as pt is quick to fatigue and requires UE support for gait >15'. Anticipated pt would be safe to d/c home with assist upon medical and pain management and improvement with ambulation distance and stability. Pt is motivated and has good family support.   PT Brief overview of current status CGA, FWW   Physical Therapy Time and Intention   Timed Code Treatment Minutes 28   Total Session Time (sum of timed and untimed services) 38

## 2024-11-16 NOTE — PROGRESS NOTES
11/16/24 1500   Appointment Info   Signing Clinician's Name / Credentials (OT) Deandre Young OTR/L   Rehab Comments (OT) Abdominal   Living Environment   People in Home child(brook), adult   Current Living Arrangements apartment   Home Accessibility no concerns   Transportation Anticipated family or friend will provide;car, drives self   Living Environment Comments Pt reports she lives in 1st floor apartment with 15 yo daughter every other 2 weeks. Laundry room is on 2nd floor up a full flight with a handrail on the R going up. Pt initially states she has walk in shower,  but is corrected by mother present in the room, able to answer remaining questions without corrections.   Self-Care   Usual Activity Tolerance good   Current Activity Tolerance moderate   Regular Exercise Yes   Activity/Exercise Type walking   Exercise Amount/Frequency 30 mins   Equipment Currently Used at Home none   Fall history within last six months yes   Number of times patient has fallen within last six months 1   Activity/Exercise/Self-Care Comment Pt reports exercising 5x a week involving walking for 30 min to a walking video. Is IND with all mobility and does not use AD for ambulation.   Instrumental Activities of Daily Living (IADL)   Previous Responsibilities meal prep;housekeeping;laundry;shopping;yardwork;medication management;driving   General Information   Onset of Illness/Injury or Date of Surgery 11/13/24   Referring Physician Blas Carney   Additional Occupational Profile Info/Pertinent History of Current Problem Hannah Sutherland is a 54 year old female with decompensated cirrhosis 2/2 to alcohol use c/b ascites and mild encephalopathy. She was hospitalized in November 2023 for acute hepatitis 2/2 to alcohol use at which time she was diagnosed with underlying cirrhosis. Ms. Sutherland has remained abstinent from alcohol for the last year. MELD at time of transplant 21. She underwent DDLT w/o stent on 11/13/2024 with   Blas Carney.   Existing Precautions/Restrictions fall;abdominal   Cognitive Status Examination   Orientation Status orientation to person, place and time   Memory Deficit minimal deficit;moderate deficit   Attention Deficit minimal deficit   Cognitive Status Comments Pt reports cognition was bad and then got worse after surgery   Visual Perception   Visual Impairment/Limitations WFL   Pain Assessment   Patient Currently in Pain Yes, see Vital Sign flowsheet   Range of Motion Comprehensive   General Range of Motion no range of motion deficits identified;bilateral upper extremity ROM WFL   Strength Comprehensive (MMT)   General Manual Muscle Testing (MMT) Assessment no strength deficits identified   Coordination   Upper Extremity Coordination No deficits were identified   Bed Mobility   Comment (Bed Mobility) increased assist within precautions   Transfers   Transfer Comments anticipate required assist within precautions   Activities of Daily Living   BADL Assessment/Intervention   (require assistance within precautions)   Clinical Impression   Criteria for Skilled Therapeutic Interventions Met (OT) Yes, treatment indicated   OT Diagnosis decreased ADL independence   OT Problem List-Impairments impacting ADL problems related to;activity tolerance impaired;post-surgical precautions;pain;strength   Assessment of Occupational Performance 3-5 Performance Deficits   Identified Performance Deficits dressing, bathing, toileting, grooming   Planned Therapy Interventions (OT) ADL retraining;IADL retraining;transfer training;home program guidelines;progressive activity/exercise;cognition   Clinical Decision Making Complexity (OT) problem focused assessment/low complexity   Risk & Benefits of therapy have been explained evaluation/treatment results reviewed;patient   Clinical Impression Comments Pt with post surgical precautions and cognitive deficits resulting in decreased independence with ADL.  Pt would beenfit from  skilled OT services to increase safety and independence with ADL.   OT Total Evaluation Time   OT Eval, Low Complexity Minutes (97350) 8   OT Goals   Therapy Frequency (OT) 6 times/week   OT Predicted Duration/Target Date for Goal Attainment 11/29/24   OT Goals Hygiene/Grooming;Toilet Transfer/Toileting;Lower Body Dressing;Lower Body Bathing;Cognition   OT: Hygiene/Grooming modified independent   OT: Lower Body Dressing Modified independent;within precautions;including set-up/clothing retrieval   OT: Lower Body Bathing Modified independent;with precautions   OT: Toilet Transfer/Toileting Modified independent;cleaning and garment management;toilet transfer;within precautions   OT: Cognitive Patient/caregiver will verbalize understanding of cognitive assessment results/recommendations as needed for safe discharge planning   Self-Care/Home Management   Self-Care/Home Mgmt/ADL, Compensatory, Meal Prep Minutes (46239) 24   Symptoms Noted During/After Treatment (Meal Preparation/Planning Training) fatigue   Treatment Detail/Skilled Intervention Pt supine in bed upon arrival, repoirts that RN needs to come in to work on a dressing for IV.  Therapist providing education on post surgical precautions.  Pt verbalizing understanding of education, though would benefit from continued review.  Therapist then providing educaiton on some AE available to help with ADL, reports some familarity with some of these toools.  Pt reports cognitive concerns as well at this time, family having to correct some subjective information about patient at times during session.   OT Discharge Planning   OT Plan standing ADL, dressing at EOB, handouts on precautions and AE   OT Discharge Recommendation (DC Rec) home with home care occupational therapy;home with assist   OT Rationale for DC Rec Pt well below baseline with ADL and cognition at this time, anticipate with progress that Pt will improve independence with ADL and improve activity tolerance,  will continue to monitor and update recs as needed   Total Session Time   Timed Code Treatment Minutes 24   Total Session Time (sum of timed and untimed services) 32

## 2024-11-16 NOTE — PROGRESS NOTES
[facility]  Transplant Nephrology Progress Note  Date of Admission:  11/13/2024  Today's Date: 11/16/2024    Recommendations:   Daily assessment of HD needs.  No plan for dialysis today.     Assessment & Plan   # RUDY: As expected with dialysis. Baseline creatinine prior to liver transplant 0.6-0.9 mg/dl. Creatinine increased to 2.9 mg/dl today   - RUDY felt secondary to liver transplant with hemodynamic changes and hypotension. Renal US 11/13/24 without hydronephrosis or signs of obstruction.   - Baseline Creatinine: ~ 0.6-0.9 mg/dl   - Proteinuria: Not checked recently  - HD Info  Access: Temporary Catheter left IJ , Days: TBD, Length: 4.0 hrs, EDW: TBD, Heparin: No, MAGUI: No, IV Iron: No, Vit D analog: No.    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  Transaminases Trend down.  Followed by Transplant Surgery.    # Immunosuppression: Tacrolimus immediate release (goal 8-10), Mycophenolate mofetil (dose 750 mg every 12 hours), and Methylprednisolone (dose taper)   - Induction with Recent Transplant:  Per Liver Tx Protocol   - Continue with intensive monitoring of immunosuppression for efficacy and toxicity.   - Goal tacrolimus level lower due to RUDY.   - Changes: Not at this time; Management per Transplant Surgery.    # Infection Prophylaxis:   - PJP: Sulfa/TMP (Bactrim) (held due to RUDY and hyperkalemia)  - CMV: Valganciclovir (Valcyte)  - Thrush: Nystatin (Mycostatin) swish and swallow  - Fungal: Nystatin (Mycostatin) swish and swallow      - CMV IgG Ab High Risk Discordance (D+/R-): No  CMV Serostatus: Positive  - EBV IgG Ab High Risk Discordance (D+/R-): No  EBV Serostatus: Positive    # Hypertension: Controlled;  Goal BP: < 150/90   - Changes: No    # Elevated Blood Glucose: Glucose generally running ~ 120-200. On insulin.    # Anemia in Chronic Disease/Surgery: Hgb: Stable, low      MAGUI: No   - Iron studies:  Not checked recently    # Thrombocytopenia: stable around 50k    # Mineral Bone Disorder:   - Vitamin D; level: Low normal        On supplement: No  - Calcium; level: Normal        On supplement: No  - Phosphorus; level: High        On supplement:  improved with HD.    # Electrolytes:   - Potassium; level: Normal        On supplement: No.   - Magnesium; level: Normal        On supplement: No  - Bicarbonate; level: Normal        On supplement: No  - Sodium; level: Low Improved with HD.    # Other Significant PMH:   - Depression: lexapro on hold given prolonged QTc     # Transplant History:  Etiology of Organ Failure: Alcohol-related liver disease  Tx: Liver Tx  Transplant: 11/13/2024 (Liver)  Significant changes in immunosuppression: Slightly lower tacrolimus level goal due to RUDY.  Significant transplant-related complications: None    Recommendations were communicated to the primary team verbally.    Rodríguez Meredith MD  Transplant Nephrology    Interval History  Had HD yesterday with 1 L UF. No lightheadedness or hypotension.  No urine output yet.  Other significant labs/tests/vitals: Normotensive.  No events overnight.  No chest pain or shortness of breath.  Still with leg swelling, but decreased.  No nausea and vomiting.    No fever, sweats or chills.    Review of Systems   4 point ROS was obtained and negative except as noted in the Interval History.    MEDICATIONS:  Current Facility-Administered Medications   Medication Dose Route Frequency Provider Last Rate Last Admin    aspirin (ASA) tablet 325 mg  325 mg Oral Daily Fabrizio Colmenares MD   325 mg at 11/15/24 0759    [START ON 11/18/2024] basiliximab (SIMULECT) 20 mg in sodium chloride 0.9 % 50 mL infusion  20 mg Intravenous Once Abena Mccray NP        bisacodyl (DULCOLAX) suppository 10 mg  10 mg Rectal Once Abena Mccray NP        [Held by provider] escitalopram (LEXAPRO) tablet 20 mg  20 mg Oral Daily Juany Saeed MD   20 mg at  11/15/24 0800    insulin aspart (NovoLOG) injection (RAPID ACTING)  1-7 Units Subcutaneous TID AC Elizabeth Landin MD   2 Units at 11/15/24 1740    insulin aspart (NovoLOG) injection (RAPID ACTING)  1-5 Units Subcutaneous At Bedtime Elizabeth Landin MD   1 Units at 11/15/24 2158    Lidocaine (LIDOCARE) 4 % Patch 1-2 patch  1-2 patch Transdermal Q24H Abena Mccray NP        methylPREDNISolone Na Suc (solu-MEDROL) injection 50 mg  50 mg Intravenous Once Fabrizio Colmenares MD        Followed by    [START ON 11/17/2024] predniSONE (DELTASONE) tablet 25 mg  25 mg Oral Once Fabrizio Colmenares MD        Followed by    [START ON 11/18/2024] predniSONE (DELTASONE) tablet 10 mg  10 mg Oral Once Fabrizio Colmenares MD        Followed by    [START ON 11/19/2024] predniSONE (DELTASONE) tablet 5 mg  5 mg Oral Once Fabrizio Colmenares MD        multivitamin w/minerals (THERA-VIT-M) tablet 1 tablet  1 tablet Oral Daily Fabrizio Colmenares MD   1 tablet at 11/15/24 1138    mycophenolate (GENERIC EQUIVALENT) capsule 750 mg  750 mg Oral BID Fabrizio Colmenares MD   750 mg at 11/15/24 2138    nystatin (MYCOSTATIN) suspension 500,000 Units  500,000 Units Oral 4x Daily Abena Mccray NP   500,000 Units at 11/15/24 2142    pantoprazole (PROTONIX) EC tablet 40 mg  40 mg Oral QAM  Fabrizio Colmenares MD   40 mg at 11/16/24 0634    polyethylene glycol (MIRALAX) Packet 17 g  17 g Oral BID Barb Carr NP        [START ON 11/20/2024] predniSONE (DELTASONE) tablet 5 mg  5 mg Oral Daily Fabiola Aleman APRN CNP        scopolamine (TRANSDERM) 72 hr patch 1 patch  1 patch Transdermal Q72H Abena Mccray NP   1 patch at 11/15/24 1325    And    scopolamine (TRANSDERM-SCOP) Patch in Place   Transdermal Q8H Abena Mccray NP        senna-docusate (SENOKOT-S/PERICOLACE) 8.6-50 MG per tablet 2 tablet  2 tablet Oral BID Barb Carr, NP        sevelamer carbonate (RENVELA) Packet 0.8 g  0.8 g Oral TID w/meals Abena Mccray, NP    "0.8 g at 11/15/24 2256    sodium chloride (PF) 0.9% PF flush 3 mL  3 mL Intravenous Q8H Fabrizio Colmenares MD   3 mL at 11/15/24 1003    [Held by provider] sulfamethoxazole-trimethoprim (BACTRIM) 400-80 MG per tablet 1 tablet  1 tablet Oral or Feeding Tube Daily Fabrizio Colmenares MD   1 tablet at 24    tacrolimus (GENERIC EQUIVALENT) capsule 2 mg  2 mg Oral BID IS Fabrizio Colmenares MD   2 mg at 11/15/24 1844    [START ON 2024] valGANciclovir (VALCYTE) tablet 450 mg  450 mg Oral Once per day on  Blas Carney MD         Current Facility-Administered Medications   Medication Dose Route Frequency Provider Last Rate Last Admin       Physical Exam   Temp  Av  F (36.7  C)  Min: 97.5  F (36.4  C)  Max: 98.4  F (36.9  C)  Arterial Line BP  Min: 119/56  Max: 157/65  Arterial Line MAP (mmHg)  Av.9 mmHg  Min: 75 mmHg  Max: 96 mmHg      Pulse  Av.1  Min: 68  Max: 95 Resp  Av.5  Min: 8  Max: 21  SpO2  Av.6 %  Min: 92 %  Max: 100 %    CVP (mmHg): 1 mmHgBP 127/72 (BP Location: Left arm)   Pulse 79   Temp 98.2  F (36.8  C) (Oral)   Resp 18   Ht 1.63 m (5' 4.17\")   Wt 84.4 kg (186 lb 1.6 oz)   SpO2 92%   BMI 31.77 kg/m     Date 11/15/24 07 - 24 0659   Shift 8602-6103 3032-7576 1780-0831 24 Hour Total   INTAKE   P.O. 118   118   I.V. 10   10   Shift Total(mL/kg) 128(1.57)   128(1.57)   OUTPUT   Urine 30   30   Drains 100   100   Shift Total(mL/kg) 130(1.59)   130(1.59)   Weight (kg) 81.7 81.7 81.7 81.7      Admit Weight: 77.6 kg (171 lb)     GENERAL APPEARANCE: alert and no distress  HENT: mouth without ulcers or lesions  RESP: lungs clear to auscultation - no rales, rhonchi or wheezes  CV: regular rhythm, normal rate, no rub, no murmur  EDEMA: trace LE edema bilaterally  ABDOMEN: soft, nondistended, diffusely tender, bowel sounds normal  MS: extremities normal - no gross deformities noted, no evidence of inflammation in joints, no muscle tenderness  SKIN: no " rash  ACCESS: left non-tunneled internal jugular catheter.    Data   All labs reviewed by me.  CMP  Recent Labs   Lab 11/16/24  0631 11/15/24  2153 11/15/24  1954 11/15/24  1733 11/15/24  1728 11/15/24  1349 11/15/24  1206 11/15/24  0912 11/15/24  0844 11/15/24  0642 11/15/24  0525 11/15/24  0348 11/14/24  1713 11/14/24  1642 11/14/24  1314 11/14/24  1010 11/14/24  0407 11/14/24  0404 11/13/24  0936 11/13/24  0931 11/13/24  0328 11/13/24  0116   NA  --   --   --   --  125*  --   --   --   --   --   --  129*  --  129*  --  131*  --  133*   < > 142   < > 136   POTASSIUM  --   --   --   --  5.6* 5.5*  --  5.7*  --  5.5*  --  6.1*  --  5.3  --  5.2  --  5.1   < > 3.6   < > 3.7   CHLORIDE  --   --   --   --  89*  --   --   --   --   --   --  93*  --  95*  --  97*  --  98   < > 104  --  100   CO2  --   --   --   --  23  --   --   --   --   --   --  22  --  23  --  23  --  23   < > 27  --  26   ANIONGAP  --   --   --   --  13  --   --   --   --   --   --  14  --  11  --  11  --  12   < > 11  --  10   * 162* 179* 205* 208*  --    < >  --    < >  --    < > 149*   < > 150*   < > 126*   < > 183*   < > 151*   < > 106*   BUN  --   --   --   --  69.2*  --   --   --   --   --   --  57.7*  --  43.0*  --  37.6*  --  31.9*   < > 16.6  --  12.4   CR  --   --   --   --  3.66*  --   --   --   --   --   --  2.94*  --  2.20*  --  1.89*  --  1.50*   < > 0.73  --  0.89   GFRESTIMATED  --   --   --   --  14*  --   --   --   --   --   --  18*  --  26*  --  31*  --  41*   < > >90  --  78   TIMOTHY  --   --   --   --  8.2*  --   --   --   --   --   --  8.3*  --  8.3*  --  8.2*  --  8.2*   < > 9.8  --  8.9   MAG  --   --   --   --   --   --   --   --   --   --   --  2.9*  --   --   --  2.7*  --  1.5*  --   --   --  1.7   PHOS  --   --   --   --   --   --   --   --   --   --   --  9.2*  --   --   --   --   --  6.2*  --   --   --  3.7   PROTTOTAL  --   --   --   --   --   --   --   --   --   --   --  5.8*  --   --   --  5.2*  --  4.9*  --  5.5*   --  6.8   ALBUMIN  --   --   --   --   --   --   --   --   --   --   --  3.0*  --   --   --  2.7*  --  2.6*  --  3.0*  --  3.0*   BILITOTAL  --   --   --   --   --   --   --   --   --   --   --  3.1*  --   --   --  3.6*  --  3.9*  --  5.4*  --  6.3*   ALKPHOS  --   --   --   --   --   --   --   --   --   --   --  82  --   --   --  73  --  70  --  100  --  147   AST  --   --   --   --   --   --   --   --   --   --   --  912*  --   --   --  1,037*  --  670*  --  612*  --  53*   ALT  --   --   --   --   --   --   --   --   --   --   --  1,275*  --   --   --  924*  --  573*  --  404*  --  15    < > = values in this interval not displayed.     CBC  Recent Labs   Lab 11/15/24  1728 11/15/24  0348 11/14/24 1642 11/14/24  0404   HGB 8.3* 9.0* 9.3* 8.6*   WBC 8.1 11.9* 11.5* 9.9   RBC 2.88* 3.16* 3.24* 3.00*   HCT 25.1* 28.4* 28.6* 26.5*   MCV 87 90 88 88   MCH 28.8 28.5 28.7 28.7   MCHC 33.1 31.7 32.5 32.5   RDW 17.3* 17.4* 17.3* 17.2*   PLT 45* 55* 53* 50*     INR  Recent Labs   Lab 11/14/24  1642 11/14/24  1010 11/14/24  0404 11/13/24  1515 11/13/24  0931 11/13/24  0629 11/13/24  0116   INR 1.77* 1.44* 1.55* 1.49* 1.66* 1.79* 1.89*   PTT  --  33  --   --  39* 47* 43*     ABG  Recent Labs   Lab 11/13/24  0931 11/13/24  0802 11/13/24  0700 11/13/24  0558   PH 7.37 7.46* 7.47* 7.48*   PCO2 52* 40 39 33*   PO2 191* 116* 130* 150*   HCO3 30* 28 28 24   O2PER 50 50.0 40.0 33.0      Urine Studies  Recent Labs   Lab Test 11/14/24  0808 11/13/24  0124 05/21/24  0759   COLOR Yellow Light Yellow Yellow   APPEARANCE Slightly Cloudy* Clear Clear   URINEGLC Negative Negative Negative   URINEBILI Small* Negative Negative   URINEKETONE Negative Negative Negative   SG 1.023 1.005 1.019   UBLD Moderate* Negative Negative   URINEPH 5.0 7.5* 6.5   PROTEIN 30* Negative Negative   NITRITE Negative Negative Negative   LEUKEST Negative Negative Trace*   RBCU 28* 0 <1   WBCU 2 <1 7*     No lab results found.  PTH  No lab results found.  Iron  Studies  Recent Labs   Lab Test 05/21/24  0757   IRON 47      IRONSAT 13*   RANDOLPH 31       IMAGING:  All imaging studies reviewed by me.

## 2024-11-16 NOTE — PROGRESS NOTES
HEMODIALYSIS TREATMENT NOTE      Date: 11/15/2024  Time: 1836     Data:  Pre Wt:   bed weight inaccurate 91.4 kg (bed scale)  Desired Wt:   To be established  Post Wt:  90.4 kg (bed scale)  Weight change: - 1 kg  Ultrafiltration - Post Run Net Total Removed (mL):  1000 ml  Vascular Access Status: CVC patent  Dialyzer Rinse:  Light   Total Blood Volume Processed: 52 L   Total Dialysis (Treatment) Time:   3 Hrs  Dialysate Bath: K 2, Ca 3  Heparin: Other: heparin lock CVC to limb volume     Lab:   Yes      Interventions:  Dialysis done through left tunneled dialysis catheter. ,   UF set to 1 Liters of fluid removal, accommodating priming and rinse back volumes  No IHD meds administered per MAR  See Flowsheet for Crit Profile throughout the run  Glucose checks done. Inta-dialysis: 190 mg/dl;  at 2000  Treatment has ended safely and blood is rinsed back completely  Catheter lumens flushed with saline and locked with heparin, catheter caps changed post HD, lactate drawn per sepsis protocol, 15 minute Vital signs initiated at 1950 for 2 hours, lactate came back  Post Tx assessment done. Patient's sent back to 720 in stable condition  Report given to PCN.     Assessment:  A/O x 4, calm & cooperative, denies pain  Lung sounds clear anterior and lateral BUL, clear BLL  CVC intact, previous dressing clean and dry, no issues with HD tolerated well.                 Plan:    Per Renal team

## 2024-11-16 NOTE — PLAN OF CARE
"Goal Outcome Evaluation:         Overall Patient Progress: improving  Overall Patient Progress: improving    Outcome Evaluation: Sitting in the chair, poor appetite.    Vitals: /74 (BP Location: Left arm)   Pulse 82   Temp 98.5  F (36.9  C) (Oral)   Resp 16   Ht 1.63 m (5' 4.17\")   Wt 84.4 kg (186 lb 1.6 oz)   SpO2 92%   BMI 31.77 kg/m      Endocrine: Glucose 111, ate lunch and glucose was not obtained.   Labs: Improving LFT's, elevated creatinine.  Pain: Abdominal discomfort.  PRN's: Robaxin X1.  Diet: Renal diet, poor appetite, on calorie counts, poor appetite.2L FR.  LDA: R CVC, L CVC tunneled line, PIV's, PRISCILA.  GI: No BM since pre surgery, took laxatives.  : Oliguric, HD yesterday.  Skin: Abdominal incision, edema.  Neuro: Alert and oriented.  Mobility: Stand by assist of 1 to the bathroom, sat in the chair for several hours.  Education: Needs all  transplant education.  Plan: Continue with plan of care.        "

## 2024-11-16 NOTE — PROGRESS NOTES
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 She was hospitalized in November 2023 for acute hepatitis 2/2 to alcohol use at which time she was diagnosed with underlying cirrhosis. Ms. Sutherland has remained abstinent from alcohol for the last year. MELD at time of transplant 21. She underwent DDLT w/o stent on 11/13/2024 with Dr. Blas Carney.    Changes today:  - Resume diet   ____________________________________________    s/p DDLT (no stent) 11/13/24: Transaminases remain elevated, Tbili trending down.    - Continue  mg daily.   - Repeat Liver US 11/15: Patent Doppler, stable hematoma    Immunosuppressed status secondary to medications:  Induction: Steroid taper and basiliximab POD 1 and POD 5 per renal sparing protocol.   Maintenance:    -  mg BID   - Tacrolimus 2 mg BID. Goal level 6-8 d/t RUDY.    - Prednisone 5 mg daily following taper    Neuro/Psych:  Depression: Hold PTA Lexapro until QTc improves.   Acute post-op pain: Continue PRN oxycodone, PRN Robaxin, Atarax, Lidoderm patches.     Hematology:   Acute blood loss anemia: Hgb 7.8, continue to monitor.   Thrombocytopenia: Plt 47, continue to monitor.     Cardiorespiratory:   Hypoxemia: Requiring 1-2L NC.   Right pleural effusion: Noted on US 11/15, small volume.    - Continue IS/CDB. Wean O2 as able.   Prolonged QTc: QTc 496 on EKG 11/13.    - Avoid QTc prolonging meds (Zofran discontinued).     GI/Nutrition:   At risk for malnutrition: Nutrition consulted.    - Renal diet, encourage optimal intake   Nausea; Constipation: Continue scheduled Miralax and senna-doc; doses escalated 11/16.    - Scopolamine patch, small dose PRN Compazine for nausea (avoid Zofran with prolonged QTc).    Endocrine:   Steroid induced hyperglycemia: Hgb A1c 4.6 05/2024.   - Continue sliding scale  insulin.     Fluid/Electrolytes:   RUDY; Oliguric: Likely r/t hemodynamic changes intra-op. Transplant Nephrology consulted. Minimal response from Lasix. Renal sparing protocol as above. Renal US WNL.    - Tunneled line by IR 11/15  -  HD 11/15, no HD 11/16  Hyperkalemia: K 4.5 today   - Hold Bactrim   - Renal diet    - Cardiac monitoring ordered  Hyperphosphatemia: K 5.4, improved.    - Start Renvela with meals.   Hyponatremia: Sodium today 130    : Young removed; bladder scan qshift    Infectious disease: No issues    Prophylaxis: DVT(mechanical), fall, GI (pantoprazole), fungal (Nystatin), viral (valganciclovir), pneumocystis (Bactrim, on hold for RUDY)    Disposition: 7A    ADELFO/Fellow/Resident Provider:   ZOHREH Vitale, CNP  Adult Solid Organ Transplant   Contact: Vocera Web Console    Faculty: Blas Carney M.D.  __________________________________________________________________  Transplant History:    11/13/2024 (Liver), Postoperative day: 3     Interval History:   Overnight events: No acute events, she offers no specific complaints.     ROS:   A 10-point review of systems was negative except as noted above.    Curent Meds:  Current Facility-Administered Medications   Medication Dose Route Frequency Provider Last Rate Last Admin    aspirin (ASA) tablet 325 mg  325 mg Oral Daily Fabrizio Colmenares MD   325 mg at 11/15/24 0759    [START ON 11/18/2024] basiliximab (SIMULECT) 20 mg in sodium chloride 0.9 % 50 mL infusion  20 mg Intravenous Once Abena Mccray NP        bisacodyl (DULCOLAX) suppository 10 mg  10 mg Rectal Once Abena Mccray NP        [Held by provider] escitalopram (LEXAPRO) tablet 20 mg  20 mg Oral Daily Juany Saeed MD   20 mg at 11/15/24 0800    insulin aspart (NovoLOG) injection (RAPID ACTING)  1-7 Units Subcutaneous TID AC Elizabeth Landin MD   2 Units at 11/15/24 1740    insulin aspart (NovoLOG) injection (RAPID ACTING)  1-5 Units Subcutaneous At Bedtime Urvashi  MD Elizabeth   1 Units at 11/15/24 2158    Lidocaine (LIDOCARE) 4 % Patch 1-2 patch  1-2 patch Transdermal Q24H Abena Mccray NP        methylPREDNISolone Na Suc (solu-MEDROL) injection 50 mg  50 mg Intravenous Once Fabrizio Colmenares MD        Followed by    [START ON 11/17/2024] predniSONE (DELTASONE) tablet 25 mg  25 mg Oral Once Fabrizio Colmenares MD        Followed by    [START ON 11/18/2024] predniSONE (DELTASONE) tablet 10 mg  10 mg Oral Once Fabrizio Colmenares MD        Followed by    [START ON 11/19/2024] predniSONE (DELTASONE) tablet 5 mg  5 mg Oral Once Fabrizio Colmenares MD        multivitamin w/minerals (THERA-VIT-M) tablet 1 tablet  1 tablet Oral Daily Fabrizio Colmenares MD   1 tablet at 11/15/24 1138    mycophenolate (GENERIC EQUIVALENT) capsule 750 mg  750 mg Oral BID Fabrizio Colmenares MD   750 mg at 11/15/24 2138    nystatin (MYCOSTATIN) suspension 500,000 Units  500,000 Units Oral 4x Daily Abena Mccray NP   500,000 Units at 11/15/24 2142    pantoprazole (PROTONIX) EC tablet 40 mg  40 mg Oral QAM AC Fabrizio Colmenares MD   40 mg at 11/16/24 0634    polyethylene glycol (MIRALAX) Packet 17 g  17 g Oral Daily Elizabeth Landin MD   17 g at 11/15/24 0759    [START ON 11/20/2024] predniSONE (DELTASONE) tablet 5 mg  5 mg Oral Daily Fabiola Aleman, ZOHREH CNP        scopolamine (TRANSDERM) 72 hr patch 1 patch  1 patch Transdermal Q72H Abena Mccray NP   1 patch at 11/15/24 1325    And    scopolamine (TRANSDERM-SCOP) Patch in Place   Transdermal Q8H Abena Mccray NP        sennosides (SENOKOT) tablet 8.6 mg  8.6 mg Oral BID Elizabeth Landin MD   8.6 mg at 11/15/24 0759    sevelamer carbonate (RENVELA) Packet 0.8 g  0.8 g Oral TID w/meals Abena Mccray NP   0.8 g at 11/15/24 2256    sodium chloride (PF) 0.9% PF flush 3 mL  3 mL Intravenous Q8H Fabrizio Colmenares MD   3 mL at 11/15/24 1003    [Held by provider] sulfamethoxazole-trimethoprim (BACTRIM) 400-80 MG per tablet 1 tablet  1 tablet Oral or  "Feeding Tube Daily Fabrizio Colmenares MD   1 tablet at 11/13/24 1952    tacrolimus (GENERIC EQUIVALENT) capsule 2 mg  2 mg Oral BID IS Fabrizio Colmenares MD   2 mg at 11/15/24 1844    [START ON 11/18/2024] valGANciclovir (VALCYTE) tablet 450 mg  450 mg Oral Once per day on Monday Thursday Blas Carney MD           Physical Exam:     Admit Weight: 77.6 kg (171 lb)    Current Vitals:   /72 (BP Location: Left arm)   Pulse 79   Temp 98.2  F (36.8  C) (Oral)   Resp 18   Ht 1.63 m (5' 4.17\")   Wt 84.4 kg (186 lb 1.6 oz)   SpO2 92%   BMI 31.77 kg/m      Vital sign ranges:    Temp:  [97.1  F (36.2  C)-98.5  F (36.9  C)] 98.2  F (36.8  C)  Pulse:  [76-90] 79  Resp:  [9-20] 18  BP: (127-167)/() 127/72  SpO2:  [92 %-100 %] 92 %    General Appearance: in no apparent distress.   Skin: normal, warm, No rashes, induration, or jaundice  Heart: She appears adequately perfused  Lungs: Breathing comfortably on 1-2L NC  Abdomen: Soft. Incision closed with staples and open to air. PRISCILA drain x1 with serosanguinous output.   : calzada present; minimal output  Extremities: edema: none There is no skin breakdown.  Neurologic: Awake, alert; oriented x4. Tremor absent.    Frailty Scores          10/14/2024 5/21/2024   Frailty Scores   Final Score Not Frail Frail   Final Score Number 1 3      Details                   Data:   CMP  Recent Labs   Lab 11/16/24  0631 11/15/24  2153 11/15/24  1733 11/15/24  1728 11/15/24  1349 11/15/24  0525 11/15/24  0348 11/14/24  1314 11/14/24  1010 11/14/24  0407 11/14/24  0404 11/13/24  0328 11/13/24  0116   NA  --   --   --  125*  --   --  129*   < > 131*  --  133*   < > 136   POTASSIUM  --   --   --  5.6* 5.5*   < > 6.1*   < > 5.2  --  5.1   < > 3.7   CHLORIDE  --   --   --  89*  --   --  93*   < > 97*  --  98   < > 100   CO2  --   --   --  23  --   --  22   < > 23  --  23   < > 26   * 162*   < > 208*  --    < > 149*   < > 126*   < > 183*   < > 106*   BUN  --   --   --  69.2*  --  "  --  57.7*   < > 37.6*  --  31.9*   < > 12.4   CR  --   --   --  3.66*  --   --  2.94*   < > 1.89*  --  1.50*   < > 0.89   GFRESTIMATED  --   --   --  14*  --   --  18*   < > 31*  --  41*   < > 78   TIMOTHY  --   --   --  8.2*  --   --  8.3*   < > 8.2*  --  8.2*   < > 8.9   ICAW  --   --   --   --   --   --  4.4  --   --   --  4.5   < >  --    MAG  --   --   --   --   --   --  2.9*  --  2.7*  --  1.5*  --  1.7   PHOS  --   --   --   --   --   --  9.2*  --   --   --  6.2*  --  3.7   AMYLASE  --   --   --   --   --   --   --   --   --   --  51  --  92   LIPASE  --   --   --   --   --   --   --   --   --   --  26  --   --    ALBUMIN  --   --   --   --   --   --  3.0*  --  2.7*  --  2.6*   < > 3.0*   BILITOTAL  --   --   --   --   --   --  3.1*  --  3.6*  --  3.9*   < > 6.3*   ALKPHOS  --   --   --   --   --   --  82  --  73  --  70   < > 147   AST  --   --   --   --   --   --  912*  --  1,037*  --  670*   < > 53*   ALT  --   --   --   --   --   --  1,275*  --  924*  --  573*   < > 15    < > = values in this interval not displayed.     CBC  Recent Labs   Lab 11/15/24  6523 11/15/24  6518   HGB 8.3* 9.0*   WBC 8.1 11.9*   PLT 45* 55*

## 2024-11-17 ENCOUNTER — APPOINTMENT (OUTPATIENT)
Dept: OCCUPATIONAL THERAPY | Facility: CLINIC | Age: 54
End: 2024-11-17
Attending: TRANSPLANT SURGERY
Payer: COMMERCIAL

## 2024-11-17 LAB
ALBUMIN SERPL BCG-MCNC: 2.9 G/DL (ref 3.5–5.2)
ALP SERPL-CCNC: 96 U/L (ref 40–150)
ALT SERPL W P-5'-P-CCNC: 695 U/L (ref 0–50)
ANION GAP SERPL CALCULATED.3IONS-SCNC: 10 MMOL/L (ref 7–15)
AST SERPL W P-5'-P-CCNC: 237 U/L (ref 0–45)
BILIRUB DIRECT SERPL-MCNC: 1.66 MG/DL (ref 0–0.3)
BILIRUB SERPL-MCNC: 2.2 MG/DL
BUN SERPL-MCNC: 57 MG/DL (ref 6–20)
CALCIUM SERPL-MCNC: 8.3 MG/DL (ref 8.8–10.4)
CHLORIDE SERPL-SCNC: 94 MMOL/L (ref 98–107)
CREAT SERPL-MCNC: 3.96 MG/DL (ref 0.51–0.95)
EGFRCR SERPLBLD CKD-EPI 2021: 13 ML/MIN/1.73M2
ERYTHROCYTE [DISTWIDTH] IN BLOOD BY AUTOMATED COUNT: 16.3 % (ref 10–15)
GLUCOSE BLDC GLUCOMTR-MCNC: 107 MG/DL (ref 70–99)
GLUCOSE BLDC GLUCOMTR-MCNC: 126 MG/DL (ref 70–99)
GLUCOSE BLDC GLUCOMTR-MCNC: 162 MG/DL (ref 70–99)
GLUCOSE BLDC GLUCOMTR-MCNC: 166 MG/DL (ref 70–99)
GLUCOSE SERPL-MCNC: 171 MG/DL (ref 70–99)
HCO3 SERPL-SCNC: 24 MMOL/L (ref 22–29)
HCT VFR BLD AUTO: 21.5 % (ref 35–47)
HGB BLD-MCNC: 7.1 G/DL (ref 11.7–15.7)
MAGNESIUM SERPL-MCNC: 2.3 MG/DL (ref 1.7–2.3)
MCH RBC QN AUTO: 28.6 PG (ref 26.5–33)
MCHC RBC AUTO-ENTMCNC: 33 G/DL (ref 31.5–36.5)
MCV RBC AUTO: 87 FL (ref 78–100)
PHOSPHATE SERPL-MCNC: 5.5 MG/DL (ref 2.5–4.5)
PLATELET # BLD AUTO: 48 10E3/UL (ref 150–450)
POTASSIUM SERPL-SCNC: 4.8 MMOL/L (ref 3.4–5.3)
PROT SERPL-MCNC: 5.2 G/DL (ref 6.4–8.3)
RBC # BLD AUTO: 2.48 10E6/UL (ref 3.8–5.2)
SODIUM SERPL-SCNC: 128 MMOL/L (ref 135–145)
TACROLIMUS BLD-MCNC: 5.1 UG/L (ref 5–15)
TME LAST DOSE: NORMAL H
TME LAST DOSE: NORMAL H
WBC # BLD AUTO: 6.5 10E3/UL (ref 4–11)

## 2024-11-17 PROCEDURE — 97535 SELF CARE MNGMENT TRAINING: CPT | Mod: GO | Performed by: OCCUPATIONAL THERAPIST

## 2024-11-17 PROCEDURE — 250N000013 HC RX MED GY IP 250 OP 250 PS 637: Performed by: PHYSICIAN ASSISTANT

## 2024-11-17 PROCEDURE — 250N000013 HC RX MED GY IP 250 OP 250 PS 637: Performed by: STUDENT IN AN ORGANIZED HEALTH CARE EDUCATION/TRAINING PROGRAM

## 2024-11-17 PROCEDURE — 80076 HEPATIC FUNCTION PANEL: CPT | Performed by: NURSE PRACTITIONER

## 2024-11-17 PROCEDURE — 120N000011 HC R&B TRANSPLANT UMMC

## 2024-11-17 PROCEDURE — 250N000012 HC RX MED GY IP 250 OP 636 PS 637: Performed by: NURSE PRACTITIONER

## 2024-11-17 PROCEDURE — 250N000013 HC RX MED GY IP 250 OP 250 PS 637: Performed by: NURSE PRACTITIONER

## 2024-11-17 PROCEDURE — 36592 COLLECT BLOOD FROM PICC: CPT | Performed by: NURSE PRACTITIONER

## 2024-11-17 PROCEDURE — 82248 BILIRUBIN DIRECT: CPT | Performed by: NURSE PRACTITIONER

## 2024-11-17 PROCEDURE — 80053 COMPREHEN METABOLIC PANEL: CPT | Performed by: NURSE PRACTITIONER

## 2024-11-17 PROCEDURE — 97530 THERAPEUTIC ACTIVITIES: CPT | Mod: GO | Performed by: OCCUPATIONAL THERAPIST

## 2024-11-17 PROCEDURE — 84100 ASSAY OF PHOSPHORUS: CPT | Performed by: NURSE PRACTITIONER

## 2024-11-17 PROCEDURE — 99233 SBSQ HOSP IP/OBS HIGH 50: CPT | Performed by: STUDENT IN AN ORGANIZED HEALTH CARE EDUCATION/TRAINING PROGRAM

## 2024-11-17 PROCEDURE — 85027 COMPLETE CBC AUTOMATED: CPT | Performed by: NURSE PRACTITIONER

## 2024-11-17 PROCEDURE — 93005 ELECTROCARDIOGRAM TRACING: CPT

## 2024-11-17 PROCEDURE — 80197 ASSAY OF TACROLIMUS: CPT | Performed by: NURSE PRACTITIONER

## 2024-11-17 PROCEDURE — 250N000011 HC RX IP 250 OP 636: Performed by: STUDENT IN AN ORGANIZED HEALTH CARE EDUCATION/TRAINING PROGRAM

## 2024-11-17 PROCEDURE — 84520 ASSAY OF UREA NITROGEN: CPT | Performed by: TRANSPLANT SURGERY

## 2024-11-17 PROCEDURE — 250N000012 HC RX MED GY IP 250 OP 636 PS 637: Performed by: STUDENT IN AN ORGANIZED HEALTH CARE EDUCATION/TRAINING PROGRAM

## 2024-11-17 PROCEDURE — 82310 ASSAY OF CALCIUM: CPT | Performed by: TRANSPLANT SURGERY

## 2024-11-17 PROCEDURE — 36592 COLLECT BLOOD FROM PICC: CPT | Performed by: TRANSPLANT SURGERY

## 2024-11-17 PROCEDURE — 83735 ASSAY OF MAGNESIUM: CPT | Performed by: NURSE PRACTITIONER

## 2024-11-17 RX ORDER — AMLODIPINE BESYLATE 5 MG/1
5 TABLET ORAL DAILY
Status: DISCONTINUED | OUTPATIENT
Start: 2024-11-17 | End: 2024-11-20 | Stop reason: HOSPADM

## 2024-11-17 RX ORDER — BUMETANIDE 0.25 MG/ML
3 INJECTION, SOLUTION INTRAMUSCULAR; INTRAVENOUS EVERY 12 HOURS
Status: DISCONTINUED | OUTPATIENT
Start: 2024-11-17 | End: 2024-11-18

## 2024-11-17 RX ORDER — ESCITALOPRAM OXALATE 20 MG/1
20 TABLET ORAL DAILY
Status: DISCONTINUED | OUTPATIENT
Start: 2024-11-17 | End: 2024-11-20 | Stop reason: HOSPADM

## 2024-11-17 RX ADMIN — ASPIRIN 325 MG ORAL TABLET 325 MG: 325 PILL ORAL at 08:55

## 2024-11-17 RX ADMIN — BUMETANIDE 3 MG: 0.25 INJECTION INTRAMUSCULAR; INTRAVENOUS at 08:54

## 2024-11-17 RX ADMIN — METHOCARBAMOL TABLETS 750 MG: 750 TABLET, COATED ORAL at 13:47

## 2024-11-17 RX ADMIN — PANTOPRAZOLE SODIUM 40 MG: 40 TABLET, DELAYED RELEASE ORAL at 06:49

## 2024-11-17 RX ADMIN — AMLODIPINE BESYLATE 5 MG: 5 TABLET ORAL at 10:30

## 2024-11-17 RX ADMIN — INSULIN ASPART 1 UNITS: 100 INJECTION, SOLUTION INTRAVENOUS; SUBCUTANEOUS at 16:50

## 2024-11-17 RX ADMIN — SEVELAMER CARBONATE 0.8 G: 800 POWDER, FOR SUSPENSION ORAL at 18:00

## 2024-11-17 RX ADMIN — SEVELAMER CARBONATE 0.8 G: 800 POWDER, FOR SUSPENSION ORAL at 12:07

## 2024-11-17 RX ADMIN — POLYETHYLENE GLYCOL 3350 17 G: 17 POWDER, FOR SOLUTION ORAL at 20:07

## 2024-11-17 RX ADMIN — OXYCODONE HYDROCHLORIDE 5 MG: 5 TABLET ORAL at 16:32

## 2024-11-17 RX ADMIN — TACROLIMUS 2 MG: 1 CAPSULE ORAL at 08:55

## 2024-11-17 RX ADMIN — SENNOSIDES AND DOCUSATE SODIUM 2 TABLET: 8.6; 5 TABLET ORAL at 20:01

## 2024-11-17 RX ADMIN — Medication 1 TABLET: at 12:07

## 2024-11-17 RX ADMIN — NYSTATIN 500000 UNITS: 100000 SUSPENSION ORAL at 20:07

## 2024-11-17 RX ADMIN — SENNOSIDES AND DOCUSATE SODIUM 2 TABLET: 8.6; 5 TABLET ORAL at 08:55

## 2024-11-17 RX ADMIN — TACROLIMUS 2.5 MG: 1 CAPSULE ORAL at 17:58

## 2024-11-17 RX ADMIN — NYSTATIN 500000 UNITS: 100000 SUSPENSION ORAL at 08:55

## 2024-11-17 RX ADMIN — POLYETHYLENE GLYCOL 3350 17 G: 17 POWDER, FOR SOLUTION ORAL at 08:56

## 2024-11-17 RX ADMIN — METHOCARBAMOL TABLETS 750 MG: 750 TABLET, COATED ORAL at 23:22

## 2024-11-17 RX ADMIN — PREDNISONE 25 MG: 20 TABLET ORAL at 08:55

## 2024-11-17 RX ADMIN — BUMETANIDE 3 MG: 0.25 INJECTION INTRAMUSCULAR; INTRAVENOUS at 20:08

## 2024-11-17 RX ADMIN — NYSTATIN 500000 UNITS: 100000 SUSPENSION ORAL at 16:08

## 2024-11-17 RX ADMIN — OXYCODONE HYDROCHLORIDE 5 MG: 5 TABLET ORAL at 23:22

## 2024-11-17 RX ADMIN — ESCITALOPRAM OXALATE 20 MG: 20 TABLET ORAL at 09:22

## 2024-11-17 RX ADMIN — Medication 5 MG: at 18:00

## 2024-11-17 RX ADMIN — OXYCODONE HYDROCHLORIDE 5 MG: 5 TABLET ORAL at 03:40

## 2024-11-17 RX ADMIN — NYSTATIN 500000 UNITS: 100000 SUSPENSION ORAL at 12:07

## 2024-11-17 RX ADMIN — SEVELAMER CARBONATE 0.8 G: 800 POWDER, FOR SUSPENSION ORAL at 08:59

## 2024-11-17 RX ADMIN — MYCOPHENOLATE MOFETIL 750 MG: 250 CAPSULE ORAL at 08:54

## 2024-11-17 RX ADMIN — MYCOPHENOLATE MOFETIL 750 MG: 250 CAPSULE ORAL at 20:05

## 2024-11-17 NOTE — PLAN OF CARE
"Goal Outcome Evaluation:      Plan of Care Reviewed With: patient    Overall Patient Progress: improvingOverall Patient Progress: improving    Outcome Evaluation: sitting in chair with food tray. Pain 8/10, intermittent nausea, difficult to encourage to walk and partake in transplant education    BP (!) 144/85 (BP Location: Left arm)   Pulse 73   Temp 98  F (36.7  C) (Oral)   Resp 18   Ht 1.63 m (5' 4.17\")   Wt 84.4 kg (186 lb 1.6 oz)   SpO2 95%   BMI 31.77 kg/m      Shift: 9865-8277  Isolation Status: none  VS: stable on room air, afebrile  Neuro: Aox4  Behaviors: calm, cooperative  BG: achs  Labs: creat=3.11  Respiratory: WDL  Cardiac: WDL  Pain/Nausea: abdominal pain/intermittent nausea  PRN: atarax x1, Oxycodone x1  Diet: Renal   IV Access: Left PIV, right internal jugular triple lumen, left tunneled HD line  Infusion(s): none  Lines/Drains: PIV x1, internal jugular, HD line, Right PRISCILA to bulb suction   GI/: very little flatus, no BM/oliguric  Skin: abdominal incision, stapled and LAKSHMI  Mobility: Ax2 + gaitbelt   Events/Education: reminded to watch transplant videos and read handbook, explained benefits of walking   Plan: Continue POC     "

## 2024-11-17 NOTE — PROGRESS NOTES
Calorie Count  Intake recorded for: 11/16  Total Kcals: 138 Total Protein: 6g  Kcals from Hospital Food: 138   Protein: 6g  Kcals from Outside Food (average):0 Protein: 0g  # Meals Ordered from Kitchen: 3  # Meals Recorded: 2  1: 25% wheat toast with butter and jelly, 2 4 oz cranberry juice   2: 25% chicken noodle soup with saltine crackers, PB special K bar   # Supplements Recorded: 0

## 2024-11-17 NOTE — PLAN OF CARE
"Goal Outcome Evaluation:      Plan of Care Reviewed With: patient    Overall Patient Progress: improvingOverall Patient Progress: improving    Outcome Evaluation: Sitting in the chair, poor appetite.    Vitals: BP (!) 140/75 (BP Location: Left arm)   Pulse 76   Temp 98.3  F (36.8  C) (Oral)   Resp 16   Ht 1.63 m (5' 4.17\")   Wt 84.2 kg (185 lb 11.2 oz)   SpO2 98%   BMI 31.70 kg/m    Started on Norvasc.  Endocrine: Glucose 107, 124.   Labs: Improving labs.  Pain: Abdominal discomfort.  PRN's: Robaxin X1 for severe muscle spasms.   Diet:  Renal diet, poor appetite, on calorie counts.  LDA: PIV X2, R TL internal jugular, L CVC, PRISCILA.  GI: Passing gas, no BM, took laxatives.  : Oliguric, improving urine output, started on IV Bumex.  Skin: Abdominal incision with staples, PRISCILA.  Neuro: Intermittent confusion, oriented but restless and fidgety.  Speech is illogical and rambling. Will need to assess if attendant is needed for safety, bed alarm on.   Mobility: Stand by assist of 1, walker.  Education: Needs all transplant education.  Plan: Monitor neuro status.      "

## 2024-11-17 NOTE — PROGRESS NOTES
[facility]  Transplant Nephrology Progress Note  Date of Admission:  11/13/2024  Today's Date: 11/17/2024    Recommendations:   Plan for dialysis tomorrow, unless voiding a lot more.  Bumetanide 3 mg IV BID.    Assessment & Plan   # RUDY: As expected with dialysis. Baseline creatinine prior to liver transplant 0.6-0.9 mg/dl.    - RUDY felt secondary to liver transplant with hemodynamic changes and hypotension. Renal US 11/13/24 without hydronephrosis or signs of obstruction.   - Baseline Creatinine: ~ 0.6-0.9 mg/dl   - Proteinuria: Not checked recently  - HD Info  Access: Temporary Catheter left IJ , Days: TBD, Length: 4.0 hrs, EDW: TBD, Heparin: No, MAGUI: No, IV Iron: No, Vit D analog: No.    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  Transaminases Trend down.  Followed by Transplant Surgery.    # Immunosuppression: Tacrolimus immediate release (goal 8-10), Mycophenolate mofetil (dose 750 mg every 12 hours), and Methylprednisolone (dose taper)   - Induction with Recent Transplant:  Per Liver Tx Protocol   - Continue with intensive monitoring of immunosuppression for efficacy and toxicity.   - Goal tacrolimus level lower due to RUDY.   - Changes: Not at this time; Management per Transplant Surgery.    # Infection Prophylaxis:   - PJP: Sulfa/TMP (Bactrim) (held due to RUDY and hyperkalemia)  - CMV: Valganciclovir (Valcyte)  - Thrush: Nystatin (Mycostatin) swish and swallow  - Fungal: Nystatin (Mycostatin) swish and swallow      - CMV IgG Ab High Risk Discordance (D+/R-): No  CMV Serostatus: Positive  - EBV IgG Ab High Risk Discordance (D+/R-): No  EBV Serostatus: Positive    # Hypertension: Controlled;  Goal BP: < 150/90   - Changes: No    # Elevated Blood Glucose: Glucose generally running ~ 120-200. On insulin.    # Anemia in Chronic Disease/Surgery: Hgb: Stable, low      MAGUI: No   - Iron studies: Not checked  recently    # Thrombocytopenia: stable around 50k    # Mineral Bone Disorder:   - Vitamin D; level: Low normal        On supplement: No  - Calcium; level: Normal        On supplement: No  - Phosphorus; level: High        On supplement:  improved with HD.    # Electrolytes:   - Potassium; level: Normal        On supplement: No.   - Magnesium; level: Normal        On supplement: No  - Bicarbonate; level: Normal        On supplement: No  - Sodium; level: Low Improved with HD.    # Other Significant PMH:   - Depression: lexapro on hold given prolonged QTc     # Transplant History:  Etiology of Organ Failure: Alcohol-related liver disease  Tx: Liver Tx  Transplant: 11/13/2024 (Liver)  Significant changes in immunosuppression: Slightly lower tacrolimus level goal due to RUDY.  Significant transplant-related complications: None    Recommendations were communicated to the primary team verbally.    Rodríguez Meredith MD  Transplant Nephrology    Interval History  Voiding more, about 100 ml each time today.  Other significant labs/tests/vitals: Normotensive.  No events overnight.  No chest pain or shortness of breath.  Decreased leg swelling.  No nausea and vomiting.    No fever, sweats or chills.    Review of Systems   4 point ROS was obtained and negative except as noted in the Interval History.    MEDICATIONS:  Current Facility-Administered Medications   Medication Dose Route Frequency Provider Last Rate Last Admin    aspirin (ASA) tablet 325 mg  325 mg Oral Daily Fabrizio Colmenares MD   325 mg at 11/16/24 0845    [START ON 11/18/2024] basiliximab (SIMULECT) 20 mg in sodium chloride 0.9 % 50 mL infusion  20 mg Intravenous Once Abena Mccray NP        bisacodyl (DULCOLAX) suppository 10 mg  10 mg Rectal Once Abena Mccray NP        bumetanide (BUMEX) injection 3 mg  3 mg Intravenous Q12H Rodríguez Mckeon MD        [Held by provider] escitalopram (LEXAPRO) tablet 20 mg  20 mg Oral Daily Darlin  MD Juany   20 mg at 11/15/24 0800    insulin aspart (NovoLOG) injection (RAPID ACTING)  1-7 Units Subcutaneous TID AC Elizabeth Landin MD   1 Units at 11/16/24 1802    insulin aspart (NovoLOG) injection (RAPID ACTING)  1-5 Units Subcutaneous At Bedtime Elizabeth Landin MD   1 Units at 11/15/24 2158    Lidocaine (LIDOCARE) 4 % Patch 1-2 patch  1-2 patch Transdermal Q24H Abena Mccray NP   2 patch at 11/16/24 0846    melatonin tablet 5 mg  5 mg Oral QPM Barb Carr NP        multivitamin w/minerals (THERA-VIT-M) tablet 1 tablet  1 tablet Oral Daily Fabrizio Colmenares MD   1 tablet at 11/16/24 1249    mycophenolate (GENERIC EQUIVALENT) capsule 750 mg  750 mg Oral BID Fabrizio Colmenares MD   750 mg at 11/16/24 2015    nystatin (MYCOSTATIN) suspension 500,000 Units  500,000 Units Oral 4x Daily Abena Mccray NP   500,000 Units at 11/16/24 2015    pantoprazole (PROTONIX) EC tablet 40 mg  40 mg Oral QAM AC Fabrizio Colmenares MD   40 mg at 11/17/24 0649    polyethylene glycol (MIRALAX) Packet 17 g  17 g Oral BID Brab Carr NP   17 g at 11/16/24 2014    predniSONE (DELTASONE) tablet 25 mg  25 mg Oral Once Fabrizio Colmenares MD        Followed by    [START ON 11/18/2024] predniSONE (DELTASONE) tablet 10 mg  10 mg Oral Once Fabrizio Colmenares MD        Followed by    [START ON 11/19/2024] predniSONE (DELTASONE) tablet 5 mg  5 mg Oral Once Fabrizio Colmenares MD        [START ON 11/20/2024] predniSONE (DELTASONE) tablet 5 mg  5 mg Oral Daily Fabiola Aleman APRN CNP        senna-docusate (SENOKOT-S/PERICOLACE) 8.6-50 MG per tablet 2 tablet  2 tablet Oral BID Barb Carr NP   2 tablet at 11/16/24 2015    sevelamer carbonate (RENVELA) Packet 0.8 g  0.8 g Oral TID w/meals Abena Mccray NP   0.8 g at 11/16/24 1833    sodium chloride (PF) 0.9% PF flush 3 mL  3 mL Intravenous Q8H Fabrizio Colmenarse MD   3 mL at 11/16/24 1835    [Held by provider] sulfamethoxazole-trimethoprim (BACTRIM) 400-80 MG per tablet 1  "tablet  1 tablet Oral or Feeding Tube Daily Fabrizio Colmenares MD   1 tablet at 24    tacrolimus (GENERIC EQUIVALENT) capsule 2 mg  2 mg Oral BID IS Fabrizio Colmenares MD   2 mg at 24    [START ON 2024] valGANciclovir (VALCYTE) tablet 450 mg  450 mg Oral Once per day on  Blas Carney MD         Current Facility-Administered Medications   Medication Dose Route Frequency Provider Last Rate Last Admin       Physical Exam   Temp  Av  F (36.7  C)  Min: 97.5  F (36.4  C)  Max: 98.4  F (36.9  C)  Arterial Line BP  Min: 119/56  Max: 157/65  Arterial Line MAP (mmHg)  Av.9 mmHg  Min: 75 mmHg  Max: 96 mmHg      Pulse  Av.1  Min: 68  Max: 95 Resp  Av.5  Min: 8  Max: 21  SpO2  Av.6 %  Min: 92 %  Max: 100 %    CVP (mmHg): 1 mmHgBP (!) 140/77 (BP Location: Left arm)   Pulse 80   Temp 98  F (36.7  C) (Oral)   Resp 14   Ht 1.63 m (5' 4.17\")   Wt 84.2 kg (185 lb 11.2 oz)   SpO2 96%   BMI 31.70 kg/m     Date 11/15/24 0700 - 24 0659   Shift 3277-7585 8755-8488 7398-1013 24 Hour Total   INTAKE   P.O. 118   118   I.V. 10   10   Shift Total(mL/kg) 128(1.57)   128(1.57)   OUTPUT   Urine 30   30   Drains 100   100   Shift Total(mL/kg) 130(1.59)   130(1.59)   Weight (kg) 81.7 81.7 81.7 81.7      Admit Weight: 77.6 kg (171 lb)     GENERAL APPEARANCE: alert and no distress  HENT: mouth without ulcers or lesions  RESP: lungs clear to auscultation - no rales, rhonchi or wheezes  CV: regular rhythm, normal rate, no rub, no murmur  EDEMA: trace LE edema bilaterally  ABDOMEN: soft, nondistended, diffusely tender, bowel sounds normal  MS: extremities normal - no gross deformities noted, no evidence of inflammation in joints, no muscle tenderness  SKIN: no rash  ACCESS: left non-tunneled internal jugular catheter.    Data   All labs reviewed by me.  CMP  Recent Labs   Lab 24  0615 24  0605 24  2209 24  1730 24  0948 11/15/24  1733 " 11/15/24  1728 11/15/24  1349 11/15/24  1206 11/15/24  0912 11/15/24  0525 11/15/24  0348 11/14/24  1713 11/14/24  1642 11/14/24  1314 11/14/24  1010 11/14/24  0407 11/14/24  0404   NA  --   --   --   --  130*  --  125*  --   --   --   --  129*  --  129*  --  131*  --  133*   POTASSIUM  --   --   --   --  4.5  --  5.6* 5.5*  --  5.7*   < > 6.1*  --  5.3  --  5.2  --  5.1   CHLORIDE  --   --   --   --  96*  --  89*  --   --   --   --  93*  --  95*  --  97*  --  98   CO2  --   --   --   --  24  --  23  --   --   --   --  22  --  23  --  23  --  23   ANIONGAP  --   --   --   --  10  --  13  --   --   --   --  14  --  11  --  11  --  12   GLC  --  107* 139* 149* 111*   < > 208*  --    < >  --    < > 149*   < > 150*   < > 126*   < > 183*   BUN  --   --   --   --  41.0*  --  69.2*  --   --   --   --  57.7*  --  43.0*  --  37.6*  --  31.9*   CR  --   --   --   --  3.11*  --  3.66*  --   --   --   --  2.94*  --  2.20*  --  1.89*  --  1.50*   GFRESTIMATED  --   --   --   --  17*  --  14*  --   --   --   --  18*  --  26*  --  31*  --  41*   TIMOTHY  --   --   --   --  8.4*  --  8.2*  --   --   --   --  8.3*  --  8.3*  --  8.2*  --  8.2*   MAG 2.3  --   --   --  2.2  --   --   --   --   --   --  2.9*  --   --   --  2.7*  --  1.5*   PHOS 5.5*  --   --   --  5.4*  --   --   --   --   --   --  9.2*  --   --   --   --   --  6.2*   PROTTOTAL 5.2*  --   --   --  5.5*  --   --   --   --   --   --  5.8*  --   --   --  5.2*  --  4.9*   ALBUMIN 2.9*  --   --   --  2.9*  --   --   --   --   --   --  3.0*  --   --   --  2.7*  --  2.6*   BILITOTAL 2.2*  --   --   --  2.4*  --   --   --   --   --   --  3.1*  --   --   --  3.6*  --  3.9*   ALKPHOS 96  --   --   --  93  --   --   --   --   --   --  82  --   --   --  73  --  70   *  --   --   --  362*  --   --   --   --   --   --  912*  --   --   --  1,037*  --  670*   *  --   --   --  851*  --   --   --   --   --   --  1,275*  --   --   --  924*  --  573*    < > = values in this  interval not displayed.     CBC  Recent Labs   Lab 11/17/24  0615 11/16/24  0948 11/15/24  1728 11/15/24  0348   HGB 7.1* 7.8* 8.3* 9.0*   WBC 6.5 8.2 8.1 11.9*   RBC 2.48* 2.78* 2.88* 3.16*   HCT 21.5* 24.4* 25.1* 28.4*   MCV 87 88 87 90   MCH 28.6 28.1 28.8 28.5   MCHC 33.0 32.0 33.1 31.7   RDW 16.3* 17.2* 17.3* 17.4*   PLT 48* 47* 45* 55*     INR  Recent Labs   Lab 11/14/24  1642 11/14/24  1010 11/14/24  0404 11/13/24  1515 11/13/24  0931 11/13/24  0629 11/13/24  0116   INR 1.77* 1.44* 1.55* 1.49* 1.66* 1.79* 1.89*   PTT  --  33  --   --  39* 47* 43*     ABG  Recent Labs   Lab 11/13/24  0931 11/13/24  0802 11/13/24  0700 11/13/24  0558   PH 7.37 7.46* 7.47* 7.48*   PCO2 52* 40 39 33*   PO2 191* 116* 130* 150*   HCO3 30* 28 28 24   O2PER 50 50.0 40.0 33.0      Urine Studies  Recent Labs   Lab Test 11/14/24  0808 11/13/24  0124 05/21/24  0759   COLOR Yellow Light Yellow Yellow   APPEARANCE Slightly Cloudy* Clear Clear   URINEGLC Negative Negative Negative   URINEBILI Small* Negative Negative   URINEKETONE Negative Negative Negative   SG 1.023 1.005 1.019   UBLD Moderate* Negative Negative   URINEPH 5.0 7.5* 6.5   PROTEIN 30* Negative Negative   NITRITE Negative Negative Negative   LEUKEST Negative Negative Trace*   RBCU 28* 0 <1   WBCU 2 <1 7*     No lab results found.  PTH  No lab results found.  Iron Studies  Recent Labs   Lab Test 05/21/24  0757   IRON 47      IRONSAT 13*   RANDOLPH 31       IMAGING:  All imaging studies reviewed by me.

## 2024-11-17 NOTE — PROGRESS NOTES
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 She was hospitalized in November 2023 for acute hepatitis 2/2 to alcohol use at which time she was diagnosed with underlying cirrhosis. Ms. Sutherland has remained abstinent from alcohol for the last year. MELD at time of transplant 21. She underwent DDLT w/o stent on 11/13/2024 with Dr. Blas Carney.    Changes today:  - Bumex Q12H  - Resume Lexapro   ____________________________________________    s/p DDLT (no stent) 11/13/24: Transaminases remain elevated, Tbili trending down.    - Continue  mg daily.   - Repeat Liver US 11/15: Patent Doppler, stable hematoma    Immunosuppressed status secondary to medications:  Induction: Steroid taper and basiliximab POD 1 and POD 5 per renal sparing protocol.   Maintenance:    -  mg BID   - Tacrolimus 2 mg BID. Goal level 6-8 d/t RUDY.    - Prednisone 5 mg daily following taper    Neuro/Psych:  Depression:Lexapro resumed 11/17.   Acute post-op pain: Continue PRN oxycodone, PRN Robaxin, Atarax, Lidoderm patches.   Post-operative delirium:  Delirium precautions. Schedule Melatonin at 1800 daily.     Hematology:   Acute blood loss anemia: Hgb 7.1, continue to monitor.   Thrombocytopenia: Plt 48, continue to monitor.     Cardiorespiratory:   Hypoxemia: Requiring 1-2L NC.   Right pleural effusion: Noted on US 11/15, small volume.    - Continue IS/CDB. Wean O2 as able.   Prolonged QTc: QTc 496 on EKG 11/13.    - Avoid QTc prolonging meds (Zofran discontinued).    - Repeat EKG 11/17: 433  Hypertension: Start amlodipine 5 mg daily     GI/Nutrition:   At risk for malnutrition: Nutrition consulted.    - Renal diet, encourage optimal intake   Nausea; Constipation: Continue scheduled Miralax and senna-doc; doses escalated 11/16.    - Scopolamine patch discontinued  11/17 due to delirium, small dose PRN Compazine for nausea (avoid Zofran with prolonged QTc).    Endocrine:   Steroid induced hyperglycemia: Hgb A1c 4.6 05/2024.   - Continue sliding scale insulin.     Fluid/Electrolytes:   RUDY; Oliguric: Likely r/t hemodynamic changes intra-op. Transplant Nephrology consulted. Minimal response from Lasix. Renal sparing protocol as above. Renal US WNL.    - Tunneled line by IR 11/15  -  HD 11/15, no HD 11/16 or 11/17  Hyperkalemia: K 4.5 11/17   - Hold Bactrim   - Renal diet    - Cardiac monitoring ordered  Hyperphosphatemia: K 5.4, improved. Pending result today.    - Renvela with meals.   Hyponatremia: Sodium 130 11/16; pending result today     : Young removed; bladder scan qshift    Infectious disease: No issues    Prophylaxis: DVT(mechanical), fall, GI (pantoprazole), fungal (Nystatin), viral (valganciclovir), pneumocystis (Bactrim, on hold for RUDY)    Disposition: 7A; therapy recommending ARU versus home     ADELFO/Fellow/Resident Provider:   ZOHREH Vitale, CNP  Adult Solid Organ Transplant   Contact: Vocera Web Console    Faculty: Blas Carney M.D.  __________________________________________________________________  Transplant History:    11/13/2024 (Liver), Postoperative day: 4     Interval History:   Overnight events: No acute events, she offers no specific complaints. She is aware she is confused, hallucinating.     ROS:   A 10-point review of systems was negative except as noted above.    Curent Meds:  Current Facility-Administered Medications   Medication Dose Route Frequency Provider Last Rate Last Admin    aspirin (ASA) tablet 325 mg  325 mg Oral Daily Fabrizio Colmenares MD   325 mg at 11/16/24 0845    [START ON 11/18/2024] basiliximab (SIMULECT) 20 mg in sodium chloride 0.9 % 50 mL infusion  20 mg Intravenous Once Abena Mccray NP        bisacodyl (DULCOLAX) suppository 10 mg  10 mg Rectal Once Abena Mccray NP        [Held by provider]  escitalopram (LEXAPRO) tablet 20 mg  20 mg Oral Daily Juany Saeed MD   20 mg at 11/15/24 0800    insulin aspart (NovoLOG) injection (RAPID ACTING)  1-7 Units Subcutaneous TID  Elizabeth Landin MD   1 Units at 11/16/24 1802    insulin aspart (NovoLOG) injection (RAPID ACTING)  1-5 Units Subcutaneous At Bedtime Elizabeth Landin MD   1 Units at 11/15/24 2158    Lidocaine (LIDOCARE) 4 % Patch 1-2 patch  1-2 patch Transdermal Q24H Abena Mccray NP   2 patch at 11/16/24 0846    multivitamin w/minerals (THERA-VIT-M) tablet 1 tablet  1 tablet Oral Daily Fabrizio Colmenares MD   1 tablet at 11/16/24 1249    mycophenolate (GENERIC EQUIVALENT) capsule 750 mg  750 mg Oral BID Fabrizio Colmenares MD   750 mg at 11/16/24 2015    nystatin (MYCOSTATIN) suspension 500,000 Units  500,000 Units Oral 4x Daily Abena Mccray NP   500,000 Units at 11/16/24 2015    pantoprazole (PROTONIX) EC tablet 40 mg  40 mg Oral QAM  Fabrizio Colmenares MD   40 mg at 11/16/24 0634    polyethylene glycol (MIRALAX) Packet 17 g  17 g Oral BID Barb Carr NP   17 g at 11/16/24 2014    predniSONE (DELTASONE) tablet 25 mg  25 mg Oral Once Fabrizio Colmenares MD        Followed by    [START ON 11/18/2024] predniSONE (DELTASONE) tablet 10 mg  10 mg Oral Once Fabrizio Colmenares MD        Followed by    [START ON 11/19/2024] predniSONE (DELTASONE) tablet 5 mg  5 mg Oral Once Fabrizio Colmenares MD        [START ON 11/20/2024] predniSONE (DELTASONE) tablet 5 mg  5 mg Oral Daily Fabiola Aleman APRN CNP        scopolamine (TRANSDERM) 72 hr patch 1 patch  1 patch Transdermal Q72H Abena Mccray NP   1 patch at 11/15/24 1325    And    scopolamine (TRANSDERM-SCOP) Patch in Place   Transdermal Q8H Abena Mccray NP        senna-docusate (SENOKOT-S/PERICOLACE) 8.6-50 MG per tablet 2 tablet  2 tablet Oral BID Barb Carr, NP   2 tablet at 11/16/24 2015    sevelamer carbonate (RENVELA) Packet 0.8 g  0.8 g Oral TID w/meals Abena Mccray  "Jerilyn, KADY   0.8 g at 11/16/24 1833    sodium chloride (PF) 0.9% PF flush 3 mL  3 mL Intravenous Q8H Fabrizio Colmenares MD   3 mL at 11/16/24 1835    [Held by provider] sulfamethoxazole-trimethoprim (BACTRIM) 400-80 MG per tablet 1 tablet  1 tablet Oral or Feeding Tube Daily Fabrizio Colmenares MD   1 tablet at 11/13/24 1952    tacrolimus (GENERIC EQUIVALENT) capsule 2 mg  2 mg Oral BID IS Fabrizio Colmenares MD   2 mg at 11/16/24 1802    [START ON 11/18/2024] valGANciclovir (VALCYTE) tablet 450 mg  450 mg Oral Once per day on Monday Thursday Blas Carney MD           Physical Exam:     Admit Weight: 77.6 kg (171 lb)    Current Vitals:   BP (!) 142/84 (BP Location: Left arm, Patient Position: Supine, Cuff Size: Adult Regular)   Pulse 68   Temp 98.2  F (36.8  C) (Oral)   Resp 16   Ht 1.63 m (5' 4.17\")   Wt 84.2 kg (185 lb 11.2 oz)   SpO2 100%   BMI 31.70 kg/m      Vital sign ranges:    Temp:  [98  F (36.7  C)-98.5  F (36.9  C)] 98.2  F (36.8  C)  Pulse:  [68-82] 68  Resp:  [16-18] 16  BP: (127-146)/(72-90) 142/84  SpO2:  [92 %-100 %] 100 %    General Appearance: in no apparent distress.   Skin: normal, warm, No rashes, induration, or jaundice  Heart: She appears adequately perfused  Lungs: Breathing comfortably on 1-2L NC  Abdomen: Soft. Incision closed with staples and open to air. PRISCILA drain x1 with serosanguinous output.   : calzada present; minimal output  Extremities: edema: none There is no skin breakdown.  Neurologic: Awake, alert; oriented x4. Tremor absent.    Frailty Scores          10/14/2024 5/21/2024   Frailty Scores   Final Score Not Frail Frail   Final Score Number 1 3      Details                   Data:   CMP  Recent Labs   Lab 11/17/24  0605 11/16/24  2209 11/16/24  1730 11/16/24  0948 11/15/24  1733 11/15/24  1728 11/15/24  0525 11/15/24  0348 11/14/24  0407 11/14/24  0404 11/13/24  0328 11/13/24  0116   NA  --   --   --  130*  --  125*  --  129*   < > 133*   < > 136   POTASSIUM  --   --   --  4.5 "  --  5.6*   < > 6.1*   < > 5.1   < > 3.7   CHLORIDE  --   --   --  96*  --  89*  --  93*   < > 98   < > 100   CO2  --   --   --  24  --  23  --  22   < > 23   < > 26   * 139*   < > 111*   < > 208*   < > 149*   < > 183*   < > 106*   BUN  --   --   --  41.0*  --  69.2*  --  57.7*   < > 31.9*   < > 12.4   CR  --   --   --  3.11*  --  3.66*  --  2.94*   < > 1.50*   < > 0.89   GFRESTIMATED  --   --   --  17*  --  14*  --  18*   < > 41*   < > 78   TIMOTHY  --   --   --  8.4*  --  8.2*  --  8.3*   < > 8.2*   < > 8.9   ICAW  --   --   --   --   --   --   --  4.4  --  4.5   < >  --    MAG  --   --   --  2.2  --   --   --  2.9*   < > 1.5*  --  1.7   PHOS  --   --   --  5.4*  --   --   --  9.2*  --  6.2*  --  3.7   AMYLASE  --   --   --   --   --   --   --   --   --  51  --  92   LIPASE  --   --   --   --   --   --   --   --   --  26  --   --    ALBUMIN  --   --   --  2.9*  --   --   --  3.0*   < > 2.6*   < > 3.0*   BILITOTAL  --   --   --  2.4*  --   --   --  3.1*   < > 3.9*   < > 6.3*   ALKPHOS  --   --   --  93  --   --   --  82   < > 70   < > 147   AST  --   --   --  362*  --   --   --  912*   < > 670*   < > 53*   ALT  --   --   --  851*  --   --   --  1,275*   < > 573*   < > 15    < > = values in this interval not displayed.     CBC  Recent Labs   Lab 11/16/24  0948 11/15/24  1728   HGB 7.8* 8.3*   WBC 8.2 8.1   PLT 47* 45*

## 2024-11-18 ENCOUNTER — APPOINTMENT (OUTPATIENT)
Dept: OCCUPATIONAL THERAPY | Facility: CLINIC | Age: 54
End: 2024-11-18
Attending: TRANSPLANT SURGERY
Payer: COMMERCIAL

## 2024-11-18 LAB
ALBUMIN SERPL BCG-MCNC: 3 G/DL (ref 3.5–5.2)
ALP SERPL-CCNC: 146 U/L (ref 40–150)
ALT SERPL W P-5'-P-CCNC: 478 U/L (ref 0–50)
ANION GAP SERPL CALCULATED.3IONS-SCNC: 11 MMOL/L (ref 7–15)
AST SERPL W P-5'-P-CCNC: 112 U/L (ref 0–45)
ATRIAL RATE - MUSE: 79 BPM
BACTERIA FLD CULT: NO GROWTH
BILIRUB DIRECT SERPL-MCNC: 1.93 MG/DL (ref 0–0.3)
BILIRUB SERPL-MCNC: 2.6 MG/DL
BUN SERPL-MCNC: 56.4 MG/DL (ref 6–20)
CALCIUM SERPL-MCNC: 8.5 MG/DL (ref 8.8–10.4)
CHLORIDE SERPL-SCNC: 95 MMOL/L (ref 98–107)
CREAT SERPL-MCNC: 3.27 MG/DL (ref 0.51–0.95)
DIASTOLIC BLOOD PRESSURE - MUSE: NORMAL MMHG
EGFRCR SERPLBLD CKD-EPI 2021: 16 ML/MIN/1.73M2
ERYTHROCYTE [DISTWIDTH] IN BLOOD BY AUTOMATED COUNT: 16.1 % (ref 10–15)
GLUCOSE BLDC GLUCOMTR-MCNC: 108 MG/DL (ref 70–99)
GLUCOSE BLDC GLUCOMTR-MCNC: 145 MG/DL (ref 70–99)
GLUCOSE BLDC GLUCOMTR-MCNC: 164 MG/DL (ref 70–99)
GLUCOSE BLDC GLUCOMTR-MCNC: 99 MG/DL (ref 70–99)
GLUCOSE SERPL-MCNC: 115 MG/DL (ref 70–99)
HCO3 SERPL-SCNC: 27 MMOL/L (ref 22–29)
HCT VFR BLD AUTO: 21.9 % (ref 35–47)
HGB BLD-MCNC: 7.3 G/DL (ref 11.7–15.7)
INTERPRETATION ECG - MUSE: NORMAL
MAGNESIUM SERPL-MCNC: 1.9 MG/DL (ref 1.7–2.3)
MCH RBC QN AUTO: 28.9 PG (ref 26.5–33)
MCHC RBC AUTO-ENTMCNC: 33.3 G/DL (ref 31.5–36.5)
MCV RBC AUTO: 87 FL (ref 78–100)
P AXIS - MUSE: 59 DEGREES
PERFORMING LABORATORY: NORMAL
PHOSPHATE SERPL-MCNC: 3.9 MG/DL (ref 2.5–4.5)
PLATELET # BLD AUTO: 53 10E3/UL (ref 150–450)
POTASSIUM SERPL-SCNC: 3.9 MMOL/L (ref 3.4–5.3)
PR INTERVAL - MUSE: 162 MS
PROT SERPL-MCNC: 5.4 G/DL (ref 6.4–8.3)
QRS DURATION - MUSE: 98 MS
QT - MUSE: 378 MS
QTC - MUSE: 433 MS
R AXIS - MUSE: -10 DEGREES
RBC # BLD AUTO: 2.53 10E6/UL (ref 3.8–5.2)
SODIUM SERPL-SCNC: 133 MMOL/L (ref 135–145)
SPECIMEN STATUS: NORMAL
SYSTOLIC BLOOD PRESSURE - MUSE: NORMAL MMHG
T AXIS - MUSE: 31 DEGREES
TACROLIMUS BLD-MCNC: 9.3 UG/L (ref 5–15)
TEST NAME: NORMAL
TME LAST DOSE: NORMAL H
TME LAST DOSE: NORMAL H
VENTRICULAR RATE- MUSE: 79 BPM
WBC # BLD AUTO: 5.2 10E3/UL (ref 4–11)

## 2024-11-18 PROCEDURE — 250N000013 HC RX MED GY IP 250 OP 250 PS 637: Performed by: TRANSPLANT SURGERY

## 2024-11-18 PROCEDURE — 80197 ASSAY OF TACROLIMUS: CPT | Performed by: NURSE PRACTITIONER

## 2024-11-18 PROCEDURE — 250N000013 HC RX MED GY IP 250 OP 250 PS 637: Performed by: NURSE PRACTITIONER

## 2024-11-18 PROCEDURE — 97535 SELF CARE MNGMENT TRAINING: CPT | Mod: GO

## 2024-11-18 PROCEDURE — 258N000003 HC RX IP 258 OP 636: Performed by: NURSE PRACTITIONER

## 2024-11-18 PROCEDURE — 36592 COLLECT BLOOD FROM PICC: CPT | Performed by: NURSE PRACTITIONER

## 2024-11-18 PROCEDURE — 85027 COMPLETE CBC AUTOMATED: CPT | Performed by: NURSE PRACTITIONER

## 2024-11-18 PROCEDURE — 82374 ASSAY BLOOD CARBON DIOXIDE: CPT | Performed by: NURSE PRACTITIONER

## 2024-11-18 PROCEDURE — 250N000013 HC RX MED GY IP 250 OP 250 PS 637: Performed by: PHYSICIAN ASSISTANT

## 2024-11-18 PROCEDURE — 80076 HEPATIC FUNCTION PANEL: CPT | Performed by: NURSE PRACTITIONER

## 2024-11-18 PROCEDURE — 250N000012 HC RX MED GY IP 250 OP 636 PS 637: Performed by: NURSE PRACTITIONER

## 2024-11-18 PROCEDURE — 250N000013 HC RX MED GY IP 250 OP 250 PS 637: Performed by: STUDENT IN AN ORGANIZED HEALTH CARE EDUCATION/TRAINING PROGRAM

## 2024-11-18 PROCEDURE — 250N000011 HC RX IP 250 OP 636: Mod: JZ | Performed by: NURSE PRACTITIONER

## 2024-11-18 PROCEDURE — 82947 ASSAY GLUCOSE BLOOD QUANT: CPT | Performed by: NURSE PRACTITIONER

## 2024-11-18 PROCEDURE — 82248 BILIRUBIN DIRECT: CPT | Performed by: NURSE PRACTITIONER

## 2024-11-18 PROCEDURE — 250N000011 HC RX IP 250 OP 636: Performed by: STUDENT IN AN ORGANIZED HEALTH CARE EDUCATION/TRAINING PROGRAM

## 2024-11-18 PROCEDURE — 120N000011 HC R&B TRANSPLANT UMMC

## 2024-11-18 PROCEDURE — 99233 SBSQ HOSP IP/OBS HIGH 50: CPT | Performed by: INTERNAL MEDICINE

## 2024-11-18 PROCEDURE — 83735 ASSAY OF MAGNESIUM: CPT | Performed by: NURSE PRACTITIONER

## 2024-11-18 PROCEDURE — 80053 COMPREHEN METABOLIC PANEL: CPT | Performed by: NURSE PRACTITIONER

## 2024-11-18 PROCEDURE — 84100 ASSAY OF PHOSPHORUS: CPT | Performed by: NURSE PRACTITIONER

## 2024-11-18 PROCEDURE — 250N000012 HC RX MED GY IP 250 OP 636 PS 637: Performed by: STUDENT IN AN ORGANIZED HEALTH CARE EDUCATION/TRAINING PROGRAM

## 2024-11-18 PROCEDURE — 82565 ASSAY OF CREATININE: CPT | Performed by: NURSE PRACTITIONER

## 2024-11-18 PROCEDURE — 250N000013 HC RX MED GY IP 250 OP 250 PS 637

## 2024-11-18 RX ORDER — BISACODYL 10 MG
10 SUPPOSITORY, RECTAL RECTAL ONCE
Status: COMPLETED | OUTPATIENT
Start: 2024-11-18 | End: 2024-11-18

## 2024-11-18 RX ORDER — PANTOPRAZOLE SODIUM 40 MG/1
40 TABLET, DELAYED RELEASE ORAL
Status: DISCONTINUED | OUTPATIENT
Start: 2024-11-18 | End: 2024-11-20 | Stop reason: HOSPADM

## 2024-11-18 RX ORDER — POLYETHYLENE GLYCOL 3350 17 G/17G
17 POWDER, FOR SOLUTION ORAL DAILY
Status: DISCONTINUED | OUTPATIENT
Start: 2024-11-18 | End: 2024-11-18

## 2024-11-18 RX ORDER — QUETIAPINE FUMARATE 25 MG/1
25 TABLET, FILM COATED ORAL
Status: DISCONTINUED | OUTPATIENT
Start: 2024-11-18 | End: 2024-11-20 | Stop reason: HOSPADM

## 2024-11-18 RX ADMIN — PREDNISONE 10 MG: 10 TABLET ORAL at 07:34

## 2024-11-18 RX ADMIN — VALGANCICLOVIR HYDROCHLORIDE 450 MG: 450 TABLET ORAL at 20:17

## 2024-11-18 RX ADMIN — OXYCODONE HYDROCHLORIDE 5 MG: 5 TABLET ORAL at 06:25

## 2024-11-18 RX ADMIN — ASPIRIN 325 MG ORAL TABLET 325 MG: 325 PILL ORAL at 07:35

## 2024-11-18 RX ADMIN — SODIUM CHLORIDE 20 MG: 9 INJECTION, SOLUTION INTRAVENOUS at 10:20

## 2024-11-18 RX ADMIN — NYSTATIN 500000 UNITS: 100000 SUSPENSION ORAL at 16:03

## 2024-11-18 RX ADMIN — TACROLIMUS 2.5 MG: 1 CAPSULE ORAL at 18:18

## 2024-11-18 RX ADMIN — Medication 1 TABLET: at 12:39

## 2024-11-18 RX ADMIN — SEVELAMER CARBONATE 0.8 G: 800 POWDER, FOR SUSPENSION ORAL at 18:19

## 2024-11-18 RX ADMIN — BISACODYL 10 MG: 10 SUPPOSITORY RECTAL at 13:27

## 2024-11-18 RX ADMIN — PANTOPRAZOLE SODIUM 40 MG: 40 TABLET, DELAYED RELEASE ORAL at 16:03

## 2024-11-18 RX ADMIN — SENNOSIDES AND DOCUSATE SODIUM 2 TABLET: 8.6; 5 TABLET ORAL at 20:17

## 2024-11-18 RX ADMIN — INSULIN ASPART 1 UNITS: 100 INJECTION, SOLUTION INTRAVENOUS; SUBCUTANEOUS at 12:45

## 2024-11-18 RX ADMIN — BUMETANIDE 3 MG: 0.25 INJECTION INTRAMUSCULAR; INTRAVENOUS at 07:33

## 2024-11-18 RX ADMIN — SEVELAMER CARBONATE 0.8 G: 800 POWDER, FOR SUSPENSION ORAL at 07:35

## 2024-11-18 RX ADMIN — METHOCARBAMOL TABLETS 750 MG: 750 TABLET, COATED ORAL at 20:29

## 2024-11-18 RX ADMIN — NYSTATIN 500000 UNITS: 100000 SUSPENSION ORAL at 20:16

## 2024-11-18 RX ADMIN — Medication 5 MG: at 18:19

## 2024-11-18 RX ADMIN — NYSTATIN 500000 UNITS: 100000 SUSPENSION ORAL at 12:40

## 2024-11-18 RX ADMIN — MYCOPHENOLATE MOFETIL 750 MG: 250 CAPSULE ORAL at 07:34

## 2024-11-18 RX ADMIN — POLYETHYLENE GLYCOL 3350 17 G: 17 POWDER, FOR SOLUTION ORAL at 20:17

## 2024-11-18 RX ADMIN — POLYETHYLENE GLYCOL 3350 17 G: 17 POWDER, FOR SOLUTION ORAL at 07:34

## 2024-11-18 RX ADMIN — AMLODIPINE BESYLATE 5 MG: 5 TABLET ORAL at 07:34

## 2024-11-18 RX ADMIN — MYCOPHENOLATE MOFETIL 750 MG: 250 CAPSULE ORAL at 20:17

## 2024-11-18 RX ADMIN — PANTOPRAZOLE SODIUM 40 MG: 40 TABLET, DELAYED RELEASE ORAL at 07:35

## 2024-11-18 RX ADMIN — SENNOSIDES AND DOCUSATE SODIUM 2 TABLET: 8.6; 5 TABLET ORAL at 07:35

## 2024-11-18 RX ADMIN — ANTACID TABLETS 500 MG: 500 TABLET, CHEWABLE ORAL at 13:40

## 2024-11-18 RX ADMIN — NYSTATIN 500000 UNITS: 100000 SUSPENSION ORAL at 07:34

## 2024-11-18 RX ADMIN — TACROLIMUS 2.5 MG: 1 CAPSULE ORAL at 07:35

## 2024-11-18 RX ADMIN — SEVELAMER CARBONATE 0.8 G: 800 POWDER, FOR SUSPENSION ORAL at 12:40

## 2024-11-18 RX ADMIN — ESCITALOPRAM OXALATE 20 MG: 20 TABLET ORAL at 07:34

## 2024-11-18 ASSESSMENT — ACTIVITIES OF DAILY LIVING (ADL)
ADLS_ACUITY_SCORE: 16.25
ADLS_ACUITY_SCORE: 16.25
ADLS_ACUITY_SCORE: 0
ADLS_ACUITY_SCORE: 16.25
ADLS_ACUITY_SCORE: 0
ADLS_ACUITY_SCORE: 16.25
ADLS_ACUITY_SCORE: 0
ADLS_ACUITY_SCORE: 0
ADLS_ACUITY_SCORE: 16.25
ADLS_ACUITY_SCORE: 0
ADLS_ACUITY_SCORE: 0
ADLS_ACUITY_SCORE: 16.25
ADLS_ACUITY_SCORE: 0
ADLS_ACUITY_SCORE: 16.25
ADLS_ACUITY_SCORE: 16.25

## 2024-11-18 NOTE — PLAN OF CARE
Goal Outcome Evaluation:      Plan of Care Reviewed With: patient    Overall Patient Progress: improvingOverall Patient Progress: improving  [id number]  Neuro:  Pt. Intermittently confused, restless at times. Sitter at bedside.   Behavior: Pt. calm & coop  Activity:  Pt. up with 1 and walker.    Vital:  Pt. hypertensive 130's-140's/60's-70's, AOVSS on RA.  BG:  ACHS.  2am 108.  LDAs: Right internal jugular, 2 PIVs SL. CVC HD line. Rt. PRISCILA with 250cc bloody drainage.    Cardiac:  WNL  Respiratory: LS clear bilat.  GI/:  Pt. voiding without difficulty. No stools this shift.  Skin: Abdominal incision stapled and LAKSHMI.  Pain/Nausea:  Abdominal pain managed with prn Oxy. x2Pt. denies nausea.   Diet: Renal with 2L fluid restriction with calorie counts.    Labs/Imaging:  Morning labs pending.    Plan: Continue to follow POC and notify team with change in status.

## 2024-11-18 NOTE — PLAN OF CARE
"  Problem: Risk for Delirium  Goal: Improved Behavioral Control  Outcome: Progressing  Intervention: Minimize Safety Risk  Recent Flowsheet Documentation  Taken 11/18/2024 0800 by Chaya Varma RN  Enhanced Safety Measures: room near unit station   Goal Outcome Evaluation: Improving       Vitals: /64 (BP Location: Left arm)   Pulse 73   Temp 98.6  F (37  C) (Oral)   Resp 18   Ht 1.63 m (5' 4.17\")   Wt 79 kg (174 lb 1.6 oz)   SpO2 96%   BMI 29.72 kg/m      Endocrine: Glucose 99,145 (post prandial)  Labs: Improving labs, creatinine dropped to 3.27  Pain: Improving abdominal discomfort.  PRN's: Tums.  Diet:  Renal diet, fair appetite.   LDA: R TL internal jugular, L CVC, PIV X2, PRISCILA.  GI: No BM, taking laxatives, passing gas, reflux (Protonix increased to BID) suppository given.  : Improving urine output, no HD today.  Skin: Abdominal incision with staples, PRISCILA.  Neuro: Confusion better, alert and oriented, states some hallucinations at night when closing her eyes. New order for Seroquel at bedtime.  Mobility: Stand by assist of 1, uses walker.  Education: Medication card lab book updated.  Plan: Continue with plan of care. Central line to be DC'd.                 "

## 2024-11-18 NOTE — PROGRESS NOTES
Patient oriented to location and person this morning, RN educated patient on call light use, Bedside attendant removed, bed alarm on.

## 2024-11-18 NOTE — PROGRESS NOTES
[facility]  Transplant Nephrology Progress Note  Date of Admission:  11/13/2024  Today's Date: 11/18/2024    Recommendations:   - No acute indications for dialysis.  Hopefully, has turned the corner.  - Would hold further diuretics for now with very good urine output.  - If further improvement in creatinine over the next couple of days, will look at removing temporary dialysis catheter.  - Okay to restart Bactrim if creatinine is further improved tomorrow.    Assessment & Plan   # RUDY: Trend down in creatinine without dialysis.  Very good urine output.  No acute indications for dialysis.   - RUDY felt secondary to liver transplant with hemodynamic changes and hypotension. Renal US 11/13/24 without hydronephrosis or signs of obstruction.   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  Transaminases Trend down.  Followed by Transplant Surgery.    # Immunosuppression: Tacrolimus immediate release (goal 8-10), Mycophenolate mofetil (dose 750 mg every 12 hours), and Methylprednisolone (dose taper)   - Induction with Recent Transplant:  Per Liver Tx Protocol   - Continue with intensive monitoring of immunosuppression for efficacy and toxicity.   - Goal tacrolimus level lower due to RUDY.   - Changes: Not at this time; Management per Transplant Surgery.    # Infection Prophylaxis:   - PJP: Sulfa/TMP (Bactrim) (held due to RUDY and hyperkalemia)  - CMV: Valganciclovir (Valcyte)  - Thrush: Nystatin (Mycostatin) swish and swallow  - Fungal: None      - CMV IgG Ab High Risk Discordance (D+/R-): No  CMV Serostatus: Positive  - EBV IgG Ab High Risk Discordance (D+/R-): No  EBV Serostatus: Positive    # Hypertension: Controlled;  Goal BP: < 140/90 (Hospitalization goal)   - Changes: Yes - With marked improvement in urine output, would recommend holding  diuretics for now.    # Elevated Blood Glucose: Glucose generally running ~ 120-200. On insulin.    # Anemia in Chronic Disease/Surgery: Hgb: Stable, low      MAGUI: No   - Iron studies: Not checked recently    # Thrombocytopenia: Stable, mildly low platelet level in the 50K range.    # Mineral Bone Disorder:   - Vitamin D; level: Low normal        On supplement: No  - Calcium; level: Normal        On supplement: No  - Phosphorus; level: Normal        On supplement: No    # Electrolytes:   - Potassium; level: Normal        On supplement: No.   - Magnesium; level: Normal        On supplement: No  - Bicarbonate; level: Normal        On supplement: No  - Sodium; level: Low; Increased with improving kidney function.    # Other Significant PMH:   - Depression: lexapro on hold given prolonged QTc     # Transplant History:  Etiology of Organ Failure: Alcohol-related liver disease  Tx: Liver Tx  Transplant: 11/13/2024 (Liver)  Significant changes in immunosuppression: Slightly lower tacrolimus level goal due to RUDY.  Significant transplant-related complications: RUDY    Recommendations were communicated to the primary team verbally.    Corky Espana MD  Transplant Nephrology    Interval History  Ms. Balderramas creatinine is 3.27 (11/18 1002); Trend down.  Very good urine output.  Trend down in transaminases.  Other significant labs/tests/vitals: Stable electrolytes.  Stable hemoglobin.  Stable platelet level.  No new events overnight.  No chest pain or shortness of breath.  Some leg swelling, but improved.  Some nausea, but no vomiting.  Bowel movements are none.  No fever, sweats or chills.    Review of Systems   4 point ROS was obtained and negative except as noted in the Interval History.    MEDICATIONS:  Current Facility-Administered Medications   Medication Dose Route Frequency Provider Last Rate Last Admin    amLODIPine (NORVASC) tablet 5 mg  5 mg Oral Daily Barb Carr NP   5 mg at 11/18/24 0734    aspirin  (ASA) tablet 325 mg  325 mg Oral Daily Fabrizio Colmenares MD   325 mg at 11/18/24 0735    [Held by provider] bumetanide (BUMEX) tablet 3 mg  3 mg Oral BID Anabela Bae PA-C        escitalopram (LEXAPRO) tablet 20 mg  20 mg Oral Daily Barb Carr NP   20 mg at 11/18/24 0734    insulin aspart (NovoLOG) injection (RAPID ACTING)  1-7 Units Subcutaneous TID  Elizabeth Landin MD   1 Units at 11/18/24 1245    insulin aspart (NovoLOG) injection (RAPID ACTING)  1-5 Units Subcutaneous At Bedtime Elizabeth Landin MD   1 Units at 11/15/24 2158    Lidocaine (LIDOCARE) 4 % Patch 1-2 patch  1-2 patch Transdermal Q24H Abena Mccray NP   2 patch at 11/16/24 0846    melatonin tablet 5 mg  5 mg Oral QPM Barb Carr NP   5 mg at 11/17/24 1800    multivitamin w/minerals (THERA-VIT-M) tablet 1 tablet  1 tablet Oral Daily Fabrizio Colmenares MD   1 tablet at 11/18/24 1239    mycophenolate (GENERIC EQUIVALENT) capsule 750 mg  750 mg Oral BID Fabrizio Colmenares MD   750 mg at 11/18/24 0734    nystatin (MYCOSTATIN) suspension 500,000 Units  500,000 Units Oral 4x Daily Abena Mccray NP   500,000 Units at 11/18/24 1240    pantoprazole (PROTONIX) EC tablet 40 mg  40 mg Oral QAM  Fabrizio Colmenares MD   40 mg at 11/18/24 0735    polyethylene glycol (MIRALAX) Packet 17 g  17 g Oral BID Barb Carr NP   17 g at 11/18/24 0734    [START ON 11/20/2024] predniSONE (DELTASONE) tablet 5 mg  5 mg Oral Daily Fabiola Aleman APRN CNP        [START ON 11/19/2024] predniSONE (DELTASONE) tablet 5 mg  5 mg Oral Once Fabrizio Colmenares MD        senna-docusate (SENOKOT-S/PERICOLACE) 8.6-50 MG per tablet 2 tablet  2 tablet Oral BID Barb Carr NP   2 tablet at 11/18/24 0735    sevelamer carbonate (RENVELA) Packet 0.8 g  0.8 g Oral TID w/meals Abena Mccray NP   0.8 g at 11/18/24 1240    sodium chloride (PF) 0.9% PF flush 3 mL  3 mL Intravenous Q8H Fabrizio Colmenares MD   3 mL at 11/18/24 1247    [Held by provider]  "sulfamethoxazole-trimethoprim (BACTRIM) 400-80 MG per tablet 1 tablet  1 tablet Oral or Feeding Tube Daily Fabrizio Colmenares MD   1 tablet at 24    tacrolimus (GENERIC EQUIVALENT) capsule 2.5 mg  2.5 mg Oral BID IS Barb Carr, NP   2.5 mg at 24 0735    valGANciclovir (VALCYTE) tablet 450 mg  450 mg Oral Once per day on  Blas Carney MD         Current Facility-Administered Medications   Medication Dose Route Frequency Provider Last Rate Last Admin       Physical Exam   Temp  Av  F (36.7  C)  Min: 97.5  F (36.4  C)  Max: 98.4  F (36.9  C)  Arterial Line BP  Min: 119/56  Max: 157/65  Arterial Line MAP (mmHg)  Av.9 mmHg  Min: 75 mmHg  Max: 96 mmHg      Pulse  Av.1  Min: 68  Max: 95 Resp  Av.5  Min: 8  Max: 21  SpO2  Av.6 %  Min: 92 %  Max: 100 %    CVP (mmHg): 1 mmHgBP 116/64 (BP Location: Left arm)   Pulse 73   Temp 98.6  F (37  C) (Oral)   Resp 18   Ht 1.63 m (5' 4.17\")   Wt 79 kg (174 lb 1.6 oz)   SpO2 96%   BMI 29.72 kg/m     Date 11/15/24 0700 - 24 0659   Shift 3397-6495 4873-7250 6863-1216 24 Hour Total   INTAKE   P.O. 118   118   I.V. 10   10   Shift Total(mL/kg) 128(1.57)   128(1.57)   OUTPUT   Urine 30   30   Drains 100   100   Shift Total(mL/kg) 130(1.59)   130(1.59)   Weight (kg) 81.7 81.7 81.7 81.7      Admit Weight: 77.6 kg (171 lb)     GENERAL APPEARANCE: alert and no distress  HENT: mouth without ulcers or lesions  RESP: lungs clear to auscultation - no rales, rhonchi or wheezes  CV: regular rhythm, normal rate, no rub, no murmur  EDEMA: trace LE and dependent edema bilaterally  ABDOMEN: soft, nondistended, diffusely tender, bowel sounds normal  MS: extremities normal - no gross deformities noted, no evidence of inflammation in joints, no muscle tenderness  SKIN: no rash  ACCESS: left non-tunneled internal jugular catheter.    Data   All labs reviewed by me.  CMP  Recent Labs   Lab 24  1242 24  1002 " 11/18/24  0745 11/18/24  0213 11/17/24  1647 11/17/24  1453 11/17/24  1147 11/17/24  0615 11/16/24  1730 11/16/24  0948 11/15/24  1733 11/15/24  1728 11/15/24  0525 11/15/24  0348   NA  --  133*  --   --   --  128*  --   --   --  130*  --  125*  --  129*   POTASSIUM  --  3.9  --   --   --  4.8  --   --   --  4.5  --  5.6*   < > 6.1*   CHLORIDE  --  95*  --   --   --  94*  --   --   --  96*  --  89*  --  93*   CO2  --  27  --   --   --  24  --   --   --  24  --  23  --  22   ANIONGAP  --  11  --   --   --  10  --   --   --  10  --  13  --  14   * 115* 99 108*   < > 171*   < >  --    < > 111*   < > 208*   < > 149*   BUN  --  56.4*  --   --   --  57.0*  --   --   --  41.0*  --  69.2*  --  57.7*   CR  --  3.27*  --   --   --  3.96*  --   --   --  3.11*  --  3.66*  --  2.94*   GFRESTIMATED  --  16*  --   --   --  13*  --   --   --  17*  --  14*  --  18*   TIMOTHY  --  8.5*  --   --   --  8.3*  --   --   --  8.4*  --  8.2*  --  8.3*   MAG  --  1.9  --   --   --   --   --  2.3  --  2.2  --   --   --  2.9*   PHOS  --  3.9  --   --   --   --   --  5.5*  --  5.4*  --   --   --  9.2*   PROTTOTAL  --  5.4*  --   --   --   --   --  5.2*  --  5.5*  --   --   --  5.8*   ALBUMIN  --  3.0*  --   --   --   --   --  2.9*  --  2.9*  --   --   --  3.0*   BILITOTAL  --  2.6*  --   --   --   --   --  2.2*  --  2.4*  --   --   --  3.1*   ALKPHOS  --  146  --   --   --   --   --  96  --  93  --   --   --  82   AST  --  112*  --   --   --   --   --  237*  --  362*  --   --   --  912*   ALT  --  478*  --   --   --   --   --  695*  --  851*  --   --   --  1,275*    < > = values in this interval not displayed.     CBC  Recent Labs   Lab 11/18/24  1002 11/17/24  0615 11/16/24  0948 11/15/24  1728   HGB 7.3* 7.1* 7.8* 8.3*   WBC 5.2 6.5 8.2 8.1   RBC 2.53* 2.48* 2.78* 2.88*   HCT 21.9* 21.5* 24.4* 25.1*   MCV 87 87 88 87   MCH 28.9 28.6 28.1 28.8   MCHC 33.3 33.0 32.0 33.1   RDW 16.1* 16.3* 17.2* 17.3*   PLT 53* 48* 47* 45*     INR  Recent  Labs   Lab 11/14/24  1642 11/14/24  1010 11/14/24  0404 11/13/24  1515 11/13/24  0931 11/13/24  0629 11/13/24  0116   INR 1.77* 1.44* 1.55* 1.49* 1.66* 1.79* 1.89*   PTT  --  33  --   --  39* 47* 43*     ABG  Recent Labs   Lab 11/13/24  0931 11/13/24  0802 11/13/24  0700 11/13/24  0558   PH 7.37 7.46* 7.47* 7.48*   PCO2 52* 40 39 33*   PO2 191* 116* 130* 150*   HCO3 30* 28 28 24   O2PER 50 50.0 40.0 33.0      Urine Studies  Recent Labs   Lab Test 11/14/24  0808 11/13/24  0124 05/21/24  0759   COLOR Yellow Light Yellow Yellow   APPEARANCE Slightly Cloudy* Clear Clear   URINEGLC Negative Negative Negative   URINEBILI Small* Negative Negative   URINEKETONE Negative Negative Negative   SG 1.023 1.005 1.019   UBLD Moderate* Negative Negative   URINEPH 5.0 7.5* 6.5   PROTEIN 30* Negative Negative   NITRITE Negative Negative Negative   LEUKEST Negative Negative Trace*   RBCU 28* 0 <1   WBCU 2 <1 7*     No lab results found.  PTH  No lab results found.  Iron Studies  Recent Labs   Lab Test 05/21/24  0757   IRON 47      IRONSAT 13*   RANDOLPH 31       IMAGING:  All imaging studies reviewed by me.

## 2024-11-18 NOTE — PROGRESS NOTES
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 She was hospitalized in November 2023 for acute hepatitis 2/2 to alcohol use at which time she was diagnosed with underlying cirrhosis. Ms. Sutherland has remained abstinent from alcohol for the last year. MELD at time of transplant 21. She underwent DDLT w/o stent on 11/13/2024 with Dr. Blas Carney.    Changes today:  - Bumex 3 mg PO Q12H  - No HD today   - Follow up with RD regarding patient's calorie counts  ____________________________________________    s/p DDLT (no stent) 11/13/24: Transaminases remain elevated, trending down. Alk phos and TB increased. Titrate tac to ~8. Tac dose increased yesterday.    - Continue  mg daily.   - Repeat Liver US 11/15: Patent Doppler, stable hematoma    Immunosuppressed status secondary to medications:  Induction: Steroid taper and basiliximab POD 1 and POD 5 per renal sparing protocol.   Maintenance:    -  mg BID   - Tacrolimus 2 mg BID. Goal level 6-8 d/t RUDY.    - Prednisone 5 mg daily following taper    Neuro/Psych:  Depression:Lexapro resumed 11/17.   Acute post-op pain: Continue PRN oxycodone, PRN Robaxin, Atarax, Lidoderm patches.   Post-operative delirium:  Delirium precautions. Schedule Melatonin at 1800 daily.     Hematology:   Acute blood loss anemia: Hgb 7.3, continue to monitor.   Thrombocytopenia: Plt 53, continue to monitor.     Cardiorespiratory:   Hypoxemia: Resolved. On RA. Continue IS/CDB.  Right pleural effusion: Noted on US 11/15, small volume.   Prolonged QTc: QTc 496 on EKG 11/13.    - Avoid QTc prolonging meds (Zofran discontinued).    - Repeat EKG 11/17: 433  Hypertension: Continue amlodipine 5 mg daily     GI/Nutrition:   At risk for malnutrition: Nutrition consulted.    - Renal diet, encourage optimal intake   Nausea;  Constipation: Continue scheduled Miralax and senna-doc; doses escalated 11/16.    - Scopolamine patch discontinued 11/17 due to delirium, small dose PRN Compazine for nausea (avoid Zofran with prolonged QTc).    Endocrine:   Steroid induced hyperglycemia: Hgb A1c 4.6 05/2024.   - Continue sliding scale insulin.     Fluid/Electrolytes:   RUDY: Likely r/t hemodynamic changes intra-op. Transplant Nephrology consulted. Minimal response from Lasix. Renal sparing protocol as above. Renal US WNL. Good response to diuretic.   - Tunneled line by IR 11/15  -  HD 11/15, no HD 11/16 or 11/17 or 11/18  Hyperkalemia: Resolved   - Hold Bactrim   - Renal diet    - Bumex ordered  Hyperphosphatemia: Resolved. On phos binder.    - Renvela with meals.   Hyponatremia: Sodium 130      : No calzada. Bladder scans negative for retention    Infectious disease: No issues    Prophylaxis: DVT(mechanical), fall, GI (pantoprazole), fungal (Nystatin), viral (valganciclovir), pneumocystis (Bactrim, on hold for RUDY)    Disposition: 7A; therapy recommending ARU versus home     ADELFO/Fellow/Resident Provider: RAFI Eldridge [id number]/chaya    Faculty: Nel Guerrero M.D.    __________________________________________________________________  Transplant History:    11/13/2024 (Liver), Postoperative day: 5     Interval History:   Overnight events: No acute events. Intake improved. Good UO. No BM since surgery. Pain controlled with medications.     Intermittently confused. No confusion on exam when seen today. Bedside attendant discontinued this morning.     ROS:   A 10-point review of systems was negative except as noted above.    Curent Meds:  Current Facility-Administered Medications   Medication Dose Route Frequency Provider Last Rate Last Admin    amLODIPine (NORVASC) tablet 5 mg  5 mg Oral Daily Barb Carr NP   5 mg at 11/18/24 0734    aspirin (ASA) tablet 325 mg  325 mg Oral Daily Fabrizio Colmenares MD   325 mg at 11/18/24 5570    bisacodyl  (DULCOLAX) suppository 10 mg  10 mg Rectal Once Abena Mccray NP        bumetanide (BUMEX) injection 3 mg  3 mg Intravenous Q12H Rodríguez Mckeon MD   3 mg at 11/18/24 0733    escitalopram (LEXAPRO) tablet 20 mg  20 mg Oral Daily Barb Carr NP   20 mg at 11/18/24 0734    insulin aspart (NovoLOG) injection (RAPID ACTING)  1-7 Units Subcutaneous TID AC Elizabeth Landin MD   1 Units at 11/17/24 1650    insulin aspart (NovoLOG) injection (RAPID ACTING)  1-5 Units Subcutaneous At Bedtime Elizabeth Landin MD   1 Units at 11/15/24 2158    Lidocaine (LIDOCARE) 4 % Patch 1-2 patch  1-2 patch Transdermal Q24H Abena Mccray NP   2 patch at 11/16/24 0846    melatonin tablet 5 mg  5 mg Oral QPM Barb Carr NP   5 mg at 11/17/24 1800    multivitamin w/minerals (THERA-VIT-M) tablet 1 tablet  1 tablet Oral Daily Fabrizio Colmenares MD   1 tablet at 11/17/24 1207    mycophenolate (GENERIC EQUIVALENT) capsule 750 mg  750 mg Oral BID Fabrizio Colmenares MD   750 mg at 11/18/24 0734    nystatin (MYCOSTATIN) suspension 500,000 Units  500,000 Units Oral 4x Daily Abena Mccray NP   500,000 Units at 11/18/24 0734    pantoprazole (PROTONIX) EC tablet 40 mg  40 mg Oral QAM AC Fabrizio Colmenares MD   40 mg at 11/18/24 0735    polyethylene glycol (MIRALAX) Packet 17 g  17 g Oral BID Barb Carr NP   17 g at 11/18/24 0734    [START ON 11/20/2024] predniSONE (DELTASONE) tablet 5 mg  5 mg Oral Daily Fabiola Aleman APRN CNP        [START ON 11/19/2024] predniSONE (DELTASONE) tablet 5 mg  5 mg Oral Once Fabrizio Colmenares MD        senna-docusate (SENOKOT-S/PERICOLACE) 8.6-50 MG per tablet 2 tablet  2 tablet Oral BID Barb Carr NP   2 tablet at 11/18/24 0735    sevelamer carbonate (RENVELA) Packet 0.8 g  0.8 g Oral TID w/meals Abena Mccray NP   0.8 g at 11/18/24 0735    sodium chloride (PF) 0.9% PF flush 3 mL  3 mL Intravenous Q8H Fabrizio Colmenares MD   3 mL at 11/18/24 0300    [Held by  "provider] sulfamethoxazole-trimethoprim (BACTRIM) 400-80 MG per tablet 1 tablet  1 tablet Oral or Feeding Tube Daily Fabrizio Colmenares MD   1 tablet at 11/13/24 1952    tacrolimus (GENERIC EQUIVALENT) capsule 2.5 mg  2.5 mg Oral BID IS Barb Carr NP   2.5 mg at 11/18/24 0735    valGANciclovir (VALCYTE) tablet 450 mg  450 mg Oral Once per day on Monday Thursday Blas Carney MD           Physical Exam:     Admit Weight: 77.6 kg (171 lb)    Current Vitals:   /65 (BP Location: Right arm)   Pulse 72   Temp 98.4  F (36.9  C) (Oral)   Resp 18   Ht 1.63 m (5' 4.17\")   Wt 79 kg (174 lb 1.6 oz)   SpO2 96%   BMI 29.72 kg/m      Vital sign ranges:    Temp:  [98.4  F (36.9  C)] 98.4  F (36.9  C)  Pulse:  [72-78] 72  Resp:  [16-18] 18  BP: (130-147)/(65-75) 130/65  SpO2:  [95 %-100 %] 96 %    General Appearance: in no apparent distress.   Skin: normal, warm, No rashes, induration, or jaundice  Heart: She appears adequately perfused  Lungs: Breathing comfortably on RA  Abdomen: Soft. Incision closed with staples and open to air. PRISCILA drain x1 with serosanguinous output.   : calzada present; minimal output  Extremities: edema: none There is no skin breakdown.  Neurologic: Awake, alert; oriented x4. Tremor absent.    Frailty Scores          10/14/2024 5/21/2024   Frailty Scores   Final Score Not Frail Frail   Final Score Number 1 3      Details                   Data:   CMP  Recent Labs   Lab 11/18/24  1002 11/18/24  0745 11/18/24  0213 11/17/24  1647 11/17/24  1453 11/17/24  1147 11/17/24  0615 11/16/24  1730 11/16/24  0948 11/15/24  0525 11/15/24  0348 11/14/24  0407 11/14/24  0404 11/13/24  0328 11/13/24  0116   NA  --   --   --   --  128*  --   --   --  130*   < > 129*   < > 133*   < > 136   POTASSIUM  --   --   --   --  4.8  --   --   --  4.5   < > 6.1*   < > 5.1   < > 3.7   CHLORIDE  --   --   --   --  94*  --   --   --  96*   < > 93*   < > 98   < > 100   CO2  --   --   --   --  24  --   --   --  24   " < > 22   < > 23   < > 26   GLC  --  99 108*   < > 171*   < >  --    < > 111*   < > 149*   < > 183*   < > 106*   BUN  --   --   --   --  57.0*  --   --   --  41.0*   < > 57.7*   < > 31.9*   < > 12.4   CR  --   --   --   --  3.96*  --   --   --  3.11*   < > 2.94*   < > 1.50*   < > 0.89   GFRESTIMATED  --   --   --   --  13*  --   --   --  17*   < > 18*   < > 41*   < > 78   TIMOTHY  --   --   --   --  8.3*  --   --   --  8.4*   < > 8.3*   < > 8.2*   < > 8.9   ICAW  --   --   --   --   --   --   --   --   --   --  4.4  --  4.5   < >  --    MAG 1.9  --   --   --   --   --  2.3  --  2.2  --  2.9*   < > 1.5*  --  1.7   PHOS 3.9  --   --   --   --   --  5.5*  --  5.4*  --  9.2*  --  6.2*  --  3.7   AMYLASE  --   --   --   --   --   --   --   --   --   --   --   --  51  --  92   LIPASE  --   --   --   --   --   --   --   --   --   --   --   --  26  --   --    ALBUMIN 3.0*  --   --   --   --   --  2.9*  --  2.9*  --  3.0*   < > 2.6*   < > 3.0*   BILITOTAL 2.6*  --   --   --   --   --  2.2*  --  2.4*  --  3.1*   < > 3.9*   < > 6.3*   ALKPHOS 146  --   --   --   --   --  96  --  93  --  82   < > 70   < > 147   *  --   --   --   --   --  237*  --  362*  --  912*   < > 670*   < > 53*   *  --   --   --   --   --  695*  --  851*  --  1,275*   < > 573*   < > 15    < > = values in this interval not displayed.     CBC  Recent Labs   Lab 11/18/24  1002 11/17/24  0615   HGB 7.3* 7.1*   WBC 5.2 6.5   PLT 53* 48*

## 2024-11-18 NOTE — PROGRESS NOTES
Calorie Count  Intake recorded for: 11/17  Total Kcals: 1195 Total Protein: 53g  Kcals from Hospital Food: 1195   Protein: 53g  Kcals from Outside Food (average):0 Protein: 0g  # Meals Ordered from Kitchen: 3 meals  # Meals Recorded: 3 meals (First - 100% 1 Wolof toast w/ syrup & butter, 1 4 oz cranberry juice)      (Second - 50% chicken noodle soup, diced pears)      (Third - 25% beef pot roast w/ brown gravy, wheat toast)  # Supplements Recorded: 2 supplements (100% 2 Ensure Enlive)

## 2024-11-18 NOTE — PLAN OF CARE
"Goal Outcome Evaluation:      Plan of Care Reviewed With: patient    Overall Patient Progress: no changeOverall Patient Progress: no change    Outcome Evaluation: Poor appetite. Intermittently confused. Pain 8/10.    /75 (BP Location: Right arm)   Pulse 73   Temp 98.4  F (36.9  C) (Oral)   Resp 16   Ht 1.63 m (5' 4.17\")   Wt 84.2 kg (185 lb 11.2 oz)   SpO2 100%   BMI 31.70 kg/m       Shift: 2156-3813  Isolation Status: none  VS: stable on room air, afebrile  Neuro: Aox4 but intermittently confused/reporting hallucinations   Behaviors: calm and cooperative  BG: ACHS (166, 162)  Labs: AM labs reviewed   Respiratory: WDL  Cardiac: WDL  Pain/Nausea: 8/10 abdominal pain, denies nausea  PRN: oxy x1  Diet: renal/fluid restriction   IV Access: R. PIV, L. PIV, CVC HD, internal jugular   Infusion(s): none  Lines/Drains: R. PRISCILA  GI/: Voiding adequately, no BM  Skin: abdominal incision stapled open to air  Mobility: assist 1 with walker   Plan: Continue plan of care and notify team with any changes   "

## 2024-11-19 ENCOUNTER — TELEPHONE (OUTPATIENT)
Dept: TRANSPLANT | Facility: CLINIC | Age: 54
End: 2024-11-19
Payer: COMMERCIAL

## 2024-11-19 ENCOUNTER — APPOINTMENT (OUTPATIENT)
Dept: PHYSICAL THERAPY | Facility: CLINIC | Age: 54
End: 2024-11-19
Attending: TRANSPLANT SURGERY
Payer: COMMERCIAL

## 2024-11-19 DIAGNOSIS — Z94.4 LIVER REPLACED BY TRANSPLANT (H): Primary | ICD-10-CM

## 2024-11-19 LAB
ABO/RH(D): NORMAL
ALBUMIN SERPL BCG-MCNC: 2.7 G/DL (ref 3.5–5.2)
ALP SERPL-CCNC: 145 U/L (ref 40–150)
ALT SERPL W P-5'-P-CCNC: 315 U/L (ref 0–50)
ANION GAP SERPL CALCULATED.3IONS-SCNC: 10 MMOL/L (ref 7–15)
ANTIBODY SCREEN: NEGATIVE
AST SERPL W P-5'-P-CCNC: 63 U/L (ref 0–45)
BILIRUB DIRECT SERPL-MCNC: 1.55 MG/DL (ref 0–0.3)
BILIRUB SERPL-MCNC: 2.2 MG/DL
BLD PROD TYP BPU: NORMAL
BLD PROD TYP BPU: NORMAL
BLOOD COMPONENT TYPE: NORMAL
BLOOD COMPONENT TYPE: NORMAL
BUN SERPL-MCNC: 46.2 MG/DL (ref 6–20)
CALCIUM SERPL-MCNC: 8.4 MG/DL (ref 8.8–10.4)
CHLORIDE SERPL-SCNC: 99 MMOL/L (ref 98–107)
CODING SYSTEM: NORMAL
CODING SYSTEM: NORMAL
CREAT SERPL-MCNC: 2.08 MG/DL (ref 0.51–0.95)
CROSSMATCH: NORMAL
CROSSMATCH: NORMAL
EGFRCR SERPLBLD CKD-EPI 2021: 28 ML/MIN/1.73M2
ERYTHROCYTE [DISTWIDTH] IN BLOOD BY AUTOMATED COUNT: 16.8 % (ref 10–15)
GLUCOSE BLDC GLUCOMTR-MCNC: 101 MG/DL (ref 70–99)
GLUCOSE BLDC GLUCOMTR-MCNC: 124 MG/DL (ref 70–99)
GLUCOSE BLDC GLUCOMTR-MCNC: 124 MG/DL (ref 70–99)
GLUCOSE BLDC GLUCOMTR-MCNC: 127 MG/DL (ref 70–99)
GLUCOSE BLDC GLUCOMTR-MCNC: 92 MG/DL (ref 70–99)
GLUCOSE SERPL-MCNC: 88 MG/DL (ref 70–99)
HCO3 SERPL-SCNC: 28 MMOL/L (ref 22–29)
HCT VFR BLD AUTO: 20.4 % (ref 35–47)
HGB BLD-MCNC: 6.7 G/DL (ref 11.7–15.7)
ISSUE DATE AND TIME: NORMAL
ISSUE DATE AND TIME: NORMAL
MAGNESIUM SERPL-MCNC: 1.7 MG/DL (ref 1.7–2.3)
MCH RBC QN AUTO: 28.5 PG (ref 26.5–33)
MCHC RBC AUTO-ENTMCNC: 32.8 G/DL (ref 31.5–36.5)
MCV RBC AUTO: 87 FL (ref 78–100)
PATH REPORT.COMMENTS IMP SPEC: NORMAL
PATH REPORT.FINAL DX SPEC: NORMAL
PATH REPORT.GROSS SPEC: NORMAL
PATH REPORT.MICROSCOPIC SPEC OTHER STN: NORMAL
PATH REPORT.RELEVANT HX SPEC: NORMAL
PHOSPHATE SERPL-MCNC: 3.2 MG/DL (ref 2.5–4.5)
PHOTO IMAGE: NORMAL
PLATELET # BLD AUTO: 62 10E3/UL (ref 150–450)
POTASSIUM SERPL-SCNC: 3.9 MMOL/L (ref 3.4–5.3)
PROT SERPL-MCNC: 5 G/DL (ref 6.4–8.3)
RBC # BLD AUTO: 2.35 10E6/UL (ref 3.8–5.2)
SODIUM SERPL-SCNC: 137 MMOL/L (ref 135–145)
SPECIMEN EXPIRATION DATE: NORMAL
TACROLIMUS BLD-MCNC: 3.8 UG/L (ref 5–15)
TME LAST DOSE: ABNORMAL H
TME LAST DOSE: ABNORMAL H
UNIT ABO/RH: NORMAL
UNIT ABO/RH: NORMAL
UNIT NUMBER: NORMAL
UNIT NUMBER: NORMAL
UNIT STATUS: NORMAL
UNIT STATUS: NORMAL
UNIT TYPE ISBT: 5100
UNIT TYPE ISBT: 9500
WBC # BLD AUTO: 3.4 10E3/UL (ref 4–11)

## 2024-11-19 PROCEDURE — 82435 ASSAY OF BLOOD CHLORIDE: CPT | Performed by: NURSE PRACTITIONER

## 2024-11-19 PROCEDURE — 250N000013 HC RX MED GY IP 250 OP 250 PS 637: Performed by: NURSE PRACTITIONER

## 2024-11-19 PROCEDURE — 97112 NEUROMUSCULAR REEDUCATION: CPT | Mod: GP

## 2024-11-19 PROCEDURE — 85041 AUTOMATED RBC COUNT: CPT | Performed by: NURSE PRACTITIONER

## 2024-11-19 PROCEDURE — 250N000012 HC RX MED GY IP 250 OP 636 PS 637: Performed by: STUDENT IN AN ORGANIZED HEALTH CARE EDUCATION/TRAINING PROGRAM

## 2024-11-19 PROCEDURE — 250N000012 HC RX MED GY IP 250 OP 636 PS 637: Performed by: NURSE PRACTITIONER

## 2024-11-19 PROCEDURE — 250N000013 HC RX MED GY IP 250 OP 250 PS 637: Performed by: STUDENT IN AN ORGANIZED HEALTH CARE EDUCATION/TRAINING PROGRAM

## 2024-11-19 PROCEDURE — 250N000013 HC RX MED GY IP 250 OP 250 PS 637: Performed by: PHYSICIAN ASSISTANT

## 2024-11-19 PROCEDURE — 84100 ASSAY OF PHOSPHORUS: CPT | Performed by: NURSE PRACTITIONER

## 2024-11-19 PROCEDURE — 120N000011 HC R&B TRANSPLANT UMMC

## 2024-11-19 PROCEDURE — P9016 RBC LEUKOCYTES REDUCED: HCPCS | Performed by: PHYSICIAN ASSISTANT

## 2024-11-19 PROCEDURE — 85027 COMPLETE CBC AUTOMATED: CPT | Performed by: NURSE PRACTITIONER

## 2024-11-19 PROCEDURE — 82565 ASSAY OF CREATININE: CPT | Performed by: NURSE PRACTITIONER

## 2024-11-19 PROCEDURE — P9016 RBC LEUKOCYTES REDUCED: HCPCS

## 2024-11-19 PROCEDURE — 86850 RBC ANTIBODY SCREEN: CPT | Performed by: TRANSPLANT SURGERY

## 2024-11-19 PROCEDURE — 86900 BLOOD TYPING SEROLOGIC ABO: CPT | Performed by: TRANSPLANT SURGERY

## 2024-11-19 PROCEDURE — 250N000012 HC RX MED GY IP 250 OP 636 PS 637: Performed by: PHYSICIAN ASSISTANT

## 2024-11-19 PROCEDURE — 83735 ASSAY OF MAGNESIUM: CPT | Performed by: NURSE PRACTITIONER

## 2024-11-19 PROCEDURE — 97116 GAIT TRAINING THERAPY: CPT | Mod: GP

## 2024-11-19 PROCEDURE — 36592 COLLECT BLOOD FROM PICC: CPT | Performed by: NURSE PRACTITIONER

## 2024-11-19 PROCEDURE — 80076 HEPATIC FUNCTION PANEL: CPT | Performed by: NURSE PRACTITIONER

## 2024-11-19 PROCEDURE — 36415 COLL VENOUS BLD VENIPUNCTURE: CPT | Performed by: TRANSPLANT SURGERY

## 2024-11-19 PROCEDURE — 82248 BILIRUBIN DIRECT: CPT | Performed by: NURSE PRACTITIONER

## 2024-11-19 PROCEDURE — 86923 COMPATIBILITY TEST ELECTRIC: CPT

## 2024-11-19 PROCEDURE — 80053 COMPREHEN METABOLIC PANEL: CPT | Performed by: NURSE PRACTITIONER

## 2024-11-19 PROCEDURE — 99233 SBSQ HOSP IP/OBS HIGH 50: CPT | Mod: 24 | Performed by: INTERNAL MEDICINE

## 2024-11-19 PROCEDURE — 82947 ASSAY GLUCOSE BLOOD QUANT: CPT | Performed by: NURSE PRACTITIONER

## 2024-11-19 PROCEDURE — 80197 ASSAY OF TACROLIMUS: CPT | Performed by: NURSE PRACTITIONER

## 2024-11-19 PROCEDURE — 86923 COMPATIBILITY TEST ELECTRIC: CPT | Performed by: PHYSICIAN ASSISTANT

## 2024-11-19 RX ORDER — BUMETANIDE 1 MG/1
1 TABLET ORAL
Status: DISCONTINUED | OUTPATIENT
Start: 2024-11-19 | End: 2024-11-20 | Stop reason: HOSPADM

## 2024-11-19 RX ADMIN — TACROLIMUS 2.5 MG: 1 CAPSULE ORAL at 07:41

## 2024-11-19 RX ADMIN — NYSTATIN 500000 UNITS: 100000 SUSPENSION ORAL at 07:42

## 2024-11-19 RX ADMIN — NYSTATIN 500000 UNITS: 100000 SUSPENSION ORAL at 20:07

## 2024-11-19 RX ADMIN — SENNOSIDES AND DOCUSATE SODIUM 2 TABLET: 8.6; 5 TABLET ORAL at 07:41

## 2024-11-19 RX ADMIN — POLYETHYLENE GLYCOL 3350 17 G: 17 POWDER, FOR SOLUTION ORAL at 07:42

## 2024-11-19 RX ADMIN — SEVELAMER CARBONATE 0.8 G: 800 POWDER, FOR SUSPENSION ORAL at 12:05

## 2024-11-19 RX ADMIN — LIDOCAINE 1 PATCH: 4 PATCH TOPICAL at 07:41

## 2024-11-19 RX ADMIN — SEVELAMER CARBONATE 0.8 G: 800 POWDER, FOR SUSPENSION ORAL at 07:42

## 2024-11-19 RX ADMIN — NYSTATIN 500000 UNITS: 100000 SUSPENSION ORAL at 12:05

## 2024-11-19 RX ADMIN — OXYCODONE HYDROCHLORIDE 5 MG: 5 TABLET ORAL at 08:38

## 2024-11-19 RX ADMIN — SEVELAMER CARBONATE 0.8 G: 800 POWDER, FOR SUSPENSION ORAL at 17:26

## 2024-11-19 RX ADMIN — TACROLIMUS 3.5 MG: 1 CAPSULE ORAL at 17:26

## 2024-11-19 RX ADMIN — ASPIRIN 325 MG ORAL TABLET 325 MG: 325 PILL ORAL at 07:42

## 2024-11-19 RX ADMIN — POLYETHYLENE GLYCOL 3350 17 G: 17 POWDER, FOR SOLUTION ORAL at 20:07

## 2024-11-19 RX ADMIN — MYCOPHENOLATE MOFETIL 750 MG: 250 CAPSULE ORAL at 20:07

## 2024-11-19 RX ADMIN — SENNOSIDES AND DOCUSATE SODIUM 2 TABLET: 8.6; 5 TABLET ORAL at 20:07

## 2024-11-19 RX ADMIN — Medication 1 TABLET: at 12:05

## 2024-11-19 RX ADMIN — PREDNISONE 5 MG: 5 TABLET ORAL at 07:41

## 2024-11-19 RX ADMIN — Medication 5 MG: at 17:26

## 2024-11-19 RX ADMIN — MYCOPHENOLATE MOFETIL 750 MG: 250 CAPSULE ORAL at 07:42

## 2024-11-19 RX ADMIN — AMLODIPINE BESYLATE 5 MG: 5 TABLET ORAL at 07:42

## 2024-11-19 RX ADMIN — ESCITALOPRAM OXALATE 20 MG: 20 TABLET ORAL at 07:42

## 2024-11-19 RX ADMIN — NYSTATIN 500000 UNITS: 100000 SUSPENSION ORAL at 17:26

## 2024-11-19 RX ADMIN — BUMETANIDE 1 MG: 1 TABLET ORAL at 14:21

## 2024-11-19 RX ADMIN — BUMETANIDE 1 MG: 1 TABLET ORAL at 20:07

## 2024-11-19 RX ADMIN — METHOCARBAMOL TABLETS 750 MG: 750 TABLET, COATED ORAL at 22:03

## 2024-11-19 RX ADMIN — METHOCARBAMOL TABLETS 750 MG: 750 TABLET, COATED ORAL at 05:14

## 2024-11-19 RX ADMIN — METHOCARBAMOL TABLETS 750 MG: 750 TABLET, COATED ORAL at 17:39

## 2024-11-19 RX ADMIN — PANTOPRAZOLE SODIUM 40 MG: 40 TABLET, DELAYED RELEASE ORAL at 07:41

## 2024-11-19 RX ADMIN — PANTOPRAZOLE SODIUM 40 MG: 40 TABLET, DELAYED RELEASE ORAL at 17:26

## 2024-11-19 NOTE — TELEPHONE ENCOUNTER
Possible discharge in the next couple days. Follow up orders placed.     Already has appointment with Dr. Santiago on 1/28 scheduled.

## 2024-11-19 NOTE — PLAN OF CARE
"BP (P) 107/63 (BP Location: Left arm)   Pulse 71   Temp (P) 98.8  F (37.1  C) (Oral)   Resp 18   Ht 1.63 m (5' 4.17\")   Wt 79 kg (174 lb 1.6 oz)   SpO2 98%   BMI 29.72 kg/m       Shift: [id number]  Isolation Status: none  VS: VSS on RA, afebrile  Neuro: Aox4, can have illogical train of thought sometimes  BG: ACHS  Labs/Imaging: Hgb 6.7 s/p 2u PRBC  Respiratory:WDL  Cardiac: WDL  Pain/Nausea: given oxy and robaxin for c/o abd pain  Diet: no longer on fluid restriction, on reg diet  LDA: internal jugular and PRSICILA  removed, PIV x2 and HD line  GI/: voiding mod amounts of urine, started bumex today, multiple Bm's   Skin: no new issues, mercedes LAKSHMI/CDI  Mobility: standby        Goal Outcome Evaluation:      Plan of Care Reviewed With: patient    Overall Patient Progress: improvingOverall Patient Progress: improving    Outcome Evaluation: doesnt feel as confused, tolerating diet, ambulating in room      "

## 2024-11-19 NOTE — PROVIDER NOTIFICATION
7A 210 Hannah BARRERA  pt has orders to remove central line. orders were placed earlier today but never done by day nurse. pt is now stating she wants it to be removed in AM, thanks   Whitney

## 2024-11-19 NOTE — PROGRESS NOTES
[facility]  Transplant Nephrology Progress Note  Date of Admission:  11/13/2024  Today's Date: 11/19/2024    Recommendations:   - No acute indications for dialysis.  - Recommend starting bumetanide 1 mg bid.  - If further improvement in creatinine over the next day, will look at removing temporary dialysis catheter.  - Okay to restart Bactrim if creatinine is further improved tomorrow.    Assessment & Plan   # RUDY: Trend down in creatinine without dialysis.  Good urine output.  No acute indications for dialysis.   - RUDY felt secondary to liver transplant with hemodynamic changes and hypotension. Renal US 11/13/24 without hydronephrosis or signs of obstruction.   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  Transaminases Trend down.  Followed by Transplant Surgery.    # Immunosuppression: Tacrolimus immediate release (goal 8-10), Mycophenolate mofetil (dose 750 mg every 12 hours), and Methylprednisolone (dose taper)   - Induction with Recent Transplant:  Per Liver Tx Protocol   - Continue with intensive monitoring of immunosuppression for efficacy and toxicity.   - Goal tacrolimus level lower due to RUDY.   - Changes: Not at this time; Management per Transplant Surgery.    # Infection Prophylaxis:   - PJP: Sulfa/TMP (Bactrim) (held due to RUDY and hyperkalemia)  - CMV: Valganciclovir (Valcyte)  - Thrush: Nystatin (Mycostatin) swish and swallow  - Fungal: None      - CMV IgG Ab High Risk Discordance (D+/R-): No  CMV Serostatus: Positive  - EBV IgG Ab High Risk Discordance (D+/R-): No  EBV Serostatus: Positive    # Hypertension: Controlled;  Goal BP: < 140/90 (Hospitalization goal)   - Changes: Yes - Recommend restarting bumetanide 1 mg bid.    # Elevated Blood Glucose: Glucose generally running ~ 80-160s. On insulin.    # Anemia in Chronic  Disease/Surgery: Hgb: Trend down, low      MAGUI: No   - Iron studies: Not checked recently   - Agree with blood transfusion for Hgb <7.0 gm/dl    # Leukopenia: Trend down, mildly low WBC.  Likely related to medications.    # Thrombocytopenia: Trend up, mildly low platelet level in the 50K range.    # Mineral Bone Disorder:   - Vitamin D; level: Low normal        On supplement: No  - Calcium; level: Normal        On supplement: No  - Phosphorus; level: Normal        On supplement: No    # Electrolytes:   - Potassium; level: Normal        On supplement: No.   - Magnesium; level: Normal        On supplement: No  - Bicarbonate; level: Normal        On supplement: No  - Sodium; level: Normal    # Other Significant PMH:   - Depression: lexapro on hold given prolonged QTc     # Transplant History:  Etiology of Organ Failure: Alcohol-related liver disease  Tx: Liver Tx  Transplant: 11/13/2024 (Liver)  Significant changes in immunosuppression: Slightly lower tacrolimus level goal due to RUDY.  Significant transplant-related complications: RUDY    Recommendations were communicated to the primary team verbally.    Corky Espana MD  Transplant Nephrology    Interval History  Ms. Sutherland's creatinine is 2.08 (11/19 0628); Trend down.  Good urine output, but a bit lower off diuretics.  Trend down in transaminases.  Other significant labs/tests/vitals: Stable electrolytes.  Trend down in hemoglobin.  Trend down WBC.  Trend up platelet level.  No new events overnight.  No chest pain or shortness of breath.  Improved leg swelling.  No nausea and vomiting.  Some heartburn symptoms.  Bowel movements are loose.  No fever, sweats or chills.    Review of Systems   4 point ROS was obtained and negative except as noted in the Interval History.    MEDICATIONS:  Current Facility-Administered Medications   Medication Dose Route Frequency Provider Last Rate Last Admin    amLODIPine (NORVASC) tablet 5 mg  5 mg Oral Daily Barb Carr,  NP   5 mg at 11/19/24 0742    aspirin (ASA) tablet 325 mg  325 mg Oral Daily Fabrizio Colmenares MD   325 mg at 11/19/24 0742    [Held by provider] bumetanide (BUMEX) tablet 3 mg  3 mg Oral BID Anabela Bae PA-C        escitalopram (LEXAPRO) tablet 20 mg  20 mg Oral Daily Barb Carr NP   20 mg at 11/19/24 0742    insulin aspart (NovoLOG) injection (RAPID ACTING)  1-7 Units Subcutaneous TID AC Elizabeth Landin MD   1 Units at 11/18/24 1245    insulin aspart (NovoLOG) injection (RAPID ACTING)  1-5 Units Subcutaneous At Bedtime Elizabeth Landin MD   1 Units at 11/15/24 2158    Lidocaine (LIDOCARE) 4 % Patch 1-2 patch  1-2 patch Transdermal Q24H Abena Mccray NP   1 patch at 11/19/24 0741    melatonin tablet 5 mg  5 mg Oral QPM Barb Carr NP   5 mg at 11/18/24 1819    multivitamin w/minerals (THERA-VIT-M) tablet 1 tablet  1 tablet Oral Daily Fabrizio Colmenares MD   1 tablet at 11/19/24 1205    mycophenolate (GENERIC EQUIVALENT) capsule 750 mg  750 mg Oral BID Fabrizio Colmenares MD   750 mg at 11/19/24 0742    nystatin (MYCOSTATIN) suspension 500,000 Units  500,000 Units Oral 4x Daily Abena Mccray NP   500,000 Units at 11/19/24 1205    pantoprazole (PROTONIX) EC tablet 40 mg  40 mg Oral BID Davina Rhodes APRN CNP   40 mg at 11/19/24 0741    polyethylene glycol (MIRALAX) Packet 17 g  17 g Oral BID Barb Carr NP   17 g at 11/19/24 0742    [START ON 11/20/2024] predniSONE (DELTASONE) tablet 5 mg  5 mg Oral Daily Fabiola Aleman APRN CNP        senna-docusate (SENOKOT-S/PERICOLACE) 8.6-50 MG per tablet 2 tablet  2 tablet Oral BID Barb Carr NP   2 tablet at 11/19/24 0741    sevelamer carbonate (RENVELA) Packet 0.8 g  0.8 g Oral TID w/meals Abena Mccray, NP   0.8 g at 11/19/24 1205    sodium chloride (PF) 0.9% PF flush 3 mL  3 mL Intravenous Q8H Fabrziio Colmenares MD   3 mL at 11/18/24 2021    [Held by provider] sulfamethoxazole-trimethoprim (BACTRIM) 400-80 MG per  "tablet 1 tablet  1 tablet Oral or Feeding Tube Daily Fabrizio Colmenares MD   1 tablet at 24    tacrolimus (GENERIC EQUIVALENT) capsule 2.5 mg  2.5 mg Oral BID IS Barb Carr NP   2.5 mg at 24 0741    valGANciclovir (VALCYTE) tablet 450 mg  450 mg Oral Once per day on  Blas Carney MD   450 mg at 24     Current Facility-Administered Medications   Medication Dose Route Frequency Provider Last Rate Last Admin       Physical Exam   Temp  Av  F (36.7  C)  Min: 97.5  F (36.4  C)  Max: 98.4  F (36.9  C)  Arterial Line BP  Min: 119/56  Max: 157/65  Arterial Line MAP (mmHg)  Av.9 mmHg  Min: 75 mmHg  Max: 96 mmHg      Pulse  Av.1  Min: 68  Max: 95 Resp  Av.5  Min: 8  Max: 21  SpO2  Av.6 %  Min: 92 %  Max: 100 %    CVP (mmHg): 1 mmHgBP 103/83   Pulse 73   Temp 97.7  F (36.5  C) (Axillary)   Resp 16   Ht 1.63 m (5' 4.17\")   Wt 79 kg (174 lb 1.6 oz)   SpO2 100%   BMI 29.72 kg/m     Date 11/15/24 0700 - 24 0659   Shift 4464-5359 5296-1755 6163-3484 24 Hour Total   INTAKE   P.O. 118   118   I.V. 10   10   Shift Total(mL/kg) 128(1.57)   128(1.57)   OUTPUT   Urine 30   30   Drains 100   100   Shift Total(mL/kg) 130(1.59)   130(1.59)   Weight (kg) 81.7 81.7 81.7 81.7      Admit Weight: 77.6 kg (171 lb)     GENERAL APPEARANCE: alert and no distress  HENT: mouth without ulcers or lesions  RESP: lungs clear to auscultation - no rales, rhonchi or wheezes  CV: regular rhythm, normal rate, no rub, no murmur  EDEMA: trace LE and dependent edema bilaterally  ABDOMEN: soft, nondistended, diffusely tender, bowel sounds normal  MS: extremities normal - no gross deformities noted, no evidence of inflammation in joints, no muscle tenderness  SKIN: no rash  DIALYSIS ACCESS:  Left IJ tunneled catheter     Data   All labs reviewed by me.  CMP  Recent Labs   Lab 24  1143 24  0800 24  0628 24  0140 24  1242 24  1002 " 11/17/24  1647 11/17/24  1453 11/17/24  1147 11/17/24  0615 11/16/24  1730 11/16/24  0948   NA  --   --  137  --   --  133*  --  128*  --   --   --  130*   POTASSIUM  --   --  3.9  --   --  3.9  --  4.8  --   --   --  4.5   CHLORIDE  --   --  99  --   --  95*  --  94*  --   --   --  96*   CO2  --   --  28  --   --  27 --  24  --   --   --  24   ANIONGAP  --   --  10  --   --  11  --  10  --   --   --  10   * 92 88 101*   < > 115*   < > 171*   < >  --    < > 111*   BUN  --   --  46.2*  --   --  56.4*  --  57.0*  --   --   --  41.0*   CR  --   --  2.08*  --   --  3.27*  --  3.96*  --   --   --  3.11*   GFRESTIMATED  --   --  28*  --   --  16*  --  13*  --   --   --  17*   TIMOTHY  --   --  8.4*  --   --  8.5*  --  8.3*  --   --   --  8.4*   MAG  --   --  1.7  --   --  1.9  --   --   --  2.3  --  2.2   PHOS  --   --  3.2  --   --  3.9  --   --   --  5.5*  --  5.4*   PROTTOTAL  --   --  5.0*  --   --  5.4*  --   --   --  5.2*  --  5.5*   ALBUMIN  --   --  2.7*  --   --  3.0*  --   --   --  2.9*  --  2.9*   BILITOTAL  --   --  2.2*  --   --  2.6*  --   --   --  2.2*  --  2.4*   ALKPHOS  --   --  145  --   --  146  --   --   --  96  --  93   AST  --   --  63*  --   --  112*  --   --   --  237*  --  362*   ALT  --   --  315*  --   --  478*  --   --   --  695*  --  851*    < > = values in this interval not displayed.     CBC  Recent Labs   Lab 11/19/24  0628 11/18/24  1002 11/17/24  0615 11/16/24  0948   HGB 6.7* 7.3* 7.1* 7.8*   WBC 3.4* 5.2 6.5 8.2   RBC 2.35* 2.53* 2.48* 2.78*   HCT 20.4* 21.9* 21.5* 24.4*   MCV 87 87 87 88   MCH 28.5 28.9 28.6 28.1   MCHC 32.8 33.3 33.0 32.0   RDW 16.8* 16.1* 16.3* 17.2*   PLT 62* 53* 48* 47*     INR  Recent Labs   Lab 11/14/24  1642 11/14/24  1010 11/14/24  0404 11/13/24  1515 11/13/24  0931 11/13/24  0629 11/13/24  0116   INR 1.77* 1.44* 1.55* 1.49* 1.66* 1.79* 1.89*   PTT  --  33  --   --  39* 47* 43*     ABG  Recent Labs   Lab 11/13/24  0931 11/13/24  0802 11/13/24  0700  11/13/24  0558   PH 7.37 7.46* 7.47* 7.48*   PCO2 52* 40 39 33*   PO2 191* 116* 130* 150*   HCO3 30* 28 28 24   O2PER 50 50.0 40.0 33.0      Urine Studies  Recent Labs   Lab Test 11/14/24  0808 11/13/24  0124 05/21/24  0759   COLOR Yellow Light Yellow Yellow   APPEARANCE Slightly Cloudy* Clear Clear   URINEGLC Negative Negative Negative   URINEBILI Small* Negative Negative   URINEKETONE Negative Negative Negative   SG 1.023 1.005 1.019   UBLD Moderate* Negative Negative   URINEPH 5.0 7.5* 6.5   PROTEIN 30* Negative Negative   NITRITE Negative Negative Negative   LEUKEST Negative Negative Trace*   RBCU 28* 0 <1   WBCU 2 <1 7*     No lab results found.  PTH  No lab results found.  Iron Studies  Recent Labs   Lab Test 05/21/24  0757   IRON 47      IRONSAT 13*   RANDOLPH 31       IMAGING:  All imaging studies reviewed by me.

## 2024-11-19 NOTE — PROGRESS NOTES
Care Management Follow Up    Length of Stay (days): 5    Expected Discharge Date: 11/20/2024     Concerns to be Addressed: discharge planning     Patient plan of care discussed at interdisciplinary rounds: Yes    Anticipated Discharge Disposition: Local hotel              Anticipated Discharge Services: outpatient follow up  Anticipated Discharge DME: None    Patient/family educated on Medicare website which has current facility and service quality ratings: no  Education Provided on the Discharge Plan:    Patient/Family in Agreement with the Plan: other (see comments) (tBD)    Referrals Placed by CM/SW: External Care Coordination  Private pay costs discussed: Not applicable    Discussed  Partnership in Safe Discharge Planning  document with patient/family: No     Handoff Completed: No, handoff not indicated or clinically appropriate    Additional Information:  Patient s/p liver transplant.  Pt no longer meets ARU criteria.  Plan for community discharge. Met with pt and her mom.  Pt confirmed that they  plan to stay Home 2 Suites.  Plan to take the shuttle to clinic for appointments, labs.  Hopeful that OP team will clear pt to return home and not have to stay locally for 4 weeks.  Will have CHW arrange OP Transplant labs M/TH starting next week.      Next Steps: CHW arranging OP Transplant labs     Cassia Varela, RN BSN, PHN, ACM-RN  November 19, 2024  7A Nurse Coordinator    Phone: [phone]  Available on [place] 7A SOT RNCC  Weekend/Holiday 7A SOT RNCC    11/19/2024

## 2024-11-19 NOTE — PROGRESS NOTES
Patient's Name: Hannah Sutherland    Procedure Date: 11/19/24  Procedure Time 1630      Procedure Performed: Central line removal     The Central Venous Catheter (CVC) was removed per provider order.     The central venous catheter was located: Right Internal Jugular vein, with a total of 3 lumens.     The patient was placed in Trendelenburg position with Insertion site lower than heart.     Valsalva response achieved by: Patient instructed to take a deep breath and bear down while the CVC was removed with a constant steady motion.     Firm pressure was applied at the access site for 5   minutes until bleeding stopped.     Post removal site assessment; Clean and dry without bleeding. Occlusive dressing applied: Yes    CVC tip was inspected and intact: Yes    Tip was sent to lab for culture per provider order: No    Patient tolerated the procedure: well    2nd RN present during removal (if applicable) yolis Mac RN   11/19/2024   4:30 PM

## 2024-11-19 NOTE — PROGRESS NOTES
Calorie Count  Intake recorded for: 11/18  Total Kcals: 481 Total Protein: 22g  Kcals from Hospital Food: 481  Protein: 22g  Kcals from Outside Food (average):0 Protein: 0g  # Meals Ordered from Kitchen: 3 meals   # Meals Recorded: 2 meals (First - 75% omelet w/ ham, veggies & cheese, cranberry juice, grape juice, Froot Loops)       (Second - 25% beef taco w/ lettuce, tomato & cheese, melissa water, cranberry juice)   # Supplements Recorded: 0

## 2024-11-19 NOTE — PROGRESS NOTES
Reason for Assessment:  Nutrition education regarding post transplant diet.   Diet History:  Patient was following a low sodium, high protein diet (aiming ~75g/day) prior to admission.  She was drinking Premier protein shakes once daily.  Her meal pattern was smaller/more frequent meals and snacks.  She tolerated a lot of fruit best.   Her oral intake has been reduced but today she notes an improved appetite.    Nutrition Diagnosis:  Food- and nutrition-related knowledge deficit r/t no previous knowledge of post transplant diet guidelines AEB patient verbal report.   Interventions:   Provided instruction on post-transplant diet to patient and mother with discussion regarding protein sources and high protein needs in acute post-tx phase.   -- Reviewed recommendations to follow low fat/low sodium diet long term and discussed heart healthy diet tips.    -- Reviewed need for food safety precautions to prevent food borne illness.  Provided handouts: Post Transplant Diet Guidelines and [employer] food safety booklet  Will send Ensure Max Protein with breakfast and Special K protein bar with HS snack.    Ordered calorie counts (11/19-11/21) and discussed with patient.   Pt verbalized understanding of diet following education and denied further nutritional questions.    Goals:   Patient will verbalize understanding of education provided.  Follow-up:    Patient to ask any further nutrition-related questions before discharge.  In addition, pt may request outpatient RD appointment.    Abena Tyson, MS, RD, LD, CCTD, [facility]  7[facility] + 7B (beds 5912-0707)  Available on Vocera [7A or 7B Clinical Dietitian]   Weekend/Holiday: Vocera [Weekend Clinical Dietitian]

## 2024-11-19 NOTE — PROGRESS NOTES
CARE MANAGEMENT FOLLOW UP    Additional Information:   11/19:CHW delegated by RNCC has scheduled transplant Lab appts for the Pt as follows:  Thursday 11/21/2024 9:30 Am  Monday 11/25/2024  11:00 Am  Friday 11/29/2024  7:00 Am  Pt should arrive 15 minutes earlier.    Sanjana Merchant   Community Health Worker, 7A&7B [facility].  [place], [place]   P [phone]   Fax: [phone]  Email: [email or web address]

## 2024-11-19 NOTE — DISCHARGE INSTRUCTIONS
Diet recommendations post-transplant: High protein diet (at least 95 grams/day) x 8 weeks.  Heart healthy dietary habits long term (low saturated/trans fat, low sodium). Practice food safety precautions. See nutrition handout and food safety booklet for more information.

## 2024-11-19 NOTE — PLAN OF CARE
"/68 (BP Location: Left arm)   Pulse 73   Temp 98.5  F (36.9  C) (Oral)   Resp 18   Ht 1.63 m (5' 4.17\")   Wt 79 kg (174 lb 1.6 oz)   SpO2 97%   BMI 29.72 kg/m      Shift: 5818-7053  VS: Vitals stable, room air, afebrile  Neuro: Alert and oriented x4  BG: ACHS 164, 101  Labs: Awaiting AM labs   Pain/Nausea: PRN robaxin x2, denies nausea   Diet: Renal diet, 2L FR  IV Access: R + L PIV saline locked, L HD line, R internal jugular (needs to be removed, pt requested to be removed in AM)  Lines/Drains: PRISCILA drain x1  GI/: Voiding adequately, LBM 11/18   Skin: Clamshell incision with staples LAKSHMI  Mobility: Up SBA, bed alarm on overnight   Plan: Continue with POC and notify team with any changes   Goal Outcome Evaluation:      Plan of Care Reviewed With: patient    Overall Patient Progress: improvingOverall Patient Progress: improving          "

## 2024-11-19 NOTE — PROGRESS NOTES
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 She was hospitalized in November 2023 for acute hepatitis 2/2 to alcohol use at which time she was diagnosed with underlying cirrhosis. Ms. Sutherland has remained abstinent from alcohol for the last year. MELD at time of transplant 21. She underwent DDLT w/o stent on 11/13/2024 with Dr. Blas Carney.    Changes today:  - Transfuse 2u of PRBCs for Hgb 6.7, recheck Hgb tomorrow AM  - Remove CVC and PRISCILA  - Bumex 1 mg BID  - Advanced to regular diet with no fluid restriction  - Prepare for possible discharge tomorrow  ____________________________________________    s/p DDLT (no stent) 11/13/24: POD #6   Transaminases continue to trend down. TB trending down. Titrate tac to ~8   - Continue  mg daily.   - Repeat Liver US 11/15: Patent Doppler, stable hematoma    Immunosuppressed status secondary to medications:  Induction: Steroid taper and basiliximab POD 1 and POD 5 per renal sparing protocol.   Maintenance:    -  mg BID   - Tacrolimus 3.5 mg BID. Goal level 6-8 d/t RUDY.    - Prednisone 5 mg daily following taper    Neuro/Psych:  Depression: Lexapro resumed 11/17.   Acute post-op pain: Continue PRN oxycodone, PRN Robaxin, Atarax, Lidoderm patches.   Post-operative delirium:  Delirium precautions. Schedule Melatonin at 1800 daily.     Hematology:   Acute blood loss anemia: Hgb 6.7, 2u PRBCs transfused, continue to monitor.   Thrombocytopenia: Plt 62, continue to monitor.     Cardiorespiratory:   Hypoxemia: Resolved. On RA. Continue IS/CDB.  Right pleural effusion: Noted on US 11/15, small volume.   Prolonged QTc: QTc 496 on EKG 11/13.    - Avoid QTc prolonging meds (Zofran discontinued).    - Repeat EKG 11/17: 433  Hypertension: Continue amlodipine 5 mg daily     GI/Nutrition:   At risk for  malnutrition: Nutrition consulted.    - Regular diet, encourage optimal intake   Nausea; Constipation: Continue scheduled Miralax and senna-doc; doses escalated 11/16.    - Scopolamine patch discontinued 11/17 due to delirium, small dose PRN Compazine for nausea (avoid Zofran with prolonged QTc).    Endocrine:   Steroid induced hyperglycemia: Hgb A1c 4.6 05/2024.   - Continue sliding scale insulin.     Fluid/Electrolytes:   RUDY: Likely r/t hemodynamic changes intra-op. Transplant Nephrology consulted. Minimal response from Lasix. Renal sparing protocol as above. Renal US WNL. Good response to diuretic.   - Non-tunneled line by IR 11/15  -  HD 11/15, no HD since  Hyperkalemia: Resolved   - Hold Bactrim   - Bumex ordered  Hyperphosphatemia: Resolved. On phos binder.    - Renvela with meals.   Hyponatremia: Sodium 137, improving      : No calzada. Bladder scans negative for retention    Infectious disease: No issues    Prophylaxis: DVT(mechanical), fall, GI (pantoprazole), fungal (Nystatin), viral (valganciclovir), pneumocystis (Bactrim, on hold for RUDY)    Disposition: 7A; therapy recommending ARU versus home     ADELFO/Fellow/Resident Provider: Fabiola Aleman NP, pager #[phone]    Faculty: Nel Guerrero M.D.    __________________________________________________________________  Transplant History:    11/13/2024 (Liver), Postoperative day: 6     Interval History:   Overnight events: No acute events. Intake improved. Good UO. Having BMs. Pain controlled with medications.    Intermittently having hallucinations/delirium. No confusion on exam when seen today.    ROS:   A 10-point review of systems was negative except as noted above.    Curent Meds:  Current Facility-Administered Medications   Medication Dose Route Frequency Provider Last Rate Last Admin    amLODIPine (NORVASC) tablet 5 mg  5 mg Oral Daily Barb Carr NP   5 mg at 11/19/24 0742    aspirin (ASA) tablet 325 mg  325 mg Oral Daily Fabrizio Colmenares MD    325 mg at 11/19/24 0742    bumetanide (BUMEX) tablet 1 mg  1 mg Oral BID Sierra Sims PA-C   1 mg at 11/19/24 1421    [Held by provider] bumetanide (BUMEX) tablet 3 mg  3 mg Oral BID Anabela Bae PA-C        escitalopram (LEXAPRO) tablet 20 mg  20 mg Oral Daily Barb Carr NP   20 mg at 11/19/24 0742    insulin aspart (NovoLOG) injection (RAPID ACTING)  1-7 Units Subcutaneous TID AC Elizabeth Landin MD   1 Units at 11/18/24 1245    insulin aspart (NovoLOG) injection (RAPID ACTING)  1-5 Units Subcutaneous At Bedtime Elizabeth Landin MD   1 Units at 11/15/24 2158    Lidocaine (LIDOCARE) 4 % Patch 1-2 patch  1-2 patch Transdermal Q24H Abena Mccray NP   1 patch at 11/19/24 0741    melatonin tablet 5 mg  5 mg Oral QPM Barb Carr NP   5 mg at 11/18/24 1819    multivitamin w/minerals (THERA-VIT-M) tablet 1 tablet  1 tablet Oral Daily Fabrizio Colmenares MD   1 tablet at 11/19/24 1205    mycophenolate (GENERIC EQUIVALENT) capsule 750 mg  750 mg Oral BID Fabrizio Colmenares MD   750 mg at 11/19/24 0742    nystatin (MYCOSTATIN) suspension 500,000 Units  500,000 Units Oral 4x Daily Abena Mccray NP   500,000 Units at 11/19/24 1205    pantoprazole (PROTONIX) EC tablet 40 mg  40 mg Oral BID Davina Rhodes APRN CNP   40 mg at 11/19/24 0741    polyethylene glycol (MIRALAX) Packet 17 g  17 g Oral BID Barb Carr NP   17 g at 11/19/24 0742    [START ON 11/20/2024] predniSONE (DELTASONE) tablet 5 mg  5 mg Oral Daily Fabiola Aleman APRN CNP        senna-docusate (SENOKOT-S/PERICOLACE) 8.6-50 MG per tablet 2 tablet  2 tablet Oral BID Barb Carr NP   2 tablet at 11/19/24 0741    sevelamer carbonate (RENVELA) Packet 0.8 g  0.8 g Oral TID w/meals Abena Mccray NP   0.8 g at 11/19/24 1205    sodium chloride (PF) 0.9% PF flush 3 mL  3 mL Intravenous Q8H Fabrizio Colmenares MD   3 mL at 11/18/24 2021    [Held by provider] sulfamethoxazole-trimethoprim (BACTRIM) 400-80 MG per  "tablet 1 tablet  1 tablet Oral or Feeding Tube Daily Fabrizio Colmenares MD   1 tablet at 11/13/24 1952    tacrolimus (GENERIC EQUIVALENT) capsule 3.5 mg  3.5 mg Oral BID IS Sierra Sims PA-C        valGANciclovir (VALCYTE) tablet 450 mg  450 mg Oral Once per day on Monday Thursday Blas Carney MD   450 mg at 11/18/24 2017       Physical Exam:     Admit Weight: 77.6 kg (171 lb)    Current Vitals:   /60   Pulse 73   Temp 98.3  F (36.8  C) (Oral)   Resp 16   Ht 1.63 m (5' 4.17\")   Wt 79 kg (174 lb 1.6 oz)   SpO2 97%   BMI 29.72 kg/m      Vital sign ranges:    Temp:  [97.7  F (36.5  C)-99.3  F (37.4  C)] 98.3  F (36.8  C)  Pulse:  [68-73] 73  Resp:  [16-18] 16  BP: (103-127)/(50-83) 108/60  SpO2:  [95 %-100 %] 97 %    General Appearance: in no apparent distress.   Skin: normal, warm, No rashes, induration, or jaundice  Heart: She appears adequately perfused  Lungs: Breathing comfortably on RA  Abdomen: Soft. Incision closed with staples and open to air   : voiding  Extremities: edema: none There is no skin breakdown.  Neurologic: Awake, alert; oriented x4. Tremor absent.    Frailty Scores          10/14/2024 5/21/2024   Frailty Scores   Final Score Not Frail Frail   Final Score Number 1 3      Details                   Data:   CMP  Recent Labs   Lab 11/19/24  1143 11/19/24  0800 11/19/24  0628 11/18/24  1242 11/18/24  1002 11/15/24  0525 11/15/24  0348 11/14/24  0407 11/14/24  0404 11/13/24  0328 11/13/24  0116   NA  --   --  137  --  133*   < > 129*   < > 133*   < > 136   POTASSIUM  --   --  3.9  --  3.9   < > 6.1*   < > 5.1   < > 3.7   CHLORIDE  --   --  99  --  95*   < > 93*   < > 98   < > 100   CO2  --   --  28  --  27   < > 22   < > 23   < > 26   * 92 88   < > 115*   < > 149*   < > 183*   < > 106*   BUN  --   --  46.2*  --  56.4*   < > 57.7*   < > 31.9*   < > 12.4   CR  --   --  2.08*  --  3.27*   < > 2.94*   < > 1.50*   < > 0.89   GFRESTIMATED  --   --  28*  --  16*   < > 18*   " < > 41*   < > 78   TIMOTHY  --   --  8.4*  --  8.5*   < > 8.3*   < > 8.2*   < > 8.9   ICAW  --   --   --   --   --   --  4.4  --  4.5   < >  --    MAG  --   --  1.7  --  1.9   < > 2.9*   < > 1.5*  --  1.7   PHOS  --   --  3.2  --  3.9   < > 9.2*  --  6.2*  --  3.7   AMYLASE  --   --   --   --   --   --   --   --  51  --  92   LIPASE  --   --   --   --   --   --   --   --  26  --   --    ALBUMIN  --   --  2.7*  --  3.0*   < > 3.0*   < > 2.6*   < > 3.0*   BILITOTAL  --   --  2.2*  --  2.6*   < > 3.1*   < > 3.9*   < > 6.3*   ALKPHOS  --   --  145  --  146   < > 82   < > 70   < > 147   AST  --   --  63*  --  112*   < > 912*   < > 670*   < > 53*   ALT  --   --  315*  --  478*   < > 1,275*   < > 573*   < > 15    < > = values in this interval not displayed.     CBC  Recent Labs   Lab 11/19/24  0628 11/18/24  1002   HGB 6.7* 7.3*   WBC 3.4* 5.2   PLT 62* 53*

## 2024-11-20 ENCOUNTER — TELEPHONE (OUTPATIENT)
Dept: TRANSPLANT | Facility: CLINIC | Age: 54
End: 2024-11-20
Payer: COMMERCIAL

## 2024-11-20 ENCOUNTER — TELEPHONE (OUTPATIENT)
Dept: PHARMACY | Facility: CLINIC | Age: 54
End: 2024-11-20
Payer: COMMERCIAL

## 2024-11-20 ENCOUNTER — APPOINTMENT (OUTPATIENT)
Dept: PHYSICAL THERAPY | Facility: CLINIC | Age: 54
End: 2024-11-20
Attending: TRANSPLANT SURGERY
Payer: COMMERCIAL

## 2024-11-20 VITALS
HEIGHT: 64 IN | TEMPERATURE: 98.1 F | WEIGHT: 165.34 LBS | RESPIRATION RATE: 18 BRPM | HEART RATE: 71 BPM | OXYGEN SATURATION: 98 % | DIASTOLIC BLOOD PRESSURE: 58 MMHG | BODY MASS INDEX: 28.23 KG/M2 | SYSTOLIC BLOOD PRESSURE: 106 MMHG

## 2024-11-20 DIAGNOSIS — Z94.4 LIVER REPLACED BY TRANSPLANT (H): Primary | ICD-10-CM

## 2024-11-20 DIAGNOSIS — Z94.4 LIVER REPLACED BY TRANSPLANT (H): ICD-10-CM

## 2024-11-20 PROBLEM — Z01.818 PRE-TRANSPLANT EVALUATION FOR LIVER TRANSPLANT: Status: RESOLVED | Noted: 2024-11-13 | Resolved: 2024-11-20

## 2024-11-20 PROBLEM — D84.9 IMMUNOSUPPRESSED STATUS (H): Status: ACTIVE | Noted: 2024-11-20

## 2024-11-20 LAB
ALBUMIN SERPL BCG-MCNC: 3.2 G/DL (ref 3.5–5.2)
ALP SERPL-CCNC: 201 U/L (ref 40–150)
ALT SERPL W P-5'-P-CCNC: 269 U/L (ref 0–50)
ANION GAP SERPL CALCULATED.3IONS-SCNC: 10 MMOL/L (ref 7–15)
AST SERPL W P-5'-P-CCNC: 56 U/L (ref 0–45)
BACTERIA FLD CULT: NORMAL
BILIRUB DIRECT SERPL-MCNC: 1.5 MG/DL (ref 0–0.3)
BILIRUB SERPL-MCNC: 2.4 MG/DL
BUN SERPL-MCNC: 35.7 MG/DL (ref 6–20)
CALCIUM SERPL-MCNC: 8.8 MG/DL (ref 8.8–10.4)
CHLORIDE SERPL-SCNC: 98 MMOL/L (ref 98–107)
CREAT SERPL-MCNC: 1.34 MG/DL (ref 0.51–0.95)
EGFRCR SERPLBLD CKD-EPI 2021: 47 ML/MIN/1.73M2
ERYTHROCYTE [DISTWIDTH] IN BLOOD BY AUTOMATED COUNT: 16.8 % (ref 10–15)
GLUCOSE BLDC GLUCOMTR-MCNC: 100 MG/DL (ref 70–99)
GLUCOSE BLDC GLUCOMTR-MCNC: 123 MG/DL (ref 70–99)
GLUCOSE BLDC GLUCOMTR-MCNC: 87 MG/DL (ref 70–99)
GLUCOSE SERPL-MCNC: 108 MG/DL (ref 70–99)
HCO3 SERPL-SCNC: 28 MMOL/L (ref 22–29)
HCT VFR BLD AUTO: 28.5 % (ref 35–47)
HGB BLD-MCNC: 9.4 G/DL (ref 11.7–15.7)
MAGNESIUM SERPL-MCNC: 1.4 MG/DL (ref 1.7–2.3)
MCH RBC QN AUTO: 28.8 PG (ref 26.5–33)
MCHC RBC AUTO-ENTMCNC: 33 G/DL (ref 31.5–36.5)
MCV RBC AUTO: 87 FL (ref 78–100)
PHOSPHATE SERPL-MCNC: 2.7 MG/DL (ref 2.5–4.5)
PLATELET # BLD AUTO: 96 10E3/UL (ref 150–450)
POTASSIUM SERPL-SCNC: 4.1 MMOL/L (ref 3.4–5.3)
PROT SERPL-MCNC: 5.9 G/DL (ref 6.4–8.3)
RBC # BLD AUTO: 3.26 10E6/UL (ref 3.8–5.2)
SCANNED LAB RESULT: NORMAL
SODIUM SERPL-SCNC: 136 MMOL/L (ref 135–145)
TACROLIMUS BLD-MCNC: 4.3 UG/L (ref 5–15)
TEST NAME: NORMAL
TME LAST DOSE: ABNORMAL H
TME LAST DOSE: ABNORMAL H
WBC # BLD AUTO: 4.2 10E3/UL (ref 4–11)

## 2024-11-20 PROCEDURE — 250N000013 HC RX MED GY IP 250 OP 250 PS 637: Performed by: PHYSICIAN ASSISTANT

## 2024-11-20 PROCEDURE — 97530 THERAPEUTIC ACTIVITIES: CPT | Mod: GP

## 2024-11-20 PROCEDURE — 82310 ASSAY OF CALCIUM: CPT | Performed by: NURSE PRACTITIONER

## 2024-11-20 PROCEDURE — 97116 GAIT TRAINING THERAPY: CPT | Mod: GP

## 2024-11-20 PROCEDURE — 80053 COMPREHEN METABOLIC PANEL: CPT | Performed by: NURSE PRACTITIONER

## 2024-11-20 PROCEDURE — 250N000013 HC RX MED GY IP 250 OP 250 PS 637: Performed by: NURSE PRACTITIONER

## 2024-11-20 PROCEDURE — 250N000012 HC RX MED GY IP 250 OP 636 PS 637

## 2024-11-20 PROCEDURE — 250N000013 HC RX MED GY IP 250 OP 250 PS 637: Performed by: STUDENT IN AN ORGANIZED HEALTH CARE EDUCATION/TRAINING PROGRAM

## 2024-11-20 PROCEDURE — 250N000012 HC RX MED GY IP 250 OP 636 PS 637: Performed by: STUDENT IN AN ORGANIZED HEALTH CARE EDUCATION/TRAINING PROGRAM

## 2024-11-20 PROCEDURE — 83735 ASSAY OF MAGNESIUM: CPT | Performed by: NURSE PRACTITIONER

## 2024-11-20 PROCEDURE — 99233 SBSQ HOSP IP/OBS HIGH 50: CPT | Mod: 24 | Performed by: INTERNAL MEDICINE

## 2024-11-20 PROCEDURE — 84100 ASSAY OF PHOSPHORUS: CPT | Performed by: NURSE PRACTITIONER

## 2024-11-20 PROCEDURE — 250N000013 HC RX MED GY IP 250 OP 250 PS 637: Performed by: TRANSPLANT SURGERY

## 2024-11-20 PROCEDURE — 36415 COLL VENOUS BLD VENIPUNCTURE: CPT | Performed by: NURSE PRACTITIONER

## 2024-11-20 PROCEDURE — 250N000012 HC RX MED GY IP 250 OP 636 PS 637: Performed by: PHYSICIAN ASSISTANT

## 2024-11-20 PROCEDURE — 82248 BILIRUBIN DIRECT: CPT | Performed by: NURSE PRACTITIONER

## 2024-11-20 PROCEDURE — 80197 ASSAY OF TACROLIMUS: CPT | Performed by: NURSE PRACTITIONER

## 2024-11-20 PROCEDURE — 85027 COMPLETE CBC AUTOMATED: CPT | Performed by: NURSE PRACTITIONER

## 2024-11-20 RX ORDER — LIDOCAINE HYDROCHLORIDE 10 MG/ML
5 INJECTION, SOLUTION EPIDURAL; INFILTRATION; INTRACAUDAL; PERINEURAL ONCE
Status: DISCONTINUED | OUTPATIENT
Start: 2024-11-20 | End: 2024-11-20 | Stop reason: HOSPADM

## 2024-11-20 RX ORDER — SULFAMETHOXAZOLE AND TRIMETHOPRIM 400; 80 MG/1; MG/1
1 TABLET ORAL DAILY
Qty: 30 TABLET | Refills: 5 | Status: ACTIVE | OUTPATIENT
Start: 2024-11-20

## 2024-11-20 RX ORDER — ASPIRIN 325 MG
325 TABLET ORAL DAILY
Qty: 30 TABLET | Refills: 5 | Status: SHIPPED | OUTPATIENT
Start: 2024-11-21

## 2024-11-20 RX ORDER — POLYETHYLENE GLYCOL 3350 17 G/17G
17 POWDER, FOR SOLUTION ORAL DAILY
Qty: 510 G | Refills: 0 | Status: SHIPPED | OUTPATIENT
Start: 2024-11-20

## 2024-11-20 RX ORDER — TACROLIMUS 5 MG/1
5 CAPSULE ORAL 2 TIMES DAILY
Qty: 60 CAPSULE | Refills: 11 | Status: ACTIVE | OUTPATIENT
Start: 2024-11-20 | End: 2024-11-22

## 2024-11-20 RX ORDER — MAGNESIUM OXIDE 400 MG/1
400 TABLET ORAL DAILY
Status: DISCONTINUED | OUTPATIENT
Start: 2024-11-20 | End: 2024-11-20 | Stop reason: HOSPADM

## 2024-11-20 RX ORDER — VALGANCICLOVIR 450 MG/1
450 TABLET, FILM COATED ORAL DAILY
Qty: 30 TABLET | Refills: 2 | Status: ACTIVE | OUTPATIENT
Start: 2024-11-21

## 2024-11-20 RX ORDER — ESCITALOPRAM OXALATE 20 MG/1
20 TABLET ORAL DAILY
Qty: 7 TABLET | Refills: 0 | Status: SHIPPED | OUTPATIENT
Start: 2024-11-21 | End: 2024-11-20

## 2024-11-20 RX ORDER — OXYCODONE HYDROCHLORIDE 5 MG/1
5 TABLET ORAL EVERY 4 HOURS PRN
Qty: 20 TABLET | Refills: 0 | Status: SHIPPED | OUTPATIENT
Start: 2024-11-20

## 2024-11-20 RX ORDER — VALGANCICLOVIR 450 MG/1
450 TABLET, FILM COATED ORAL DAILY
Status: DISCONTINUED | OUTPATIENT
Start: 2024-11-20 | End: 2024-11-20 | Stop reason: HOSPADM

## 2024-11-20 RX ORDER — TACROLIMUS 1 MG/1
1 CAPSULE ORAL 2 TIMES DAILY PRN
Qty: 60 CAPSULE | Refills: 11 | Status: ACTIVE | OUTPATIENT
Start: 2024-11-20 | End: 2024-11-22

## 2024-11-20 RX ORDER — AMLODIPINE BESYLATE 5 MG/1
5 TABLET ORAL DAILY
Qty: 30 TABLET | Refills: 2 | Status: SHIPPED | OUTPATIENT
Start: 2024-11-21

## 2024-11-20 RX ORDER — ESCITALOPRAM OXALATE 20 MG/1
20 TABLET ORAL DAILY
Qty: 7 TABLET | Refills: 0 | Status: SHIPPED | OUTPATIENT
Start: 2024-11-21

## 2024-11-20 RX ORDER — AMOXICILLIN 250 MG
2 CAPSULE ORAL 2 TIMES DAILY
Qty: 60 TABLET | Refills: 0 | Status: SHIPPED | OUTPATIENT
Start: 2024-11-20

## 2024-11-20 RX ORDER — PANTOPRAZOLE SODIUM 40 MG/1
40 TABLET, DELAYED RELEASE ORAL
Qty: 60 TABLET | Refills: 0 | Status: SHIPPED | OUTPATIENT
Start: 2024-11-20 | End: 2024-12-20

## 2024-11-20 RX ORDER — METHOCARBAMOL 750 MG/1
750 TABLET, FILM COATED ORAL 4 TIMES DAILY PRN
Qty: 20 TABLET | Refills: 0 | Status: SHIPPED | OUTPATIENT
Start: 2024-11-20

## 2024-11-20 RX ORDER — MYCOPHENOLATE MOFETIL 250 MG/1
750 CAPSULE ORAL 2 TIMES DAILY
Qty: 180 CAPSULE | Refills: 11 | Status: ACTIVE | OUTPATIENT
Start: 2024-11-20

## 2024-11-20 RX ORDER — PREDNISONE 5 MG/1
5 TABLET ORAL DAILY
Qty: 30 TABLET | Refills: 2 | Status: SHIPPED | OUTPATIENT
Start: 2024-11-21

## 2024-11-20 RX ADMIN — MAGNESIUM OXIDE TAB 400 MG (241.3 MG ELEMENTAL MG) 400 MG: 400 (241.3 MG) TAB at 11:44

## 2024-11-20 RX ADMIN — TACROLIMUS 3.5 MG: 1 CAPSULE ORAL at 07:51

## 2024-11-20 RX ADMIN — OXYCODONE HYDROCHLORIDE 5 MG: 5 TABLET ORAL at 03:45

## 2024-11-20 RX ADMIN — NYSTATIN 500000 UNITS: 100000 SUSPENSION ORAL at 07:51

## 2024-11-20 RX ADMIN — SEVELAMER CARBONATE 0.8 G: 800 POWDER, FOR SUSPENSION ORAL at 07:51

## 2024-11-20 RX ADMIN — NYSTATIN 500000 UNITS: 100000 SUSPENSION ORAL at 11:43

## 2024-11-20 RX ADMIN — BUMETANIDE 1 MG: 1 TABLET ORAL at 07:51

## 2024-11-20 RX ADMIN — PREDNISONE 5 MG: 5 TABLET ORAL at 07:51

## 2024-11-20 RX ADMIN — OXYCODONE HYDROCHLORIDE 5 MG: 5 TABLET ORAL at 11:43

## 2024-11-20 RX ADMIN — AMLODIPINE BESYLATE 5 MG: 5 TABLET ORAL at 07:51

## 2024-11-20 RX ADMIN — LIDOCAINE 1 PATCH: 4 PATCH TOPICAL at 07:51

## 2024-11-20 RX ADMIN — PANTOPRAZOLE SODIUM 40 MG: 40 TABLET, DELAYED RELEASE ORAL at 07:51

## 2024-11-20 RX ADMIN — ASPIRIN 325 MG ORAL TABLET 325 MG: 325 PILL ORAL at 07:51

## 2024-11-20 RX ADMIN — Medication 1 TABLET: at 11:43

## 2024-11-20 RX ADMIN — VALGANCICLOVIR HYDROCHLORIDE 450 MG: 450 TABLET ORAL at 11:43

## 2024-11-20 RX ADMIN — SEVELAMER CARBONATE 0.8 G: 800 POWDER, FOR SUSPENSION ORAL at 11:43

## 2024-11-20 RX ADMIN — ESCITALOPRAM OXALATE 20 MG: 20 TABLET ORAL at 07:51

## 2024-11-20 RX ADMIN — MYCOPHENOLATE MOFETIL 750 MG: 250 CAPSULE ORAL at 07:50

## 2024-11-20 ASSESSMENT — ACTIVITIES OF DAILY LIVING (ADL)
ADLS_ACUITY_SCORE: 0

## 2024-11-20 NOTE — PLAN OF CARE
"/79 (BP Location: Left arm)   Pulse 71   Temp 98.2  F (36.8  C) (Oral)   Resp 18   Ht 1.63 m (5' 4.17\")   Wt 75 kg (165 lb 5.5 oz)   SpO2 94%   BMI 28.23 kg/m      Shift: 7184-2876  VS: Vitals stable, room air, afebrile  Neuro: Alert and oriented x4  BG: ACHS 124, 87  Labs: Awaiting AM labs   Pain/Nausea: Pain managed by prn oxy x1, prn robaxin x1, denies nausea   Diet: Regular, vonnie counts   IV Access: R + L PIV saline locked, L chest HD line   GI/: Voiding adequately, scheduled bumex given, LBM 11/19  Skin: Clamshell incision with staples LAKSHMI, old PRISCILA site leaky, dressing changed x3   Mobility: Up SBA, using walker   Plan: Continue with POC and notify team with any changes   Goal Outcome Evaluation:      Plan of Care Reviewed With: patient    Overall Patient Progress: improvingOverall Patient Progress: improving    Outcome Evaluation: Alert and oriented x4 overnight, pain controlled well, possible discharge 11/20      " Vomiting and diarrhea began yesterday. Mom concerned about \"scab\" under bottom lip, no known injury per mom. Mom denies fevers.    Decreased PO, +UOP.    No meds PTA

## 2024-11-20 NOTE — PROGRESS NOTES
[facility]  Transplant Nephrology Progress Note  Date of Admission:  11/13/2024  Today's Date: 11/20/2024    Recommendations:   - No acute indications for dialysis.  - Recommend holding diuretics on hospital discharge.  - Recommend starting magnesium oxide 400 mg daily with lunch.    Assessment & Plan   # RUDY: Trend down in creatinine.  Good urine output.  No acute indications for dialysis.   - RUDY felt secondary to liver transplant with hemodynamic changes and hypotension. Renal US 11/13/24 without hydronephrosis or signs of obstruction.   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  Transaminases Trend down.  Followed by Transplant Surgery.    # Immunosuppression: Tacrolimus immediate release (goal 8-10), Mycophenolate mofetil (dose 750 mg every 12 hours), and Methylprednisolone (dose taper)   - Induction with Recent Transplant:  Per Liver Tx Protocol   - Continue with intensive monitoring of immunosuppression for efficacy and toxicity.   - Goal tacrolimus level lower due to RUDY.   - Changes: Not at this time; Management per Transplant Surgery.    # Infection Prophylaxis:   - PJP: Sulfa/TMP (Bactrim) (held due to RUDY and hyperkalemia)  - CMV: Valganciclovir (Valcyte)  - Thrush: Nystatin (Mycostatin) swish and swallow  - Fungal: None      - CMV IgG Ab High Risk Discordance (D+/R-): No  CMV Serostatus: Positive  - EBV IgG Ab High Risk Discordance (D+/R-): No  EBV Serostatus: Positive    # Hypertension: Controlled;  Goal BP: < 140/90 (Hospitalization goal)   - Changes: Not at this time    # Elevated Blood Glucose: Glucose generally running ~ 80-160s. On insulin.    # Anemia in Chronic Disease/Surgery: Hgb: Increased following blood transfusion      MAGUI: No   - Iron studies: Not checked recently    # Thrombocytopenia: Trend up, moderately  low platelet level.    # Mineral Bone Disorder:   - Vitamin D; level: Low normal        On supplement: No  - Calcium; level: Normal        On supplement: No  - Phosphorus; level: Normal        On supplement: No    # Electrolytes:   - Potassium; level: Normal        On supplement: No.   - Magnesium; level: Low        On supplement: No; Recommend starting magnesium oxide 400 mg daily with lunch.  - Bicarbonate; level: Normal        On supplement: No  - Sodium; level: Normal    # Other Significant PMH:   - Depression: lexapro on hold given prolonged QTc     # Transplant History:  Etiology of Organ Failure: Alcohol-related liver disease  Tx: Liver Tx  Transplant: 11/13/2024 (Liver)  Significant changes in immunosuppression: Slightly lower tacrolimus level goal due to RUDY.  Significant transplant-related complications: RUDY    Recommendations were communicated to the primary team verbally.    Corky Espana MD  Transplant Nephrology    Interval History  Ms. Sutherland's creatinine is 1.34 (11/20 0610); Trend down.  Very good urine output.  Trend down in transaminases.  Other significant labs/tests/vitals: Stable electrolytes.  Increased hemoglobin after blood transfusion.  Trend up in platelet level.  No new events overnight.  No chest pain or shortness of breath.  Stable leg swelling.  No nausea and vomiting.  Some heartburn symptoms.  Bowel movements are loose.  No fever, sweats or chills.    Review of Systems   4 point ROS was obtained and negative except as noted in the Interval History.    MEDICATIONS:  Current Facility-Administered Medications   Medication Dose Route Frequency Provider Last Rate Last Admin    amLODIPine (NORVASC) tablet 5 mg  5 mg Oral Daily Barb Carr NP   5 mg at 11/20/24 0751    aspirin (ASA) tablet 325 mg  325 mg Oral Daily Fabrizio Colmenares MD   325 mg at 11/20/24 0751    bumetanide (BUMEX) tablet 1 mg  1 mg Oral BID Sierra Sims PA-C   1 mg at 11/20/24 0751    escitalopram  (LEXAPRO) tablet 20 mg  20 mg Oral Daily Barb Carr NP   20 mg at 11/20/24 0751    insulin aspart (NovoLOG) injection (RAPID ACTING)  1-7 Units Subcutaneous TID  Elizabeth Landin MD   1 Units at 11/18/24 1245    insulin aspart (NovoLOG) injection (RAPID ACTING)  1-5 Units Subcutaneous At Bedtime Elizabeth Landin MD   1 Units at 11/15/24 2158    Lidocaine (LIDOCARE) 4 % Patch 1-2 patch  1-2 patch Transdermal Q24H Abena Mccray NP   1 patch at 11/20/24 0751    lidocaine (PF) (XYLOCAINE) 1 % injection 5 mL  5 mL Subcutaneous Once Sierra Sims PA-C        magnesium oxide (MAG-OX) tablet 400 mg  400 mg Oral Daily Sierra Sims PA-C   400 mg at 11/20/24 1144    melatonin tablet 5 mg  5 mg Oral QPM Barb Carr NP   5 mg at 11/19/24 1726    multivitamin w/minerals (THERA-VIT-M) tablet 1 tablet  1 tablet Oral Daily Fabrizio Colmenares MD   1 tablet at 11/20/24 1143    mycophenolate (GENERIC EQUIVALENT) capsule 750 mg  750 mg Oral BID Fabrizio Colmenares MD   750 mg at 11/20/24 0750    nystatin (MYCOSTATIN) suspension 500,000 Units  500,000 Units Oral 4x Daily Abena Mccray NP   500,000 Units at 11/20/24 1143    pantoprazole (PROTONIX) EC tablet 40 mg  40 mg Oral BID  Davina Hardin APRN CNP   40 mg at 11/20/24 0751    polyethylene glycol (MIRALAX) Packet 17 g  17 g Oral BID Barb Carr NP   17 g at 11/19/24 2007    predniSONE (DELTASONE) tablet 5 mg  5 mg Oral Daily Fabiola Aleman APRN CNP   5 mg at 11/20/24 0751    senna-docusate (SENOKOT-S/PERICOLACE) 8.6-50 MG per tablet 2 tablet  2 tablet Oral BID Barb Carr NP   2 tablet at 11/19/24 2007    sevelamer carbonate (RENVELA) Packet 0.8 g  0.8 g Oral TID w/meals Abena Mccray, NP   0.8 g at 11/20/24 1143    sodium chloride (PF) 0.9% PF flush 3 mL  3 mL Intravenous Q8H Fabrizio Colmenares MD   3 mL at 11/19/24 2011    sulfamethoxazole-trimethoprim (BACTRIM) 400-80 MG per tablet 1 tablet  1 tablet Oral or  "Feeding Tube Daily Sierra Sims PA-C   1 tablet at 24    tacrolimus (GENERIC EQUIVALENT) capsule 3.5 mg  3.5 mg Oral BID IS Sierra Sims PA-C   3.5 mg at 24 0751    valGANciclovir (VALCYTE) tablet 450 mg  450 mg Oral Daily Blas Carney MD   450 mg at 24 1143     Current Facility-Administered Medications   Medication Dose Route Frequency Provider Last Rate Last Admin       Physical Exam   Temp  Av  F (36.7  C)  Min: 97.5  F (36.4  C)  Max: 98.4  F (36.9  C)  Arterial Line BP  Min: 119/56  Max: 157/65  Arterial Line MAP (mmHg)  Av.9 mmHg  Min: 75 mmHg  Max: 96 mmHg      Pulse  Av.1  Min: 68  Max: 95 Resp  Av.5  Min: 8  Max: 21  SpO2  Av.6 %  Min: 92 %  Max: 100 %    CVP (mmHg): 1 mmHgBP 106/58 (BP Location: Right arm)   Pulse 71   Temp 98.1  F (36.7  C) (Oral)   Resp 18   Ht 1.63 m (5' 4.17\")   Wt 75 kg (165 lb 5.5 oz)   SpO2 98%   BMI 28.23 kg/m     Date 11/15/24 0700 - 24 0659   Shift 9209-7901 2294-6119 5951-1405 24 Hour Total   INTAKE   P.O. 118   118   I.V. 10   10   Shift Total(mL/kg) 128(1.57)   128(1.57)   OUTPUT   Urine 30   30   Drains 100   100   Shift Total(mL/kg) 130(1.59)   130(1.59)   Weight (kg) 81.7 81.7 81.7 81.7      Admit Weight: 77.6 kg (171 lb)     GENERAL APPEARANCE: alert and no distress  HENT: mouth without ulcers or lesions  RESP: lungs clear to auscultation - no rales, rhonchi or wheezes  CV: regular rhythm, normal rate, no rub, no murmur  EDEMA: trace LE and dependent edema bilaterally  ABDOMEN: soft, nondistended, diffusely tender, bowel sounds normal  MS: extremities normal - no gross deformities noted, no evidence of inflammation in joints, no muscle tenderness  SKIN: no rash  DIALYSIS ACCESS:  None     Data   All labs reviewed by me.  CMP  Recent Labs   Lab 24  1143 24  0748 24  0610 24  0118 24  0800 24  0628 24  1242 24  1002 24  1647 " 11/17/24  1453 11/17/24  1147 11/17/24  0615   NA  --   --  136  --   --  137  --  133*  --  128*  --   --    POTASSIUM  --   --  4.1  --   --  3.9  --  3.9  --  4.8  --   --    CHLORIDE  --   --  98  --   --  99  --  95*  --  94*  --   --    CO2  --   --  28  --   --  28  --  27 --  24  --   --    ANIONGAP  --   --  10  --   --  10  --  11  --  10  --   --    * 100* 108* 87   < > 88   < > 115*   < > 171*   < >  --    BUN  --   --  35.7*  --   --  46.2*  --  56.4*  --  57.0*  --   --    CR  --   --  1.34*  --   --  2.08*  --  3.27*  --  3.96*  --   --    GFRESTIMATED  --   --  47*  --   --  28*  --  16*  --  13*  --   --    TIMOTHY  --   --  8.8  --   --  8.4*  --  8.5*  --  8.3*  --   --    MAG  --   --  1.4*  --   --  1.7  --  1.9  --   --   --  2.3   PHOS  --   --  2.7  --   --  3.2  --  3.9  --   --   --  5.5*   PROTTOTAL  --   --  5.9*  --   --  5.0*  --  5.4*  --   --   --  5.2*   ALBUMIN  --   --  3.2*  --   --  2.7*  --  3.0*  --   --   --  2.9*   BILITOTAL  --   --  2.4*  --   --  2.2*  --  2.6*  --   --   --  2.2*   ALKPHOS  --   --  201*  --   --  145  --  146  --   --   --  96   AST  --   --  56*  --   --  63*  --  112*  --   --   --  237*   ALT  --   --  269*  --   --  315*  --  478*  --   --   --  695*    < > = values in this interval not displayed.     CBC  Recent Labs   Lab 11/20/24  0610 11/19/24  0628 11/18/24  1002 11/17/24  0615   HGB 9.4* 6.7* 7.3* 7.1*   WBC 4.2 3.4* 5.2 6.5   RBC 3.26* 2.35* 2.53* 2.48*   HCT 28.5* 20.4* 21.9* 21.5*   MCV 87 87 87 87   MCH 28.8 28.5 28.9 28.6   MCHC 33.0 32.8 33.3 33.0   RDW 16.8* 16.8* 16.1* 16.3*   PLT 96* 62* 53* 48*     INR  Recent Labs   Lab 11/14/24  1642 11/14/24  1010 11/14/24  0404 11/13/24  1515   INR 1.77* 1.44* 1.55* 1.49*   PTT  --  33  --   --      ABG  No lab results found in last 7 days.     Urine Studies  Recent Labs   Lab Test 11/14/24  0808 11/13/24  0124 05/21/24  0759   COLOR Yellow Light Yellow Yellow   APPEARANCE Slightly Cloudy*  Clear Clear   URINEGLC Negative Negative Negative   URINEBILI Small* Negative Negative   URINEKETONE Negative Negative Negative   SG 1.023 1.005 1.019   UBLD Moderate* Negative Negative   URINEPH 5.0 7.5* 6.5   PROTEIN 30* Negative Negative   NITRITE Negative Negative Negative   LEUKEST Negative Negative Trace*   RBCU 28* 0 <1   WBCU 2 <1 7*     No lab results found.  PTH  No lab results found.  Iron Studies  Recent Labs   Lab Test 05/21/24  0757   IRON 47      IRONSAT 13*   RANDOLPH 31       IMAGING:  All imaging studies reviewed by me.

## 2024-11-20 NOTE — PROGRESS NOTES
Calorie Count  Intake recorded for: 11/19  Total Kcals: 964 Total Protein: 59g  Kcals from Hospital Food: 964  Protein: 59g  Kcals from Outside Food (average):0 Protein: 0g  # Meals Ordered from Kitchen: 3 meals   # Meals Recorded: 3 meals (First - 100% omelet w/ ham, veggies & cheese, Greek yogurt, cranberry juice)       (Second - 100% 2 cranberry juices, 66% baked chicken breast, 50% green beans)       (Third - 80% natacha crackers, 60% chicken stir w/ veggies (no rice), 50% serena food cake w/ strawberry & whipped topping)   # Supplements Recorded: 0

## 2024-11-20 NOTE — PROGRESS NOTES
CLINICAL NUTRITION SERVICES - DISCHARGE NOTE    Patient s discharge needs assessed and discharge planning has been conducted with the multidisciplinary transplant care team including physicians, pharmacy, social work and transplant coordinator.    Follow up/Monitoring:  Once discharged, place outpatient nutrition consult via the transplant team if nutrition concerns arise.    Abena Tyson, MS, RD, LD, CCTD, [facility]  7A + 7B (beds [id number])  Available on Vocera [7A or 7B Clinical Dietitian]   Weekend/Holiday: Vocera [Weekend Clinical Dietitian]

## 2024-11-20 NOTE — TELEPHONE ENCOUNTER
A pharmacist spent 30 minutes providing medication teaching with Hannah Sutherland and bette Hunter for discharge with a focus on new medications/dose changes.  The discharge medication list was reviewed with the patient/family and the following points were discussed, as applicable: Name, description, purpose, dose/strength, duration of medications, common side effects, food/medications to avoid, action to be taken if dose is missed, when to call MD, and how to obtain refills.  The family member (bette Hunter) will be responsible for managing medications. Additionally, the following transplant related education was covered: Purpose of medication card, Medication videos, Timing of medications and day of lab draw considerations , Prescription Insurance , and Discharge process for receiving meds   Patient will  transplant supplies including 7 day pill organizer, thermometer, and BP monitor at the discharge pharmacy along with medications.  Patient chooses to receive medications from [facility] pharmacy.   Clinical Pharmacy Consult:                                                      Transplant Specific:   Date of Transplant: 11/13/24  Type of Transplant: liver  First Transplant: yes  History of rejection: no    Immunosuppression Regimen   TAC 3.5mg qAM & 3.5mg qPM, Prednisone 5mg daily, and MMF 750mg qAM & 750mg qPM  Patient specific goal: Tacrolimus goal trough levels of approximately 8 mcg/L    Most recent level: 4.3, date 11/20/24  Immunosuppressant Levels:  Subtherapeutic  Pt adherent to lab draws: N/A  Scr:   Lab Results   Component Value Date    CR 1.34 11/20/2024     Side effects: no side effects    Prophylactic Medications  PJP Prophylactic: Bactrim SS daily  Scheduled Discontinue Date: 6 months Anticipated date 5/13/25    Antifungal: Nystatin  Scheduled Discontinue Date:  discontinue at discharge    CMV Prophylactic: Max dose in Liver transplant is Valcyte 450mg once daily   Scheduled Discontinue  Date: 3 months Anticipated date 1/13/25    Acid Reducer: Protonix (pantoprazole)  Scheduled Reviewed Date:  PTAM    Vascular Prophylactic: Aspirin 325 mg PO daily  Scheduled Discontinue Date:  Review at clinic    Reminders:  Bring to first clinic appt: med box, med card, bp monitor, all medications being taken, and lab book.  2.   MTM pharmacist visit on first clinic appt and if ok, again in 3 to 4 months during follow up appt.  3.   Avoid Grapefruit and Grapefruit juice.   4.   Avoid herbal supplements. If wish to take other medications or supplements, call your coordinator.   5.   Keep lab appts.   6.   Can use apps on phone like "[employer] to help manage medication lists and reminders.   7.   Make sure you are protecting your skin by wearing long sleeves and applying sunscreen to exposed skin, for any significant time in the sun.     Transplant Coordinator is Barb Lazaro.    Thank You,  Taniya Schumacher, PharmD  [facility]  [phone]

## 2024-11-20 NOTE — DISCHARGE SUMMARY
[facility]    Discharge Summary  Transplant Surgery    Date of Admission:  2024  Date of Discharge:  2024  Discharging Provider: Sierra Sims PA-C/Nel Guerrero MD  Date of Service (when I saw the patient): 24    Discharge Diagnoses   Active Problems:    Immunosuppressed status (H)    Liver replaced by transplant (H)     Procedure/Surgery Information   Procedure: Procedure(s):  Transplant liver recipient  donor  Bench kidney   Surgeon(s): Surgeons and Role:     * Blas Carney MD - Primary     * Nura East MD - Resident - Assisting     * Fabrizio Colmenares MD - Fellow - Assisting       Non-operative procedures Tunneled dialysis catheter placed     History of Present Illness   Hannah Sutherland is a 54 year old female with decompensated cirrhosis 2/2 to alcohol use c/b ascites and mild encephalopathy. She was hospitalized in 2023 for acute hepatitis 2/2 to alcohol use at which time she was diagnosed with underlying cirrhosis. Ms. Sutherland has remained abstinent from alcohol for the last year. MELD at time of transplant 21. She underwent DDLT w/o stent on 2024 with Dr. Blas Careny.     Hospital Course   Hannah Sutherland was admitted on 2024.      She was taken to the operating room on 24 where she underwent a  donor orthotopic liver transplant.  The patient tolerated the procedure well, and was transferred to the surgical ICU for close post-operative management.      s/p DDLT (no stent) 24: POD #7 on discharge.   Transaminases continue to trend down. TB 2.4 on discharge.    - Continue  mg daily.   - Liver US 11/15: Patent Doppler, stable hematoma     Immunosuppressed status secondary to medications:  Induction: Steroid taper and basiliximab POD 1 and POD 5 per renal sparing protocol.   Maintenance:    -  mg BID   - Tacrolimus 5 mg BID. Goal level 6-8 d/t RUDY  initially, now goal 8-12.    - Prednisone 5 mg daily following taper x 3 months     Neuro/Psych:  Depression: Lexapro resumed 11/17.   Acute post-op pain: Continue PRN oxycodone, PRN Robaxin, Atarax, Lidoderm patches.   Post-operative delirium: Delirium precautions.      Hematology:   Acute blood loss anemia: Hgb 9.4 on discharge following 2u PRBCs transfused for HGB 6.7 on 11/19.   Thrombocytopenia: Plt improving to 96 on discharge, continue to monitor.      Cardiorespiratory:   Hypoxemia: Resolved. On RA. Continue IS/CDB.  Right pleural effusion: Noted on US 11/15, small volume.   Prolonged QTc: QTc 496 on EKG 11/13.    - Avoid QTc prolonging meds (Zofran discontinued).    - Repeat EKG 11/17: 433  Hypertension: Continue amlodipine 5 mg daily      GI/Nutrition:   At risk for malnutrition: Nutrition consulted.    - Regular diet, encourage optimal intake   Nausea; Constipation: Continue scheduled Miralax and senna-doc; doses escalated 11/16.    - Scopolamine patch discontinued 11/17 due to delirium, small dose PRN Compazine for nausea (avoid Zofran with prolonged QTc).     Endocrine:   Steroid induced hyperglycemia: Hgb A1c 4.6 05/2024.   - Sliding scale insulin required post operatively, resolved prior to discharge.      Fluid/Electrolytes:   RUDY: Likely r/t hemodynamic changes intra-op. Transplant Nephrology consulted. Minimal response from Lasix. Renal sparing protocol as above. Renal US WNL. Good response to diuretic.   - Non-tunneled line by IR 11/15  -  HD 11/15, no HD since  -Cr improved to 1.3 on the day of discharge with 3L UOP over 24 hours on Bumex 1 mg.   -Patient is to monitor weight daily and contact transplant coordinator for possible diuretics if weight increased 2-3 pounds from time of discharge.   Hyperkalemia: Resolved   - Held Bactrim, now restarted   - Bumex ordered  Hyperphosphatemia: Resolved. On phos binder.    - Renvela with meals, discontinued on discharge   Hyponatremia: Sodium 136 on  discharge, resolved.    -Fluid restrictions initially, now discontinued.      : No calzada. Bladder scans negative for retention     Infectious disease: No issues     Prophylaxis: DVT(mechanical), fall, GI (pantoprazole), fungal (Nystatin completed), viral (valganciclovir x 3 months), pneumocystis (Bactrim, on hold for RUDY)    Transplant coordinator: Barb Lazaro [phone]  Biliary stent: No  Donor status: DBD  CMV D -/ R +  EBV D +/ R+  Anticoagulation plan:  mg daily x6 months    Discharge Disposition   Discharged to home   Condition at discharge: Stable    Pending Results   These results will be followed up by transplant coordinator   Unresulted Labs Ordered in the Past 30 Days of this Admission       Date and Time Order Name Status Description    11/13/2024  3:57 AM Fungal or Yeast Culture Routine Preliminary           Final pathology results: A(1). LIVER AND GALLBLADDER, EXPLANT:  - Established cirrhosis; morphologically cryptogenic, clinically alcohol-related.  - Surgical margins of resection are unremarkable.  - Chronic cholecystitis with cholelithiasis; one reactive cystic lymph node (0/1).  - See Comment.     B(2). GALLBLADDER, CHOLECYSTECTOMY:  - Chronic cholecystitis.   Primary Care Physician   ELENA HICKMAN    Physical Exam   Temp: 98.2  F (36.8  C) Temp src: Oral BP: 125/73 Pulse: 71   Resp: 18 SpO2: 97 % O2 Device: None (Room air)    Vitals:    11/17/24 2315 11/18/24 0625 11/20/24 0002   Weight: 81.4 kg (179 lb 7.3 oz) 79 kg (174 lb 1.6 oz) 75 kg (165 lb 5.5 oz)     Vital Signs with Ranges  Temp:  [97.7  F (36.5  C)-99.3  F (37.4  C)] 98.2  F (36.8  C)  Pulse:  [63-73] 71  Resp:  [16-18] 18  BP: (103-138)/(50-83) 125/73  SpO2:  [94 %-100 %] 97 %  I/O last 3 completed shifts:  In: 1574 [P.O.:720]  Out: 3175 [Urine:3000; Drains:175]    General Appearance: in no apparent distress.   Skin: normal, warm, No rashes, induration, or jaundice  Heart: She appears adequately perfused  Lungs: Breathing  comfortably on RA  Abdomen: Soft. Incision closed with staples and open to air   : voiding  Extremities: edema: none There is no skin breakdown.  Neurologic: Awake, alert; oriented x4. Tremor absent.    Consultations This Hospital Stay   SOCIAL WORK IP CONSULT  NUTRITION SERVICES ADULT IP CONSULT  PHYSICAL THERAPY ADULT IP CONSULT  OCCUPATIONAL THERAPY ADULT IP CONSULT  NURSING TO CONSULT FOR VASCULAR ACCESS CARE IP CONSULT  NURSING TO CONSULT FOR VASCULAR ACCESS CARE IP CONSULT  NURSING TO CONSULT FOR VASCULAR ACCESS CARE IP CONSULT  NUTRITION SERVICES ADULT IP CONSULT  OCCUPATIONAL THERAPY ADULT IP CONSULT  PHYSICAL THERAPY ADULT IP CONSULT  PHARMACY IP CONSULT  SOT MEDICATION HISTORY IP PHARMACY CONSULT  CARE MANAGEMENT / SOCIAL WORK IP CONSULT  NEPHROLOGY KIDNEY/PANCREAS TRANSPLANT ADULT IP CONSULT  INTERVENTIONAL RADIOLOGY ADULT/PEDS IP CONSULT    Time Spent on this Encounter   I have spent greater than 30 minutes on this discharge.    Discharge Orders      Reason for your hospital stay    Liver transplant     Activity    Walk at least four times a day, lift no greater than 10 pounds for 8 weeks from the time of surgery.  No driving while taking narcotics or 3 weeks after surgery.     Follow Up and recommended labs and tests    [facility] FOLLOW UP:     1. Follow up in Transplant Clinic weekly for the next 5 weeks with Dr. Carney (or any available liver transplant surgeon).     2. Follow up with Transplant Hepatology in 3 months.     Call your Transplant Coordinator ([phone]) with questions about Transplant Center appointment scheduling.     LABS:     CBC, BMP, magnesium, phosphorus, hepatic panel, tacrolimus level every Monday and Thursday by home health care nurse if arranged, or at an outpatient lab.     [facility] Follow-up and recommended labs and tests    [facility] FOLLOW UP:     1. Follow up in Transplant Clinic weekly for the next 5 weeks with Dr. Carney  (or any available liver transplant surgeon).     2. Follow up with Transplant Hepatology in 3 months.     Call your Transplant Coordinator ([phone]) with questions about Transplant Center appointment scheduling.     Monitor and record    Monitor weight daily. If weight increases by 2-3 pounds contact your transplant coordinator.     When to contact your care team    WHEN TO CONTACT YOUR  COORDINATOR:     Transplant Coordinator [phone]     Notify your coordinator if you have pain over your liver, increased redness or drainage from your incision, fever greater than 100F, or yellowing of skin or eyes.     Notify your coordinator immediately if you are ever unable to take your immunosuppressive medications for any reason.     If you have URGENT concerns after office hours, please call the hospital switchboard at [phone] and ask to have the organ transplant nurse on-call paged. If you have a life-threatening emergency, go to the nearest emergency room.     Wound care and dressings    If you have staples in place, they will be removed in 3 weeks after operation. Wash incision daily with soap and water. Do not soak or scrub.     Diet    Diet recommendations post-transplant: Heart healthy dietary habits long term (low saturated/trans fat, low sodium). High protein diet x 8 weeks. Practice food safety precautions.        Discharge Medications   Current Discharge Medication List        START taking these medications    Details   amLODIPine (NORVASC) 5 MG tablet Take 1 tablet (5 mg) by mouth daily.  Qty: 30 tablet, Refills: 2    Associated Diagnoses: Liver replaced by transplant (H)      aspirin (ASA) 325 MG tablet Take 1 tablet (325 mg) by mouth daily.  Qty: 30 tablet, Refills: 5    Associated Diagnoses: Liver replaced by transplant (H)      methocarbamol (ROBAXIN) 750 MG tablet Take 1 tablet (750 mg) by mouth 4 times daily as needed for muscle spasms.  Qty: 20 tablet, Refills: 0    Associated Diagnoses: Liver  replaced by transplant (H)      mycophenolate (GENERIC EQUIVALENT) 250 MG capsule Take 3 capsules (750 mg) by mouth 2 times daily.  Qty: 180 capsule, Refills: 11    Associated Diagnoses: Liver replaced by transplant (H)      oxyCODONE (ROXICODONE) 5 MG tablet Take 1 tablet (5 mg) by mouth every 4 hours as needed for moderate pain.  Qty: 20 tablet, Refills: 0    Associated Diagnoses: Liver replaced by transplant (H)      polyethylene glycol (MIRALAX) 17 GM/Dose powder Take 17 g by mouth daily.  Qty: 510 g, Refills: 0    Associated Diagnoses: Liver replaced by transplant (H)      predniSONE (DELTASONE) 5 MG tablet Take 1 tablet (5 mg) by mouth daily.  Qty: 30 tablet, Refills: 2    Associated Diagnoses: Liver replaced by transplant (H)      senna-docusate (SENOKOT-S/PERICOLACE) 8.6-50 MG tablet Take 2 tablets by mouth 2 times daily.  Qty: 60 tablet, Refills: 0    Associated Diagnoses: Liver replaced by transplant (H)      sulfamethoxazole-trimethoprim (BACTRIM) 400-80 MG tablet Take 1 tablet by mouth or Feeding Tube daily.  Qty: 30 tablet, Refills: 5    Associated Diagnoses: Liver replaced by transplant (H)      !! tacrolimus (GENERIC EQUIVALENT) 1 MG capsule Take 1 capsule (1 mg) by mouth 2 times daily as needed (Dose titration per transplant team).  Qty: 60 capsule, Refills: 11    Associated Diagnoses: Liver replaced by transplant (H)      !! tacrolimus (GENERIC EQUIVALENT) 5 MG capsule Take 1 capsule (5 mg) by mouth 2 times daily.  Qty: 60 capsule, Refills: 11    Associated Diagnoses: Liver replaced by transplant (H)      valGANciclovir (VALCYTE) 450 MG tablet Take 1 tablet (450 mg) by mouth daily.  Qty: 30 tablet, Refills: 2    Associated Diagnoses: Liver replaced by transplant (H)       !! - Potential duplicate medications found. Please discuss with provider.        CONTINUE these medications which have CHANGED    Details   pantoprazole (PROTONIX) 40 MG EC tablet Take 1 tablet (40 mg) by mouth 2 times daily  (before meals).  Qty: 60 tablet, Refills: 0    Associated Diagnoses: Liver replaced by transplant (H)           CONTINUE these medications which have NOT CHANGED    Details   albuterol (VENTOLIN HFA) 108 (90 Base) MCG/ACT inhaler Inhale 2 puffs into the lungs every 4 hours as needed for shortness of breath      ascorbic acid (VITAMIN C) 250 MG CHEW chewable tablet Take 250 mg by mouth daily.      calcium-vitamin D-vitamin K (VIACTIV) 500-500-40 MG-UNT-MCG CHEW Take 1 tablet by mouth 2 times daily.      escitalopram (LEXAPRO) 20 MG tablet Take 1 tablet by mouth every morning      folic acid (FOLVITE) 1 MG tablet Take 1 tablet by mouth every morning      magnesium oxide (MAG-OX) 400 MG tablet Take 400 mg by mouth 2 times daily      Multiple Vitamin (QUINTABS) TABS Take 1 tablet by mouth every morning      thiamine (B-1) 100 MG tablet Take 100 mg by mouth daily           STOP taking these medications       acetaminophen (TYLENOL) 500 MG tablet Comments:   Reason for Stopping:         furosemide (LASIX) 40 MG tablet Comments:   Reason for Stopping:         midodrine (PROAMATINE) 5 MG tablet Comments:   Reason for Stopping:         spironolactone (ALDACTONE) 50 MG tablet Comments:   Reason for Stopping:               No discharge procedures on file.      Data   Most Recent 3 CBC's:  Recent Labs   Lab Test 11/20/24  0610 11/19/24  0628 11/18/24  1002   WBC 4.2 3.4* 5.2   HGB 9.4* 6.7* 7.3*   MCV 87 87 87   PLT 96* 62* 53*      Most Recent 3 BMP's:  Recent Labs   Lab Test 11/20/24  0610 11/20/24  0118 11/19/24  2159 11/19/24  0800 11/19/24  0628 11/18/24  1242 11/18/24  1002     --   --   --  137  --  133*   POTASSIUM 4.1  --   --   --  3.9  --  3.9   CHLORIDE 98  --   --   --  99  --  95*   CO2 28  --   --   --  28  --  27   BUN 35.7*  --   --   --  46.2*  --  56.4*   CR 1.34*  --   --   --  2.08*  --  3.27*   ANIONGAP 10  --   --   --  10  --  11   TIMOTHY 8.8  --   --   --  8.4*  --  8.5*   * 87 124*   < > 88    < > 115*    < > = values in this interval not displayed.     Most Recent 2 LFT's:  Recent Labs   Lab Test 11/20/24  0610 11/19/24  0628   AST 56* 63*   * 315*   ALKPHOS 201* 145   BILITOTAL 2.4* 2.2*     Most Recent INR's and Anticoagulation Dosing History:  Anticoagulation Dose History  More data exists         Latest Ref Rng & Units 10/11/2024 10/18/2024 10/25/2024 10/31/2024 11/7/2024 11/13/2024 11/14/2024   Recent Dosing and Labs   INR 0.85 - 1.15 1.8     1.9     2.0     1.8     1.8     1.49  1.66  1.79  1.89  1.77  1.44  1.55       Details          This result is from an external source.    Multiple values from one day are sorted in reverse-chronological order             Most Recent 3 Troponin's:No lab results found.  Most Recent Cholesterol Panel:  Recent Labs   Lab Test 05/21/24  0757   CHOL 234*   *   HDL 57   TRIG 86     Most Recent 6 Bacteria Isolates From Any Culture (See EPIC Reports for Culture Details):No lab results found.  Most Recent TSH, T4 and A1c Labs:  Recent Labs   Lab Test 05/21/24  0757   TSH 2.08   A1C 4.6

## 2024-11-20 NOTE — PHARMACY-TRANSPLANT NOTE
Solid Organ Transplant Recipient Prior to Discharge Note    54 year old female s/p liver transplant on 11/13/2024.    Planned immunosuppression regimen per liver transplant protocol:  MAINTENANCE:   - Mycophenolate 750 mg twice daily   - Provided information related to mycophenolate REMS program, patient reports going through menopause and no plans to become pregnant   - Tacrolimus with goal trough levels of approximately 8 mcg/L    - Most recent tacrolimus level: 4.3 (on 11/20), increasing to 5 mg twice daily on discharge  - Prednisone 5 mg daily to continue for 3 months post-transplant      Opportunistic pathogen prophylaxis includes:  - PJP: trimethoprim/sulfamethoxazole - previously held for RUDY, resumed 11/20  - CMV D-/R+: Valganciclovir for 3 months duration tentatively    Pharmacy has monitored for medication interactions and immunosuppression levels in conjunction with the multidisciplinary team. In anticipation for discharge, medication therapy needs have been addressed daily throughout the current admission via multidisciplinary rounds and/or discussions, order verification, daily clinical pharmacy review, and communication with prescribers.  Regino GoodD., BCPS

## 2024-11-20 NOTE — PLAN OF CARE
DISCHARGE:  Patient with orders to discharge to home.     Education Provided:   Med Card updated  Lab Book updated  Handouts provided  Specialty Pharmacy completed      Discharge instructions, medications & follow ups reviewed with patient and mother. Patient in stable condition. AVSS. HD line removed, old kike site stitched, mercedes incision CDI with scant serosang drainage on R-end. LBM overnight, voiding yuval urine. Tolerating diet. had no further questions regarding discharge instructions and medications. Patient transferred out by wheelchair and & left with mom and sister.         Goal Outcome Evaluation:      Plan of Care Reviewed With: patient    Overall Patient Progress: improvingOverall Patient Progress: improving    Outcome Evaluation: potential dc today after teaching

## 2024-11-20 NOTE — TELEPHONE ENCOUNTER
Call to Hannah to go over discharge teaching. She was at the pharmacy waiting for meds still. Reviewed to take evening meds at 9 pm tonight, labs are at 930 AM, and to take morning meds after labs, not before. She verbalized understanding. Hannha stated that she didn't have any of her home meds with her (she was told not to bring them). Her escitalopram was not ordered at discharge so she doesn't have any until one of her children comes on Friday. Paged ADELFO to get a 2 day supply sent to [facility] to  tomorrow morning. ADELFO ordered 7 day supply and sent to discharge pharmacy. Changed prescription to go to [facility] and call to Hannah to let her know. We will plan to go over full discharge teaching tomorrow morning after labs.

## 2024-11-21 ENCOUNTER — TELEPHONE (OUTPATIENT)
Dept: PHARMACY | Facility: CLINIC | Age: 54
End: 2024-11-21

## 2024-11-21 ENCOUNTER — LAB (OUTPATIENT)
Dept: LAB | Facility: CLINIC | Age: 54
End: 2024-11-21
Payer: COMMERCIAL

## 2024-11-21 DIAGNOSIS — Z94.4 LIVER REPLACED BY TRANSPLANT (H): ICD-10-CM

## 2024-11-21 LAB
ALBUMIN SERPL BCG-MCNC: 3.2 G/DL (ref 3.5–5.2)
ALP SERPL-CCNC: 213 U/L (ref 40–150)
ALT SERPL W P-5'-P-CCNC: 206 U/L (ref 0–50)
ANION GAP SERPL CALCULATED.3IONS-SCNC: 10 MMOL/L (ref 7–15)
AST SERPL W P-5'-P-CCNC: 66 U/L (ref 0–45)
BACTERIA FLD CULT: NORMAL
BILIRUB DIRECT SERPL-MCNC: 1.3 MG/DL (ref 0–0.3)
BILIRUB SERPL-MCNC: 2.1 MG/DL
BUN SERPL-MCNC: 27.6 MG/DL (ref 6–20)
CALCIUM SERPL-MCNC: 8.8 MG/DL (ref 8.8–10.4)
CHLORIDE SERPL-SCNC: 100 MMOL/L (ref 98–107)
CREAT SERPL-MCNC: 1.14 MG/DL (ref 0.51–0.95)
EGFRCR SERPLBLD CKD-EPI 2021: 58 ML/MIN/1.73M2
ERYTHROCYTE [DISTWIDTH] IN BLOOD BY AUTOMATED COUNT: 17.3 % (ref 10–15)
GLUCOSE SERPL-MCNC: 102 MG/DL (ref 70–99)
HCO3 SERPL-SCNC: 28 MMOL/L (ref 22–29)
HCT VFR BLD AUTO: 29.4 % (ref 35–47)
HGB BLD-MCNC: 9.8 G/DL (ref 11.7–15.7)
MAGNESIUM SERPL-MCNC: 1.5 MG/DL (ref 1.7–2.3)
MCH RBC QN AUTO: 29.2 PG (ref 26.5–33)
MCHC RBC AUTO-ENTMCNC: 33.3 G/DL (ref 31.5–36.5)
MCV RBC AUTO: 88 FL (ref 78–100)
PHOSPHATE SERPL-MCNC: 3.3 MG/DL (ref 2.5–4.5)
PLATELET # BLD AUTO: 141 10E3/UL (ref 150–450)
POTASSIUM SERPL-SCNC: 4.3 MMOL/L (ref 3.4–5.3)
PROT SERPL-MCNC: 6.4 G/DL (ref 6.4–8.3)
RBC # BLD AUTO: 3.36 10E6/UL (ref 3.8–5.2)
SODIUM SERPL-SCNC: 138 MMOL/L (ref 135–145)
TACROLIMUS BLD-MCNC: 4.7 UG/L (ref 5–15)
TME LAST DOSE: ABNORMAL H
TME LAST DOSE: ABNORMAL H
WBC # BLD AUTO: 4.9 10E3/UL (ref 4–11)

## 2024-11-21 PROCEDURE — 83735 ASSAY OF MAGNESIUM: CPT | Performed by: PATHOLOGY

## 2024-11-21 PROCEDURE — 82248 BILIRUBIN DIRECT: CPT | Performed by: PATHOLOGY

## 2024-11-21 PROCEDURE — 36415 COLL VENOUS BLD VENIPUNCTURE: CPT | Performed by: PATHOLOGY

## 2024-11-21 PROCEDURE — 85027 COMPLETE CBC AUTOMATED: CPT | Performed by: PATHOLOGY

## 2024-11-21 PROCEDURE — 80053 COMPREHEN METABOLIC PANEL: CPT | Performed by: PATHOLOGY

## 2024-11-21 PROCEDURE — 84100 ASSAY OF PHOSPHORUS: CPT | Performed by: PATHOLOGY

## 2024-11-21 PROCEDURE — 99000 SPECIMEN HANDLING OFFICE-LAB: CPT | Performed by: PATHOLOGY

## 2024-11-21 PROCEDURE — 80197 ASSAY OF TACROLIMUS: CPT | Performed by: TRANSPLANT SURGERY

## 2024-11-21 NOTE — TELEPHONE ENCOUNTER
Hannah, patient, called regarding oozing from sutures.    On one side of incision it appears as a suture is not fully intact. Reviewed with dr JORDY Gil. Pt to replace dressing and follow up with NP in the morning.     Patient aware if pain is not managed, or if develops a fever or any changes to go to the ER tonight.    Appt request made. Message to scheduling.

## 2024-11-21 NOTE — TELEPHONE ENCOUNTER
Post Liver Transplant Coordinator Discharge Teaching    Introduced myself and explained the role of the post liver transplant coordinator and support team.    Upon discharge, Hannah will be staying locally.    Hannah informed of need for appointment with PCP within 1-2 weeks of discharge.  Hannah is aware that they will have weekly clinic visits with their surgeon or surgical ADELFO x4 and then visit frequency is dictated by surgeon for first 3 months post transplant.   Hannah informed that they will see their pre-transplant hepatologist around 3 months after transplant and will have regular follow up with hepatologist for life.   Hannah is aware that they will need lab testing on a regular basis for life as labs are the best way to monitor how the liver is functioning. Initial lab frequency is twice weekly, with frequency decreasing over time.   Hannah has a biliary stent. Hannah informed of need for xray around 3 months to be sure it has passed, if not seen in stool. If still present at 3 months, EGD will be needed to remove.    Briefly discussed the following topics:  Immunosuppression- the importance of taking regularly as directed and timing of medication before lab draws  Pertinent labs and their interpretation  Rejection signs/symptoms and treatment  Importance of long term follow up with transplant providers and PCP  No grapefruit or grapefruit juice.   10 pound weight restriction  Need annual dermatology appt for a transplant skin check. Increased risk for skin cancer due to immunosuppressants  If anyone other than transplant team prescribes new medications, contact coordinator to review before taking  How to contact coordinator/transplant office if: becomes ill, fevers, forgets to take medications, or other emergencies  Provided contact number for coordinator/LPN/transplant office, , and after hours/weekend/holiday coordinator    Hannah has lab book and understands responsibility  of getting and recording results.     Hannah asked further questions    Pharmacy to be used: [facility]  Lab to be used: [facility] while local  Home Care: No    Organ specific education completed and discharge planning has been conducted with multidisciplinary transplant care team including physicians, pharmacy, nutrition, and social work.     [employer] message sent with above information.

## 2024-11-21 NOTE — TELEPHONE ENCOUNTER
Hannah called on call coordinator last night regarding incision oozing. Order was placed for Hannah to see Katarzyna this morning. Scheduling attempted to call Hannah but no answer. Spoke to Hannah to let her know to bring her morning meds with her to labs and to take them after labs drawn. Informed her that someone would be calling soon to give her a time for seeing ADELFO.     ADDENDUM:  Clinic supervisor got Hannah on ADELFO schedule at 1030. Call to Hannah and let her know of appt time.

## 2024-11-21 NOTE — PLAN OF CARE
Physical Therapy Discharge Summary    Reason for therapy discharge:    Discharged to home with outpatient therapy.    Progress towards therapy goal(s). See goals on Care Plan in [facility] electronic health record for goal details.  Goals partially met.  Barriers to achieving goals:   discharge from facility.    Therapy recommendation(s):    Continued therapy is recommended.  Rationale/Recommendations:  to improve activity tolerance for functional mobility.

## 2024-11-21 NOTE — PLAN OF CARE
Occupational Therapy Discharge Summary    Reason for therapy discharge:    Discharged to home with outpatient therapy.    Progress towards therapy goal(s). See goals on Care Plan in [facility] electronic health record for goal details.  Goals partially met.  Barriers to achieving goals:   discharge from facility.    Therapy recommendation(s):    Continued therapy is recommended.  Rationale/Recommendations:  decreased ADL I.    Goal Outcome Evaluation:

## 2024-11-21 NOTE — TELEPHONE ENCOUNTER
Antony Sutherland is a post-liver transplant patient who discharged on 11/20/24. Patient chooses to receive medications from [place] specialty pharmacy. The family member (mom Elsa) will be responsible for managing medications.    SOT*LIAISON (ANTONY) IS A (LIVER) TRANSPLANT PATIENT  (DATE OF TRANSPLANT: (DATE: 11/13/2024) )      HEALTH BENEFIT: ([employer])  ID# [id number] [facility]# [id number] (EFFECTIVE (DATE: 1/1/2024) )      PHARMACY BENEFIT: ([facility] [place] COMMERCIAL)  PROCESSING INFO: ID# [id number] [facility]# [id number] PCN# [facility] BIN# [id number] (EFFECTIVE (DATE: 1/1/2024) ).   DEDUCTIBLE ($3300) & MAX OUT-OF-POCKET ($6000)  COPAY STRUCTURE:  PATIENT HAS A $0 COPAY STRUCTURE DUE TO MEETING THEIR DEDUCTIBLE AND OUT OF POCKET    TEST CLAIM SPECIALTY #28  MYCOPHENOLATE 250MG (#240/30DS) =$0  PROGRAF 1MG (#120/30DS)= PRODUCT SERVICE NOT COVERED/ PLAN EXCLUSION  TACROLIMUS 1MG (#120/30DS)=$0  CYCLOSPORINE 100MG (#60/30DS)=$0  VALGANCICLOVIR 450MG (#60/30DS)=$0  VALACYCLOVIR 1GM (#90/30DS)=$0    TEST CLAIM DISCHARGE #27 (21 YEARS AND OLDER):  MYCOPHENOLATE 250MG (#240/30DS) =$0  PROGRAF 1MG (#120/30DS)= PRODUCT SERVICE NOT COVERED/ PLAN EXCLUSION  TACROLIMUS 1MG (#120/30DS)=$0  CYCLOSPORINE 100MG (#60/30DS)=$0  VALGANCICLOVIR 450MG (#60/30DS)=$0  VALACYCLOVIR 1GM (#90/30DS)=$0    **CAN PATIENT FILL AT [facility] FOR MEDICATIONS LISTED? YES    Ede Buitrago [facility]

## 2024-11-21 NOTE — PROGRESS NOTES
Transplant Social Work Service Discharge Note      Patient Name:  Hannah Sutherland     Anticipated Discharge Date:  11/20/2024    Discharge Disposition:   Other:  [place] [address]    Plan for 24 hour care for immediate post transplant period: Mother    If not local, plans for short term stay:  [address]    Additional Services/Equipment Arranged:  see RNCC note     Persons notified of above discharge plan:  Pt and family    Patient / Family response to discharge plan:  In agreement    Education and resources provided by SW at discharge: role of transplant  in out patient setting and provided contact info for , availability of support groups    Discussed anticipated pharmacy out of pocket costs: NO    Provided [employer] Donor Letter Writing packet : NO will discuss later    Plan: This writer will follow up with patient outpatient    NEVIN Torres, [facility]  L[facility] Transplant   [facility]  Phone: [phone]

## 2024-11-23 ENCOUNTER — TELEPHONE (OUTPATIENT)
Dept: TRANSPLANT | Facility: CLINIC | Age: 54
End: 2024-11-23
Payer: COMMERCIAL

## 2024-11-24 NOTE — TELEPHONE ENCOUNTER
Hannah pageshlomo. Call to Hannah. More nausea than usual today. Had 1 loose stool and then nausea was better. Eating alright, not so great this evening. Advised to eat smaller amounts frequently throughout day and to keep hydrated. Discussed that if nausea continues might need to change mycophenolate to mycophenolic acid, but will monitor at this time.    Old PRISCILA site with some drainage after she showered a few hours ago. Drainage was thin, orange colored, stitch bled a bit. Site looks good, no s/s of infection. No drainage noted in last 1.5-2 hours. Advised Hannah to watch for s/s of infection at site, purulent or excessive drainage and to page again if she has any of these symptoms.

## 2024-11-25 ENCOUNTER — OFFICE VISIT (OUTPATIENT)
Dept: TRANSPLANT | Facility: CLINIC | Age: 54
End: 2024-11-25
Attending: TRANSPLANT SURGERY
Payer: COMMERCIAL

## 2024-11-25 ENCOUNTER — LAB (OUTPATIENT)
Dept: LAB | Facility: CLINIC | Age: 54
End: 2024-11-25
Attending: TRANSPLANT SURGERY
Payer: COMMERCIAL

## 2024-11-25 VITALS
OXYGEN SATURATION: 98 % | HEART RATE: 67 BPM | DIASTOLIC BLOOD PRESSURE: 62 MMHG | SYSTOLIC BLOOD PRESSURE: 106 MMHG | BODY MASS INDEX: 28.22 KG/M2 | WEIGHT: 165.3 LBS

## 2024-11-25 DIAGNOSIS — Z94.4 LIVER REPLACED BY TRANSPLANT (H): ICD-10-CM

## 2024-11-25 LAB
ALBUMIN SERPL BCG-MCNC: 3.2 G/DL (ref 3.5–5.2)
ALP SERPL-CCNC: 143 U/L (ref 40–150)
ALT SERPL W P-5'-P-CCNC: 90 U/L (ref 0–50)
ANION GAP SERPL CALCULATED.3IONS-SCNC: 7 MMOL/L (ref 7–15)
AST SERPL W P-5'-P-CCNC: 35 U/L (ref 0–45)
BILIRUB DIRECT SERPL-MCNC: 0.78 MG/DL (ref 0–0.3)
BILIRUB SERPL-MCNC: 1.2 MG/DL
BUN SERPL-MCNC: 30.4 MG/DL (ref 6–20)
CALCIUM SERPL-MCNC: 8.9 MG/DL (ref 8.8–10.4)
CHLORIDE SERPL-SCNC: 105 MMOL/L (ref 98–107)
CREAT SERPL-MCNC: 1.38 MG/DL (ref 0.51–0.95)
EGFRCR SERPLBLD CKD-EPI 2021: 46 ML/MIN/1.73M2
ERYTHROCYTE [DISTWIDTH] IN BLOOD BY AUTOMATED COUNT: 17.1 % (ref 10–15)
GLUCOSE SERPL-MCNC: 91 MG/DL (ref 70–99)
HCO3 SERPL-SCNC: 26 MMOL/L (ref 22–29)
HCT VFR BLD AUTO: 26.8 % (ref 35–47)
HGB BLD-MCNC: 8.4 G/DL (ref 11.7–15.7)
MAGNESIUM SERPL-MCNC: 1.6 MG/DL (ref 1.7–2.3)
MCH RBC QN AUTO: 28.7 PG (ref 26.5–33)
MCHC RBC AUTO-ENTMCNC: 31.3 G/DL (ref 31.5–36.5)
MCV RBC AUTO: 92 FL (ref 78–100)
PHOSPHATE SERPL-MCNC: 3.6 MG/DL (ref 2.5–4.5)
PLATELET # BLD AUTO: 269 10E3/UL (ref 150–450)
POTASSIUM SERPL-SCNC: 4.5 MMOL/L (ref 3.4–5.3)
PROT SERPL-MCNC: 6.2 G/DL (ref 6.4–8.3)
RBC # BLD AUTO: 2.93 10E6/UL (ref 3.8–5.2)
SODIUM SERPL-SCNC: 138 MMOL/L (ref 135–145)
TACROLIMUS BLD-MCNC: 7.9 UG/L (ref 5–15)
TME LAST DOSE: NORMAL H
TME LAST DOSE: NORMAL H
WBC # BLD AUTO: 4.8 10E3/UL (ref 4–11)

## 2024-11-25 PROCEDURE — 99213 OFFICE O/P EST LOW 20 MIN: CPT | Mod: 24 | Performed by: TRANSPLANT SURGERY

## 2024-11-25 PROCEDURE — 85027 COMPLETE CBC AUTOMATED: CPT | Performed by: PATHOLOGY

## 2024-11-25 PROCEDURE — 36415 COLL VENOUS BLD VENIPUNCTURE: CPT | Performed by: PATHOLOGY

## 2024-11-25 PROCEDURE — 80053 COMPREHEN METABOLIC PANEL: CPT | Performed by: PATHOLOGY

## 2024-11-25 PROCEDURE — 82248 BILIRUBIN DIRECT: CPT | Performed by: PATHOLOGY

## 2024-11-25 PROCEDURE — 80197 ASSAY OF TACROLIMUS: CPT | Performed by: TRANSPLANT SURGERY

## 2024-11-25 PROCEDURE — 84100 ASSAY OF PHOSPHORUS: CPT | Performed by: PATHOLOGY

## 2024-11-25 PROCEDURE — 83735 ASSAY OF MAGNESIUM: CPT | Performed by: PATHOLOGY

## 2024-11-25 PROCEDURE — 99000 SPECIMEN HANDLING OFFICE-LAB: CPT | Performed by: PATHOLOGY

## 2024-11-25 NOTE — PROGRESS NOTES
Transplant Surgery -OUTPATIENT IMMUNOSUPPRESSION PROGRESS NOTE    Date of Visit: 2024    Transplants:  2024 (Liver); Postoperative day:  12  ASSESMENT AND PLAN:  1.Graft Function:Liver allograft: no rejection or technical problems.    2.Immunosuppression Management:keep tacrolimus levels at 8-`10 ng/dL  3.Hypertension:on norvasc  4.Renal Function: better  5.Lab frequency:twice weekly  6.Other:  Wound minor discharge at the right side, dressing changed    Date: 2024    Transplant:  [x]                             Liver [x]                              Kidney []                             Pancreas []                              Other:             Chief Complaint:  Doing well    History of Present Illness:  Patient Active Problem List   Diagnosis    Abnormal INR    Abnormal Pap smear of cervix    Alcoholic cirrhosis (H)    Backache    Chronic gastritis    Chronic liver failure (H)    Chronic pain of left knee    Depression    Diarrhea    Dysphagia    Elevated bilirubin    Elevated liver function tests    Eosinophilic esophagitis    Alcohol dependence, uncomplicated (H)    GERD (gastroesophageal reflux disease)    Hyperlipidemia    Hypertension    Hypomagnesemia    Arthritis    Inflammatory liver disease    Intermittent asthma    Motion sickness    Perimenopausal    Portal hypertension (H)    Cough    Seasonal allergies    Thrombocytopenia (H)    Vision loss    Vitamin D deficiency    End stage liver disease (H)    Immunosuppressed status (H)    Liver replaced by transplant (H)     SOCIAL /FAMILY HISTORY: [x]                  No recent change    No past medical history on file.  Past Surgical History:   Procedure Laterality Date    BENCH KIDNEY  2024    Procedure: Bench kidney;  Surgeon: Blas Carney MD;  Location: [facility] OR    IR CVC NON TUNNEL PLACEMENT > 5 YRS  11/15/2024    TRANSPLANT LIVER RECIPIENT  DONOR N/A 2024    Procedure: Transplant liver recipient   donor;  Surgeon: Blas Carney MD;  Location:  OR     Social History     Socioeconomic History    Marital status:      Spouse name: Not on file    Number of children: Not on file    Years of education: Not on file    Highest education level: Not on file   Occupational History    Not on file   Tobacco Use    Smoking status: Never    Smokeless tobacco: Never   Substance and Sexual Activity    Alcohol use: Not Currently     Comment: sober 11/7/23    Drug use: Never    Sexual activity: Not on file   Other Topics Concern    Not on file   Social History Narrative    Not on file     Social Drivers of Health     Financial Resource Strain: High Risk (11/14/2024)    Financial Resource Strain     Within the past 12 months, have you or your family members you live with been unable to get utilities (heat, electricity) when it was really needed?: Yes   Food Insecurity: High Risk (11/14/2024)    Food Insecurity     Within the past 12 months, did you worry that your food would run out before you got money to buy more?: Yes     Within the past 12 months, did the food you bought just not last and you didn t have money to get more?: Yes   Transportation Needs: Low Risk  (11/14/2024)    Transportation Needs     Within the past 12 months, has lack of transportation kept you from medical appointments, getting your medicines, non-medical meetings or appointments, work, or from getting things that you need?: No   Physical Activity: Insufficiently Active (2/27/2024)    [facility]    Exercise Vital Sign     Days of Exercise per Week: 3 days     Minutes of Exercise per Session: 10 min   Stress: No Stress Concern Present (2/27/2024)    [facility]    [employer] [place] [employer] - Occupational Stress Questionnaire     Feeling of Stress : Not at all   Social Connections:  Moderately Integrated (2/27/2024)    Received from [facility], [facility]    Social Connection and Isolation Panel [NHANES]     Frequency of Communication with Friends and Family: More than three times a week     Frequency of Social Gatherings with Friends and Family: Once a week     Attends Christianity Services: More than 4 times per year     Active Member of Clubs or Organizations: Yes     Attends Club or Organization Meetings: More than 4 times per year     Marital Status:    Recent Concern: Social Connections - Moderately Isolated (12/1/2023)    Received from [facility], [facility]    Social Connection and Isolation Panel [NHANES]     Frequency of Communication with Friends and Family: More than three times a week     Frequency of Social Gatherings with Friends and Family: Once a week     Attends Christianity Services: More than 4 times per year     Active Member of Clubs or Organizations: No     Attends Club or Organization Meetings: Not on file     Marital Status:    Interpersonal Safety: Not At Risk (7/14/2024)    Received from [facility]    Intimate Partner Violence     Are you in a relationship where you are physically hurt, threatened and/or made to feel afraid?: No   Housing Stability: Low Risk  (11/14/2024)    Housing Stability     Do you have housing? : Yes     Are you worried about losing your housing?: No     Prescription Medications as of 11/25/2024         Rx Number Disp Refills Start End Last Dispensed Date Next Fill Date Owning Pharmacy    albuterol (VENTOLIN HFA) 108 (90 Base) MCG/ACT inhaler  -- -- 3/5/2024 --       Sig: Inhale 2 puffs into the lungs every 4 hours as needed for shortness of breath    Class: Historical    Route: Inhalation    amLODIPine (NORVASC) 5 MG tablet  30 tablet 2 11/21/2024 --   [facility] - [place], [place] - [address]     Sig: Take 1 tablet (5 mg) by mouth daily.    Class: E-Prescribe    Route: Oral    ascorbic acid (VITAMIN C) 250 MG CHEW chewable tablet  -- --  --       Sig: Take 250 mg by mouth daily.    Class: Historical    Route: Oral    aspirin (ASA) 325 MG tablet  30 tablet 5 11/21/2024 --   [address]    Sig: Take 1 tablet (325 mg) by mouth daily.    Class: E-Prescribe    Route: Oral    calcium-vitamin D-vitamin K (VIACTIV) 500-500-40 MG-UNT-MCG CHEW  -- --  --       Sig: Take 1 tablet by mouth 2 times daily.    Class: Historical    Route: Oral    escitalopram (LEXAPRO) 20 MG tablet  7 tablet 0 11/21/2024 --   [phone]    Sig: Take 1 tablet (20 mg) by mouth daily.    Class: E-Prescribe    Route: Oral    escitalopram (LEXAPRO) 20 MG tablet  -- -- 3/5/2024 3/5/2025       Sig: Take 1 tablet by mouth every morning    Class: Historical    Route: Oral    folic acid (FOLVITE) 1 MG tablet  -- -- 1/30/2024 --       Sig: Take 1 tablet by mouth every morning    Class: Historical    Route: Oral    magnesium oxide (MAG-OX) 400 MG tablet  -- -- 1/30/2024 --       Sig: Take 400 mg by mouth 2 times daily    Class: Historical    Route: Oral    methocarbamol (ROBAXIN) 750 MG tablet  20 tablet 0 11/20/2024 --   [address]    Sig: Take 1 tablet (750 mg) by mouth 4 times daily as needed for muscle spasms.    Class: E-Prescribe    Route: Oral    Multiple Vitamin (QUINTABS) TABS  -- -- 2/14/2024 2/8/2025       Sig: Take 1 tablet by mouth every morning    Class: Historical    Route: Oral    mycophenolate (GENERIC EQUIVALENT) 250 MG capsule  180 capsule 11 11/20/2024 --   [address]    Sig: Take 3 capsules (750 mg) by mouth 2 times daily.    Class: E-Prescribe    Route: Oral    oxyCODONE (ROXICODONE)  5 MG tablet  20 tablet 0 11/20/2024 --       Sig: Take 1 tablet (5 mg) by mouth every 4 hours as needed for moderate pain.    Class: Local Print    Earliest Fill Date: 11/20/2024    Route: Oral    pantoprazole (PROTONIX) 40 MG EC tablet  60 tablet 0 11/20/2024 12/20/2024   [place], [place] - [address] [place] [address]    Sig: Take 1 tablet (40 mg) by mouth 2 times daily (before meals).    Class: E-Prescribe    Route: Oral    polyethylene glycol (MIRALAX) 17 GM/Dose powder  510 g 0 11/20/2024 --   [address]    Sig: Take 17 g by mouth daily.    Class: E-Prescribe    Route: Oral    predniSONE (DELTASONE) 5 MG tablet  30 tablet 2 11/21/2024 --   [address]    Sig: Take 1 tablet (5 mg) by mouth daily.    Class: E-Prescribe    Route: Oral    senna-docusate (SENOKOT-S/PERICOLACE) 8.6-50 MG tablet  60 tablet 0 11/20/2024 --   [address]    Sig: Take 2 tablets by mouth 2 times daily.    Class: E-Prescribe    Route: Oral    sulfamethoxazole-trimethoprim (BACTRIM) 400-80 MG tablet  30 tablet 5 11/20/2024 --   [address]    Sig: Take 1 tablet by mouth or Feeding Tube daily.    Class: E-Prescribe    Route: Oral or Feeding Tube    tacrolimus (GENERIC EQUIVALENT) 1 MG capsule  120 capsule 11 11/22/2024 --   [place], [place] - [phone]    Sig: Take 2 capsules (2 mg) by mouth 2 times daily. Total dose 7 mg BID    Class: E-Prescribe    Notes to Pharmacy: TXP DT 11/13/2024 (Liver) TXP Dischg DT 11/20/2024 DX Liver replaced by transplant Z94.4 [facility] ([place], [place])    Route: Oral    tacrolimus (GENERIC EQUIVALENT) 5 MG capsule  60 capsule 11 11/22/2024 --   [facility] [place], [place] - [address] [phone]    Sig: Take 1 capsule (5 mg) by mouth 2 times daily. Total dose 7 mg BID    Class: E-Prescribe    Notes to Pharmacy: Profile Rx: patient will contact pharmacy when needed    Route: Oral    thiamine (B-1) 100 MG tablet  -- -- 12/29/2023 --       Sig: Take 100 mg by mouth daily    Class: Historical    Route: Oral    valGANciclovir (VALCYTE) 450 MG tablet  30 tablet 2 11/21/2024 --   [facility] [place], [place] - [address] [facility]    Sig: Take 1 tablet (450 mg) by mouth daily.    Class: E-Prescribe    Route: Oral          Penicillins, Latex, and Erythromycin   REVIEW OF SYSTEMS (check box if normal)  [x]               GENERAL  [x]                 PULMONARY [x]                GENITOURINARY  [x]                CNS                 [x]                 CARDIAC  [x]                 ENDOCRINE  [x]                EARS,NOSE,THROAT [x]                 GASTROINTESTINAL [x]                 NEUROLOGIC    [x]                MUSCLOSKELTAL  [x]                  HEMATOLOGY      PHYSICAL EXAM (check box if normal)There were no vitals taken for this visit.        [x]            GENERAL:    [x]       EYES:  ICTERIC   []        YES  []                    NO  [x]           EXTREMITIES: Clubbing []       Y     [x]           N    [x]           EARS, NOSE, THROAT: Membranes Moist    YES   [x]                   NO []                  [x]           LUNGS:  CLEAR    YES       [x]                  NO    []                                [x]           SKIN: Jaundice           YES       []                  NO    [x]                   Rash: YES       []                  NO    [x]                                     [x]             HEART: Regular Rate          YES       [x]                  NO    []                   Incision Clean:  YES       [x]                  NO    []                                [x]                    ABDOMEN: Minor blood stained  drainage, dressings changed  Organomegaly YES       []                  NO    [x]                       [x]                    NEUROLOGICAL:  Nonfocal  YES       [x]                  NO    []                       [x]                    Hernia YES       []                  NO    [x]                   PSYCHIATRIC:  Appropriate  YES       [x]                  NO    []                       OTHER:                                                                                                   PAIN SCALE:: 3

## 2024-11-25 NOTE — LETTER
11/25/2024      Hannah Sutherland  [address]  [facility] [address]      Dear Colleague,    Thank you for referring your patient, Hannah Sutherland, to the [facility]. Please see a copy of my visit note below.    Transplant Surgery -OUTPATIENT IMMUNOSUPPRESSION PROGRESS NOTE    Date of Visit: 11/25/2024    Transplants:  11/13/2024 (Liver); Postoperative day:  12  ASSESMENT AND PLAN:  1.Graft Function:Liver allograft: no rejection or technical problems.    2.Immunosuppression Management:keep tacrolimus levels at 8-`10 ng/dL  3.Hypertension:on norvasc  4.Renal Function: better  5.Lab frequency:twice weekly  6.Other:  Wound minor discharge at the right side, dressing changed    Date: November 25, 2024    Transplant:  [x]                             Liver [x]                              Kidney []                             Pancreas []                              Other:             Chief Complaint:  Doing well    History of Present Illness:  Patient Active Problem List   Diagnosis     Abnormal INR     Abnormal Pap smear of cervix     Alcoholic cirrhosis (H)     Backache     Chronic gastritis     Chronic liver failure (H)     Chronic pain of left knee     Depression     Diarrhea     Dysphagia     Elevated bilirubin     Elevated liver function tests     Eosinophilic esophagitis     Alcohol dependence, uncomplicated (H)     GERD (gastroesophageal reflux disease)     Hyperlipidemia     Hypertension     Hypomagnesemia     Arthritis     Inflammatory liver disease     Intermittent asthma     Motion sickness     Perimenopausal     Portal hypertension (H)     Cough     Seasonal allergies     Thrombocytopenia (H)     Vision loss     Vitamin D deficiency     End stage liver disease (H)     Immunosuppressed status (H)     Liver replaced by transplant (H)     SOCIAL /FAMILY HISTORY: [x]                  No recent change    No past medical history on file.  Past Surgical History:    Procedure Laterality Date     BENCH KIDNEY  2024    Procedure: Bench kidney;  Surgeon: Blas Carney MD;  Location: [facility] OR     IR CVC NON TUNNEL PLACEMENT > 5 YRS  11/15/2024     TRANSPLANT LIVER RECIPIENT  DONOR N/A 2024    Procedure: Transplant liver recipient  donor;  Surgeon: Blas Carney MD;  Location: [facility] OR     Social History     Socioeconomic History     Marital status:      Spouse name: Not on file     Number of children: Not on file     Years of education: Not on file     Highest education level: Not on file   Occupational History     Not on file   Tobacco Use     Smoking status: Never     Smokeless tobacco: Never   Substance and Sexual Activity     Alcohol use: Not Currently     Comment: sober 25     Drug use: Never     Sexual activity: Not on file   Other Topics Concern     Not on file   Social History Narrative     Not on file     Social Drivers of Health     Financial Resource Strain: High Risk (2024)    Financial Resource Strain      Within the past 12 months, have you or your family members you live with been unable to get utilities (heat, electricity) when it was really needed?: Yes   Food Insecurity: High Risk (2024)    Food Insecurity      Within the past 12 months, did you worry that your food would run out before you got money to buy more?: Yes      Within the past 12 months, did the food you bought just not last and you didn t have money to get more?: Yes   Transportation Needs: Low Risk  (2024)    Transportation Needs      Within the past 12 months, has lack of transportation kept you from medical appointments, getting your medicines, non-medical meetings or appointments, work, or from getting things that you need?: No   Physical Activity: Insufficiently Active (2024)    [facility], [facility]    Exercise Vital Sign      Days of Exercise per Week: 3 days       Minutes of Exercise per Session: 10 min   Stress: No Stress Concern Present (2/27/2024)    Received from [facility], [facility]    [place] [employer] - Occupational Stress Questionnaire      Feeling of Stress : Not at all   Social Connections: Moderately Integrated (2/27/2024)    Received from [facility], [facility]    Social Connection and Isolation Panel [NHANES]      Frequency of Communication with Friends and Family: More than three times a week      Frequency of Social Gatherings with Friends and Family: Once a week      Attends Spiritism Services: More than 4 times per year      Active Member of Clubs or Organizations: Yes      Attends Club or Organization Meetings: More than 4 times per year      Marital Status:    Recent Concern: Social Connections - Moderately Isolated (12/1/2023)    Received from [facility], [facility]    Social Connection and Isolation Panel [NHANES]      Frequency of Communication with Friends and Family: More than three times a week      Frequency of Social Gatherings with Friends and Family: Once a week      Attends Spiritism Services: More than 4 times per year      Active Member of Clubs or Organizations: No      Attends Club or Organization Meetings: Not on file      Marital Status:    Interpersonal Safety: Not At Risk (7/14/2024)    Received from [facility]    Intimate Partner Violence      Are you in a relationship where you are physically hurt, threatened and/or made to feel afraid?: No   Housing Stability: Low Risk  (11/14/2024)    Housing Stability      Do you have housing? : Yes      Are you worried about losing your housing?: No     Prescription Medications as of 11/25/2024         Rx Number Disp Refills Start End Last Dispensed Date Next Fill Date Owning Pharmacy    albuterol  (VENTOLIN HFA) 108 (90 Base) MCG/ACT inhaler  -- -- 3/5/2024 --       Sig: Inhale 2 puffs into the lungs every 4 hours as needed for shortness of breath    Class: Historical    Route: Inhalation    amLODIPine (NORVASC) 5 MG tablet  30 tablet 2 11/21/2024 --   [place], [place] - [address] [place] [address]    Sig: Take 1 tablet (5 mg) by mouth daily.    Class: E-Prescribe    Route: Oral    ascorbic acid (VITAMIN C) 250 MG CHEW chewable tablet  -- --  --       Sig: Take 250 mg by mouth daily.    Class: Historical    Route: Oral    aspirin (ASA) 325 MG tablet  30 tablet 5 11/21/2024 --   [address]    Sig: Take 1 tablet (325 mg) by mouth daily.    Class: E-Prescribe    Route: Oral    calcium-vitamin D-vitamin K (VIACTIV) 500-500-40 MG-UNT-MCG CHEW  -- --  --       Sig: Take 1 tablet by mouth 2 times daily.    Class: Historical    Route: Oral    escitalopram (LEXAPRO) 20 MG tablet  7 tablet 0 11/21/2024 --   [place], [place] - [address] [facility] [id number]    Sig: Take 1 tablet (20 mg) by mouth daily.    Class: E-Prescribe    Route: Oral    escitalopram (LEXAPRO) 20 MG tablet  -- -- 3/5/2024 3/5/2025       Sig: Take 1 tablet by mouth every morning    Class: Historical    Route: Oral    folic acid (FOLVITE) 1 MG tablet  -- -- 1/30/2024 --       Sig: Take 1 tablet by mouth every morning    Class: Historical    Route: Oral    magnesium oxide (MAG-OX) 400 MG tablet  -- -- 1/30/2024 --       Sig: Take 400 mg by mouth 2 times daily    Class: Historical    Route: Oral    methocarbamol (ROBAXIN) 750 MG tablet  20 tablet 0 11/20/2024 --   [place], [place] - [address] [facility]    Sig: Take 1 tablet (750 mg) by mouth 4 times daily as needed for muscle spasms.    Class: E-Prescribe    Route: Oral    Multiple Vitamin (QUINTABS) TABS  -- -- 2/14/2024 2/8/2025       Sig: Take 1 tablet  by mouth every morning    Class: Historical    Route: Oral    mycophenolate (GENERIC EQUIVALENT) 250 MG capsule  180 capsule 11 11/20/2024 --   [address]    Sig: Take 3 capsules (750 mg) by mouth 2 times daily.    Class: E-Prescribe    Route: Oral    oxyCODONE (ROXICODONE) 5 MG tablet  20 tablet 0 11/20/2024 --       Sig: Take 1 tablet (5 mg) by mouth every 4 hours as needed for moderate pain.    Class: Local Print    Earliest Fill Date: 11/20/2024    Route: Oral    pantoprazole (PROTONIX) 40 MG EC tablet  60 tablet 0 11/20/2024 12/20/2024   [address]    Sig: Take 1 tablet (40 mg) by mouth 2 times daily (before meals).    Class: E-Prescribe    Route: Oral    polyethylene glycol (MIRALAX) 17 GM/Dose powder  510 g 0 11/20/2024 --   [address]    Sig: Take 17 g by mouth daily.    Class: E-Prescribe    Route: Oral    predniSONE (DELTASONE) 5 MG tablet  30 tablet 2 11/21/2024 --   [address]    Sig: Take 1 tablet (5 mg) by mouth daily.    Class: E-Prescribe    Route: Oral    senna-docusate (SENOKOT-S/PERICOLACE) 8.6-50 MG tablet  60 tablet 0 11/20/2024 --   [address]    Sig: Take 2 tablets by mouth 2 times daily.    Class: E-Prescribe    Route: Oral    sulfamethoxazole-trimethoprim (BACTRIM) 400-80 MG tablet  30 tablet 5 11/20/2024 --   [address]    Sig: Take 1 tablet by mouth or Feeding Tube daily.    Class: E-Prescribe    Route: Oral or Feeding Tube    tacrolimus (GENERIC EQUIVALENT) 1 MG capsule  120 capsule 11 11/22/2024 --   [facility] [address] [facility] [phone]    Sig: Take 2 capsules (2 mg) by mouth 2 times  daily. Total dose 7 mg BID    Class: E-Prescribe    Notes to Pharmacy: TXP DT 11/13/2024 (Liver) TXP Dischg DT 11/20/2024 DX Liver replaced by transplant Z94.4 [facility] [place] ([place], [place])    Route: Oral    tacrolimus (GENERIC EQUIVALENT) 5 MG capsule  60 capsule 11 11/22/2024 --   [facility] - [place], [place] - [address] [facility] [id number]    Sig: Take 1 capsule (5 mg) by mouth 2 times daily. Total dose 7 mg BID    Class: E-Prescribe    Notes to Pharmacy: Profile Rx: patient will contact pharmacy when needed    Route: Oral    thiamine (B-1) 100 MG tablet  -- -- 12/29/2023 --       Sig: Take 100 mg by mouth daily    Class: Historical    Route: Oral    valGANciclovir (VALCYTE) 450 MG tablet  30 tablet 2 11/21/2024 --   [facility] - [place], [place] - [address] [facility]    Sig: Take 1 tablet (450 mg) by mouth daily.    Class: E-Prescribe    Route: Oral          Penicillins, Latex, and Erythromycin   REVIEW OF SYSTEMS (check box if normal)  [x]               GENERAL  [x]                 PULMONARY [x]                GENITOURINARY  [x]                CNS                 [x]                 CARDIAC  [x]                 ENDOCRINE  [x]                EARS,NOSE,THROAT [x]                 GASTROINTESTINAL [x]                 NEUROLOGIC    [x]                MUSCLOSKELTAL  [x]                  HEMATOLOGY      PHYSICAL EXAM (check box if normal)There were no vitals taken for this visit.        [x]            GENERAL:    [x]       EYES:  ICTERIC   []        YES  []                    NO  [x]           EXTREMITIES: Clubbing []       Y     [x]           N    [x]           EARS, NOSE, THROAT: Membranes Moist    YES   [x]                   NO []                  [x]           LUNGS:  CLEAR    YES       [x]                  NO    []                                [x]           SKIN: Jaundice           YES       []                  NO    [x]                    Rash: YES       []                  NO    [x]                                     [x]             HEART: Regular Rate          YES       [x]                  NO    []                   Incision Clean:  YES       [x]                  NO    []                                [x]                    ABDOMEN: Minor blood stained drainage, dressings changed  Organomegaly YES       []                  NO    [x]                       [x]                    NEUROLOGICAL:  Nonfocal  YES       [x]                  NO    []                       [x]                    Hernia YES       []                  NO    [x]                   PSYCHIATRIC:  Appropriate  YES       [x]                  NO    []                       OTHER:                                                                                                   PAIN SCALE:: 3       Again, thank you for allowing me to participate in the care of your patient.        Sincerely,        Blas Carney MD

## 2024-11-27 ENCOUNTER — TELEPHONE (OUTPATIENT)
Dept: TRANSPLANT | Facility: CLINIC | Age: 54
End: 2024-11-27
Payer: COMMERCIAL

## 2024-11-27 DIAGNOSIS — Z94.4 LIVER REPLACED BY TRANSPLANT (H): Primary | ICD-10-CM

## 2024-11-27 NOTE — TELEPHONE ENCOUNTER
Pt talked with Barb yesterday  Not sure if she sent the request to the Dr for more pain meds  She thought he might not OK the Oxycodone - but she thought he could OK the Robaxin - [facility] [address]

## 2024-11-28 LAB — BACTERIA FLD CULT: NORMAL

## 2024-11-29 ENCOUNTER — LAB (OUTPATIENT)
Dept: LAB | Facility: CLINIC | Age: 54
End: 2024-11-29
Payer: COMMERCIAL

## 2024-11-29 DIAGNOSIS — Z94.4 LIVER REPLACED BY TRANSPLANT (H): ICD-10-CM

## 2024-11-29 LAB
ALBUMIN SERPL BCG-MCNC: 3.3 G/DL (ref 3.5–5.2)
ALP SERPL-CCNC: 110 U/L (ref 40–150)
ALT SERPL W P-5'-P-CCNC: 39 U/L (ref 0–50)
ANION GAP SERPL CALCULATED.3IONS-SCNC: 8 MMOL/L (ref 7–15)
AST SERPL W P-5'-P-CCNC: 19 U/L (ref 0–45)
BILIRUB DIRECT SERPL-MCNC: 0.61 MG/DL (ref 0–0.3)
BILIRUB SERPL-MCNC: 0.9 MG/DL
BUN SERPL-MCNC: 38.2 MG/DL (ref 6–20)
CALCIUM SERPL-MCNC: 9.2 MG/DL (ref 8.8–10.4)
CHLORIDE SERPL-SCNC: 107 MMOL/L (ref 98–107)
CREAT SERPL-MCNC: 1.32 MG/DL (ref 0.51–0.95)
EGFRCR SERPLBLD CKD-EPI 2021: 48 ML/MIN/1.73M2
ERYTHROCYTE [DISTWIDTH] IN BLOOD BY AUTOMATED COUNT: 17 % (ref 10–15)
GLUCOSE SERPL-MCNC: 96 MG/DL (ref 70–99)
HCO3 SERPL-SCNC: 22 MMOL/L (ref 22–29)
HCT VFR BLD AUTO: 23.6 % (ref 35–47)
HGB BLD-MCNC: 7.3 G/DL (ref 11.7–15.7)
MAGNESIUM SERPL-MCNC: 1.6 MG/DL (ref 1.7–2.3)
MCH RBC QN AUTO: 28.6 PG (ref 26.5–33)
MCHC RBC AUTO-ENTMCNC: 30.9 G/DL (ref 31.5–36.5)
MCV RBC AUTO: 93 FL (ref 78–100)
PHOSPHATE SERPL-MCNC: 4 MG/DL (ref 2.5–4.5)
PLATELET # BLD AUTO: 311 10E3/UL (ref 150–450)
POTASSIUM SERPL-SCNC: 4.7 MMOL/L (ref 3.4–5.3)
PROT SERPL-MCNC: 6.3 G/DL (ref 6.4–8.3)
RBC # BLD AUTO: 2.55 10E6/UL (ref 3.8–5.2)
SODIUM SERPL-SCNC: 137 MMOL/L (ref 135–145)
TACROLIMUS BLD-MCNC: 4.9 UG/L (ref 5–15)
TME LAST DOSE: ABNORMAL H
TME LAST DOSE: ABNORMAL H
WBC # BLD AUTO: 4.1 10E3/UL (ref 4–11)

## 2024-11-29 PROCEDURE — 80197 ASSAY OF TACROLIMUS: CPT | Performed by: TRANSPLANT SURGERY

## 2024-11-29 PROCEDURE — 99000 SPECIMEN HANDLING OFFICE-LAB: CPT | Performed by: PATHOLOGY

## 2024-11-29 PROCEDURE — 83735 ASSAY OF MAGNESIUM: CPT | Performed by: PATHOLOGY

## 2024-11-29 PROCEDURE — 82248 BILIRUBIN DIRECT: CPT | Performed by: PATHOLOGY

## 2024-11-29 PROCEDURE — 36415 COLL VENOUS BLD VENIPUNCTURE: CPT | Performed by: PATHOLOGY

## 2024-11-29 PROCEDURE — 84100 ASSAY OF PHOSPHORUS: CPT | Performed by: PATHOLOGY

## 2024-11-29 PROCEDURE — 85027 COMPLETE CBC AUTOMATED: CPT | Performed by: PATHOLOGY

## 2024-11-29 PROCEDURE — 80053 COMPREHEN METABOLIC PANEL: CPT | Performed by: PATHOLOGY

## 2024-12-01 RX ORDER — TRAMADOL HYDROCHLORIDE 50 MG/1
50 TABLET ORAL EVERY 6 HOURS PRN
Qty: 10 TABLET | Refills: 0 | Status: SHIPPED | OUTPATIENT
Start: 2024-12-01 | End: 2024-12-04

## 2024-12-02 ENCOUNTER — OFFICE VISIT (OUTPATIENT)
Dept: TRANSPLANT | Facility: CLINIC | Age: 54
End: 2024-12-02
Attending: TRANSPLANT SURGERY
Payer: COMMERCIAL

## 2024-12-02 ENCOUNTER — LAB (OUTPATIENT)
Dept: LAB | Facility: CLINIC | Age: 54
End: 2024-12-02
Attending: TRANSPLANT SURGERY
Payer: COMMERCIAL

## 2024-12-02 ENCOUNTER — LAB (OUTPATIENT)
Dept: LAB | Facility: CLINIC | Age: 54
End: 2024-12-02
Payer: COMMERCIAL

## 2024-12-02 VITALS
SYSTOLIC BLOOD PRESSURE: 121 MMHG | WEIGHT: 159.7 LBS | OXYGEN SATURATION: 100 % | TEMPERATURE: 98 F | HEART RATE: 68 BPM | DIASTOLIC BLOOD PRESSURE: 66 MMHG | BODY MASS INDEX: 27.26 KG/M2

## 2024-12-02 VITALS
OXYGEN SATURATION: 100 % | BODY MASS INDEX: 27.4 KG/M2 | WEIGHT: 160.5 LBS | SYSTOLIC BLOOD PRESSURE: 114 MMHG | DIASTOLIC BLOOD PRESSURE: 70 MMHG | HEART RATE: 72 BPM

## 2024-12-02 DIAGNOSIS — R19.7 DIARRHEA, UNSPECIFIED TYPE: ICD-10-CM

## 2024-12-02 DIAGNOSIS — D84.9 IMMUNOSUPPRESSION (H): ICD-10-CM

## 2024-12-02 DIAGNOSIS — Z29.89 NEED FOR PNEUMOCYSTIS PROPHYLAXIS: ICD-10-CM

## 2024-12-02 DIAGNOSIS — R19.7 DIARRHEA, UNSPECIFIED TYPE: Primary | ICD-10-CM

## 2024-12-02 DIAGNOSIS — Z94.4 LIVER REPLACED BY TRANSPLANT (H): ICD-10-CM

## 2024-12-02 DIAGNOSIS — D63.8 ANEMIA OF CHRONIC DISEASE: ICD-10-CM

## 2024-12-02 DIAGNOSIS — E83.42 HYPOMAGNESEMIA: ICD-10-CM

## 2024-12-02 DIAGNOSIS — N17.9 AKI (ACUTE KIDNEY INJURY) (H): Primary | ICD-10-CM

## 2024-12-02 DIAGNOSIS — Z48.298 AFTERCARE FOLLOWING ORGAN TRANSPLANT: ICD-10-CM

## 2024-12-02 LAB
ALBUMIN SERPL BCG-MCNC: 3.6 G/DL (ref 3.5–5.2)
ALP SERPL-CCNC: 110 U/L (ref 40–150)
ALT SERPL W P-5'-P-CCNC: 24 U/L (ref 0–50)
ANION GAP SERPL CALCULATED.3IONS-SCNC: 10 MMOL/L (ref 7–15)
AST SERPL W P-5'-P-CCNC: 18 U/L (ref 0–45)
BILIRUB DIRECT SERPL-MCNC: 0.57 MG/DL (ref 0–0.3)
BILIRUB SERPL-MCNC: 0.9 MG/DL
BUN SERPL-MCNC: 42.5 MG/DL (ref 6–20)
CALCIUM SERPL-MCNC: 9.4 MG/DL (ref 8.8–10.4)
CHLORIDE SERPL-SCNC: 104 MMOL/L (ref 98–107)
CREAT SERPL-MCNC: 1.2 MG/DL (ref 0.51–0.95)
EGFRCR SERPLBLD CKD-EPI 2021: 54 ML/MIN/1.73M2
ERYTHROCYTE [DISTWIDTH] IN BLOOD BY AUTOMATED COUNT: 16.7 % (ref 10–15)
GLUCOSE SERPL-MCNC: 99 MG/DL (ref 70–99)
HCO3 SERPL-SCNC: 23 MMOL/L (ref 22–29)
HCT VFR BLD AUTO: 24.5 % (ref 35–47)
HGB BLD-MCNC: 7.6 G/DL (ref 11.7–15.7)
MAGNESIUM SERPL-MCNC: 1.6 MG/DL (ref 1.7–2.3)
MCH RBC QN AUTO: 28.4 PG (ref 26.5–33)
MCHC RBC AUTO-ENTMCNC: 31 G/DL (ref 31.5–36.5)
MCV RBC AUTO: 91 FL (ref 78–100)
PHOSPHATE SERPL-MCNC: 4 MG/DL (ref 2.5–4.5)
PLATELET # BLD AUTO: 281 10E3/UL (ref 150–450)
POTASSIUM SERPL-SCNC: 5 MMOL/L (ref 3.4–5.3)
PROT SERPL-MCNC: 6.8 G/DL (ref 6.4–8.3)
RBC # BLD AUTO: 2.68 10E6/UL (ref 3.8–5.2)
SODIUM SERPL-SCNC: 137 MMOL/L (ref 135–145)
TACROLIMUS BLD-MCNC: 4.2 UG/L (ref 5–15)
TME LAST DOSE: ABNORMAL H
TME LAST DOSE: ABNORMAL H
WBC # BLD AUTO: 3.5 10E3/UL (ref 4–11)

## 2024-12-02 PROCEDURE — 80197 ASSAY OF TACROLIMUS: CPT | Performed by: TRANSPLANT SURGERY

## 2024-12-02 PROCEDURE — 80053 COMPREHEN METABOLIC PANEL: CPT | Performed by: PATHOLOGY

## 2024-12-02 PROCEDURE — 83735 ASSAY OF MAGNESIUM: CPT | Performed by: PATHOLOGY

## 2024-12-02 PROCEDURE — 99213 OFFICE O/P EST LOW 20 MIN: CPT | Performed by: NURSE PRACTITIONER

## 2024-12-02 PROCEDURE — 36415 COLL VENOUS BLD VENIPUNCTURE: CPT | Performed by: PATHOLOGY

## 2024-12-02 PROCEDURE — 82248 BILIRUBIN DIRECT: CPT | Performed by: PATHOLOGY

## 2024-12-02 PROCEDURE — 99213 OFFICE O/P EST LOW 20 MIN: CPT | Performed by: INTERNAL MEDICINE

## 2024-12-02 PROCEDURE — 99213 OFFICE O/P EST LOW 20 MIN: CPT | Mod: 24 | Performed by: NURSE PRACTITIONER

## 2024-12-02 PROCEDURE — 84100 ASSAY OF PHOSPHORUS: CPT | Performed by: PATHOLOGY

## 2024-12-02 PROCEDURE — 85027 COMPLETE CBC AUTOMATED: CPT | Performed by: PATHOLOGY

## 2024-12-02 PROCEDURE — 99000 SPECIMEN HANDLING OFFICE-LAB: CPT | Performed by: PATHOLOGY

## 2024-12-02 RX ORDER — PANTOPRAZOLE SODIUM 40 MG/1
40 TABLET, DELAYED RELEASE ORAL
Qty: 60 TABLET | Refills: 0 | Status: SHIPPED | OUTPATIENT
Start: 2024-12-02

## 2024-12-02 RX ORDER — TACROLIMUS 1 MG/1
2 CAPSULE ORAL 2 TIMES DAILY
Qty: 120 CAPSULE | Refills: 11 | Status: SHIPPED | OUTPATIENT
Start: 2024-12-02 | End: 2024-12-03

## 2024-12-02 ASSESSMENT — PAIN SCALES - GENERAL
PAINLEVEL_OUTOF10: MODERATE PAIN (4)
PAINLEVEL_OUTOF10: MODERATE PAIN (4)

## 2024-12-02 NOTE — PATIENT INSTRUCTIONS
Stop amlodipine      Transplant Patient Information  Your Post Transplant Coordinator is:  Tejas Damian  You and your care team can contact your transplant coordinator Monday - Friday, 8am - 5pm at [phone] (Option 2 to reach the coordinator or Option 4 to schedule an appointment).  You can also reach your care team online via [email or web address].  After hours for urgent matters, please call [facility] at [phone].

## 2024-12-02 NOTE — NURSING NOTE
Chief Complaint   Patient presents with    RECHECK       /66   Pulse 68   Temp 98  F (36.7  C) (Oral)   Wt 72.4 kg (159 lb 11.2 oz)   SpO2 100%   BMI 27.26 kg/m      Carlton Ayon on 12/2/2024 at 12:21 PM

## 2024-12-02 NOTE — LETTER
12/2/2024      Hannah Sutherland  [address]  [facility] [address]      Dear Colleague,    Thank you for referring your patient, Hannah Sutherland, to the [facility]. Please see a copy of my visit note below.    Transplant Surgery -OUTPATIENT IMMUNOSUPPRESSION PROGRESS NOTE    Date of Visit: 12/02/2024    Transplants:  11/13/2024 (Liver); Postoperative day:  19  ASSESMENT AND PLAN:  1.Graft Function:LFTs stable   2.Immunosuppression Management:No changes today.   3.Hypertension:BP stable   4.Renal Function: Cr 1.2 and following with nephrology   5.Lab frequency:as scheduled.   6.Other: amlodipine stopped per neph.   STOP senna. Stool studies ordered. Start metamucil.     Hgb trending down overall. Will get liver US. Declines transfusion at this time.   -Dr. Carney updated.     Date: November 21, 2024    Transplant:  [x]                             Liver [x]                              Kidney []                             Pancreas []                              Other:             Chief Complaint:RECHECK (LIVER POST RETURN TXP SURG - week #2, staff with [facility] (Ok per [facility]), ordering prov: Dr. Carney, staff with [facility]//)    History of Present Illness:  s/p DDLT (no stent) 11/13/24:   Issues with watery stools since surgery. No fever or abdominal cramping. Still taking senna.    Frequency varies: sometimes has 1 per day, other days can be 4 episodes.   Just saw nephrology.   Hgb trending down-  tired but not SOB. Scant drainage from R incision.   BP at home 110/60. HR 67.   Weight trending down - eating less due to altered taste.   Urinating more but no dysuria. Drinking more fluids lately.   Pain overall improved. Notes R low back pain off and on.     Patient Active Problem List   Diagnosis     Abnormal INR     Abnormal Pap smear of cervix     Alcoholic cirrhosis (H)     Backache     Chronic gastritis     Chronic liver failure (H)     Chronic pain of  left knee     Depression     Diarrhea     Dysphagia     Elevated bilirubin     Elevated liver function tests     Eosinophilic esophagitis     Alcohol dependence, uncomplicated (H)     GERD (gastroesophageal reflux disease)     Hyperlipidemia     Hypertension     Hypomagnesemia     Arthritis     Inflammatory liver disease     Intermittent asthma     Motion sickness     Perimenopausal     Portal hypertension (H)     Cough     Seasonal allergies     Thrombocytopenia (H)     Vision loss     Vitamin D deficiency     End stage liver disease (H)     Immunosuppressed status (H)     Liver replaced by transplant (H)     SOCIAL /FAMILY HISTORY: [x]                  No recent change    No past medical history on file.  Past Surgical History:   Procedure Laterality Date     BENCH KIDNEY  2024    Procedure: Bench kidney;  Surgeon: Blas Carney MD;  Location: [facility] OR     IR CVC NON TUNNEL PLACEMENT > 5 YRS  11/15/2024     TRANSPLANT LIVER RECIPIENT  DONOR N/A 2024    Procedure: Transplant liver recipient  donor;  Surgeon: Blas Carney MD;  Location: [facility] OR     Social History     Socioeconomic History     Marital status:      Spouse name: Not on file     Number of children: Not on file     Years of education: Not on file     Highest education level: Not on file   Occupational History     Not on file   Tobacco Use     Smoking status: Never     Smokeless tobacco: Never   Substance and Sexual Activity     Alcohol use: Not Currently     Comment: sober 25     Drug use: Never     Sexual activity: Not on file   Other Topics Concern     Not on file   Social History Narrative     Not on file     Social Drivers of Health     Financial Resource Strain: High Risk (2024)    Financial Resource Strain      Within the past 12 months, have you or your family members you live with been unable to get utilities (heat, electricity) when it was really needed?: Yes   Food Insecurity: High Risk  (11/14/2024)    Food Insecurity      Within the past 12 months, did you worry that your food would run out before you got money to buy more?: Yes      Within the past 12 months, did the food you bought just not last and you didn t have money to get more?: Yes   Transportation Needs: Low Risk  (11/14/2024)    Transportation Needs      Within the past 12 months, has lack of transportation kept you from medical appointments, getting your medicines, non-medical meetings or appointments, work, or from getting things that you need?: No   Physical Activity: Insufficiently Active (2/27/2024)    Received from [facility], [facility]    Exercise Vital Sign      Days of Exercise per Week: 3 days      Minutes of Exercise per Session: 10 min   Stress: No Stress Concern Present (2/27/2024)    Received from [facility], [facility]    [employer] - Occupational Stress Questionnaire      Feeling of Stress : Not at all   Social Connections: Moderately Integrated (2/27/2024)    Received from [facility], [facility]    Social Connection and Isolation Panel [NHANES]      Frequency of Communication with Friends and Family: More than three times a week      Frequency of Social Gatherings with Friends and Family: Once a week      Attends Cheondoism Services: More than 4 times per year      Active Member of Clubs or Organizations: Yes      Attends Club or Organization Meetings: More than 4 times per year      Marital Status:    Recent Concern: Social Connections - Moderately Isolated (12/1/2023)    Received from [facility], [facility]    Social Connection and Isolation Panel [NHANES]      Frequency of Communication with Friends and Family: More than three times a week      Frequency of Social Gatherings with Friends and Family: Once a  week      Attends Islam Services: More than 4 times per year      Active Member of Clubs or Organizations: No      Attends Club or Organization Meetings: Not on file      Marital Status:    Interpersonal Safety: Not At Risk (7/14/2024)    Received from [facility] and [facility]    Intimate Partner Violence      Are you in a relationship where you are physically hurt, threatened and/or made to feel afraid?: No   Housing Stability: Low Risk  (11/14/2024)    Housing Stability      Do you have housing? : Yes      Are you worried about losing your housing?: No     Prescription Medications as of 12/2/2024         Rx Number Disp Refills Start End Last Dispensed Date Next Fill Date Owning Pharmacy    albuterol (VENTOLIN HFA) 108 (90 Base) MCG/ACT inhaler  -- -- 3/5/2024 --       Sig: Inhale 2 puffs into the lungs every 4 hours as needed for shortness of breath    Class: Historical    Route: Inhalation    aspirin (ASA) 325 MG tablet  30 tablet 5 11/21/2024 --   [facility] [place], [place] - [address]    Sig: Take 1 tablet (325 mg) by mouth daily.    Class: E-Prescribe    Route: Oral    calcium-vitamin D-vitamin K (VIACTIV) 500-500-40 MG-UNT-MCG CHEW  -- --  --       Sig: Take 1 tablet by mouth 2 times daily.    Class: Historical    Route: Oral    escitalopram (LEXAPRO) 20 MG tablet  30 tablet 0 11/29/2024 --   [facility] [place], [place] - [address] [place] [address] [phone]    Sig: Take 1 tablet (20 mg) by mouth daily.    Class: E-Prescribe    Notes to Pharmacy: Temporary fill    Route: Oral    escitalopram (LEXAPRO) 20 MG tablet  -- -- 3/5/2024 3/5/2025       Sig: Take 1 tablet by mouth every morning    Class: Historical    Route: Oral    folic acid (FOLVITE) 1 MG tablet  -- -- 1/30/2024 --       Sig: Take 1 tablet by mouth every morning    Class: Historical    Route: Oral    magnesium oxide (MAG-OX) 400 MG tablet  -- -- 1/30/2024 --       Sig: Take 400 mg  by mouth 2 times daily    Class: Historical    Route: Oral    methocarbamol (ROBAXIN) 750 MG tablet  20 tablet 0 11/29/2024 --   [id number] - [place], [place] - [address] [facility] [address]    Sig: Take 1 tablet (750 mg) by mouth 4 times daily as needed for muscle spasms.    Class: E-Prescribe    Route: Oral    Multiple Vitamin (QUINTABS) TABS  -- -- 2/14/2024 2/8/2025       Sig: Take 1 tablet by mouth every morning    Class: Historical    Route: Oral    mycophenolate (GENERIC EQUIVALENT) 250 MG capsule  180 capsule 11 11/20/2024 --   [address]    Sig: Take 3 capsules (750 mg) by mouth 2 times daily.    Class: E-Prescribe    Route: Oral    oxyCODONE (ROXICODONE) 5 MG tablet  20 tablet 0 11/20/2024 --       Sig: Take 1 tablet (5 mg) by mouth every 4 hours as needed for moderate pain.    Class: Local Print    Earliest Fill Date: 11/20/2024    Route: Oral    pantoprazole (PROTONIX) 40 MG EC tablet  60 tablet 0 11/20/2024 12/20/2024   [address]    Sig: Take 1 tablet (40 mg) by mouth 2 times daily (before meals).    Class: E-Prescribe    Route: Oral    predniSONE (DELTASONE) 5 MG tablet  30 tablet 2 11/21/2024 --   [address]    Sig: Take 1 tablet (5 mg) by mouth daily.    Class: E-Prescribe    Route: Oral    senna-docusate (SENOKOT-S/PERICOLACE) 8.6-50 MG tablet  60 tablet 0 11/20/2024 --   [address]    Sig: Take 2 tablets by mouth 2 times daily.    Class: E-Prescribe    Route: Oral    sulfamethoxazole-trimethoprim (BACTRIM) 400-80 MG tablet  30 tablet 5 11/20/2024 --   [address]    Sig: Take 1 tablet by mouth or Feeding Tube daily.    Class: E-Prescribe    Route: Oral or Feeding Tube    tacrolimus (GENERIC EQUIVALENT)  1 MG capsule  120 capsule 11 11/22/2024 --   [place], [place] - [address] [place] [address] [phone]    Sig: Take 2 capsules (2 mg) by mouth 2 times daily. Total dose 7 mg BID    Class: E-Prescribe    Notes to Pharmacy: TXP DT 11/13/2024 (Liver) TXP Dischg DT 11/20/2024 DX Liver replaced by transplant Z94.4 TX Center [facility] ([place], [place])    Route: Oral    tacrolimus (GENERIC EQUIVALENT) 5 MG capsule  60 capsule 11 11/22/2024 --   [place], [place] - [address] [facility] [phone]    Sig: Take 1 capsule (5 mg) by mouth 2 times daily. Total dose 7 mg BID    Class: E-Prescribe    Notes to Pharmacy: Profile Rx: patient will contact pharmacy when needed    Route: Oral    thiamine (B-1) 100 MG tablet  -- -- 12/29/2023 --       Sig: Take 100 mg by mouth daily    Class: Historical    Route: Oral    traMADol (ULTRAM) 50 MG tablet  10 tablet 0 12/1/2024 12/4/2024   [place], [place] - [address] [place] [address] [phone]    Sig: Take 1 tablet (50 mg) by mouth every 6 hours as needed for severe pain or moderate to severe pain.    Class: Local Print    Route: Oral    valGANciclovir (VALCYTE) 450 MG tablet  30 tablet 2 11/21/2024 --   [place], [place] - [address] [facility]    Sig: Take 1 tablet (450 mg) by mouth daily.    Class: E-Prescribe    Route: Oral          Penicillins, Latex, and Erythromycin   REVIEW OF SYSTEMS (check box if normal)  [x]               GENERAL  [x]                 PULMONARY [x]                GENITOURINARY  [x]                CNS                 [x]                 CARDIAC  [x]                 ENDOCRINE  [x]                EARS,NOSE,THROAT [x]                 GASTROINTESTINAL [x]                 NEUROLOGIC    [x]                MUSCLOSKELTAL  [x]                  HEMATOLOGY      PHYSICAL EXAM (check box if normal)There were no vitals taken for  this visit.        [x]            GENERAL:    [x]       EYES:  ICTERIC   []        YES  []                    NO  [x]           EXTREMITIES: Clubbing []       Y     [x]           N    [x]           EARS, NOSE, THROAT: Membranes Moist    YES   [x]                   NO []                  [x]           LUNGS:  CLEAR    YES       [x]                  NO    []                                [x]           SKIN: Jaundice           YES       []                  NO    [x]                   Rash: YES       []                  NO    [x]                                     [x]             HEART: Regular Rate          YES       [x]                  NO    []                   Incision Clean:  YES       [x]                  NO    []                                [x]                    ABDOMEN: Organomegaly YES       []                  NO    [x]                       [x]                    NEUROLOGICAL:  Nonfocal  YES       [x]                  NO    []                       [x]                    Hernia YES       []                  NO    [x]                   PSYCHIATRIC:  Appropriate  YES       [x]                  NO    []                       OTHER:   Incision with staples healing well. R side with missing staple and steri strip gone. Scant dried drainage but no active draining noted. Abd soft throughout. Non-tender. No erythema.   +1 bilat lower leg edema                                                                                               PAIN SCALE:: 2       Again, thank you for allowing me to participate in the care of your patient.        Sincerely,        ZOHREH Lomas CNP

## 2024-12-02 NOTE — PROGRESS NOTES
Transplant Surgery -OUTPATIENT IMMUNOSUPPRESSION PROGRESS NOTE    Date of Visit: 12/02/2024    Transplants:  11/13/2024 (Liver); Postoperative day:  19  ASSESMENT AND PLAN:  1.Graft Function:LFTs stable   2.Immunosuppression Management:No changes today.   3.Hypertension:BP stable   4.Renal Function: Cr 1.2 and following with nephrology   5.Lab frequency:as scheduled.   6.Other: amlodipine stopped per neph.   STOP senna. Stool studies ordered. Start metamucil.     Hgb trending down overall. Will get liver US. Declines transfusion at this time.   -Dr. Carney updated.     Date: November 21, 2024    Transplant:  [x]                             Liver [x]                              Kidney []                             Pancreas []                              Other:             Chief Complaint:RECHECK (LIVER POST RETURN TXP SURG - week #2, staff with [facility] (Ok per RNCC), ordering prov: Dr. Carney, staff with [facility]//)    History of Present Illness:  s/p DDLT (no stent) 11/13/24:   Issues with watery stools since surgery. No fever or abdominal cramping. Still taking senna.    Frequency varies: sometimes has 1 per day, other days can be 4 episodes.   Just saw nephrology.   Hgb trending down-  tired but not SOB. Scant drainage from R incision.   BP at home 110/60. HR 67.   Weight trending down - eating less due to altered taste.   Urinating more but no dysuria. Drinking more fluids lately.   Pain overall improved. Notes R low back pain off and on.     Patient Active Problem List   Diagnosis    Abnormal INR    Abnormal Pap smear of cervix    Alcoholic cirrhosis (H)    Backache    Chronic gastritis    Chronic liver failure (H)    Chronic pain of left knee    Depression    Diarrhea    Dysphagia    Elevated bilirubin    Elevated liver function tests    Eosinophilic esophagitis    Alcohol dependence, uncomplicated (H)    GERD (gastroesophageal reflux disease)    Hyperlipidemia    Hypertension     Hypomagnesemia    Arthritis    Inflammatory liver disease    Intermittent asthma    Motion sickness    Perimenopausal    Portal hypertension (H)    Cough    Seasonal allergies    Thrombocytopenia (H)    Vision loss    Vitamin D deficiency    End stage liver disease (H)    Immunosuppressed status (H)    Liver replaced by transplant (H)     SOCIAL /FAMILY HISTORY: [x]                  No recent change    No past medical history on file.  Past Surgical History:   Procedure Laterality Date    BENCH KIDNEY  2024    Procedure: Bench kidney;  Surgeon: Blas Carney MD;  Location: [facility] OR    IR CVC NON TUNNEL PLACEMENT > 5 YRS  11/15/2024    TRANSPLANT LIVER RECIPIENT  DONOR N/A 2024    Procedure: Transplant liver recipient  donor;  Surgeon: Blas Carney MD;  Location: [facility] OR     Social History     Socioeconomic History    Marital status:      Spouse name: Not on file    Number of children: Not on file    Years of education: Not on file    Highest education level: Not on file   Occupational History    Not on file   Tobacco Use    Smoking status: Never    Smokeless tobacco: Never   Substance and Sexual Activity    Alcohol use: Not Currently     Comment: sober 25    Drug use: Never    Sexual activity: Not on file   Other Topics Concern    Not on file   Social History Narrative    Not on file     Social Drivers of Health     Financial Resource Strain: High Risk (2024)    Financial Resource Strain     Within the past 12 months, have you or your family members you live with been unable to get utilities (heat, electricity) when it was really needed?: Yes   Food Insecurity: High Risk (2024)    Food Insecurity     Within the past 12 months, did you worry that your food would run out before you got money to buy more?: Yes     Within the past 12 months, did the food you bought just not last and you didn t have money to get more?: Yes   Transportation Needs: Low Risk   (11/14/2024)    Transportation Needs     Within the past 12 months, has lack of transportation kept you from medical appointments, getting your medicines, non-medical meetings or appointments, work, or from getting things that you need?: No   Physical Activity: Insufficiently Active (2/27/2024)    [facility]    Exercise Vital Sign     Days of Exercise per Week: 3 days     Minutes of Exercise per Session: 10 min   Stress: No Stress Concern Present (2/27/2024)    [facility]    [employer] [place] [employer] - Occupational Stress Questionnaire     Feeling of Stress : Not at all   Social Connections: Moderately Integrated (2/27/2024)    [facility]    Social Connection and Isolation Panel [NHANES]     Frequency of Communication with Friends and Family: More than three times a week     Frequency of Social Gatherings with Friends and Family: Once a week     Attends Mosque Services: More than 4 times per year     Active Member of Clubs or Organizations: Yes     Attends Club or Organization Meetings: More than 4 times per year     Marital Status:    Recent Concern: Social Connections - Moderately Isolated (12/1/2023)    [facility]    Social Connection and Isolation Panel [NHANES]     Frequency of Communication with Friends and Family: More than three times a week     Frequency of Social Gatherings with Friends and Family: Once a week     Attends Mosque Services: More than 4 times per year     Active Member of Clubs or Organizations: No     Attends Club or Organization Meetings: Not on file     Marital Status:    Interpersonal Safety: Not At Risk (7/14/2024)    Received from [facility]    Intimate  Partner Violence     Are you in a relationship where you are physically hurt, threatened and/or made to feel afraid?: No   Housing Stability: Low Risk  (11/14/2024)    Housing Stability     Do you have housing? : Yes     Are you worried about losing your housing?: No     Prescription Medications as of 12/2/2024         Rx Number Disp Refills Start End Last Dispensed Date Next Fill Date Owning Pharmacy    albuterol (VENTOLIN HFA) 108 (90 Base) MCG/ACT inhaler  -- -- 3/5/2024 --       Sig: Inhale 2 puffs into the lungs every 4 hours as needed for shortness of breath    Class: Historical    Route: Inhalation    aspirin (ASA) 325 MG tablet  30 tablet 5 11/21/2024 --   [facility] [place], [place] - [address] [facility]    Sig: Take 1 tablet (325 mg) by mouth daily.    Class: E-Prescribe    Route: Oral    calcium-vitamin D-vitamin K (VIACTIV) 500-500-40 MG-UNT-MCG CHEW  -- --  --       Sig: Take 1 tablet by mouth 2 times daily.    Class: Historical    Route: Oral    escitalopram (LEXAPRO) 20 MG tablet  30 tablet 0 11/29/2024 --   [place], [place] - [address] [facility] [id number]    Sig: Take 1 tablet (20 mg) by mouth daily.    Class: E-Prescribe    Notes to Pharmacy: Temporary fill    Route: Oral    escitalopram (LEXAPRO) 20 MG tablet  -- -- 3/5/2024 3/5/2025       Sig: Take 1 tablet by mouth every morning    Class: Historical    Route: Oral    folic acid (FOLVITE) 1 MG tablet  -- -- 1/30/2024 --       Sig: Take 1 tablet by mouth every morning    Class: Historical    Route: Oral    magnesium oxide (MAG-OX) 400 MG tablet  -- -- 1/30/2024 --       Sig: Take 400 mg by mouth 2 times daily    Class: Historical    Route: Oral    methocarbamol (ROBAXIN) 750 MG tablet  20 tablet 0 11/29/2024 --   [facility] [place], [place] - [address] [facility] [address] [facility]    Sig: Take 1 tablet (750 mg) by mouth 4 times daily as needed for muscle spasms.    Class: E-Prescribe    Route: Oral     Multiple Vitamin (QUINTABS) TABS  -- -- 2/14/2024 2/8/2025       Sig: Take 1 tablet by mouth every morning    Class: Historical    Route: Oral    mycophenolate (GENERIC EQUIVALENT) 250 MG capsule  180 capsule 11 11/20/2024 --   [address]    Sig: Take 3 capsules (750 mg) by mouth 2 times daily.    Class: E-Prescribe    Route: Oral    oxyCODONE (ROXICODONE) 5 MG tablet  20 tablet 0 11/20/2024 --       Sig: Take 1 tablet (5 mg) by mouth every 4 hours as needed for moderate pain.    Class: Local Print    Earliest Fill Date: 11/20/2024    Route: Oral    pantoprazole (PROTONIX) 40 MG EC tablet  60 tablet 0 11/20/2024 12/20/2024   [address]    Sig: Take 1 tablet (40 mg) by mouth 2 times daily (before meals).    Class: E-Prescribe    Route: Oral    predniSONE (DELTASONE) 5 MG tablet  30 tablet 2 11/21/2024 --   [address]    Sig: Take 1 tablet (5 mg) by mouth daily.    Class: E-Prescribe    Route: Oral    senna-docusate (SENOKOT-S/PERICOLACE) 8.6-50 MG tablet  60 tablet 0 11/20/2024 --   [address]    Sig: Take 2 tablets by mouth 2 times daily.    Class: E-Prescribe    Route: Oral    sulfamethoxazole-trimethoprim (BACTRIM) 400-80 MG tablet  30 tablet 5 11/20/2024 --   [address]    Sig: Take 1 tablet by mouth or Feeding Tube daily.    Class: E-Prescribe    Route: Oral or Feeding Tube    tacrolimus (GENERIC EQUIVALENT) 1 MG capsule  120 capsule 11 11/22/2024 --   [facility] [address] [place] [address] [phone]    Sig: Take 2 capsules (2 mg) by mouth 2 times daily. Total dose 7 mg BID    Class: E-Prescribe    Notes to Pharmacy: TXP DT 11/13/2024 (Liver) TXP Dischg DT 11/20/2024 DX Liver  replaced by transplant Z94.4 TX [facility] ([place], [place])    Route: Oral    tacrolimus (GENERIC EQUIVALENT) 5 MG capsule  60 capsule 11 11/22/2024 --   [address]    Sig: Take 1 capsule (5 mg) by mouth 2 times daily. Total dose 7 mg BID    Class: E-Prescribe    Notes to Pharmacy: Profile Rx: patient will contact pharmacy when needed    Route: Oral    thiamine (B-1) 100 MG tablet  -- -- 12/29/2023 --       Sig: Take 100 mg by mouth daily    Class: Historical    Route: Oral    traMADol (ULTRAM) 50 MG tablet  10 tablet 0 12/1/2024 12/4/2024   [phone]    Sig: Take 1 tablet (50 mg) by mouth every 6 hours as needed for severe pain or moderate to severe pain.    Class: Local Print    Route: Oral    valGANciclovir (VALCYTE) 450 MG tablet  30 tablet 2 11/21/2024 --   [place], [place] - [address] [facility]    Sig: Take 1 tablet (450 mg) by mouth daily.    Class: E-Prescribe    Route: Oral          Penicillins, Latex, and Erythromycin   REVIEW OF SYSTEMS (check box if normal)  [x]               GENERAL  [x]                 PULMONARY [x]                GENITOURINARY  [x]                CNS                 [x]                 CARDIAC  [x]                 ENDOCRINE  [x]                EARS,NOSE,THROAT [x]                 GASTROINTESTINAL [x]                 NEUROLOGIC    [x]                MUSCLOSKELTAL  [x]                  HEMATOLOGY      PHYSICAL EXAM (check box if normal)There were no vitals taken for this visit.        [x]            GENERAL:    [x]       EYES:  ICTERIC   []        YES  []                    NO  [x]           EXTREMITIES: Clubbing []       Y     [x]           N    [x]           EARS, NOSE, THROAT: Membranes Moist    YES   [x]                   NO []                  [x]            LUNGS:  CLEAR    YES       [x]                  NO    []                                [x]           SKIN: Jaundice           YES       []                  NO    [x]                   Rash: YES       []                  NO    [x]                                     [x]             HEART: Regular Rate          YES       [x]                  NO    []                   Incision Clean:  YES       [x]                  NO    []                                [x]                    ABDOMEN: Organomegaly YES       []                  NO    [x]                       [x]                    NEUROLOGICAL:  Nonfocal  YES       [x]                  NO    []                       [x]                    Hernia YES       []                  NO    [x]                   PSYCHIATRIC:  Appropriate  YES       [x]                  NO    []                       OTHER:   Incision with staples healing well. R side with missing staple and steri strip gone. Scant dried drainage but no active draining noted. Abd soft throughout. Non-tender. No erythema.   +1 bilat lower leg edema                                                                                               PAIN SCALE:: 2

## 2024-12-02 NOTE — NURSING NOTE
Chief Complaint   Patient presents with    RECHECK     LIVER POST RETURN TXP SURG - week #2, staff with [facility] (Ok per RNCC), ordering prov: Dr. Carney, staff with [facility]         /70 (BP Location: Right arm, Patient Position: Sitting, Cuff Size: Adult Regular)   Pulse 72   Wt 72.8 kg (160 lb 8 oz)   SpO2 100%   BMI 27.40 kg/m      Jayme Mejia CMA on 12/2/2024 at 2:00 PM

## 2024-12-02 NOTE — LETTER
12/2/2024      Hannah Sutherland  [address]  [facility] [address]      Dear Colleague,    Thank you for referring your patient, Hannah Sutherland, to the [facility] TRANSPL[facility]. Please see a copy of my visit note below.      TRANSPLANT NEPHROLOGY CLINIC VISIT     Assessment & Plan  # RUDY: Trend down in creatinine from peak 3.9 now down to 1.2              - RUDY felt secondary to liver transplant with hemodynamic changes and hypotension. Renal US 11/13/24 without hydronephrosis or signs of obstruction.              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  Transaminases Trend down.  Followed by Transplant Surgery.     # Immunosuppression: Tacrolimus immediate release (goal 8-10), Mycophenolate mofetil (dose 750 mg every 12 hours)              - Induction with Recent Transplant:  Per Liver Tx Protocol              - Continue with intensive monitoring of immunosuppression for efficacy and toxicity.              - Goal tacrolimus level lower due to RUDY.              - Changes: Not at this time; Management per Transplant Surgery.     # Infection Prophylaxis:   - PJP: Sulfa/TMP (Bactrim)   - CMV: Valganciclovir (Valcyte)  - Thrush: Nystatin (Mycostatin) swish and swallow  - Fungal: None              - CMV IgG Ab High Risk Discordance (D+/R-): No                CMV Serostatus: Positive  - EBV IgG Ab High Risk Discordance (D+/R-): No                EBV Serostatus: Positive     # Hypertension: tight control;   Goal BP: < 140/90               - Changes: stop amlodipine given soft BP low 100s with orthostatic symptoms, check BP daily, if persistently>=140/90, resume lower dose amlodipine 5 mg po qd        # Anemia in Chronic Disease/Surgery: Hgb: downtrend      MAGUI: No              - Iron studies: Not checked recently.      # Thrombocytopenia:  Trend up, moderately low platelet level.     # Mineral Bone Disorder:   - Vitamin D; level: Low normal        On supplement: No  - Calcium; level: Normal        On supplement: No  - Phosphorus; level: Normal        On supplement: No     # HypoMg: on Mg oxide 400 mg po qd     # Other Significant PMH:              - Depression: lexapro on hold given prolonged QTc                # Transplant History:  Etiology of Organ Failure: Alcohol-related liver disease  Tx: Liver Tx  Transplant: 11/13/2024 (Liver)  Significant changes in immunosuppression: Slightly lower tacrolimus level goal due to RUDY.  Significant transplant-related complications: RUDY    Transplant Office Phone Number: [phone]    Assessment and plan was discussed with the patient and she voiced her understanding and agreement.    Return visit: Return in about 4 months (around 4/2/2025).    Oneyda Riojas MD    The longitudinal plan of care for the diagnosis(es)/condition(s) as documented were addressed during this visit. Due to the added complexity in care, I will continue to support Hannah in the subsequent management and with ongoing continuity of care.      Chief Complaint  Ms. Sutherland is a 54 year old here for RUDY post liver transplant.     History of Present Illness  Feels ok. Reports some fatigue, denies any fevers or chills, no night sweats. Notes increased urine output   Some loose stools on senokot. no longer using oxycodone regularly, sometimes once at night     Home BP:  SBP low 100s , +orthostatic symptoms    Problem List  Patient Active Problem List   Diagnosis     Abnormal INR     Abnormal Pap smear of cervix     Alcoholic cirrhosis (H)     Backache     Chronic gastritis     Chronic liver failure (H)     Chronic pain of left knee     Depression     Diarrhea     Dysphagia     Elevated bilirubin     Elevated liver function tests     Eosinophilic esophagitis     Alcohol dependence, uncomplicated (H)     GERD (gastroesophageal reflux disease)      Hyperlipidemia     Hypertension     Hypomagnesemia     Arthritis     Inflammatory liver disease     Intermittent asthma     Motion sickness     Perimenopausal     Portal hypertension (H)     Cough     Seasonal allergies     Thrombocytopenia (H)     Vision loss     Vitamin D deficiency     End stage liver disease (H)     Immunosuppressed status (H)     Liver replaced by transplant (H)       Allergies  Allergies   Allergen Reactions     Penicillins Rash and Unknown     Latex Other (See Comments)     History of asthma.     Erythromycin Nausea and Vomiting       Medications  Current Outpatient Medications   Medication Sig Dispense Refill     albuterol (VENTOLIN HFA) 108 (90 Base) MCG/ACT inhaler Inhale 2 puffs into the lungs every 4 hours as needed for shortness of breath       amLODIPine (NORVASC) 5 MG tablet Take 1 tablet (5 mg) by mouth daily. 30 tablet 2     ascorbic acid (VITAMIN C) 250 MG CHEW chewable tablet Take 250 mg by mouth daily.       aspirin (ASA) 325 MG tablet Take 1 tablet (325 mg) by mouth daily. 30 tablet 5     calcium-vitamin D-vitamin K (VIACTIV) 500-500-40 MG-UNT-MCG CHEW Take 1 tablet by mouth 2 times daily.       escitalopram (LEXAPRO) 20 MG tablet Take 1 tablet (20 mg) by mouth daily. 30 tablet 0     escitalopram (LEXAPRO) 20 MG tablet Take 1 tablet by mouth every morning       folic acid (FOLVITE) 1 MG tablet Take 1 tablet by mouth every morning       magnesium oxide (MAG-OX) 400 MG tablet Take 400 mg by mouth 2 times daily       methocarbamol (ROBAXIN) 750 MG tablet Take 1 tablet (750 mg) by mouth 4 times daily as needed for muscle spasms. 20 tablet 0     Multiple Vitamin (QUINTABS) TABS Take 1 tablet by mouth every morning       mycophenolate (GENERIC EQUIVALENT) 250 MG capsule Take 3 capsules (750 mg) by mouth 2 times daily. 180 capsule 11     oxyCODONE (ROXICODONE) 5 MG tablet Take 1 tablet (5 mg) by mouth every 4 hours as needed for moderate pain. 20 tablet 0     pantoprazole  (PROTONIX) 40 MG EC tablet Take 1 tablet (40 mg) by mouth 2 times daily (before meals). 60 tablet 0     polyethylene glycol (MIRALAX) 17 GM/Dose powder Take 17 g by mouth daily. 510 g 0     predniSONE (DELTASONE) 5 MG tablet Take 1 tablet (5 mg) by mouth daily. 30 tablet 2     senna-docusate (SENOKOT-S/PERICOLACE) 8.6-50 MG tablet Take 2 tablets by mouth 2 times daily. 60 tablet 0     sulfamethoxazole-trimethoprim (BACTRIM) 400-80 MG tablet Take 1 tablet by mouth or Feeding Tube daily. 30 tablet 5     tacrolimus (GENERIC EQUIVALENT) 1 MG capsule Take 2 capsules (2 mg) by mouth 2 times daily. Total dose 7 mg  capsule 11     tacrolimus (GENERIC EQUIVALENT) 5 MG capsule Take 1 capsule (5 mg) by mouth 2 times daily. Total dose 7 mg BID 60 capsule 11     thiamine (B-1) 100 MG tablet Take 100 mg by mouth daily       traMADol (ULTRAM) 50 MG tablet Take 1 tablet (50 mg) by mouth every 6 hours as needed for severe pain or moderate to severe pain. 10 tablet 0     valGANciclovir (VALCYTE) 450 MG tablet Take 1 tablet (450 mg) by mouth daily. 30 tablet 2     No current facility-administered medications for this visit.     There are no discontinued medications.    Physical Exam  Vital Signs: /66   Pulse 68   Temp 98  F (36.7  C) (Oral)   Wt 72.4 kg (159 lb 11.2 oz)   SpO2 100%   BMI 27.26 kg/m      GENERAL APPEARANCE: alert and no distress  HENT: mouth without ulcers or lesions  RESP: lungs clear to auscultation - no rales, rhonchi or wheezes  CV: regular rhythm, normal rate, no rub, no murmur  EDEMA: 1+ LE edema bilaterally  ABDOMEN: soft, nondistended, nontender, bowel sounds normal Staples over the incision  MS: extremities normal - no gross deformities noted, no evidence of inflammation in joints, no muscle tenderness  SKIN: no rash    Data        Latest Ref Rng & Units 12/2/2024    11:36 AM 11/29/2024     8:57 AM 11/25/2024     9:53 AM   Renal   Sodium 135 - 145 mmol/L 137  137  138    K 3.4 - 5.3 mmol/L  5.0  4.7  4.5    Cl 98 - 107 mmol/L 104  107  105    Cl (external) 98 - 107 mmol/L 104  107  105    CO2 22 - 29 mmol/L 23  22  26    Urea Nitrogen 6.0 - 20.0 mg/dL 42.5  38.2  30.4    Creatinine 0.51 - 0.95 mg/dL 1.20  1.32  1.38    Glucose 70 - 99 mg/dL 99  96  91    Calcium 8.8 - 10.4 mg/dL 9.4  9.2  8.9    Magnesium 1.7 - 2.3 mg/dL 1.6  1.6  1.6          Latest Ref Rng & Units 12/2/2024    11:36 AM 11/29/2024     8:57 AM 11/25/2024     9:53 AM   Bone Health   Phosphorus 2.5 - 4.5 mg/dL 4.0  4.0  3.6          Latest Ref Rng & Units 12/2/2024    11:36 AM 11/29/2024     8:57 AM 11/25/2024     9:53 AM   Heme   WBC 4.0 - 11.0 10e3/uL 3.5  4.1  4.8    Hgb 11.7 - 15.7 g/dL 7.6  7.3  8.4    Plt 150 - 450 10e3/uL 281  311  269          Latest Ref Rng & Units 12/2/2024    11:36 AM 11/29/2024     8:57 AM 11/25/2024     9:53 AM   Liver   AP 40 - 150 U/L 110  110  143    TBili <=1.2 mg/dL 0.9  0.9  1.2    Bilirubin Direct 0.00 - 0.30 mg/dL 0.57  0.61  0.78    ALT 0 - 50 U/L 24  39  90    AST 0 - 45 U/L 18  19  35    Tot Protein 6.4 - 8.3 g/dL 6.8  6.3  6.2    Albumin 3.5 - 5.2 g/dL 3.6  3.3  3.2          Latest Ref Rng & Units 11/14/2024     4:04 AM 11/13/2024     1:16 AM 5/21/2024     7:57 AM   Pancreas   A1C <5.7 %   4.6    Amylase 28 - 100 U/L 51  92     Lipase (Roche) 13 - 60 U/L 26            Latest Ref Rng & Units 5/21/2024     7:57 AM   Iron studies   Iron 37 - 145 ug/dL 47    Iron Sat Index 15 - 46 % 13    Ferritin 11 - 328 ng/mL 31          Latest Ref Rng & Units 11/13/2024     1:16 AM 5/21/2024     7:57 AM   [facility] Txp Virology   EBV CAPSID ANTIBODY IGG No detectable antibody. Positive  Positive      Failed to redirect to the Timeline version of the REVFS SmartLink.  Recent Labs   Lab Test 11/21/24  0943 11/25/24  0953 11/29/24  0857   DOSTAC 11/20/2024 11/24/2024 11/28/2024   TACROL 4.7* 7.9 4.9*              Again, thank you for allowing me to participate in the care of your patient.        Sincerely,        Oneyda  MD [place]

## 2024-12-02 NOTE — PROGRESS NOTES
TRANSPLANT NEPHROLOGY CLINIC VISIT     Assessment & Plan   # RUDY: Trend down in creatinine from peak 3.9 now down to 1.2              - RUDY felt secondary to liver transplant with hemodynamic changes and hypotension. Renal US 11/13/24 without hydronephrosis or signs of obstruction.              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  Transaminases Trend down.  Followed by Transplant Surgery.     # Immunosuppression: Tacrolimus immediate release (goal 8-10), Mycophenolate mofetil (dose 750 mg every 12 hours)              - Induction with Recent Transplant:  Per Liver Tx Protocol              - Continue with intensive monitoring of immunosuppression for efficacy and toxicity.              - Goal tacrolimus level lower due to RUDY.              - Changes: Not at this time; Management per Transplant Surgery.     # Infection Prophylaxis:   - PJP: Sulfa/TMP (Bactrim)   - CMV: Valganciclovir (Valcyte)  - Thrush: Nystatin (Mycostatin) swish and swallow  - Fungal: None              - CMV IgG Ab High Risk Discordance (D+/R-): No                CMV Serostatus: Positive  - EBV IgG Ab High Risk Discordance (D+/R-): No                EBV Serostatus: Positive     # Hypertension: tight control;   Goal BP: < 140/90               - Changes: stop amlodipine given soft BP low 100s with orthostatic symptoms, check BP daily, if persistently>=140/90, resume lower dose amlodipine 5 mg po qd        # Anemia in Chronic Disease/Surgery: Hgb: downtrend      MAGUI: No              - Iron studies: Not checked recently.      # Thrombocytopenia: Trend up, moderately low platelet level.     # Mineral Bone Disorder:   - Vitamin D; level: Low normal        On supplement: No  - Calcium; level: Normal        On supplement: No  - Phosphorus; level: Normal        On supplement: No     # HypoMg: on Mg  oxide 400 mg po qd     # Other Significant PMH:              - Depression: lexapro on hold given prolonged QTc                # Transplant History:  Etiology of Organ Failure: Alcohol-related liver disease  Tx: Liver Tx  Transplant: 11/13/2024 (Liver)  Significant changes in immunosuppression: Slightly lower tacrolimus level goal due to RUDY.  Significant transplant-related complications: RUDY    Transplant Office Phone Number: [phone]    Assessment and plan was discussed with the patient and she voiced her understanding and agreement.    Return visit: Return in about 4 months (around 4/2/2025).    Oneyda Riojas MD    The longitudinal plan of care for the diagnosis(es)/condition(s) as documented were addressed during this visit. Due to the added complexity in care, I will continue to support Hannah in the subsequent management and with ongoing continuity of care.      Chief Complaint   Ms. Sutherland is a 54 year old here for RUDY post liver transplant.     History of Present Illness   Feels ok. Reports some fatigue, denies any fevers or chills, no night sweats. Notes increased urine output   Some loose stools on senokot. no longer using oxycodone regularly, sometimes once at night     Home BP:  SBP low 100s , +orthostatic symptoms    Problem List   Patient Active Problem List   Diagnosis    Abnormal INR    Abnormal Pap smear of cervix    Alcoholic cirrhosis (H)    Backache    Chronic gastritis    Chronic liver failure (H)    Chronic pain of left knee    Depression    Diarrhea    Dysphagia    Elevated bilirubin    Elevated liver function tests    Eosinophilic esophagitis    Alcohol dependence, uncomplicated (H)    GERD (gastroesophageal reflux disease)    Hyperlipidemia    Hypertension    Hypomagnesemia    Arthritis    Inflammatory liver disease    Intermittent asthma    Motion sickness    Perimenopausal    Portal hypertension (H)    Cough    Seasonal allergies    Thrombocytopenia (H)    Vision loss    Vitamin D  deficiency    End stage liver disease (H)    Immunosuppressed status (H)    Liver replaced by transplant (H)       Allergies   Allergies   Allergen Reactions    Penicillins Rash and Unknown    Latex Other (See Comments)     History of asthma.    Erythromycin Nausea and Vomiting       Medications   Current Outpatient Medications   Medication Sig Dispense Refill    albuterol (VENTOLIN HFA) 108 (90 Base) MCG/ACT inhaler Inhale 2 puffs into the lungs every 4 hours as needed for shortness of breath      amLODIPine (NORVASC) 5 MG tablet Take 1 tablet (5 mg) by mouth daily. 30 tablet 2    ascorbic acid (VITAMIN C) 250 MG CHEW chewable tablet Take 250 mg by mouth daily.      aspirin (ASA) 325 MG tablet Take 1 tablet (325 mg) by mouth daily. 30 tablet 5    calcium-vitamin D-vitamin K (VIACTIV) 500-500-40 MG-UNT-MCG CHEW Take 1 tablet by mouth 2 times daily.      escitalopram (LEXAPRO) 20 MG tablet Take 1 tablet (20 mg) by mouth daily. 30 tablet 0    escitalopram (LEXAPRO) 20 MG tablet Take 1 tablet by mouth every morning      folic acid (FOLVITE) 1 MG tablet Take 1 tablet by mouth every morning      magnesium oxide (MAG-OX) 400 MG tablet Take 400 mg by mouth 2 times daily      methocarbamol (ROBAXIN) 750 MG tablet Take 1 tablet (750 mg) by mouth 4 times daily as needed for muscle spasms. 20 tablet 0    Multiple Vitamin (QUINTABS) TABS Take 1 tablet by mouth every morning      mycophenolate (GENERIC EQUIVALENT) 250 MG capsule Take 3 capsules (750 mg) by mouth 2 times daily. 180 capsule 11    oxyCODONE (ROXICODONE) 5 MG tablet Take 1 tablet (5 mg) by mouth every 4 hours as needed for moderate pain. 20 tablet 0    pantoprazole (PROTONIX) 40 MG EC tablet Take 1 tablet (40 mg) by mouth 2 times daily (before meals). 60 tablet 0    polyethylene glycol (MIRALAX) 17 GM/Dose powder Take 17 g by mouth daily. 510 g 0    predniSONE (DELTASONE) 5 MG tablet Take 1 tablet (5 mg) by mouth daily. 30 tablet 2    senna-docusate  (SENOKOT-S/PERICOLACE) 8.6-50 MG tablet Take 2 tablets by mouth 2 times daily. 60 tablet 0    sulfamethoxazole-trimethoprim (BACTRIM) 400-80 MG tablet Take 1 tablet by mouth or Feeding Tube daily. 30 tablet 5    tacrolimus (GENERIC EQUIVALENT) 1 MG capsule Take 2 capsules (2 mg) by mouth 2 times daily. Total dose 7 mg  capsule 11    tacrolimus (GENERIC EQUIVALENT) 5 MG capsule Take 1 capsule (5 mg) by mouth 2 times daily. Total dose 7 mg BID 60 capsule 11    thiamine (B-1) 100 MG tablet Take 100 mg by mouth daily      traMADol (ULTRAM) 50 MG tablet Take 1 tablet (50 mg) by mouth every 6 hours as needed for severe pain or moderate to severe pain. 10 tablet 0    valGANciclovir (VALCYTE) 450 MG tablet Take 1 tablet (450 mg) by mouth daily. 30 tablet 2     No current facility-administered medications for this visit.     There are no discontinued medications.    Physical Exam   Vital Signs: /66   Pulse 68   Temp 98  F (36.7  C) (Oral)   Wt 72.4 kg (159 lb 11.2 oz)   SpO2 100%   BMI 27.26 kg/m      GENERAL APPEARANCE: alert and no distress  HENT: mouth without ulcers or lesions  RESP: lungs clear to auscultation - no rales, rhonchi or wheezes  CV: regular rhythm, normal rate, no rub, no murmur  EDEMA: 1+ LE edema bilaterally  ABDOMEN: soft, nondistended, nontender, bowel sounds normal Staples over the incision  MS: extremities normal - no gross deformities noted, no evidence of inflammation in joints, no muscle tenderness  SKIN: no rash    Data         Latest Ref Rng & Units 12/2/2024    11:36 AM 11/29/2024     8:57 AM 11/25/2024     9:53 AM   Renal   Sodium 135 - 145 mmol/L 137  137  138    K 3.4 - 5.3 mmol/L 5.0  4.7  4.5    Cl 98 - 107 mmol/L 104  107  105    Cl (external) 98 - 107 mmol/L 104  107  105    CO2 22 - 29 mmol/L 23  22  26    Urea Nitrogen 6.0 - 20.0 mg/dL 42.5  38.2  30.4    Creatinine 0.51 - 0.95 mg/dL 1.20  1.32  1.38    Glucose 70 - 99 mg/dL 99  96  91    Calcium 8.8 - 10.4 mg/dL 9.4   9.2  8.9    Magnesium 1.7 - 2.3 mg/dL 1.6  1.6  1.6          Latest Ref Rng & Units 12/2/2024    11:36 AM 11/29/2024     8:57 AM 11/25/2024     9:53 AM   Bone Health   Phosphorus 2.5 - 4.5 mg/dL 4.0  4.0  3.6          Latest Ref Rng & Units 12/2/2024    11:36 AM 11/29/2024     8:57 AM 11/25/2024     9:53 AM   Heme   WBC 4.0 - 11.0 10e3/uL 3.5  4.1  4.8    Hgb 11.7 - 15.7 g/dL 7.6  7.3  8.4    Plt 150 - 450 10e3/uL 281  311  269          Latest Ref Rng & Units 12/2/2024    11:36 AM 11/29/2024     8:57 AM 11/25/2024     9:53 AM   Liver   AP 40 - 150 U/L 110  110  143    TBili <=1.2 mg/dL 0.9  0.9  1.2    Bilirubin Direct 0.00 - 0.30 mg/dL 0.57  0.61  0.78    ALT 0 - 50 U/L 24  39  90    AST 0 - 45 U/L 18  19  35    Tot Protein 6.4 - 8.3 g/dL 6.8  6.3  6.2    Albumin 3.5 - 5.2 g/dL 3.6  3.3  3.2          Latest Ref Rng & Units 11/14/2024     4:04 AM 11/13/2024     1:16 AM 5/21/2024     7:57 AM   Pancreas   A1C <5.7 %   4.6    Amylase 28 - 100 U/L 51  92     Lipase (Roche) 13 - 60 U/L 26            Latest Ref Rng & Units 5/21/2024     7:57 AM   Iron studies   Iron 37 - 145 ug/dL 47    Iron Sat Index 15 - 46 % 13    Ferritin 11 - 328 ng/mL 31          Latest Ref Rng & Units 11/13/2024     1:16 AM 5/21/2024     7:57 AM   [facility] Vi[facility]   EBV CAPSID ANTIBODY IGG No detectable antibody. Positive  Positive      Failed to redirect to the Timeline version of the REVFS SmartLink.  Recent Labs   Lab Test 11/21/24  0943 11/25/24  0953 11/29/24  0857   DOSTAC 11/20/2024 11/24/2024 11/28/2024   TACROL 4.7* 7.9 4.9*

## 2024-12-03 ENCOUNTER — TELEPHONE (OUTPATIENT)
Dept: TRANSPLANT | Facility: CLINIC | Age: 54
End: 2024-12-03
Payer: COMMERCIAL

## 2024-12-03 DIAGNOSIS — Z94.4 LIVER REPLACED BY TRANSPLANT (H): ICD-10-CM

## 2024-12-03 RX ORDER — TACROLIMUS 1 MG/1
4 CAPSULE ORAL 2 TIMES DAILY
Qty: 240 CAPSULE | Refills: 11 | Status: SHIPPED | OUTPATIENT
Start: 2024-12-03

## 2024-12-03 NOTE — TELEPHONE ENCOUNTER
Angelika avila from [address] [place] [address] stated she has some questions regarding order for US and is requesting a call back.

## 2024-12-03 NOTE — TELEPHONE ENCOUNTER
ISSUE:   Tacrolimus IR level 4.2 on 12/2, goal 8-10, dose 7 mg BID.    PLAN:   Call Patient and confirm this was an accurate 12-hour trough.   Verify Tacrolimus IR dose 7 mg BID.   Confirm no new medications or or missed doses.   Confirm no new illness / infection / diarrhea.   If accurate trough and accurate dose, increase Tacrolimus IR dose to 9 mg BID     Is this more than a 50% increase or decrease in current IS dose: No    Repeat labs in 2 days.      OUTCOME:   Spoke with Patient, they confirm accurate trough level and current dose 7 mg BID.   Patient confirmed dose change to 9 mg BID.  Patient agreed to repeat labs in 2 days.   Orders sent to preferred pharmacy for dose change and lab for repeat labs.   Patient voiced understanding of plan.

## 2024-12-03 NOTE — TELEPHONE ENCOUNTER
US just wanted clarification of US needed, liver transplant with doppler vs just looking for hematoma. Asked that they do full liver transplant US.

## 2024-12-04 ENCOUNTER — ANCILLARY PROCEDURE (OUTPATIENT)
Dept: ULTRASOUND IMAGING | Facility: CLINIC | Age: 54
End: 2024-12-04
Attending: NURSE PRACTITIONER
Payer: COMMERCIAL

## 2024-12-04 DIAGNOSIS — Z94.4 LIVER REPLACED BY TRANSPLANT (H): ICD-10-CM

## 2024-12-04 PROCEDURE — 93975 VASCULAR STUDY: CPT | Mod: GC | Performed by: RADIOLOGY

## 2024-12-05 ENCOUNTER — TELEPHONE (OUTPATIENT)
Dept: TRANSPLANT | Facility: CLINIC | Age: 54
End: 2024-12-05

## 2024-12-05 ENCOUNTER — LAB (OUTPATIENT)
Dept: LAB | Facility: CLINIC | Age: 54
End: 2024-12-05
Payer: COMMERCIAL

## 2024-12-05 DIAGNOSIS — Z94.4 LIVER REPLACED BY TRANSPLANT (H): ICD-10-CM

## 2024-12-05 LAB
ALBUMIN SERPL BCG-MCNC: 3.8 G/DL (ref 3.5–5.2)
ALP SERPL-CCNC: 111 U/L (ref 40–150)
ALT SERPL W P-5'-P-CCNC: 17 U/L (ref 0–50)
ANION GAP SERPL CALCULATED.3IONS-SCNC: 11 MMOL/L (ref 7–15)
AST SERPL W P-5'-P-CCNC: 16 U/L (ref 0–45)
BACTERIA FLD CULT: NORMAL
BILIRUB DIRECT SERPL-MCNC: 0.54 MG/DL (ref 0–0.3)
BILIRUB SERPL-MCNC: 0.9 MG/DL
BUN SERPL-MCNC: 51 MG/DL (ref 6–20)
CALCIUM SERPL-MCNC: 9.5 MG/DL (ref 8.8–10.4)
CHLORIDE SERPL-SCNC: 102 MMOL/L (ref 98–107)
CREAT SERPL-MCNC: 1.53 MG/DL (ref 0.51–0.95)
EGFRCR SERPLBLD CKD-EPI 2021: 40 ML/MIN/1.73M2
ERYTHROCYTE [DISTWIDTH] IN BLOOD BY AUTOMATED COUNT: 16.6 % (ref 10–15)
GLUCOSE SERPL-MCNC: 99 MG/DL (ref 70–99)
HCO3 SERPL-SCNC: 23 MMOL/L (ref 22–29)
HCT VFR BLD AUTO: 24.3 % (ref 35–47)
HGB BLD-MCNC: 7.6 G/DL (ref 11.7–15.7)
MAGNESIUM SERPL-MCNC: 1.6 MG/DL (ref 1.7–2.3)
MCH RBC QN AUTO: 28 PG (ref 26.5–33)
MCHC RBC AUTO-ENTMCNC: 31.3 G/DL (ref 31.5–36.5)
MCV RBC AUTO: 90 FL (ref 78–100)
PHOSPHATE SERPL-MCNC: 4.5 MG/DL (ref 2.5–4.5)
PLATELET # BLD AUTO: 253 10E3/UL (ref 150–450)
POTASSIUM SERPL-SCNC: 4.3 MMOL/L (ref 3.4–5.3)
PROT SERPL-MCNC: 7.1 G/DL (ref 6.4–8.3)
RBC # BLD AUTO: 2.71 10E6/UL (ref 3.8–5.2)
SODIUM SERPL-SCNC: 136 MMOL/L (ref 135–145)
WBC # BLD AUTO: 3.1 10E3/UL (ref 4–11)

## 2024-12-05 PROCEDURE — 80053 COMPREHEN METABOLIC PANEL: CPT | Performed by: PATHOLOGY

## 2024-12-05 PROCEDURE — 82248 BILIRUBIN DIRECT: CPT | Performed by: PATHOLOGY

## 2024-12-05 PROCEDURE — 36415 COLL VENOUS BLD VENIPUNCTURE: CPT | Performed by: PATHOLOGY

## 2024-12-05 PROCEDURE — G0480 DRUG TEST DEF 1-7 CLASSES: HCPCS | Mod: 90 | Performed by: PATHOLOGY

## 2024-12-05 PROCEDURE — 87535 HIV-1 PROBE&REVERSE TRNSCRPJ: CPT | Performed by: TRANSPLANT SURGERY

## 2024-12-05 PROCEDURE — 99000 SPECIMEN HANDLING OFFICE-LAB: CPT | Performed by: PATHOLOGY

## 2024-12-05 PROCEDURE — 85027 COMPLETE CBC AUTOMATED: CPT | Performed by: PATHOLOGY

## 2024-12-05 PROCEDURE — 87516 HEPATITIS B DNA AMP PROBE: CPT | Performed by: TRANSPLANT SURGERY

## 2024-12-05 PROCEDURE — 87521 HEPATITIS C PROBE&RVRS TRNSC: CPT | Performed by: TRANSPLANT SURGERY

## 2024-12-05 PROCEDURE — 84100 ASSAY OF PHOSPHORUS: CPT | Performed by: PATHOLOGY

## 2024-12-05 PROCEDURE — 83735 ASSAY OF MAGNESIUM: CPT | Performed by: PATHOLOGY

## 2024-12-05 NOTE — TELEPHONE ENCOUNTER
Post Liver Transplant Team Conference  Date: 12/5/2024  Transplant Coordinator: Barb Lazaro  Transplant Surgeon: Blas Carney      Attendees:  [] Dr. Alarcon [x] To Liu, RN []       [x] Dr. Carney [x] Georgina Holloway LPN []    [] Dr. Rodriguez [x] Barb Lazaro RN []    [] Dr. Guerrero [] Mary Rowell, NP []    [] DR. Herrera [] Mary Victoria, NIRALI []       [] Dr. Norton [x] Dodie Blanco, NIRALI []       [] Dr. Jovany Juárez [x] Radha Drummond, NIRALI []       [] Dr. SHERYL Golden [x] Laya Tejada RN []       [] Ray Keen, pharm [] Bruna Rm RN []       [] Katarzyna Portillo NP [] Franck Powell RN []         Verbal Plan Read Back:   Ok to go home and come back on Tuesday 12/10 for appointment with Katarzyna and labs.     US looks ok, hematoma should improve with time, watch hemoglobin.    Routed to RN Coordinator   Barb Lazaro RN     Message to Katarzyna regarding staple removal timing, today, tomorrow, Tuesday? Katarzyna replied if kolton are bothering her she can get them out today or tomorrow, if not, Tuesday is fine.     Call to Hannah. Informed her that she is okayed to go home and come back for appointments. Hannah is very happy! Kolton are not really bothering her, so she will wait and get them removed on Tuesday when she is in clinic. She will get labs at [facility], lab orders faxed. Reviewed US results, per Ruth hematoma should get better with time and we will watch hemoglobin for now. Watery stools improved after stopping senna and miralax.

## 2024-12-05 NOTE — LETTER
OUTPATIENT LABORATORY TEST ORDER  [facility]  [phone]     Patient Name: Hannah Sutherland   YOB: 1970     [facility] MR# [if applicable]: [id number]   Date & Time: December 5, 2024  10:08 AM  Expiration Date: 1 year after date issued       Diagnosis: Liver Transplant (ICD-10 Z94.4)   Aftercare following organ transplant (ICD-10 Z48.288)   Long term use of medications (ICD-10 Z79.899)   Contact with and (suspected) exposure to other viral communicable   diseases (Z20.828)      We ask your assistance in obtaining the following laboratory tests, which are part of our routine surveillance program for Solid Organ Transplant patients.     Please fax each result to [phone], same day as resulted/available    Critical lab results page [phone]    Monthly beginning 12/24:     Phosphatidylethanol (PEth)    First 8 weeks post-transplant (12/9/24-1/10/25)  Labs 2x/week (Monday/Thursday)  CBC with Platelets   Basic Metabolic Panel   Phosphorous  Magnesium  Hepatic panel   Tacrolimus drug level - 12-hour trough, please document time of last dose     Month 3-4 post-transplant (1/13-2/28/25)  Labs weekly (Monday or Tuesday)  CBC with Platelets   Basic Metabolic Panel   Phosphorous  Magnesium  Hepatic panel   Tacrolimus drug level - 12-hour trough, please document time of last dose     Months 4-5 post-transplant (3/3-3/28/25)  Labs every other week  CBC with Platelets   Basic Metabolic Panel   Phosphorous  Magnesium  Hepatic panel   Tacrolimus drug level - 12-hour trough, please document time of last dose     Months 5-12 post-transplant (3/31-11/13/25)  Labs monthly  CBC with Platelets   Basic Metabolic Panel   Phosphorous  Magnesium  Hepatic panel   Tacrolimus drug level - 12-hour trough, please document time of last dose    12-months post-transplant due 11/2025  Fasting Lipid Panel  Urine protein/creatinine ratio  UA with reflex to micro  Hepatitis B DNA PCR on all  patients    If you have any questions, please call [facility]- [phone] or [phone], Fax- [phone].      Blas Carney MD, WYATT  Professor of Surgery  [employer]  Surgical Director of Liver Transplant Program  Executive Medical Director of Pediatric Transplantation

## 2024-12-06 ENCOUNTER — LAB (OUTPATIENT)
Dept: LAB | Facility: CLINIC | Age: 54
End: 2024-12-06
Payer: COMMERCIAL

## 2024-12-06 DIAGNOSIS — Z94.4 LIVER REPLACED BY TRANSPLANT (H): ICD-10-CM

## 2024-12-06 LAB
ALBUMIN SERPL BCG-MCNC: 3.7 G/DL (ref 3.5–5.2)
ALP SERPL-CCNC: 111 U/L (ref 40–150)
ALT SERPL W P-5'-P-CCNC: 15 U/L (ref 0–50)
ANION GAP SERPL CALCULATED.3IONS-SCNC: 11 MMOL/L (ref 7–15)
AST SERPL W P-5'-P-CCNC: 15 U/L (ref 0–45)
BILIRUB DIRECT SERPL-MCNC: 0.49 MG/DL (ref 0–0.3)
BILIRUB SERPL-MCNC: 0.8 MG/DL
BUN SERPL-MCNC: 48 MG/DL (ref 6–20)
CALCIUM SERPL-MCNC: 9.3 MG/DL (ref 8.8–10.4)
CHLORIDE SERPL-SCNC: 103 MMOL/L (ref 98–107)
CREAT SERPL-MCNC: 1.73 MG/DL (ref 0.51–0.95)
EGFRCR SERPLBLD CKD-EPI 2021: 35 ML/MIN/1.73M2
ERYTHROCYTE [DISTWIDTH] IN BLOOD BY AUTOMATED COUNT: 16.3 % (ref 10–15)
GLUCOSE SERPL-MCNC: 99 MG/DL (ref 70–99)
HCO3 SERPL-SCNC: 22 MMOL/L (ref 22–29)
HCT VFR BLD AUTO: 24.6 % (ref 35–47)
HGB BLD-MCNC: 7.5 G/DL (ref 11.7–15.7)
MAGNESIUM SERPL-MCNC: 1.7 MG/DL (ref 1.7–2.3)
MCH RBC QN AUTO: 27.5 PG (ref 26.5–33)
MCHC RBC AUTO-ENTMCNC: 30.5 G/DL (ref 31.5–36.5)
MCV RBC AUTO: 90 FL (ref 78–100)
PHOSPHATE SERPL-MCNC: 4.6 MG/DL (ref 2.5–4.5)
PLATELET # BLD AUTO: 243 10E3/UL (ref 150–450)
POTASSIUM SERPL-SCNC: 4.4 MMOL/L (ref 3.4–5.3)
PROT SERPL-MCNC: 7 G/DL (ref 6.4–8.3)
RBC # BLD AUTO: 2.73 10E6/UL (ref 3.8–5.2)
SODIUM SERPL-SCNC: 136 MMOL/L (ref 135–145)
TACROLIMUS BLD-MCNC: 6 UG/L (ref 5–15)
TME LAST DOSE: NORMAL H
TME LAST DOSE: NORMAL H
WBC # BLD AUTO: 2.7 10E3/UL (ref 4–11)

## 2024-12-06 PROCEDURE — 36415 COLL VENOUS BLD VENIPUNCTURE: CPT | Performed by: PATHOLOGY

## 2024-12-06 PROCEDURE — 99000 SPECIMEN HANDLING OFFICE-LAB: CPT | Performed by: PATHOLOGY

## 2024-12-06 PROCEDURE — 85027 COMPLETE CBC AUTOMATED: CPT | Performed by: PATHOLOGY

## 2024-12-06 PROCEDURE — 84100 ASSAY OF PHOSPHORUS: CPT | Performed by: PATHOLOGY

## 2024-12-06 PROCEDURE — 83735 ASSAY OF MAGNESIUM: CPT | Performed by: PATHOLOGY

## 2024-12-06 PROCEDURE — 82248 BILIRUBIN DIRECT: CPT | Performed by: PATHOLOGY

## 2024-12-06 PROCEDURE — 80053 COMPREHEN METABOLIC PANEL: CPT | Performed by: PATHOLOGY

## 2024-12-06 PROCEDURE — 80197 ASSAY OF TACROLIMUS: CPT | Performed by: TRANSPLANT SURGERY

## 2024-12-08 LAB
HBV DNA SERPL QL NAA+PROBE: NORMAL
HCV RNA SERPL QL NAA+PROBE: NORMAL
HIV1+2 RNA SERPL QL NAA+PROBE: NORMAL

## 2024-12-09 DIAGNOSIS — Z94.4 LIVER REPLACED BY TRANSPLANT (H): Primary | ICD-10-CM

## 2024-12-09 RX ORDER — MYCOPHENOLIC ACID 180 MG/1
540 TABLET, DELAYED RELEASE ORAL 2 TIMES DAILY
Qty: 540 TABLET | Refills: 0 | Status: SHIPPED | OUTPATIENT
Start: 2024-12-09 | End: 2024-12-11

## 2024-12-09 NOTE — PROGRESS NOTES
HBV HCV HIV by STEFANO drawn 6 days too early. Order stated due date was approximately 12/16, but was drawn on 12/5. Reordered for 12/16.

## 2024-12-09 NOTE — PROGRESS NOTES
"Hannah stated, in MyChart message, that her stomach is constantly upset and it is making her not want to eat. Message to Dr. Carney asking if her mycophenolate can be switched to mycophenolic acid. Dr. Carney responded \"Yes please\".     Mycophenolic acid ordered. Message to Hannah and will discuss in clinic tomorrow morning.  "

## 2024-12-10 ENCOUNTER — OFFICE VISIT (OUTPATIENT)
Dept: TRANSPLANT | Facility: CLINIC | Age: 54
End: 2024-12-10
Attending: TRANSPLANT SURGERY
Payer: COMMERCIAL

## 2024-12-10 ENCOUNTER — LAB (OUTPATIENT)
Dept: LAB | Facility: CLINIC | Age: 54
End: 2024-12-10
Attending: TRANSPLANT SURGERY
Payer: COMMERCIAL

## 2024-12-10 ENCOUNTER — ANCILLARY PROCEDURE (OUTPATIENT)
Dept: ULTRASOUND IMAGING | Facility: CLINIC | Age: 54
End: 2024-12-10
Attending: NURSE PRACTITIONER
Payer: COMMERCIAL

## 2024-12-10 VITALS
RESPIRATION RATE: 16 BRPM | DIASTOLIC BLOOD PRESSURE: 80 MMHG | WEIGHT: 154.4 LBS | HEART RATE: 74 BPM | OXYGEN SATURATION: 97 % | SYSTOLIC BLOOD PRESSURE: 129 MMHG | BODY MASS INDEX: 26.36 KG/M2

## 2024-12-10 DIAGNOSIS — D84.9 IMMUNOSUPPRESSION (H): ICD-10-CM

## 2024-12-10 DIAGNOSIS — R19.06 ABDOMINAL WALL MASS OF EPIGASTRIC REGION: Primary | ICD-10-CM

## 2024-12-10 DIAGNOSIS — R19.06 ABDOMINAL WALL MASS OF EPIGASTRIC REGION: ICD-10-CM

## 2024-12-10 DIAGNOSIS — Z94.4 LIVER REPLACED BY TRANSPLANT (H): ICD-10-CM

## 2024-12-10 LAB
ALBUMIN SERPL BCG-MCNC: 3.6 G/DL (ref 3.5–5.2)
ALP SERPL-CCNC: 102 U/L (ref 40–150)
ALT SERPL W P-5'-P-CCNC: 10 U/L (ref 0–50)
ANION GAP SERPL CALCULATED.3IONS-SCNC: 10 MMOL/L (ref 7–15)
AST SERPL W P-5'-P-CCNC: 14 U/L (ref 0–45)
BASOPHILS # BLD AUTO: 0.1 10E3/UL (ref 0–0.2)
BASOPHILS NFR BLD AUTO: 2 %
BILIRUB DIRECT SERPL-MCNC: 0.45 MG/DL (ref 0–0.3)
BILIRUB SERPL-MCNC: 0.8 MG/DL
BUN SERPL-MCNC: 35.9 MG/DL (ref 6–20)
CALCIUM SERPL-MCNC: 9.2 MG/DL (ref 8.8–10.4)
CHLORIDE SERPL-SCNC: 103 MMOL/L (ref 98–107)
CREAT SERPL-MCNC: 1.82 MG/DL (ref 0.51–0.95)
EGFRCR SERPLBLD CKD-EPI 2021: 33 ML/MIN/1.73M2
EOSINOPHIL # BLD AUTO: 0 10E3/UL (ref 0–0.7)
EOSINOPHIL NFR BLD AUTO: 1 %
ERYTHROCYTE [DISTWIDTH] IN BLOOD BY AUTOMATED COUNT: 15.9 % (ref 10–15)
GLUCOSE SERPL-MCNC: 98 MG/DL (ref 70–99)
HCO3 SERPL-SCNC: 22 MMOL/L (ref 22–29)
HCT VFR BLD AUTO: 25.1 % (ref 35–47)
HGB BLD-MCNC: 8 G/DL (ref 11.7–15.7)
IMM GRANULOCYTES # BLD: 0 10E3/UL
IMM GRANULOCYTES NFR BLD: 1 %
LYMPHOCYTES # BLD AUTO: 1 10E3/UL (ref 0.8–5.3)
LYMPHOCYTES NFR BLD AUTO: 31 %
MAGNESIUM SERPL-MCNC: 1.6 MG/DL (ref 1.7–2.3)
MCH RBC QN AUTO: 27.7 PG (ref 26.5–33)
MCHC RBC AUTO-ENTMCNC: 31.9 G/DL (ref 31.5–36.5)
MCV RBC AUTO: 87 FL (ref 78–100)
MONOCYTES # BLD AUTO: 0.3 10E3/UL (ref 0–1.3)
MONOCYTES NFR BLD AUTO: 10 %
NEUTROPHILS # BLD AUTO: 1.8 10E3/UL (ref 1.6–8.3)
NEUTROPHILS NFR BLD AUTO: 57 %
NRBC # BLD AUTO: 0 10E3/UL
NRBC BLD AUTO-RTO: 0 /100
PHOSPHATE SERPL-MCNC: 4.2 MG/DL (ref 2.5–4.5)
PLATELET # BLD AUTO: 210 10E3/UL (ref 150–450)
POTASSIUM SERPL-SCNC: 4.9 MMOL/L (ref 3.4–5.3)
PROT SERPL-MCNC: 6.9 G/DL (ref 6.4–8.3)
RBC # BLD AUTO: 2.89 10E6/UL (ref 3.8–5.2)
SODIUM SERPL-SCNC: 135 MMOL/L (ref 135–145)
WBC # BLD AUTO: 3.1 10E3/UL (ref 4–11)
WBC # BLD AUTO: 3.1 10E3/UL (ref 4–11)

## 2024-12-10 PROCEDURE — 83735 ASSAY OF MAGNESIUM: CPT | Performed by: PATHOLOGY

## 2024-12-10 PROCEDURE — 80197 ASSAY OF TACROLIMUS: CPT | Performed by: TRANSPLANT SURGERY

## 2024-12-10 PROCEDURE — 87535 HIV-1 PROBE&REVERSE TRNSCRPJ: CPT | Performed by: TRANSPLANT SURGERY

## 2024-12-10 PROCEDURE — 87516 HEPATITIS B DNA AMP PROBE: CPT | Performed by: TRANSPLANT SURGERY

## 2024-12-10 PROCEDURE — 80053 COMPREHEN METABOLIC PANEL: CPT | Performed by: PATHOLOGY

## 2024-12-10 PROCEDURE — 99213 OFFICE O/P EST LOW 20 MIN: CPT | Performed by: NURSE PRACTITIONER

## 2024-12-10 PROCEDURE — 84100 ASSAY OF PHOSPHORUS: CPT | Performed by: PATHOLOGY

## 2024-12-10 PROCEDURE — 85025 COMPLETE CBC W/AUTO DIFF WBC: CPT | Performed by: PATHOLOGY

## 2024-12-10 PROCEDURE — 82248 BILIRUBIN DIRECT: CPT | Performed by: PATHOLOGY

## 2024-12-10 PROCEDURE — 76705 ECHO EXAM OF ABDOMEN: CPT | Mod: GC | Performed by: STUDENT IN AN ORGANIZED HEALTH CARE EDUCATION/TRAINING PROGRAM

## 2024-12-10 PROCEDURE — 36415 COLL VENOUS BLD VENIPUNCTURE: CPT | Performed by: PATHOLOGY

## 2024-12-10 PROCEDURE — 87521 HEPATITIS C PROBE&RVRS TRNSC: CPT | Performed by: TRANSPLANT SURGERY

## 2024-12-10 PROCEDURE — 99000 SPECIMEN HANDLING OFFICE-LAB: CPT | Performed by: PATHOLOGY

## 2024-12-10 RX ORDER — TRAMADOL HYDROCHLORIDE 50 MG/1
50 TABLET ORAL EVERY 6 HOURS PRN
COMMUNITY
Start: 2024-12-05

## 2024-12-10 NOTE — PROGRESS NOTES
Transplant Surgery -OUTPATIENT IMMUNOSUPPRESSION PROGRESS NOTE    Date of Visit: 12/10/2024    Transplants:  11/13/2024 (Liver); Postoperative day:  27  ASSESMENT AND PLAN:  1.Graft Function:LFTs stable   2.Immunosuppression Management:No changes today. Tac pending.   3.Hypertension:BP stable   4.Renal Function: Cr  trending up to 1.8 -ensure good hydration. Repeat this week.   5.Lab frequency:as scheduled.   6.Other:   L epigastric mass: US ordered   Staples removed with no issues. Steris applied.       Date: November 21, 2024    Transplant:  [x]                             Liver [x]                              Kidney []                             Pancreas []                              Other:             Chief Complaint:Follow Up (Liver transplant 11/13/24)    History of Present Illness:  s/p DDLT (no stent) 11/13/24:   Stools normalized over weekend but watery this AM x 1.   Switched to MPA today.   Appetite still poor but trying to eat. Getting in around 1 meal and snacks. Was drinking protein shakes.   Pre tx weight 165lbs.   Urinating good amounts. Fluid intake could be better.    Pain controlled.   Noticed lump on L epigastric area that is tender. No fever.     Patient Active Problem List   Diagnosis    Abnormal INR    Abnormal Pap smear of cervix    Alcoholic cirrhosis (H)    Backache    Chronic gastritis    Chronic liver failure (H)    Chronic pain of left knee    Depression    Diarrhea    Dysphagia    Elevated bilirubin    Elevated liver function tests    Eosinophilic esophagitis    Alcohol dependence, uncomplicated (H)    GERD (gastroesophageal reflux disease)    Hyperlipidemia    Hypertension    Hypomagnesemia    Arthritis    Inflammatory liver disease    Intermittent asthma    Motion sickness    Perimenopausal    Portal hypertension (H)    Cough    Seasonal allergies    Thrombocytopenia (H)    Vision loss    Vitamin D deficiency    End stage liver disease (H)    Immunosuppressed status (H)    Liver  replaced by transplant (H)     SOCIAL /FAMILY HISTORY: [x]                  No recent change    No past medical history on file.  Past Surgical History:   Procedure Laterality Date    BENCH KIDNEY  2024    Procedure: Bench kidney;  Surgeon: Blas Carney MD;  Location: [facility] OR    IR CVC NON TUNNEL PLACEMENT > 5 YRS  11/15/2024    TRANSPLANT LIVER RECIPIENT  DONOR N/A 2024    Procedure: Transplant liver recipient  donor;  Surgeon: Blas Carney MD;  Location: [facility] OR     Social History     Socioeconomic History    Marital status:      Spouse name: Not on file    Number of children: Not on file    Years of education: Not on file    Highest education level: Not on file   Occupational History    Not on file   Tobacco Use    Smoking status: Never    Smokeless tobacco: Never   Vaping Use    Vaping status: Never Used   Substance and Sexual Activity    Alcohol use: Not Currently     Comment: sober 25    Drug use: Never    Sexual activity: Not on file   Other Topics Concern    Not on file   Social History Narrative    Not on file     Social Drivers of Health     Financial Resource Strain: High Risk (2024)    Financial Resource Strain     Within the past 12 months, have you or your family members you live with been unable to get utilities (heat, electricity) when it was really needed?: Yes   Food Insecurity: High Risk (2024)    Food Insecurity     Within the past 12 months, did you worry that your food would run out before you got money to buy more?: Yes     Within the past 12 months, did the food you bought just not last and you didn t have money to get more?: Yes   Transportation Needs: Low Risk  (2024)    Transportation Needs     Within the past 12 months, has lack of transportation kept you from medical appointments, getting your medicines, non-medical meetings or appointments, work, or from getting things that you need?: No   Physical Activity:  Insufficiently Active (2/27/2024)    [facility]    Exercise Vital Sign     Days of Exercise per Week: 3 days     Minutes of Exercise per Session: 10 min   Stress: No Stress Concern Present (2/27/2024)    [facility]    [employer] - Occupational Stress Questionnaire     Feeling of Stress : Not at all   Social Connections: Moderately Integrated (2/27/2024)    [facility]    Social Connection and Isolation Panel [NHANES]     Frequency of Communication with Friends and Family: More than three times a week     Frequency of Social Gatherings with Friends and Family: Once a week     Attends Taoism Services: More than 4 times per year     Active Member of Clubs or Organizations: Yes     Attends Club or Organization Meetings: More than 4 times per year     Marital Status:    Recent Concern: Social Connections - Moderately Isolated (12/1/2023)    [facility]    Social Connection and Isolation Panel [NHANES]     Frequency of Communication with Friends and Family: More than three times a week     Frequency of Social Gatherings with Friends and Family: Once a week     Attends Taoism Services: More than 4 times per year     Active Member of Clubs or Organizations: No     Attends Club or Organization Meetings: Not on file     Marital Status:    Interpersonal Safety: Not At Risk (7/14/2024)    Received from [facility]    Intimate Partner Violence     Are you in a relationship where you are physically hurt, threatened and/or made to feel afraid?: No   Housing Stability: Low Risk  (11/14/2024)    Housing Stability     Do you have housing? : Yes     Are you worried about losing your  housing?: No     Prescription Medications as of 12/10/2024         Rx Number Disp Refills Start End Last Dispensed Date Next Fill Date Owning Pharmacy    acetaminophen (TYLENOL) 500 MG tablet  -- --  --       Sig: Take 500 mg by mouth every 6 hours as needed for mild pain.    Class: Historical    Route: Oral    albuterol (VENTOLIN HFA) 108 (90 Base) MCG/ACT inhaler  -- -- 3/5/2024 --       Sig: Inhale 2 puffs into the lungs every 4 hours as needed for shortness of breath    Class: Historical    Route: Inhalation    aspirin (ASA) 325 MG tablet  30 tablet 5 11/21/2024 --   [place], [place] - [address] [facility]    Sig: Take 1 tablet (325 mg) by mouth daily.    Class: E-Prescribe    Route: Oral    calcium-vitamin D-vitamin K (VIACTIV) 500-500-40 MG-UNT-MCG CHEW  -- --  --       Sig: Take 1 tablet by mouth 2 times daily.    Class: Historical    Route: Oral    escitalopram (LEXAPRO) 20 MG tablet  30 tablet 0 11/29/2024 --   [place], [place] - [address] [facility] [phone]    Sig: Take 1 tablet (20 mg) by mouth daily.    Class: E-Prescribe    Notes to Pharmacy: Temporary fill    Route: Oral    folic acid (FOLVITE) 1 MG tablet  -- -- 1/30/2024 --       Sig: Take 1 tablet by mouth every morning    Class: Historical    Route: Oral    magnesium oxide (MAG-OX) 400 MG tablet  -- -- 1/30/2024 --       Sig: Take 400 mg by mouth 2 times daily    Class: Historical    Route: Oral    methocarbamol (ROBAXIN) 750 MG tablet  20 tablet 0 11/29/2024 --   [facility] [address] [place], [place] - [address] [facility]    Sig: Take 1 tablet (750 mg) by mouth 4 times daily as needed for muscle spasms.    Class: E-Prescribe    Route: Oral    Multiple Vitamin (QUINTABS) TABS  -- -- 2/14/2024 2/8/2025       Sig: Take 1 tablet by mouth every morning    Class: Historical    Route: Oral    mycophenolic acid (GENERIC EQUIVALENT) 180 MG EC tablet  540 tablet 0 12/9/2024 --   [place] [address]    Sig: Take 3 tablets (540 mg) by mouth 2 times daily.    Class: E-Prescribe    Notes to Pharmacy: TXP DT 11/13/2024 (Liver) TXP Dischg DT 11/20/2024 DX Liver replaced by transplant Z94.4 TX Center [facility] ([place], [place])    Route: Oral    oxyCODONE (ROXICODONE) 5 MG tablet  20 tablet 0 11/20/2024 --       Sig: Take 1 tablet (5 mg) by mouth every 4 hours as needed for moderate pain.    Class: Local Print    Earliest Fill Date: 11/20/2024    Route: Oral    pantoprazole (PROTONIX) 40 MG EC tablet  60 tablet 0 12/2/2024 --   [address]    Sig: Take 1 tablet (40 mg) by mouth 2 times daily (before meals).    Class: E-Prescribe    Route: Oral    predniSONE (DELTASONE) 5 MG tablet  30 tablet 2 11/21/2024 --   [address]    Sig: Take 1 tablet (5 mg) by mouth daily.    Class: E-Prescribe    Route: Oral    psyllium (METAMUCIL/KONSYL) 58.6 % powder  180 g 0 12/2/2024 1/1/2025   [address]    Sig: Take 6 g (1 teaspoonful) by mouth daily.    Class: E-Prescribe    Route: Oral    senna-docusate (SENOKOT-S/PERICOLACE) 8.6-50 MG tablet  60 tablet 0 11/20/2024 --   [place], [place] - [address] [place] [address]    Sig: Take 2 tablets by mouth 2 times daily.    Class: E-Prescribe    Route: Oral    sulfamethoxazole-trimethoprim (BACTRIM) 400-80 MG tablet  30 tablet 5 11/20/2024 --   [address]    Sig: Take 1 tablet by mouth or Feeding Tube daily.    Class: E-Prescribe    Route: Oral or Feeding Tube    tacrolimus (GENERIC EQUIVALENT) 1 MG capsule  240 capsule 11 12/3/2024 --   [facility] [address]  [facility] [phone]    Sig: Take 4 capsules (4 mg) by mouth 2 times daily. Total dose 7 mg BID    Class: E-Prescribe    Notes to Pharmacy: TXP DT 11/13/2024 (Liver) TXP Dischg DT 11/20/2024 DX Liver replaced by transplant Z94.4 TX Center [facility] ([place], [place])    Route: Oral    tacrolimus (GENERIC EQUIVALENT) 5 MG capsule  60 capsule 11 11/22/2024 --   [place], [place] - [address] [facility] [phone]    Sig: Take 1 capsule (5 mg) by mouth 2 times daily. Total dose 7 mg BID    Class: E-Prescribe    Notes to Pharmacy: Profile Rx: patient will contact pharmacy when needed    Route: Oral    thiamine (B-1) 100 MG tablet  -- -- 12/29/2023 --       Sig: Take 100 mg by mouth daily    Class: Historical    Route: Oral    traMADol (ULTRAM) 50 MG tablet  -- -- 12/5/2024 --       Sig: Take 50 mg by mouth every 6 hours as needed for pain.    Class: Historical    Route: Oral    valGANciclovir (VALCYTE) 450 MG tablet  30 tablet 2 11/21/2024 --   [facility] - [place], [place] - [address] [facility]    Sig: Take 1 tablet (450 mg) by mouth daily.    Class: E-Prescribe    Route: Oral          Penicillins, Latex, and Erythromycin   REVIEW OF SYSTEMS (check box if normal)  [x]               GENERAL  [x]                 PULMONARY [x]                GENITOURINARY  [x]                CNS                 [x]                 CARDIAC  [x]                 ENDOCRINE  [x]                EARS,NOSE,THROAT [x]                 GASTROINTESTINAL [x]                 NEUROLOGIC    [x]                MUSCLOSKELTAL  [x]                  HEMATOLOGY      PHYSICAL EXAM (check box if normal)/80   Pulse 74   Resp 16   Wt 70 kg (154 lb 6.4 oz)   SpO2 97%   BMI 26.36 kg/m          [x]            GENERAL:    [x]       EYES:  ICTERIC   []        YES  []                    NO  [x]           EXTREMITIES: Clubbing []       Y     [x]           N    [x]            EARS, NOSE, THROAT: Membranes Moist    YES   [x]                   NO []                  [x]           LUNGS:  CLEAR    YES       [x]                  NO    []                                [x]           SKIN: Jaundice           YES       []                  NO    [x]                   Rash: YES       []                  NO    [x]                                     [x]             HEART: Regular Rate          YES       [x]                  NO    []                   Incision Clean:  YES       [x]                  NO    []                                [x]                    ABDOMEN: Organomegaly YES       []                  NO    [x]                       [x]                    NEUROLOGICAL:  Nonfocal  YES       [x]                  NO    []                       [x]                    Hernia YES       []                  NO    [x]                   PSYCHIATRIC:  Appropriate  YES       [x]                  NO    []                       OTHER:   Incision healing well - staples removed. No erythema or drainage.   -L epigastric area with 2cm rubbery mobile mass. +tenderness. No induration or erythema on skin.                                                                                             PAIN SCALE:: 2

## 2024-12-10 NOTE — LETTER
12/10/2024      Hannah Sutherland  [address]  [facility] [address]      Dear Colleague,    Thank you for referring your patient, Hannah Sutherland, to the [facility]. Please see a copy of my visit note below.    Transplant Surgery -OUTPATIENT IMMUNOSUPPRESSION PROGRESS NOTE    Date of Visit: 12/10/2024    Transplants:  11/13/2024 (Liver); Postoperative day:  27  ASSESMENT AND PLAN:  1.Graft Function:LFTs stable   2.Immunosuppression Management:No changes today. Tac pending.   3.Hypertension:BP stable   4.Renal Function: Cr  trending up to 1.8 -ensure good hydration. Repeat this week.   5.Lab frequency:as scheduled.   6.Other:   L epigastric mass: US ordered   Staples removed with no issues. Steris applied.       Date: November 21, 2024    Transplant:  [x]                             Liver [x]                              Kidney []                             Pancreas []                              Other:             Chief Complaint:Follow Up (Liver transplant 11/13/24)    History of Present Illness:  s/p DDLT (no stent) 11/13/24:   Stools normalized over weekend but watery this AM x 1.   Switched to MPA today.   Appetite still poor but trying to eat. Getting in around 1 meal and snacks. Was drinking protein shakes.   Pre tx weight 165lbs.   Urinating good amounts. Fluid intake could be better.    Pain controlled.   Noticed lump on L epigastric area that is tender. No fever.     Patient Active Problem List   Diagnosis     Abnormal INR     Abnormal Pap smear of cervix     Alcoholic cirrhosis (H)     Backache     Chronic gastritis     Chronic liver failure (H)     Chronic pain of left knee     Depression     Diarrhea     Dysphagia     Elevated bilirubin     Elevated liver function tests     Eosinophilic esophagitis     Alcohol dependence, uncomplicated (H)     GERD (gastroesophageal reflux disease)     Hyperlipidemia     Hypertension     Hypomagnesemia     Arthritis      Inflammatory liver disease     Intermittent asthma     Motion sickness     Perimenopausal     Portal hypertension (H)     Cough     Seasonal allergies     Thrombocytopenia (H)     Vision loss     Vitamin D deficiency     End stage liver disease (H)     Immunosuppressed status (H)     Liver replaced by transplant (H)     SOCIAL /FAMILY HISTORY: [x]                  No recent change    No past medical history on file.  Past Surgical History:   Procedure Laterality Date     BENCH KIDNEY  2024    Procedure: Bench kidney;  Surgeon: Blas Carney MD;  Location: [facility] OR     IR CVC NON TUNNEL PLACEMENT > 5 YRS  11/15/2024     TRANSPLANT LIVER RECIPIENT  DONOR N/A 2024    Procedure: Transplant liver recipient  donor;  Surgeon: Blas Carney MD;  Location: [facility] OR     Social History     Socioeconomic History     Marital status:      Spouse name: Not on file     Number of children: Not on file     Years of education: Not on file     Highest education level: Not on file   Occupational History     Not on file   Tobacco Use     Smoking status: Never     Smokeless tobacco: Never   Vaping Use     Vaping status: Never Used   Substance and Sexual Activity     Alcohol use: Not Currently     Comment: sober 25     Drug use: Never     Sexual activity: Not on file   Other Topics Concern     Not on file   Social History Narrative     Not on file     Social Drivers of Health     Financial Resource Strain: High Risk (2024)    Financial Resource Strain      Within the past 12 months, have you or your family members you live with been unable to get utilities (heat, electricity) when it was really needed?: Yes   Food Insecurity: High Risk (2024)    Food Insecurity      Within the past 12 months, did you worry that your food would run out before you got money to buy more?: Yes      Within the past 12 months, did the food you bought just not last and you didn t have money to get  more?: Yes   Transportation Needs: Low Risk  (11/14/2024)    Transportation Needs      Within the past 12 months, has lack of transportation kept you from medical appointments, getting your medicines, non-medical meetings or appointments, work, or from getting things that you need?: No   Physical Activity: Insufficiently Active (2/27/2024)    [facility]    Exercise Vital Sign      Days of Exercise per Week: 3 days      Minutes of Exercise per Session: 10 min   Stress: No Stress Concern Present (2/27/2024)    [facility]    [employer] [place] [employer] - Occupational Stress Questionnaire      Feeling of Stress : Not at all   Social Connections: Moderately Integrated (2/27/2024)    [facility] from [facility]    Social Connection and Isolation Panel [NHANES]      Frequency of Communication with Friends and Family: More than three times a week      Frequency of Social Gatherings with Friends and Family: Once a week      Attends Yazidi Services: More than 4 times per year      Active Member of Clubs or Organizations: Yes      Attends Club or Organization Meetings: More than 4 times per year      Marital Status:    Recent Concern: Social Connections - Moderately Isolated (12/1/2023)    [facility]    Social Connection and Isolation Panel [NHANES]      Frequency of Communication with Friends and Family: More than three times a week      Frequency of Social Gatherings with Friends and Family: Once a week      Attends Yazidi Services: More than 4 times per year      Active Member of Clubs or Organizations: No      Attends Club or Organization Meetings: Not on file      Marital Status:    Interpersonal Safety: Not At Risk (7/14/2024)     Received from [facility]    Intimate Partner Violence      Are you in a relationship where you are physically hurt, threatened and/or made to feel afraid?: No   Housing Stability: Low Risk  (11/14/2024)    Housing Stability      Do you have housing? : Yes      Are you worried about losing your housing?: No     Prescription Medications as of 12/10/2024         Rx Number Disp Refills Start End Last Dispensed Date Next Fill Date Owning Pharmacy    acetaminophen (TYLENOL) 500 MG tablet  -- --  --       Sig: Take 500 mg by mouth every 6 hours as needed for mild pain.    Class: Historical    Route: Oral    albuterol (VENTOLIN HFA) 108 (90 Base) MCG/ACT inhaler  -- -- 3/5/2024 --       Sig: Inhale 2 puffs into the lungs every 4 hours as needed for shortness of breath    Class: Historical    Route: Inhalation    aspirin (ASA) 325 MG tablet  30 tablet 5 11/21/2024 --   [facility] [place], [place] - [address]    Sig: Take 1 tablet (325 mg) by mouth daily.    Class: E-Prescribe    Route: Oral    calcium-vitamin D-vitamin K (VIACTIV) 500-500-40 MG-UNT-MCG CHEW  -- --  --       Sig: Take 1 tablet by mouth 2 times daily.    Class: Historical    Route: Oral    escitalopram (LEXAPRO) 20 MG tablet  30 tablet 0 11/29/2024 --   [facility] [place], [place] - [phone]    Sig: Take 1 tablet (20 mg) by mouth daily.    Class: E-Prescribe    Notes to Pharmacy: Temporary fill    Route: Oral    folic acid (FOLVITE) 1 MG tablet  -- -- 1/30/2024 --       Sig: Take 1 tablet by mouth every morning    Class: Historical    Route: Oral    magnesium oxide (MAG-OX) 400 MG tablet  -- -- 1/30/2024 --       Sig: Take 400 mg by mouth 2 times daily    Class: Historical    Route: Oral    methocarbamol (ROBAXIN) 750 MG tablet  20 tablet 0 11/29/2024 --   [facility] [place], [place] - [address]    Sig: Take 1 tablet (750 mg) by mouth 4  times daily as needed for muscle spasms.    Class: E-Prescribe    Route: Oral    Multiple Vitamin (QUINTABS) TABS  -- -- 2/14/2024 2/8/2025       Sig: Take 1 tablet by mouth every morning    Class: Historical    Route: Oral    mycophenolic acid (GENERIC EQUIVALENT) 180 MG EC tablet  540 tablet 0 12/9/2024 --   [address]    Sig: Take 3 tablets (540 mg) by mouth 2 times daily.    Class: E-Prescribe    Notes to Pharmacy: TXP DT 11/13/2024 (Liver) TXP Dischg DT 11/20/2024 DX Liver replaced by transplant Z94.4 TX Center [facility] [place], [place])    Route: Oral    oxyCODONE (ROXICODONE) 5 MG tablet  20 tablet 0 11/20/2024 --       Sig: Take 1 tablet (5 mg) by mouth every 4 hours as needed for moderate pain.    Class: Local Print    Earliest Fill Date: 11/20/2024    Route: Oral    pantoprazole (PROTONIX) 40 MG EC tablet  60 tablet 0 12/2/2024 --   [place], [place] - [address] [place] [address] [phone]    Sig: Take 1 tablet (40 mg) by mouth 2 times daily (before meals).    Class: E-Prescribe    Route: Oral    predniSONE (DELTASONE) 5 MG tablet  30 tablet 2 11/21/2024 --   [place], [place] - [address] [place] [address]    Sig: Take 1 tablet (5 mg) by mouth daily.    Class: E-Prescribe    Route: Oral    psyllium (METAMUCIL/KONSYL) 58.6 % powder  180 g 0 12/2/2024 1/1/2025   [place], [place] - [address] [place] [address] [phone]    Sig: Take 6 g (1 teaspoonful) by mouth daily.    Class: E-Prescribe    Route: Oral    senna-docusate (SENOKOT-S/PERICOLACE) 8.6-50 MG tablet  60 tablet 0 11/20/2024 --   [address]    Sig: Take 2 tablets by mouth 2 times daily.    Class: E-Prescribe    Route: Oral    sulfamethoxazole-trimethoprim (BACTRIM) 400-80 MG tablet  30 tablet 5 11/20/2024 --    [address]    Sig: Take 1 tablet by mouth or Feeding Tube daily.    Class: E-Prescribe    Route: Oral or Feeding Tube    tacrolimus (GENERIC EQUIVALENT) 1 MG capsule  240 capsule 11 12/3/2024 --   [address]    Sig: Take 4 capsules (4 mg) by mouth 2 times daily. Total dose 7 mg BID    Class: E-Prescribe    Notes to Pharmacy: TXP DT 11/13/2024 (Liver) TXP Dischg DT 11/20/2024 DX Liver replaced by transplant Z94.4 TX Center [facility] ([place] [place])    Route: Oral    tacrolimus (GENERIC EQUIVALENT) 5 MG capsule  60 capsule 11 11/22/2024 --   [phone]    Sig: Take 1 capsule (5 mg) by mouth 2 times daily. Total dose 7 mg BID    Class: E-Prescribe    Notes to Pharmacy: Profile Rx: patient will contact pharmacy when needed    Route: Oral    thiamine (B-1) 100 MG tablet  -- -- 12/29/2023 --       Sig: Take 100 mg by mouth daily    Class: Historical    Route: Oral    traMADol (ULTRAM) 50 MG tablet  -- -- 12/5/2024 --       Sig: Take 50 mg by mouth every 6 hours as needed for pain.    Class: Historical    Route: Oral    valGANciclovir (VALCYTE) 450 MG tablet  30 tablet 2 11/21/2024 --   [address]    Sig: Take 1 tablet (450 mg) by mouth daily.    Class: E-Prescribe    Route: Oral          Penicillins, Latex, and Erythromycin   REVIEW OF SYSTEMS (check box if normal)  [x]               GENERAL  [x]                 PULMONARY [x]                GENITOURINARY  [x]                CNS                 [x]                 CARDIAC  [x]                 ENDOCRINE  [x]                EARS,NOSE,THROAT [x]                 GASTROINTESTINAL [x]                 NEUROLOGIC    [x]                MUSCLOSKELTAL  [x]                   HEMATOLOGY      PHYSICAL EXAM (check box if normal)/80   Pulse 74   Resp 16   Wt 70 kg (154 lb 6.4 oz)   SpO2 97%   BMI 26.36 kg/m          [x]            GENERAL:    [x]       EYES:  ICTERIC   []        YES  []                    NO  [x]           EXTREMITIES: Clubbing []       Y     [x]           N    [x]           EARS, NOSE, THROAT: Membranes Moist    YES   [x]                   NO []                  [x]           LUNGS:  CLEAR    YES       [x]                  NO    []                                [x]           SKIN: Jaundice           YES       []                  NO    [x]                   Rash: YES       []                  NO    [x]                                     [x]             HEART: Regular Rate          YES       [x]                  NO    []                   Incision Clean:  YES       [x]                  NO    []                                [x]                    ABDOMEN: Organomegaly YES       []                  NO    [x]                       [x]                    NEUROLOGICAL:  Nonfocal  YES       [x]                  NO    []                       [x]                    Hernia YES       []                  NO    [x]                   PSYCHIATRIC:  Appropriate  YES       [x]                  NO    []                       OTHER:   Incision healing well - staples removed. No erythema or drainage.   -L epigastric area with 2cm rubbery mobile mass. +tenderness. No induration or erythema on skin.                                                                                             PAIN SCALE:: 2       Again, thank you for allowing me to participate in the care of your patient.        Sincerely,        ZOHREH Lomas CNP

## 2024-12-10 NOTE — NURSING NOTE
Chief Complaint   Patient presents with    Follow Up     Liver transplant 11/13/24       /80   Pulse 74   Resp 16   Wt 70 kg (154 lb 6.4 oz)   SpO2 97%   BMI 26.36 kg/m      TARA BERGER RN on 12/10/2024 at 9:41 AM

## 2024-12-11 DIAGNOSIS — Z94.4 LIVER REPLACED BY TRANSPLANT (H): ICD-10-CM

## 2024-12-11 LAB
BACTERIA FLD CULT: NO GROWTH
HBV DNA SERPL QL NAA+PROBE: NORMAL
HCV RNA SERPL QL NAA+PROBE: NORMAL
HIV1+2 RNA SERPL QL NAA+PROBE: NORMAL
TACROLIMUS BLD-MCNC: 8.8 UG/L (ref 5–15)
TME LAST DOSE: NORMAL H
TME LAST DOSE: NORMAL H

## 2024-12-11 RX ORDER — VALGANCICLOVIR 450 MG/1
450 TABLET, FILM COATED ORAL DAILY
Qty: 90 TABLET | Refills: 0 | Status: SHIPPED | OUTPATIENT
Start: 2024-12-11

## 2024-12-11 RX ORDER — PANTOPRAZOLE SODIUM 40 MG/1
40 TABLET, DELAYED RELEASE ORAL
Qty: 60 TABLET | Refills: 2 | Status: SHIPPED | OUTPATIENT
Start: 2024-12-11

## 2024-12-11 RX ORDER — TACROLIMUS 5 MG/1
5 CAPSULE ORAL 2 TIMES DAILY
Qty: 60 CAPSULE | Refills: 11 | Status: SHIPPED | OUTPATIENT
Start: 2024-12-11

## 2024-12-11 RX ORDER — TACROLIMUS 1 MG/1
4 CAPSULE ORAL 2 TIMES DAILY
Qty: 240 CAPSULE | Refills: 11 | Status: SHIPPED | OUTPATIENT
Start: 2024-12-11

## 2024-12-11 RX ORDER — PREDNISONE 5 MG/1
5 TABLET ORAL DAILY
Qty: 30 TABLET | Refills: 2 | Status: SHIPPED | OUTPATIENT
Start: 2024-12-11

## 2024-12-11 RX ORDER — SULFAMETHOXAZOLE AND TRIMETHOPRIM 400; 80 MG/1; MG/1
1 TABLET ORAL DAILY
Qty: 90 TABLET | Refills: 1 | Status: SHIPPED | OUTPATIENT
Start: 2024-12-11

## 2024-12-11 RX ORDER — MYCOPHENOLIC ACID 180 MG/1
540 TABLET, DELAYED RELEASE ORAL 2 TIMES DAILY
Qty: 540 TABLET | Refills: 0 | Status: SHIPPED | OUTPATIENT
Start: 2024-12-11

## 2024-12-11 RX ORDER — ASPIRIN 325 MG
325 TABLET ORAL DAILY
Qty: 30 TABLET | Refills: 4 | Status: SHIPPED | OUTPATIENT
Start: 2024-12-11

## 2024-12-11 NOTE — PROGRESS NOTES
Spoke to Hannah in clinic on 12/10. She is getting low on most of her medications and would like to have them filled at her local [facility]. Prescriptions sent.

## 2024-12-12 PROCEDURE — 80197 ASSAY OF TACROLIMUS: CPT | Performed by: TRANSPLANT SURGERY

## 2024-12-14 ENCOUNTER — LAB (OUTPATIENT)
Dept: LAB | Facility: CLINIC | Age: 54
End: 2024-12-14
Payer: COMMERCIAL

## 2024-12-14 DIAGNOSIS — Z94.4 LIVER REPLACED BY TRANSPLANT (H): ICD-10-CM

## 2024-12-14 LAB
TACROLIMUS BLD-MCNC: 6 UG/L (ref 5–15)
TME LAST DOSE: NORMAL H
TME LAST DOSE: NORMAL H

## 2024-12-16 ENCOUNTER — LAB (OUTPATIENT)
Dept: LAB | Facility: CLINIC | Age: 54
End: 2024-12-16
Attending: TRANSPLANT SURGERY
Payer: COMMERCIAL

## 2024-12-16 ENCOUNTER — OFFICE VISIT (OUTPATIENT)
Dept: TRANSPLANT | Facility: CLINIC | Age: 54
End: 2024-12-16
Attending: TRANSPLANT SURGERY
Payer: COMMERCIAL

## 2024-12-16 ENCOUNTER — TELEPHONE (OUTPATIENT)
Dept: TRANSPLANT | Facility: CLINIC | Age: 54
End: 2024-12-16

## 2024-12-16 VITALS
WEIGHT: 155 LBS | OXYGEN SATURATION: 97 % | TEMPERATURE: 98 F | BODY MASS INDEX: 26.46 KG/M2 | HEART RATE: 69 BPM | DIASTOLIC BLOOD PRESSURE: 77 MMHG | SYSTOLIC BLOOD PRESSURE: 143 MMHG

## 2024-12-16 DIAGNOSIS — N17.9 AKI (ACUTE KIDNEY INJURY) (H): ICD-10-CM

## 2024-12-16 DIAGNOSIS — Z94.4 LIVER REPLACED BY TRANSPLANT (H): Primary | ICD-10-CM

## 2024-12-16 DIAGNOSIS — Z94.4 LIVER REPLACED BY TRANSPLANT (H): ICD-10-CM

## 2024-12-16 LAB
ALBUMIN SERPL BCG-MCNC: 3.6 G/DL (ref 3.5–5.2)
ALP SERPL-CCNC: 98 U/L (ref 40–150)
ALT SERPL W P-5'-P-CCNC: 8 U/L (ref 0–50)
ANION GAP SERPL CALCULATED.3IONS-SCNC: 13 MMOL/L (ref 7–15)
AST SERPL W P-5'-P-CCNC: 12 U/L (ref 0–45)
BILIRUB DIRECT SERPL-MCNC: 0.35 MG/DL (ref 0–0.3)
BILIRUB SERPL-MCNC: 0.6 MG/DL
BUN SERPL-MCNC: 31.9 MG/DL (ref 6–20)
CALCIUM SERPL-MCNC: 9.3 MG/DL (ref 8.8–10.4)
CHLORIDE SERPL-SCNC: 102 MMOL/L (ref 98–107)
CREAT SERPL-MCNC: 2.28 MG/DL (ref 0.51–0.95)
CYSTATIN C (ROCHE): 2.5 MG/L (ref 0.6–1)
EGFRCR SERPLBLD CKD-EPI 2021: 25 ML/MIN/1.73M2
ERYTHROCYTE [DISTWIDTH] IN BLOOD BY AUTOMATED COUNT: 15.6 % (ref 10–15)
GFR/BSA.PRED SERPLBLD CYS-BASED-ARV: 22 ML/MIN/1.73M2
GLUCOSE SERPL-MCNC: 93 MG/DL (ref 70–99)
HCO3 SERPL-SCNC: 21 MMOL/L (ref 22–29)
HCT VFR BLD AUTO: 22.6 % (ref 35–47)
HGB BLD-MCNC: 7.1 G/DL (ref 11.7–15.7)
MAGNESIUM SERPL-MCNC: 1.6 MG/DL (ref 1.7–2.3)
MCH RBC QN AUTO: 26.8 PG (ref 26.5–33)
MCHC RBC AUTO-ENTMCNC: 31.4 G/DL (ref 31.5–36.5)
MCV RBC AUTO: 85 FL (ref 78–100)
PHOSPHATE SERPL-MCNC: 4.2 MG/DL (ref 2.5–4.5)
PLATELET # BLD AUTO: 162 10E3/UL (ref 150–450)
POTASSIUM SERPL-SCNC: 4.9 MMOL/L (ref 3.4–5.3)
PROT SERPL-MCNC: 6.8 G/DL (ref 6.4–8.3)
RBC # BLD AUTO: 2.65 10E6/UL (ref 3.8–5.2)
SODIUM SERPL-SCNC: 136 MMOL/L (ref 135–145)
TACROLIMUS BLD-MCNC: 6.5 UG/L (ref 5–15)
TME LAST DOSE: NORMAL H
TME LAST DOSE: NORMAL H
WBC # BLD AUTO: 3.5 10E3/UL (ref 4–11)

## 2024-12-16 PROCEDURE — 99000 SPECIMEN HANDLING OFFICE-LAB: CPT | Performed by: PATHOLOGY

## 2024-12-16 PROCEDURE — 83735 ASSAY OF MAGNESIUM: CPT | Performed by: PATHOLOGY

## 2024-12-16 PROCEDURE — 80053 COMPREHEN METABOLIC PANEL: CPT | Performed by: PATHOLOGY

## 2024-12-16 PROCEDURE — 85027 COMPLETE CBC AUTOMATED: CPT | Performed by: PATHOLOGY

## 2024-12-16 PROCEDURE — 99213 OFFICE O/P EST LOW 20 MIN: CPT | Mod: 24 | Performed by: TRANSPLANT SURGERY

## 2024-12-16 PROCEDURE — 99213 OFFICE O/P EST LOW 20 MIN: CPT | Performed by: TRANSPLANT SURGERY

## 2024-12-16 PROCEDURE — 82248 BILIRUBIN DIRECT: CPT | Performed by: PATHOLOGY

## 2024-12-16 PROCEDURE — 36415 COLL VENOUS BLD VENIPUNCTURE: CPT | Performed by: PATHOLOGY

## 2024-12-16 PROCEDURE — 84100 ASSAY OF PHOSPHORUS: CPT | Performed by: PATHOLOGY

## 2024-12-16 PROCEDURE — 82610 CYSTATIN C: CPT | Performed by: TRANSPLANT SURGERY

## 2024-12-16 PROCEDURE — 80197 ASSAY OF TACROLIMUS: CPT | Performed by: TRANSPLANT SURGERY

## 2024-12-16 RX ORDER — TACROLIMUS 5 MG/1
5 CAPSULE ORAL 2 TIMES DAILY
Qty: 60 CAPSULE | Refills: 11 | Status: SHIPPED | OUTPATIENT
Start: 2024-12-16

## 2024-12-16 RX ORDER — TRAMADOL HYDROCHLORIDE 50 MG/1
50 TABLET ORAL EVERY 6 HOURS PRN
Qty: 10 TABLET | Refills: 0 | Status: SHIPPED | OUTPATIENT
Start: 2024-12-16 | End: 2024-12-17

## 2024-12-16 RX ORDER — TACROLIMUS 1 MG/1
2 CAPSULE ORAL 2 TIMES DAILY
Qty: 120 CAPSULE | Refills: 11 | Status: SHIPPED | OUTPATIENT
Start: 2024-12-16

## 2024-12-16 RX ORDER — METHOCARBAMOL 500 MG/1
500 TABLET, FILM COATED ORAL 4 TIMES DAILY PRN
Qty: 20 TABLET | Refills: 1 | Status: SHIPPED | OUTPATIENT
Start: 2024-12-16

## 2024-12-16 NOTE — TELEPHONE ENCOUNTER
ISSUE:   Tacrolimus IR level 6 on 12/12, goal 6, dose 9 mg BID.    PLAN:   Call Patient and confirm this was an accurate 12-hour trough.   Verify Tacrolimus IR dose 9 mg BID.   Confirm no new medications or or missed doses.   Confirm no new illness / infection / diarrhea.   If accurate trough and accurate dose, decrease Tacrolimus IR dose to 7 mg BID     Dr. Carney decreased tacrolimus goal to 6 today for renal sparing. Decreasing dose to get closer to level of 6 at 12 hours.     Is this more than a 50% increase or decrease in current IS dose: No    Repeat labs in 3 days.      OUTCOME:   Spoke with Patient, they confirm accurate trough level and current dose 9 mg BID. This was a 14 hour level.  Patient confirmed dose change to 7 mg BID.  Patient agreed to repeat labs in 3 days.   Orders sent to preferred pharmacy for dose change and lab for repeat labs.   Patient voiced understanding of plan.

## 2024-12-16 NOTE — PROGRESS NOTES
Transplant Surgery -OUTPATIENT IMMUNOSUPPRESSION PROGRESS NOTE    Date of Visit: 12/16/2024    Transplants:  11/13/2024 (Liver); Postoperative day:  33  ASSESMENT AND PLAN:  1.Graft Function:Liver allograft: no rejection or technical problems.  2.Immunosuppression Management:keep tacrolimus levels at 6 ng/dL for renal sparing; myfortic 360 tid for gi sideeffects  3.Hypertension:ok  4.Renal Function: elevated creatinine, suspect ATN would recommend seeing a nephrologist  5.Lab frequency: twice weekly  6.Other:]  Severe upper GI discomfort: recommend upper GI endoscopy  Increase protonix to 1 bid    Date: December 16, 2024    Transplant:  [x]                             Liver [x]                              Kidney []                             Pancreas []                              Other:             Chief Complaint:Post-op Visit (Liver txp)    Has upper GI discomfort    History of Present Illness:  Patient Active Problem List   Diagnosis    Abnormal INR    Abnormal Pap smear of cervix    Alcoholic cirrhosis (H)    Backache    Chronic gastritis    Chronic liver failure (H)    Chronic pain of left knee    Depression    Diarrhea    Dysphagia    Elevated bilirubin    Elevated liver function tests    Eosinophilic esophagitis    Alcohol dependence, uncomplicated (H)    GERD (gastroesophageal reflux disease)    Hyperlipidemia    Hypertension    Hypomagnesemia    Arthritis    Inflammatory liver disease    Intermittent asthma    Motion sickness    Perimenopausal    Portal hypertension (H)    Cough    Seasonal allergies    Thrombocytopenia (H)    Vision loss    Vitamin D deficiency    End stage liver disease (H)    Immunosuppressed status (H)    Liver replaced by transplant (H)     SOCIAL /FAMILY HISTORY: [x]                  No recent change    No past medical history on file.  Past Surgical History:   Procedure Laterality Date    BENCH KIDNEY  11/13/2024    Procedure: Bench kidney;  Surgeon: Blas Carney MD;   Location: [facility] OR    IR CVC NON TUNNEL PLACEMENT > 5 YRS  11/15/2024    TRANSPLANT LIVER RECIPIENT  DONOR N/A 2024    Procedure: Transplant liver recipient  donor;  Surgeon: Blas Carney MD;  Location: [facility] OR     Social History     Socioeconomic History    Marital status:      Spouse name: Not on file    Number of children: Not on file    Years of education: Not on file    Highest education level: Not on file   Occupational History    Not on file   Tobacco Use    Smoking status: Never    Smokeless tobacco: Never   Vaping Use    Vaping status: Never Used   Substance and Sexual Activity    Alcohol use: Not Currently     Comment: sober 25    Drug use: Never    Sexual activity: Not on file   Other Topics Concern    Not on file   Social History Narrative    Not on file     Social Drivers of Health     Financial Resource Strain: High Risk (2024)    Received from [facility]    Overall Financial Resource Strain (CARDIA)     Difficulty of Paying Living Expenses: Hard   Food Insecurity: No Food Insecurity (2024)    Received from [facility]    Hunger Vital Sign     Worried About Running Out of Food in the Last Year: Never true     Ran Out of Food in the Last Year: Never true   Recent Concern: Food Insecurity - High Risk (2024)    Food Insecurity     Within the past 12 months, did you worry that your food would run out before you got money to buy more?: Yes     Within the past 12 months, did the food you bought just not last and you didn t have money to get more?: Yes   Transportation Needs: No Transportation Needs (2024)    Received from [facility]    Transportation Needs     In the past 12 months, has lack of transportation kept you from medical appointments, meetings, work, or from getting medicines or things needed for daily living?: No   Physical Activity: Inactive (2024)    Received from  [facility]    Exercise Vital Sign     Days of Exercise per Week: 0 days     Minutes of Exercise per Session: 0 min   Stress: No Stress Concern Present (12/11/2024)    Received from [facility]    [place] [employer] - Occupational Stress Questionnaire     Feeling of Stress : Only a little   Social Connections: Moderately Isolated (12/11/2024)    Received from [facility]    Social Connection and Isolation Panel [NHANES]     Frequency of Communication with Friends and Family: More than three times a week     Frequency of Social Gatherings with Friends and Family: Never     Attends Christianity Services: More than 4 times per year     Active Member of Clubs or Organizations: No     Attends Club or Organization Meetings: Never     Marital Status:    Interpersonal Safety: Not At Risk (12/11/2024)    Received from [facility]    Humiliation, Afraid, Rape, and Kick questionnaire     Fear of Current or Ex-Partner: No     Emotionally Abused: No     Physically Abused: No     Sexually Abused: No   Housing Stability: Low Risk  (12/11/2024)    Received from [facility]    Housing Stability Vital Sign     Unable to Pay for Housing in the Last Year: No     Number of Times Moved in the Last Year: 0     Homeless in the Last Year: No     Prescription Medications as of 12/16/2024         Rx Number Disp Refills Start End Last Dispensed Date Next Fill Date Owning Pharmacy    acetaminophen (TYLENOL) 500 MG tablet  -- --  --       Sig: Take 500 mg by mouth every 6 hours as needed for mild pain.    Class: Historical    Route: Oral    albuterol (VENTOLIN HFA) 108 (90 Base) MCG/ACT inhaler  -- -- 3/5/2024 --       Sig: Inhale 2 puffs into the lungs every 4 hours as needed for shortness of breath    Class: Historical    Route: Inhalation    aspirin (ASA) 325 MG tablet  30 tablet 4 12/11/2024 --   [id number] - [place], [place] - [address] AT [address]    Sig: Take 1 tablet (325 mg) by mouth daily.    Class: E-Prescribe    Route: Oral    calcium-vitamin D-vitamin K (VIACTIV) 500-500-40 MG-UNT-MCG CHEW  -- --  --       Sig: Take 1 tablet by mouth 2 times daily.    Class: Historical    Route: Oral    escitalopram (LEXAPRO) 20 MG tablet  30 tablet 0 11/29/2024 --   [facility] [place], [place] - [address] [place] [address] [phone]    Sig: Take 1 tablet (20 mg) by mouth daily.    Class: E-Prescribe    Notes to Pharmacy: Temporary fill    Route: Oral    folic acid (FOLVITE) 1 MG tablet  -- -- 1/30/2024 --       Sig: Take 1 tablet by mouth every morning    Class: Historical    Route: Oral    magnesium oxide (MAG-OX) 400 MG tablet  -- -- 1/30/2024 --       Sig: Take 400 mg by mouth 2 times daily    Class: Historical    Route: Oral    methocarbamol (ROBAXIN) 750 MG tablet  20 tablet 0 11/29/2024 --   [facility] #[id number] [place] [address] - [address] [place] [address]    Sig: Take 1 tablet (750 mg) by mouth 4 times daily as needed for muscle spasms.    Class: E-Prescribe    Route: Oral    Multiple Vitamin (QUINTABS) TABS  -- -- 2/14/2024 2/8/2025       Sig: Take 1 tablet by mouth every morning    Class: Historical    Route: Oral    mycophenolic acid (GENERIC EQUIVALENT) 180 MG EC tablet  540 tablet 0 12/11/2024 --   [facility] #[id number] - [place], [place] - [address] AT [address] [place]    Sig: Take 3 tablets (540 mg) by mouth 2 times daily.    Class: E-Prescribe    Notes to Pharmacy: TXP DT 11/13/2024 (Liver) TXP Dischg DT 11/20/2024 DX Liver replaced by transplant Z94.4 [facility] ([place], [place])    Route: Oral    oxyCODONE (ROXICODONE) 5 MG tablet  20 tablet 0 11/20/2024 --       Sig: Take 1 tablet (5 mg) by mouth every 4 hours as needed for moderate pain.    Class: Local Print    Earliest Fill Date: 11/20/2024    Route:  Oral    pantoprazole (PROTONIX) 40 MG EC tablet  60 tablet 2 12/11/2024 --   [id number] [email or web address] - [address] AT [address]    Sig: Take 1 tablet (40 mg) by mouth 2 times daily (before meals).    Class: E-Prescribe    Route: Oral    predniSONE (DELTASONE) 5 MG tablet  30 tablet 2 12/11/2024 --   [id number] [place] - [address] AT [address]    Sig: Take 1 tablet (5 mg) by mouth daily.    Class: E-Prescribe    Notes to Pharmacy: TXP DT 11/13/2024 (Liver) TXP Dischg DT 11/20/2024 DX Liver replaced by transplant Z94.4 TX Center [facility] ([place], [place])    Route: Oral    psyllium (METAMUCIL/KONSYL) 58.6 % powder  180 g 0 12/2/2024 1/1/2025   [place], [place] - [address] [facility] [phone]    Sig: Take 6 g (1 teaspoonful) by mouth daily.    Class: E-Prescribe    Route: Oral    senna-docusate (SENOKOT-S/PERICOLACE) 8.6-50 MG tablet  60 tablet 0 11/20/2024 --   [place], [place] - [address] [facility]    Sig: Take 2 tablets by mouth 2 times daily.    Class: E-Prescribe    Route: Oral    sulfamethoxazole-trimethoprim (BACTRIM) 400-80 MG tablet  90 tablet 1 12/11/2024 --   [id number] [email or web address], [place] - [address] AT [address]    Sig: Take 1 tablet by mouth or Feeding Tube daily.    Class: E-Prescribe    Route: Oral or Feeding Tube    tacrolimus (GENERIC EQUIVALENT) 1 MG capsule  240 capsule 11 12/11/2024 --   [facility][id number] - [place], [place] - [address] AT [address]    Sig: Take 4 capsules (4 mg) by mouth 2 times daily. Total dose 9 mg BID    Class: E-Prescribe    Notes to Pharmacy: TXP DT 11/13/2024 (Liver) TXP Dischg DT 11/20/2024 DX Liver replaced by transplant Z94.4 TX Center [facility] [place] ([place], [place])    Route: Oral    tacrolimus  (GENERIC EQUIVALENT) 5 MG capsule  60 capsule 11 12/11/2024 --   [facility] #[id number] - [place], [place] - [address] AT [address]    Sig: Take 1 capsule (5 mg) by mouth 2 times daily. Total dose 9 mg BID    Class: E-Prescribe    Notes to Pharmacy: TXP DT 11/13/2024 (Liver) TXP Dischg DT 11/20/2024 DX Liver replaced by transplant Z94.4 TX Center [facility] ([place], [place])    Route: Oral    thiamine (B-1) 100 MG tablet  -- -- 12/29/2023 --       Sig: Take 100 mg by mouth daily    Class: Historical    Route: Oral    traMADol (ULTRAM) 50 MG tablet  -- -- 12/5/2024 --       Sig: Take 50 mg by mouth every 6 hours as needed for pain.    Class: Historical    Route: Oral    valGANciclovir (VALCYTE) 450 MG tablet  90 tablet 0 12/11/2024 --   [facility] #[id number] - [place], [place] - [address] AT [address]    Sig: Take 1 tablet (450 mg) by mouth daily.    Class: E-Prescribe    Route: Oral          Penicillins, Latex, and Erythromycin   REVIEW OF SYSTEMS (check box if normal)  [x]               GENERAL  [x]                 PULMONARY [x]                GENITOURINARY  [x]                CNS                 [x]                 CARDIAC  [x]                 ENDOCRINE  [x]                EARS,NOSE,THROAT [x]                 GASTROINTESTINAL [x]                 NEUROLOGIC    [x]                MUSCLOSKELTAL  [x]                  HEMATOLOGY      PHYSICAL EXAM (check box if normal)BP (!) 143/77   Pulse 69   Temp 98  F (36.7  C) (Oral)   Wt 70.3 kg (155 lb)   SpO2 97%   BMI 26.46 kg/m          [x]            GENERAL:    [x]       EYES:  ICTERIC   []        YES  []                    NO  [x]           EXTREMITIES: Clubbing []       Y     [x]           N    [x]           EARS, NOSE, THROAT: Membranes Moist    YES   [x]                   NO []                  [x]           LUNGS:  CLEAR    YES       [x]                  NO    []                                 [x]           SKIN: Jaundice           YES       []                  NO    [x]                   Rash: YES       []                  NO    [x]                                     [x]             HEART: Regular Rate          YES       [x]                  NO    []                   Incision Clean:  YES       [x]                  NO    []                                [x]                    ABDOMEN: Wound healthy Organomegaly YES       []                  NO    [x]                       [x]                    NEUROLOGICAL:  Nonfocal  YES       [x]                  NO    []                       [x]                    Hernia YES       []                  NO    [x]                   PSYCHIATRIC:  Appropriate  YES       [x]                  NO    []                       OTHER:                                                                                                   PAIN SCALE:: 3

## 2024-12-16 NOTE — LETTER
12/16/2024      Hannah Sutherland  [address]  [facility] [address]      Dear Colleague,    Thank you for referring your patient, Hannah Sutherland, to the [facility]. Please see a copy of my visit note below.    Transplant Surgery -OUTPATIENT IMMUNOSUPPRESSION PROGRESS NOTE    Date of Visit: 12/16/2024    Transplants:  11/13/2024 (Liver); Postoperative day:  33  ASSESMENT AND PLAN:  1.Graft Function:Liver allograft: no rejection or technical problems.  2.Immunosuppression Management:keep tacrolimus levels at 6 ng/dL for renal sparing; myfortic 360 tid for gi sideeffects  3.Hypertension:ok  4.Renal Function: elevated creatinine, suspect ATN would recommend seeing a nephrologist  5.Lab frequency: twice weekly  6.Other:]  Severe upper GI discomfort: recommend upper GI endoscopy  Increase protonix to 1 bid    Date: December 16, 2024    Transplant:  [x]                             Liver [x]                              Kidney []                             Pancreas []                              Other:             Chief Complaint:Post-op Visit (Liver txp)    Has upper GI discomfort    History of Present Illness:  Patient Active Problem List   Diagnosis     Abnormal INR     Abnormal Pap smear of cervix     Alcoholic cirrhosis (H)     Backache     Chronic gastritis     Chronic liver failure (H)     Chronic pain of left knee     Depression     Diarrhea     Dysphagia     Elevated bilirubin     Elevated liver function tests     Eosinophilic esophagitis     Alcohol dependence, uncomplicated (H)     GERD (gastroesophageal reflux disease)     Hyperlipidemia     Hypertension     Hypomagnesemia     Arthritis     Inflammatory liver disease     Intermittent asthma     Motion sickness     Perimenopausal     Portal hypertension (H)     Cough     Seasonal allergies     Thrombocytopenia (H)     Vision loss     Vitamin D deficiency     End stage liver disease (H)     Immunosuppressed status  (H)     Liver replaced by transplant (H)     SOCIAL /FAMILY HISTORY: [x]                  No recent change    No past medical history on file.  Past Surgical History:   Procedure Laterality Date     BENCH KIDNEY  2024    Procedure: Bench kidney;  Surgeon: Blas Carney MD;  Location: [facility] OR     IR CVC NON TUNNEL PLACEMENT > 5 YRS  11/15/2024     TRANSPLANT LIVER RECIPIENT  DONOR N/A 2024    Procedure: Transplant liver recipient  donor;  Surgeon: Blas Carney MD;  Location: [facility] OR     Social History     Socioeconomic History     Marital status:      Spouse name: Not on file     Number of children: Not on file     Years of education: Not on file     Highest education level: Not on file   Occupational History     Not on file   Tobacco Use     Smoking status: Never     Smokeless tobacco: Never   Vaping Use     Vaping status: Never Used   Substance and Sexual Activity     Alcohol use: Not Currently     Comment: sober 23     Drug use: Never     Sexual activity: Not on file   Other Topics Concern     Not on file   Social History Narrative     Not on file     Social Drivers of Health     Financial Resource Strain: High Risk (2024)    [facility]    Overall Financial Resource Strain (CARDIA)      Difficulty of Paying Living Expenses: Hard   Food Insecurity: No Food Insecurity (2024)    [facility]    Hunger Vital Sign      Worried About Running Out of Food in the Last Year: Never true      Ran Out of Food in the Last Year: Never true   Recent Concern: Food Insecurity - High Risk (2024)    Food Insecurity      Within the past 12 months, did you worry that your food would run out before you got money to buy more?: Yes      Within the past 12 months, did the food you bought just not last and you didn t have money to get more?: Yes   Transportation Needs: No Transportation Needs  (12/11/2024)    Received from [employer]    Transportation Needs      In the past 12 months, has lack of transportation kept you from medical appointments, meetings, work, or from getting medicines or things needed for daily living?: No   Physical Activity: Inactive (12/11/2024)    [facility]    Exercise Vital Sign      Days of Exercise per Week: 0 days      Minutes of Exercise per Session: 0 min   Stress: No Stress Concern Present (12/11/2024)    [facility]    [employer] [place] [employer] - Occupational Stress Questionnaire      Feeling of Stress : Only a little   Social Connections: Moderately Isolated (12/11/2024)    [facility]    Social Connection and Isolation Panel [NHANES]      Frequency of Communication with Friends and Family: More than three times a week      Frequency of Social Gatherings with Friends and Family: Never      Attends Buddhist Services: More than 4 times per year      Active Member of Clubs or Organizations: No      Attends Club or Organization Meetings: Never      Marital Status:    Interpersonal Safety: Not At Risk (12/11/2024)    Received from [employer]    Humiliation, Afraid, Rape, and Kick questionnaire      Fear of Current or Ex-Partner: No      Emotionally Abused: No      Physically Abused: No      Sexually Abused: No   Housing Stability: Low Risk  (12/11/2024)    [facility]    Housing Stability Vital Sign      Unable to Pay for Housing in the Last Year: No      Number of Times Moved in the Last Year: 0      Homeless in the Last Year: No     Prescription Medications as of 12/16/2024         Rx Number Disp Refills Start End Last Dispensed Date Next Fill Date Owning Pharmacy    acetaminophen (TYLENOL) 500 MG tablet  -- --  --       Sig: Take 500 mg by mouth every 6 hours as needed  for mild pain.    Class: Historical    Route: Oral    albuterol (VENTOLIN HFA) 108 (90 Base) MCG/ACT inhaler  -- -- 3/5/2024 --       Sig: Inhale 2 puffs into the lungs every 4 hours as needed for shortness of breath    Class: Historical    Route: Inhalation    aspirin (ASA) 325 MG tablet  30 tablet 4 12/11/2024 --   [id number] - [place], [place] - [address] AT [address]    Sig: Take 1 tablet (325 mg) by mouth daily.    Class: E-Prescribe    Route: Oral    calcium-vitamin D-vitamin K (VIACTIV) 500-500-40 MG-UNT-MCG CHEW  -- --  --       Sig: Take 1 tablet by mouth 2 times daily.    Class: Historical    Route: Oral    escitalopram (LEXAPRO) 20 MG tablet  30 tablet 0 11/29/2024 --   [place], [place] - [address] [place] [address] [phone]    Sig: Take 1 tablet (20 mg) by mouth daily.    Class: E-Prescribe    Notes to Pharmacy: Temporary fill    Route: Oral    folic acid (FOLVITE) 1 MG tablet  -- -- 1/30/2024 --       Sig: Take 1 tablet by mouth every morning    Class: Historical    Route: Oral    magnesium oxide (MAG-OX) 400 MG tablet  -- -- 1/30/2024 --       Sig: Take 400 mg by mouth 2 times daily    Class: Historical    Route: Oral    methocarbamol (ROBAXIN) 750 MG tablet  20 tablet 0 11/29/2024 --   [facility] #[id number] [place] [address] [address]    Sig: Take 1 tablet (750 mg) by mouth 4 times daily as needed for muscle spasms.    Class: E-Prescribe    Route: Oral    Multiple Vitamin (QUINTABS) TABS  -- -- 2/14/2024 2/8/2025       Sig: Take 1 tablet by mouth every morning    Class: Historical    Route: Oral    mycophenolic acid (GENERIC EQUIVALENT) 180 MG EC tablet  540 tablet 0 12/11/2024 --   [facility][id number] - [place], [place] - [address] AT [address]    Sig: Take 3 tablets (540 mg) by mouth 2 times daily.    Class: E-Prescribe    Notes to Pharmacy: MUSA LLOYD 11/13/2024 (Liver) TXP Dischg DT 11/20/2024 DX Liver  replaced by transplant Z94.4 TX [facility] ([place], [place])    Route: Oral    oxyCODONE (ROXICODONE) 5 MG tablet  20 tablet 0 11/20/2024 --       Sig: Take 1 tablet (5 mg) by mouth every 4 hours as needed for moderate pain.    Class: Local Print    Earliest Fill Date: 11/20/2024    Route: Oral    pantoprazole (PROTONIX) 40 MG EC tablet  60 tablet 2 12/11/2024 --   [facility][id number] [place], [place] - [address] AT [address]    Sig: Take 1 tablet (40 mg) by mouth 2 times daily (before meals).    Class: E-Prescribe    Route: Oral    predniSONE (DELTASONE) 5 MG tablet  30 tablet 2 12/11/2024 --   [facility][id number] [place], [place] - [address] AT [address]    Sig: Take 1 tablet (5 mg) by mouth daily.    Class: E-Prescribe    Notes to Pharmacy: TXP DT 11/13/2024 (Liver) TXP Dischg DT 11/20/2024 DX Liver replaced by transplant Z94.4 TX [facility] ([place], [place])    Route: Oral    psyllium (METAMUCIL/KONSYL) 58.6 % powder  180 g 0 12/2/2024 1/1/2025   [facility] [place], [place] - [phone]    Sig: Take 6 g (1 teaspoonful) by mouth daily.    Class: E-Prescribe    Route: Oral    senna-docusate (SENOKOT-S/PERICOLACE) 8.6-50 MG tablet  60 tablet 0 11/20/2024 --   [facility] - [place], [place] - [address]    Sig: Take 2 tablets by mouth 2 times daily.    Class: E-Prescribe    Route: Oral    sulfamethoxazole-trimethoprim (BACTRIM) 400-80 MG tablet  90 tablet 1 12/11/2024 --   [facility][id number] [place], [place] - [address] AT [address]    Sig: Take 1 tablet by mouth or Feeding Tube daily.    Class: E-Prescribe    Route: Oral or Feeding Tube    tacrolimus (GENERIC EQUIVALENT) 1 MG capsule  240 capsule 11 12/11/2024 --   [facility] #[id number] - [place] - [address] AT  [address]    Sig: Take 4 capsules (4 mg) by mouth 2 times daily. Total dose 9 mg BID    Class: E-Prescribe    Notes to Pharmacy: TXP DT 11/13/2024 (Liver) TXP Dischg DT 11/20/2024 DX Liver replaced by transplant Z94.4 TX [place], [place])    Route: Oral    tacrolimus (GENERIC EQUIVALENT) 5 MG capsule  60 capsule 11 12/11/2024 --   [facility] #[id number] - [place], [place] - [address] AT [address]    Sig: Take 1 capsule (5 mg) by mouth 2 times daily. Total dose 9 mg BID    Class: E-Prescribe    Notes to Pharmacy: TXP DT 11/13/2024 (Liver) TXP Dischg DT 11/20/2024 DX Liver replaced by transplant Z94.4 TX [facility] ([place], [place])    Route: Oral    thiamine (B-1) 100 MG tablet  -- -- 12/29/2023 --       Sig: Take 100 mg by mouth daily    Class: Historical    Route: Oral    traMADol (ULTRAM) 50 MG tablet  -- -- 12/5/2024 --       Sig: Take 50 mg by mouth every 6 hours as needed for pain.    Class: Historical    Route: Oral    valGANciclovir (VALCYTE) 450 MG tablet  90 tablet 0 12/11/2024 --   [facility] #[id number] - [place], [place] - [address] AT [address]    Sig: Take 1 tablet (450 mg) by mouth daily.    Class: E-Prescribe    Route: Oral          Penicillins, Latex, and Erythromycin   REVIEW OF SYSTEMS (check box if normal)  [x]               GENERAL  [x]                 PULMONARY [x]                GENITOURINARY  [x]                CNS                 [x]                 CARDIAC  [x]                 ENDOCRINE  [x]                EARS,NOSE,THROAT [x]                 GASTROINTESTINAL [x]                 NEUROLOGIC    [x]                MUSCLOSKELTAL  [x]                  HEMATOLOGY      PHYSICAL EXAM (check box if normal)BP (!) 143/77   Pulse 69   Temp 98  F (36.7  C) (Oral)   Wt 70.3 kg (155 lb)   SpO2 97%   BMI 26.46 kg/m          [x]             GENERAL:    [x]       EYES:  ICTERIC   []        YES  []                    NO  [x]           EXTREMITIES: Clubbing []       Y     [x]           N    [x]           EARS, NOSE, THROAT: Membranes Moist    YES   [x]                   NO []                  [x]           LUNGS:  CLEAR    YES       [x]                  NO    []                                [x]           SKIN: Jaundice           YES       []                  NO    [x]                   Rash: YES       []                  NO    [x]                                     [x]             HEART: Regular Rate          YES       [x]                  NO    []                   Incision Clean:  YES       [x]                  NO    []                                [x]                    ABDOMEN: Wound healthy Organomegaly YES       []                  NO    [x]                       [x]                    NEUROLOGICAL:  Nonfocal  YES       [x]                  NO    []                       [x]                    Hernia YES       []                  NO    [x]                   PSYCHIATRIC:  Appropriate  YES       [x]                  NO    []                       OTHER:                                                                                                   PAIN SCALE:: 3       Again, thank you for allowing me to participate in the care of your patient.        Sincerely,        Blas Carney MD

## 2024-12-16 NOTE — TELEPHONE ENCOUNTER
Message received from Dr. Carney requesting Hannah see nephrology and get an upper GI endoscopy. Orders entered. Cystatin C added to today's labs.

## 2024-12-17 ENCOUNTER — TELEPHONE (OUTPATIENT)
Dept: TRANSPLANT | Facility: CLINIC | Age: 54
End: 2024-12-17
Payer: COMMERCIAL

## 2024-12-17 DIAGNOSIS — Z94.4 LIVER REPLACED BY TRANSPLANT (H): Primary | ICD-10-CM

## 2024-12-17 DIAGNOSIS — Z94.4 LIVER REPLACED BY TRANSPLANT (H): ICD-10-CM

## 2024-12-17 RX ORDER — ASCORBIC ACID 100 MG
1 TABLET,CHEWABLE ORAL DAILY
Qty: 30 TABLET | Refills: 11 | Status: SHIPPED | OUTPATIENT
Start: 2024-12-17

## 2024-12-17 RX ORDER — TRAMADOL HYDROCHLORIDE 50 MG/1
50 TABLET ORAL EVERY 6 HOURS PRN
COMMUNITY
Start: 2024-12-17

## 2024-12-17 NOTE — LETTER
OUTPATIENT LABORATORY TEST ORDER  [facility]  [phone]      Patient Name: Hannah Sutherland     YOB: 1970       [facility] MR# [if applicable]: [id number]   Date & Time: December 18, 2024    Expiration Date: 1 year after date issued         Diagnosis:      Liver Transplant (ICD-10 Z94.4)   Aftercare following organ transplant (ICD-10 Z48.288)   Long term use of medications (ICD-10 Z79.899)   Contact with and (suspected) exposure to other viral communicable                       diseases (Z20.828)      We ask your assistance in obtaining the following laboratory tests, which are part of our routine surveillance program for Solid Organ Transplant patients.      Please fax each result to [phone], same day as resulted/available    Critical lab results page [phone]     Monthly beginning 12/24:                 Phosphatidylethanol (PEth)     First 8 weeks post-transplant (12/9/24-1/10/25)  Labs 2x/week (Monday/Thursday)  CBC with Platelets   Basic Metabolic Panel   Phosphorous  Magnesium  Hepatic panel   Tacrolimus drug level - 12-hour trough, please document time of last dose      Month 3-4 post-transplant (1/13-2/28/25)  Labs weekly (Monday or Tuesday)  CBC with Platelets   Basic Metabolic Panel   Phosphorous  Magnesium  Hepatic panel   Tacrolimus drug level - 12-hour trough, please document time of last dose      Months 4-5 post-transplant (3/3-3/28/25)  Labs every other week  CBC with Platelets   Basic Metabolic Panel   Phosphorous  Magnesium  Hepatic panel   Tacrolimus drug level - 12-hour trough, please document time of last dose      Months 5-12 post-transplant (3/31-11/13/25)  Labs monthly  CBC with Platelets   Basic Metabolic Panel   Phosphorous  Magnesium  Hepatic panel   Tacrolimus drug level - 12-hour trough, please document time of last dose     12-months post-transplant due 11/2025  Fasting Lipid Panel  Urine protein/creatinine ratio  UA with  reflex to micro  Hepatitis B DNA PCR on all patients     If you have any questions, please call The [facility]- [phone] or [phone], Fax- [phone].    If you have any questions, please call [facility]- [phone] or [phone], Fax- [phone].      Blas Carney MD, WYATT  Professor of Surgery  [employer]  Surgical Director of Liver Transplant Program  Executive Medical Director of Pediatric Transplantation

## 2024-12-17 NOTE — TELEPHONE ENCOUNTER
Patient Call: General  Route to LPN    Reason for call:  patient needing post transplant orders sent to [facility] in [place]. Phone number to reach clinic is [phone]. Patient mentioned that their appointment at the [place] clinic is this Thursday morning at 9:45am. More details with call back.       Call back needed? Yes    Return Call Needed  Same as documented in contacts section  When to return call?: Same day: Route High Priority

## 2024-12-17 NOTE — TELEPHONE ENCOUNTER
Hannah asked about tramadol, Dr. Carney ordered it in clinic yesterday but didn't give her the paper prescription due to printer issues.

## 2024-12-18 NOTE — TELEPHONE ENCOUNTER
Post Discharge Liver Transplant Phone Call (within 1-3 business days post discharge)      Reviewed with the patient the Transplant Center contact information:  Best phone contact is [phone] Monday-Friday from 8am-5pm.   *You may have to leave a message and get a call back.    Good alternative communication method is via [employer] message.    Coordinators are available 8am-5pm M-F, otherwise there will be an on-call RN available 24/7  *If calling after hours, please call the same number, [phone] and ask the  to page the on-call Transplant Coordinator    When to contact the Transplant Center:  Fever > 100.5F  Dizziness, lightheadedness  Severe pain  If you are unable to take your immunosuppression medications.  Unable to obtain refill on immunosuppressive medications    Medications:  Reviewed medications with patient.   - confirmed pt has supply of meds  - MAR is up to date   - Verify preferred pharmacy in [employer]  - Reminded patient to always contact the pharmacy first for refills    Confirmed the patient has an up to date medication card at home and is knowledgeable in updating their med card (or has someone to assist in this).   - Reinforced patient always bring med card to appointment      Labs:  Labs are expected to be drawn on Monday and Thursday until you are told differently by your transplant team.   - confirmed patient's preferred lab and updated EPIC   - Take a copy of your lab letter with to each lab draw   - Lab order sent directly to patient via [employer] or mail.    - Confirmed patient has a copy of lab letter  - Review how to review lab results on [employer] or obtaining and recording lab values in handbook    Vitals:  Encouraged the patient to record the following:  BP - daily unless light headed  Temperature - daily  Weight - daily    Follow Up:  Role of the Transplant Coordinator vs LPN was reviewed. Contact information provided for both.   Reviewed current scheduled follow up appointments  with surgeon.    Reminded the patient to follow up with PCP within 1 -2 weeks for general follow-up and management of diabetes, pain, and blood pressure  STENT REMOVAL - reminded patient that if stent was placed at time of transplant (this is indicated on the check list) this will need to come out 2-3 mos post transplant.  Asked patient to notify transplant coordinator if sees stent in stool.  Has the patient been contacted with initial visit from HOME CARE? No  If not, Transplant Coordinator to be notified to follow up with Home Care  Asked pt to schedule an appt with PCP.     Georgina Holloway LPN

## 2024-12-19 ENCOUNTER — TELEPHONE (OUTPATIENT)
Dept: TRANSPLANT | Facility: CLINIC | Age: 54
End: 2024-12-19
Payer: COMMERCIAL

## 2024-12-19 ENCOUNTER — APPOINTMENT (OUTPATIENT)
Dept: LAB | Facility: CLINIC | Age: 54
End: 2024-12-19
Payer: COMMERCIAL

## 2024-12-19 DIAGNOSIS — Z94.4 LIVER REPLACED BY TRANSPLANT (H): Primary | ICD-10-CM

## 2024-12-19 DIAGNOSIS — N17.9 AKI (ACUTE KIDNEY INJURY) (H): ICD-10-CM

## 2024-12-19 PROCEDURE — 80197 ASSAY OF TACROLIMUS: CPT | Performed by: TRANSPLANT SURGERY

## 2024-12-19 NOTE — TELEPHONE ENCOUNTER
Post Liver Transplant Team Conference  Date: 12/19/2024  Transplant Coordinator: Barb Lazaro  Transplant Surgeon: Blas Carney      Attendees:  [] Dr. Alarcon [x] To Liu, RN []       [x] Dr. Carney [x] Georgina Holloway LPN []    [] Dr. Rodriguez [x] Barb Lazaro RN []    [] Dr. Guerrero [] Mary Rowell, KADY []    [] DR. Herrera [] Mary Victoria, NIRALI []       [] Dr. Norton [x] Dodie Blanco RN []       [] Dr. Jovany Juárez [x] Radha Drummond, NIRALI []       [] Dr. SHERYL Golden [] Laya Tejada RN []       [x] Ray Keen, pharm [] Bruna Rm RN []       [] Katarzyna Portillo NP [] Franck Powell RN []         Verbal Plan Read Back:   Try to move up nephrology appt from 1/28 to earlier.     Routed to RN Coordinator   Barb Lazaro RN    Message to PCS to see if she can get Hannah's neph appt moved up. PCS asked if Hannah could get here at 10 am tomorrow morning.    Call to Hannah. She will make 10 am tomorrow work for neph appt.

## 2024-12-20 ENCOUNTER — HOSPITAL ENCOUNTER (OUTPATIENT)
Dept: ULTRASOUND IMAGING | Facility: CLINIC | Age: 54
Discharge: HOME | End: 2024-12-20
Attending: STUDENT IN AN ORGANIZED HEALTH CARE EDUCATION/TRAINING PROGRAM | Admitting: STUDENT IN AN ORGANIZED HEALTH CARE EDUCATION/TRAINING PROGRAM
Payer: COMMERCIAL

## 2024-12-20 ENCOUNTER — LAB (OUTPATIENT)
Dept: LAB | Facility: CLINIC | Age: 54
End: 2024-12-20
Payer: COMMERCIAL

## 2024-12-20 ENCOUNTER — LAB (OUTPATIENT)
Dept: LAB | Facility: CLINIC | Age: 54
End: 2024-12-20
Attending: TRANSPLANT SURGERY
Payer: COMMERCIAL

## 2024-12-20 DIAGNOSIS — Z94.4 LIVER REPLACED BY TRANSPLANT (H): ICD-10-CM

## 2024-12-20 DIAGNOSIS — N17.9 AKI (ACUTE KIDNEY INJURY) (H): ICD-10-CM

## 2024-12-20 DIAGNOSIS — E87.1 HYPONATREMIA: ICD-10-CM

## 2024-12-20 PROBLEM — I10 HYPERTENSION: Status: RESOLVED | Noted: 2018-10-30 | Resolved: 2024-12-20

## 2024-12-20 LAB
ALBUMIN MFR UR ELPH: 9.9 MG/DL
ALBUMIN SERPL BCG-MCNC: 3.9 G/DL (ref 3.5–5.2)
ALBUMIN UR-MCNC: NEGATIVE MG/DL
ALP SERPL-CCNC: 102 U/L (ref 40–150)
ALT SERPL W P-5'-P-CCNC: 8 U/L (ref 0–50)
ANION GAP SERPL CALCULATED.3IONS-SCNC: 12 MMOL/L (ref 7–15)
APPEARANCE UR: ABNORMAL
AST SERPL W P-5'-P-CCNC: 13 U/L (ref 0–45)
BACTERIA #/AREA URNS HPF: ABNORMAL /HPF
BILIRUB DIRECT SERPL-MCNC: 0.36 MG/DL (ref 0–0.3)
BILIRUB SERPL-MCNC: 0.6 MG/DL
BILIRUB UR QL STRIP: NEGATIVE
BUN SERPL-MCNC: 26.7 MG/DL (ref 6–20)
CALCIUM SERPL-MCNC: 9.3 MG/DL (ref 8.8–10.4)
CHLORIDE SERPL-SCNC: 101 MMOL/L (ref 98–107)
COLOR UR AUTO: ABNORMAL
CREAT SERPL-MCNC: 1.91 MG/DL (ref 0.51–0.95)
CREAT UR-MCNC: 85 MG/DL
EGFRCR SERPLBLD CKD-EPI 2021: 31 ML/MIN/1.73M2
ERYTHROCYTE [DISTWIDTH] IN BLOOD BY AUTOMATED COUNT: 15.5 % (ref 10–15)
FERRITIN SERPL-MCNC: 735 NG/ML (ref 11–328)
GLUCOSE SERPL-MCNC: 110 MG/DL (ref 70–99)
GLUCOSE UR STRIP-MCNC: NEGATIVE MG/DL
HAPTOGLOB SERPL-MCNC: 110 MG/DL (ref 30–200)
HCO3 SERPL-SCNC: 21 MMOL/L (ref 22–29)
HCT VFR BLD AUTO: 24.4 % (ref 35–47)
HGB BLD-MCNC: 7.8 G/DL (ref 11.7–15.7)
HGB UR QL STRIP: NEGATIVE
HYALINE CASTS: 7 /LPF
IRON BINDING CAPACITY (ROCHE): 189 UG/DL (ref 240–430)
IRON SATN MFR SERPL: 16 % (ref 15–46)
IRON SERPL-MCNC: 31 UG/DL (ref 37–145)
KETONES UR STRIP-MCNC: NEGATIVE MG/DL
LDH SERPL L TO P-CCNC: 163 U/L (ref 0–250)
LEUKOCYTE ESTERASE UR QL STRIP: NEGATIVE
MAGNESIUM SERPL-MCNC: 1.5 MG/DL (ref 1.7–2.3)
MCH RBC QN AUTO: 26.8 PG (ref 26.5–33)
MCHC RBC AUTO-ENTMCNC: 32 G/DL (ref 31.5–36.5)
MCV RBC AUTO: 84 FL (ref 78–100)
NITRATE UR QL: NEGATIVE
OSMOLALITY UR: 287 MMOL/KG (ref 100–1200)
PH UR STRIP: 5 [PH] (ref 5–7)
PHOSPHATE SERPL-MCNC: 3.9 MG/DL (ref 2.5–4.5)
PLATELET # BLD AUTO: 183 10E3/UL (ref 150–450)
POTASSIUM SERPL-SCNC: 4.4 MMOL/L (ref 3.4–5.3)
PROT SERPL-MCNC: 7.2 G/DL (ref 6.4–8.3)
PROT/CREAT 24H UR: 0.12 MG/MG CR (ref 0–0.2)
RBC # BLD AUTO: 2.91 10E6/UL (ref 3.8–5.2)
RBC URINE: 1 /HPF
RETICS # AUTO: 0.05 10E6/UL (ref 0.03–0.1)
RETICS/RBC NFR AUTO: 1.6 % (ref 0.5–2)
SODIUM SERPL-SCNC: 134 MMOL/L (ref 135–145)
SODIUM UR-SCNC: 43 MMOL/L
SP GR UR STRIP: 1.01 (ref 1–1.03)
SQUAMOUS EPITHELIAL: 12 /HPF
TACROLIMUS BLD-MCNC: 15.4 UG/L (ref 5–15)
TACROLIMUS BLD-MCNC: 6 UG/L (ref 5–15)
TME LAST DOSE: ABNORMAL H
TME LAST DOSE: ABNORMAL H
TME LAST DOSE: NORMAL H
TME LAST DOSE: NORMAL H
UROBILINOGEN UR STRIP-MCNC: NORMAL MG/DL
WBC # BLD AUTO: 3.6 10E3/UL (ref 4–11)
WBC URINE: 2 /HPF

## 2024-12-20 PROCEDURE — 83550 IRON BINDING TEST: CPT | Performed by: PATHOLOGY

## 2024-12-20 PROCEDURE — 84300 ASSAY OF URINE SODIUM: CPT | Performed by: STUDENT IN AN ORGANIZED HEALTH CARE EDUCATION/TRAINING PROGRAM

## 2024-12-20 PROCEDURE — 83735 ASSAY OF MAGNESIUM: CPT | Performed by: PATHOLOGY

## 2024-12-20 PROCEDURE — 83540 ASSAY OF IRON: CPT | Performed by: PATHOLOGY

## 2024-12-20 PROCEDURE — 86160 COMPLEMENT ANTIGEN: CPT | Performed by: STUDENT IN AN ORGANIZED HEALTH CARE EDUCATION/TRAINING PROGRAM

## 2024-12-20 PROCEDURE — 83010 ASSAY OF HAPTOGLOBIN QUANT: CPT | Performed by: STUDENT IN AN ORGANIZED HEALTH CARE EDUCATION/TRAINING PROGRAM

## 2024-12-20 PROCEDURE — 83615 LACTATE (LD) (LDH) ENZYME: CPT | Performed by: PATHOLOGY

## 2024-12-20 PROCEDURE — 85045 AUTOMATED RETICULOCYTE COUNT: CPT | Performed by: PATHOLOGY

## 2024-12-20 PROCEDURE — 81001 URINALYSIS AUTO W/SCOPE: CPT | Performed by: PATHOLOGY

## 2024-12-20 PROCEDURE — 36415 COLL VENOUS BLD VENIPUNCTURE: CPT | Performed by: PATHOLOGY

## 2024-12-20 PROCEDURE — 80053 COMPREHEN METABOLIC PANEL: CPT | Performed by: PATHOLOGY

## 2024-12-20 PROCEDURE — 84100 ASSAY OF PHOSPHORUS: CPT | Performed by: PATHOLOGY

## 2024-12-20 PROCEDURE — 83935 ASSAY OF URINE OSMOLALITY: CPT | Performed by: STUDENT IN AN ORGANIZED HEALTH CARE EDUCATION/TRAINING PROGRAM

## 2024-12-20 PROCEDURE — 82248 BILIRUBIN DIRECT: CPT | Performed by: PATHOLOGY

## 2024-12-20 PROCEDURE — 82728 ASSAY OF FERRITIN: CPT | Performed by: PATHOLOGY

## 2024-12-20 PROCEDURE — 80197 ASSAY OF TACROLIMUS: CPT | Performed by: TRANSPLANT SURGERY

## 2024-12-20 PROCEDURE — 84156 ASSAY OF PROTEIN URINE: CPT | Performed by: PATHOLOGY

## 2024-12-20 PROCEDURE — 76770 US EXAM ABDO BACK WALL COMP: CPT

## 2024-12-20 PROCEDURE — 99000 SPECIMEN HANDLING OFFICE-LAB: CPT | Performed by: PATHOLOGY

## 2024-12-20 PROCEDURE — 85027 COMPLETE CBC AUTOMATED: CPT | Performed by: PATHOLOGY

## 2024-12-20 RX ORDER — TACROLIMUS 1 MG/1
3 CAPSULE ORAL EVERY 12 HOURS
Qty: 540 CAPSULE | Refills: 3 | Status: SHIPPED | OUTPATIENT
Start: 2024-12-20 | End: 2024-12-23

## 2024-12-20 RX ORDER — TACROLIMUS 5 MG/1
5 CAPSULE ORAL 2 TIMES DAILY
Qty: 60 CAPSULE | Refills: 11 | Status: SHIPPED | OUTPATIENT
Start: 2024-12-20

## 2024-12-20 NOTE — TELEPHONE ENCOUNTER
ISSUE:   Tacrolimus IR level 6 on 12/19, goal 8-10, dose 7 mg BID.    PLAN:   Call Patient and confirm this was an accurate 12-hour trough.   Verify Tacrolimus IR dose 7 mg BID.   Confirm no new medications or or missed doses.   Confirm no new illness / infection / diarrhea.   If accurate trough and accurate dose, increase Tacrolimus IR dose to 8 mg BID     Is this more than a 50% increase or decrease in current IS dose: No  If YES, justification:     Repeat labs in 1 weeks.  *If > 50% change in immunosuppression dose, repeat labs in 1 week.     OUTCOME:   Spoke with Patient, they confirm accurate trough level and current dose 7 mg BID.   Patient confirmed dose change to 8 mg BID.  Patient agreed to repeat labs in 3 days.   Orders sent to preferred pharmacy for dose change and lab for repeat labs.   Patient voiced understanding of plan.     Georgina Holloway LPN

## 2024-12-23 ENCOUNTER — OFFICE VISIT (OUTPATIENT)
Dept: TRANSPLANT | Facility: CLINIC | Age: 54
End: 2024-12-23
Attending: SURGERY
Payer: COMMERCIAL

## 2024-12-23 ENCOUNTER — LAB (OUTPATIENT)
Dept: LAB | Facility: CLINIC | Age: 54
End: 2024-12-23
Attending: SURGERY
Payer: COMMERCIAL

## 2024-12-23 VITALS
DIASTOLIC BLOOD PRESSURE: 76 MMHG | WEIGHT: 152.1 LBS | OXYGEN SATURATION: 99 % | SYSTOLIC BLOOD PRESSURE: 153 MMHG | HEART RATE: 71 BPM | BODY MASS INDEX: 25.97 KG/M2

## 2024-12-23 DIAGNOSIS — Z94.4 LIVER REPLACED BY TRANSPLANT (H): ICD-10-CM

## 2024-12-23 LAB
ALBUMIN SERPL BCG-MCNC: 3.8 G/DL (ref 3.5–5.2)
ALP SERPL-CCNC: 93 U/L (ref 40–150)
ALT SERPL W P-5'-P-CCNC: 7 U/L (ref 0–50)
ANION GAP SERPL CALCULATED.3IONS-SCNC: 12 MMOL/L (ref 7–15)
AST SERPL W P-5'-P-CCNC: 13 U/L (ref 0–45)
BILIRUB DIRECT SERPL-MCNC: 0.31 MG/DL (ref 0–0.3)
BILIRUB SERPL-MCNC: 0.5 MG/DL
BUN SERPL-MCNC: 30.1 MG/DL (ref 6–20)
C3 SERPL-MCNC: 153 MG/DL (ref 81–157)
C4 SERPL-MCNC: 33 MG/DL (ref 13–39)
CALCIUM SERPL-MCNC: 9.1 MG/DL (ref 8.8–10.4)
CHLORIDE SERPL-SCNC: 99 MMOL/L (ref 98–107)
CREAT SERPL-MCNC: 2.09 MG/DL (ref 0.51–0.95)
EGFRCR SERPLBLD CKD-EPI 2021: 28 ML/MIN/1.73M2
ERYTHROCYTE [DISTWIDTH] IN BLOOD BY AUTOMATED COUNT: 15.9 % (ref 10–15)
GLUCOSE SERPL-MCNC: 108 MG/DL (ref 70–99)
HCO3 SERPL-SCNC: 21 MMOL/L (ref 22–29)
HCT VFR BLD AUTO: 23.8 % (ref 35–47)
HGB BLD-MCNC: 7.6 G/DL (ref 11.7–15.7)
MAGNESIUM SERPL-MCNC: 1.5 MG/DL (ref 1.7–2.3)
MCH RBC QN AUTO: 26.9 PG (ref 26.5–33)
MCHC RBC AUTO-ENTMCNC: 31.9 G/DL (ref 31.5–36.5)
MCV RBC AUTO: 84 FL (ref 78–100)
PHOSPHATE SERPL-MCNC: 3.7 MG/DL (ref 2.5–4.5)
PLATELET # BLD AUTO: 168 10E3/UL (ref 150–450)
POTASSIUM SERPL-SCNC: 5.3 MMOL/L (ref 3.4–5.3)
PROT SERPL-MCNC: 6.8 G/DL (ref 6.4–8.3)
RBC # BLD AUTO: 2.83 10E6/UL (ref 3.8–5.2)
SODIUM SERPL-SCNC: 132 MMOL/L (ref 135–145)
TACROLIMUS BLD-MCNC: 10 UG/L (ref 5–15)
TME LAST DOSE: NORMAL H
TME LAST DOSE: NORMAL H
WBC # BLD AUTO: 4.5 10E3/UL (ref 4–11)

## 2024-12-23 PROCEDURE — 36415 COLL VENOUS BLD VENIPUNCTURE: CPT | Performed by: PATHOLOGY

## 2024-12-23 PROCEDURE — 85027 COMPLETE CBC AUTOMATED: CPT | Performed by: PATHOLOGY

## 2024-12-23 PROCEDURE — 84100 ASSAY OF PHOSPHORUS: CPT | Performed by: PATHOLOGY

## 2024-12-23 PROCEDURE — 99000 SPECIMEN HANDLING OFFICE-LAB: CPT | Performed by: PATHOLOGY

## 2024-12-23 PROCEDURE — 83735 ASSAY OF MAGNESIUM: CPT | Performed by: PATHOLOGY

## 2024-12-23 PROCEDURE — 80197 ASSAY OF TACROLIMUS: CPT | Performed by: TRANSPLANT SURGERY

## 2024-12-23 PROCEDURE — 80053 COMPREHEN METABOLIC PANEL: CPT | Performed by: PATHOLOGY

## 2024-12-23 PROCEDURE — 82248 BILIRUBIN DIRECT: CPT | Performed by: PATHOLOGY

## 2024-12-23 RX ORDER — TACROLIMUS 1 MG/1
3 CAPSULE ORAL EVERY 12 HOURS
Qty: 540 CAPSULE | Refills: 3 | Status: SHIPPED | OUTPATIENT
Start: 2024-12-23

## 2024-12-26 PROCEDURE — 80197 ASSAY OF TACROLIMUS: CPT | Performed by: TRANSPLANT SURGERY

## 2024-12-30 ENCOUNTER — LAB (OUTPATIENT)
Dept: LAB | Facility: CLINIC | Age: 54
End: 2024-12-30
Payer: COMMERCIAL

## 2024-12-30 DIAGNOSIS — Z94.4 LIVER REPLACED BY TRANSPLANT (H): ICD-10-CM

## 2024-12-30 PROCEDURE — 80197 ASSAY OF TACROLIMUS: CPT

## 2024-12-31 ENCOUNTER — TELEPHONE (OUTPATIENT)
Dept: GASTROENTEROLOGY | Facility: CLINIC | Age: 54
End: 2024-12-31
Payer: COMMERCIAL

## 2024-12-31 ENCOUNTER — MYC MEDICAL ADVICE (OUTPATIENT)
Dept: GASTROENTEROLOGY | Facility: CLINIC | Age: 54
End: 2024-12-31
Payer: COMMERCIAL

## 2024-12-31 LAB
TACROLIMUS BLD-MCNC: 5.5 UG/L (ref 5–15)
TME LAST DOSE: NORMAL H
TME LAST DOSE: NORMAL H

## 2024-12-31 NOTE — TELEPHONE ENCOUNTER
FUTURE VISIT INFORMATION        SURGERY INFORMATION:  Date: 25  Location:  gi  Surgeon:  Anna Mantilla MD 2  Anesthesia Type:  mac  Procedure: Esophagoscopy, gastroscopy, duodenoscopy (EGD), combined      RECORDS REQUESTED FROM:         Primary Care Provider: Geovanna Bradshaw APRN,CNP - [facility]     Pertinent Medical History: portal hypertension     Most recent EKG+ Tracin24     Most recent ECHO: 24     Most recent Coronary Angiogram: 24     Most recent PFT's: 24

## 2024-12-31 NOTE — TELEPHONE ENCOUNTER
"Endoscopy Scheduling Screen    Have you had any respiratory illness or flu-like symptoms in the last 10 days?  No    What is your communication preference for Instructions and/or Bowel Prep?   MyChart    What insurance is in the chart?  Other:  BCBS    Ordering/Referring Provider:  Blas Carney MD   (If ordering provider performs procedure, schedule with ordering provider unless otherwise instructed. )    BMI: Estimated body mass index is 25.97 kg/m  as calculated from the following:    Height as of 11/13/24: 1.63 m (5' 4.17\").    Weight as of 12/23/24: 69 kg (152 lb 1.6 oz).     Sedation Ordered  MAC/deep sedation.   BMI<= 45 45 < BMI <= 48 48 < BMI < = 50  BMI > 50   No Restrictions No [facility]  No [facility]  [facility] with exceptions Hospital Only OR Only       Do you have a history of malignant hyperthermia?  No    (Females) Are you currently pregnant?   No     Have you been diagnosed or told you have pulmonary hypertension?   No    Do you have an LVAD?  No    Have you been told you have moderate to severe sleep apnea?  No.    Have you been told you have COPD, asthma, or any other lung disease?  Yes     What breathing problems do you have?  Asthma     Do you use home oxygen?  No    Have your breathing problems required an ED visit or hospitalization in the last year?  No.    Do you have any heart conditions?  No     Have you ever had or are you waiting for an organ transplant?  Yes. Have you had or are on on the wait list for a heart/lung transplant? No, may schedule at all facilities except [facility]    Have you had a stroke or transient ischemic attack (TIA aka \"mini stroke\" in the last 6 months?   No    Have you been diagnosed with or been told you have cirrhosis of the liver?   No.    Are you currently on dialysis?   No    Do you need assistance transferring?   No    BMI: Estimated body mass index is 25.97 kg/m  as calculated from the following:    Height as of 11/13/24: 1.63 m (5' 4.17\").    Weight as of " 12/23/24: 69 kg (152 lb 1.6 oz).     Is patients BMI > 40 and scheduling location [facility]?  No    Do you take an injectable or oral medication for weight loss or diabetes (excluding insulin)?  No    Do you take the medication Naltrexone?  No    Do you take blood thinners?  No       Prep   Are you currently on dialysis or do you have chronic kidney disease?  No    Do you have a diagnosis of diabetes?  No    Do you have a diagnosis of cystic fibrosis (CF)?  No    On a regular basis do you go 3 -5 days between bowel movements?  No    BMI > 40?  No    Preferred Pharmacy:    [id number] - [place], [place] - [address] AT [facility] [address]  [address]  [place] [facility] [address]  Phone: [phone] Fax: [phone]      Final Scheduling Details     Procedure scheduled  Upper endoscopy (EGD)    Surgeon:  YOON     Date of procedure:  1/30/2025     Pre-OP / PAC:   Yes - PAC clinic evaluation scheduled.    Location  [facility] - Per RN assessment.    Sedation   MAC/Deep Sedation - Per RN assessment.      Patient Reminders:   You will receive a call from a Nurse to review instructions and health history.  This assessment must be completed prior to your procedure.  Failure to complete the Nurse assessment may result in the procedure being cancelled.      On the day of your procedure, please designate an adult(s) who can drive you home stay with you for the next 24 hours. The medicines used in the exam will make you sleepy. You will not be able to drive.      You cannot take public transportation, ride share services, or non-medical taxi service without a responsible caregiver.  Medical transport services are allowed with the requirement that a responsible caregiver will receive you at your destination.  We require that drivers and caregivers are confirmed prior to your procedure.

## 2025-01-02 ENCOUNTER — TELEPHONE (OUTPATIENT)
Dept: TRANSPLANT | Facility: CLINIC | Age: 55
End: 2025-01-02
Payer: COMMERCIAL

## 2025-01-02 ENCOUNTER — LAB (OUTPATIENT)
Dept: LAB | Facility: CLINIC | Age: 55
End: 2025-01-02
Attending: TRANSPLANT SURGERY
Payer: COMMERCIAL

## 2025-01-02 DIAGNOSIS — Z94.4 LIVER REPLACED BY TRANSPLANT (H): ICD-10-CM

## 2025-01-02 PROCEDURE — 80197 ASSAY OF TACROLIMUS: CPT

## 2025-01-02 RX ORDER — VALGANCICLOVIR 450 MG/1
450 TABLET, FILM COATED ORAL
Qty: 15 TABLET | Refills: 0 | Status: SHIPPED | OUTPATIENT
Start: 2025-01-03

## 2025-01-02 RX ORDER — SULFAMETHOXAZOLE AND TRIMETHOPRIM 400; 80 MG/1; MG/1
1 TABLET ORAL
Qty: 30 TABLET | Refills: 1 | Status: SHIPPED | OUTPATIENT
Start: 2025-01-03

## 2025-01-02 NOTE — TELEPHONE ENCOUNTER
Post Liver Transplant Team Conference  Date: 1/2/2025  Transplant Coordinator: Barb Lazaro  Transplant Surgeon: Blas Carney      Attendees:  [] Dr. Alarcon [x] To Liu, RN []       [x] Dr. Carney [x] Georgina Holloway LPN []    [] Dr. Rodriguez [x] Barb Lazaro, NIRALI []    [] Dr. Guerrero [] Mary Rowell, KADY []    [] DR. Herrera [] Mary Victoria, NIRALI []       [] Dr. Norton [x] Dodie Blanco RN []       [] Dr. Jovany Juárez [x] Radha Drummond, INRALI []       [] Dr. SHERYL Golden [x] Laya Tejada RN []       [x] Ray Keen, pharm [] Bruna Rm RN []       [] Katarzyna Portillo NP [] Franck Powell RN []         Verbal Plan Read Back:   Due to elevated creatinine and lower creatinine clearance, decrease valcyte and bactrim to every other day. Bactrim already reduced to MWF per nephrology.  Ask Hannah about whether GI symptoms have improved.     Routed to RN Coordinator   Barb Lazaro, RN    Call to Hannah. GI symptoms have improved. She is working on getting more protein in her diet, but is feeling much better. She will decrease valcyte to MWF with bactrim, it is easier for her to manage than every other day. She is low CMV risk, D-/R+.

## 2025-01-03 LAB
TACROLIMUS BLD-MCNC: 6.3 UG/L (ref 5–15)
TME LAST DOSE: NORMAL H
TME LAST DOSE: NORMAL H

## 2025-01-09 PROCEDURE — 80197 ASSAY OF TACROLIMUS: CPT | Performed by: TRANSPLANT SURGERY

## 2025-01-10 ENCOUNTER — LAB (OUTPATIENT)
Dept: LAB | Facility: CLINIC | Age: 55
End: 2025-01-10
Payer: COMMERCIAL

## 2025-01-10 DIAGNOSIS — Z94.4 LIVER REPLACED BY TRANSPLANT (H): ICD-10-CM

## 2025-01-10 LAB
TACROLIMUS BLD-MCNC: 5.4 UG/L (ref 5–15)
TME LAST DOSE: NORMAL H
TME LAST DOSE: NORMAL H

## 2025-01-12 ENCOUNTER — MYC REFILL (OUTPATIENT)
Dept: TRANSPLANT | Facility: CLINIC | Age: 55
End: 2025-01-12
Payer: COMMERCIAL

## 2025-01-12 DIAGNOSIS — Z94.4 LIVER REPLACED BY TRANSPLANT (H): ICD-10-CM

## 2025-01-13 ENCOUNTER — PRE VISIT (OUTPATIENT)
Dept: SURGERY | Facility: CLINIC | Age: 55
End: 2025-01-13

## 2025-01-13 ENCOUNTER — OFFICE VISIT (OUTPATIENT)
Dept: TRANSPLANT | Facility: CLINIC | Age: 55
End: 2025-01-13
Attending: TRANSPLANT SURGERY
Payer: COMMERCIAL

## 2025-01-13 VITALS
DIASTOLIC BLOOD PRESSURE: 78 MMHG | TEMPERATURE: 97.8 F | RESPIRATION RATE: 18 BRPM | SYSTOLIC BLOOD PRESSURE: 151 MMHG | BODY MASS INDEX: 25.06 KG/M2 | HEART RATE: 70 BPM | OXYGEN SATURATION: 99 % | WEIGHT: 146.8 LBS

## 2025-01-13 DIAGNOSIS — Z94.4 LIVER REPLACED BY TRANSPLANT (H): ICD-10-CM

## 2025-01-13 PROCEDURE — 99211 OFF/OP EST MAY X REQ PHY/QHP: CPT | Performed by: TRANSPLANT SURGERY

## 2025-01-13 PROCEDURE — 99213 OFFICE O/P EST LOW 20 MIN: CPT | Mod: 24 | Performed by: TRANSPLANT SURGERY

## 2025-01-13 RX ORDER — TACROLIMUS 5 MG/1
5 CAPSULE ORAL 2 TIMES DAILY
Qty: 60 CAPSULE | Refills: 11 | Status: SHIPPED | OUTPATIENT
Start: 2025-01-13

## 2025-01-13 NOTE — LETTER
1/13/2025      Hannah Sutherland  [address]  [facility] [facility] [address]      Dear Colleague,    Thank you for referring your patient, Hannah Sutherland, to the [facility]. Please see a copy of my visit note below.    Transplant Surgery -OUTPATIENT IMMUNOSUPPRESSION PROGRESS NOTE    Date of Visit: 01/13/2025    Transplants:  11/13/2024 (Liver); Postoperative day:  61  ASSESMENT AND PLAN:  1.Graft Function:Liver allograft: no rejection or technical problems.    2.Immunosuppression Management:keep tacrolimus levels 6-8 ng/dL  3.Hypertension:ok  4.Renal Function:elevated creatinine, needs to see nephrology  5.Lab frequency:twice weekly  6.Other:  Wound healthy    Date: January 13, 2025    Transplant:  [x]                             Liver [x]                              Kidney []                             Pancreas []                              Other:             Chief Complaint:Follow Up (Liver transplant follow-up)    Doing well    History of Present Illness:  Patient Active Problem List   Diagnosis     Abnormal INR     Abnormal Pap smear of cervix     Alcoholic cirrhosis (H)     Backache     Chronic gastritis     Chronic liver failure (H)     Chronic pain of left knee     Depression     Diarrhea     Dysphagia     Elevated bilirubin     Elevated liver function tests     Eosinophilic esophagitis     Alcohol dependence, uncomplicated (H)     GERD (gastroesophageal reflux disease)     Hyperlipidemia     Hypomagnesemia     Arthritis     Inflammatory liver disease     Intermittent asthma     Motion sickness     Perimenopausal     Portal hypertension (H)     Cough     Seasonal allergies     Thrombocytopenia     Vision loss     Vitamin D deficiency     End stage liver disease (H)     Immunosuppressed status     Liver replaced by transplant (H)     RUDY (acute kidney injury)     Hyponatremia     SOCIAL /FAMILY HISTORY: [x]                  No recent change    No past medical history on  file.  Past Surgical History:   Procedure Laterality Date     BENCH KIDNEY  2024    Procedure: Bench kidney;  Surgeon: Blas Carney MD;  Location: [facility]     IR CVC NON TUNNEL PLACEMENT > 5 YRS  11/15/2024     TRANSPLANT LIVER RECIPIENT  DONOR N/A 2024    Procedure: Transplant liver recipient  donor;  Surgeon: Blas Carney MD;  Location: [facility] OR     Social History     Socioeconomic History     Marital status:      Spouse name: Not on file     Number of children: Not on file     Years of education: Not on file     Highest education level: Not on file   Occupational History     Not on file   Tobacco Use     Smoking status: Never     Smokeless tobacco: Never   Vaping Use     Vaping status: Never Used   Substance and Sexual Activity     Alcohol use: Not Currently     Comment: sober 23     Drug use: Never     Sexual activity: Not on file   Other Topics Concern     Not on file   Social History Narrative     Not on file     Social Drivers of Health     Financial Resource Strain: High Risk (2024)    [facility] [place] [facility]    Overall Financial Resource Strain (CARDIA)      Difficulty of Paying Living Expenses: Hard   Food Insecurity: No Food Insecurity (2024)    [facility] [place] [facility] the [facility]    Hunger Vital Sign      Worried About Running Out of Food in the Last Year: Never true      Ran Out of Food in the Last Year: Never true   Recent Concern: Food Insecurity - High Risk (2024)    Food Insecurity      Within the past 12 months, did you worry that your food would run out before you got money to buy more?: Yes      Within the past 12 months, did the food you bought just not last and you didn t have money to get more?: Yes   Transportation Needs: No Transportation Needs (2024)    [facility]    Transportation Needs      In the past 12 months, has lack of transportation  kept you from medical appointments, meetings, work, or from getting medicines or things needed for daily living?: No   Physical Activity: Inactive (12/11/2024)    [facility]    Exercise Vital Sign      Days of Exercise per Week: 0 days      Minutes of Exercise per Session: 0 min   Stress: No Stress Concern Present (12/11/2024)    [facility]    [employer] of Occupational Health - Occupational Stress Questionnaire      Feeling of Stress : Only a little   Social Connections: Moderately Isolated (12/11/2024)    [facility]    Social Connection and Isolation Panel [NHANES]      Frequency of Communication with Friends and Family: More than three times a week      Frequency of Social Gatherings with Friends and Family: Never      Attends Gnosticism Services: More than 4 times per year      Active Member of Clubs or Organizations: No      Attends Club or Organization Meetings: Never      Marital Status:    Interpersonal Safety: Not At Risk (12/11/2024)    Received from [facility]    Humiliation, Afraid, Rape, and Kick questionnaire      Fear of Current or Ex-Partner: No      Emotionally Abused: No      Physically Abused: No      Sexually Abused: No   Housing Stability: Low Risk  (12/11/2024)    Received from [facility]    Housing Stability Vital Sign      Unable to Pay for Housing in the Last Year: No      Number of Times Moved in the Last Year: 0      Homeless in the Last Year: No     Prescription Medications as of 1/13/2025         Rx Number Disp Refills Start End Last Dispensed Date Next Fill Date Owning Pharmacy    acetaminophen (TYLENOL) 500 MG tablet  -- --  --       Sig: Take 500 mg by mouth every 6 hours as needed for mild pain.    Class: Historical    Route: Oral    aspirin (ASA) 325 MG tablet  30 tablet 4 12/11/2024 --   [facility] #[id number] -  [place], [place] - [address] AT [address]    Sig: Take 1 tablet (325 mg) by mouth daily.    Class: E-Prescribe    Route: Oral    calcium-vitamin D-vitamin K (VIACTIV) 500-500-40 MG-UNT-MCG CHEW  -- --  --       Sig: Take 1 tablet by mouth 2 times daily.    Class: Historical    Route: Oral    escitalopram (LEXAPRO) 20 MG tablet  30 tablet 0 11/29/2024 --   [address]    Sig: Take 1 tablet (20 mg) by mouth daily.    Class: E-Prescribe    Notes to Pharmacy: Temporary fill    Route: Oral    magnesium oxide (MAG-OX) 400 MG tablet  -- -- 1/30/2024 --       Sig: Take 400 mg by mouth 2 times daily    Class: Historical    Route: Oral    methocarbamol (ROBAXIN) 500 MG tablet  20 tablet 1 12/16/2024 --   [facility] #[id number]  [place] - [address] AT [address]    Sig: Take 1 tablet (500 mg) by mouth 4 times daily as needed for muscle spasms.    Class: E-Prescribe    Route: Oral    methocarbamol (ROBAXIN) 750 MG tablet  20 tablet 0 11/29/2024 --   [facility] #[id number] [place] [address] - [address] [place] [address]    Sig: Take 1 tablet (750 mg) by mouth 4 times daily as needed for muscle spasms.    Class: E-Prescribe    Route: Oral    Multiple Vitamin (QUINTABS) TABS  30 tablet 11 12/17/2024 --   [facility] #[id number]  [place] - [address] AT [address]    Sig: Take 1 tablet by mouth daily.    Class: E-Prescribe    Route: Oral    mycophenolic acid (GENERIC EQUIVALENT) 180 MG EC tablet  540 tablet 0 12/11/2024 --   [facility] #[id number] - [place], [place] - [address] AT [address]    Sig: Take 3 tablets (540 mg) by mouth 2 times daily.    Class: E-Prescribe    Notes to Pharmacy: TXP DT 11/13/2024 (Liver) TXP Dischg DT 11/20/2024 DX Liver replaced by transplant Z94.4 [facility] ([place], [place])    Route: Oral     naloxone (NARCAN) 4 MG/0.1ML nasal spray  20 each 0 12/16/2024 --   [facility][id number] - [place] [facility], [place] - [address] AT [address]    Sig: [place] 1 spray (4 mg) into one nostril alternating nostrils as needed for opioid reversal. every 2-3 minutes until assistance arrives    Class: E-Prescribe    Route: Alternating Nostrils    pantoprazole (PROTONIX) 40 MG EC tablet  60 tablet 2 12/11/2024 --   [facility] #[id number] [place], [place] - [address] AT [address]    Sig: Take 1 tablet (40 mg) by mouth 2 times daily (before meals).    Class: E-Prescribe    Route: Oral    predniSONE (DELTASONE) 5 MG tablet  30 tablet 2 12/11/2024 --   [facility] #[id number] - [place] [facility], [place] - [address] AT [address]    Sig: Take 1 tablet (5 mg) by mouth daily.    Class: E-Prescribe    Notes to Pharmacy: TXP DT 11/13/2024 (Liver) TXP Dischg DT 11/20/2024 DX Liver replaced by transplant Z94.4 [facility] ([place], [place])    Route: Oral    sulfamethoxazole-trimethoprim (BACTRIM) 400-80 MG tablet  30 tablet 1 1/3/2025 --   [facility] #[id number] [place], [place] - [address] AT [address]    Sig: Take 1 tablet by mouth Every Mon, Wed, Fri Morning.    Class: E-Prescribe    Notes to Pharmacy: Profile Rx: patient will contact pharmacy when needed    Route: Oral    tacrolimus (GENERIC EQUIVALENT) 1 MG capsule  540 capsule 3 12/23/2024 --   [facility] #[id number] [place], [place] - [address] AT [address]    Sig: Take 3 capsules (3 mg) by mouth every 12 hours. Total dose 8 mg BID    Class: E-Prescribe    Notes to Pharmacy: TXP DT 11/13/2024 (Liver) TXP Dischg DT 11/20/2024 DX Liver replaced by transplant Z94.4 [facility] ([place], [place])    Route: Oral    tacrolimus (GENERIC EQUIVALENT) 5 MG capsule  60 capsule 11 1/13/2025 --   [facility][id number] - [place] - [address] AT [address]    Sig: Take 1 capsule (5 mg) by mouth 2 times daily. Total dose 8 mg BID    Class: E-Prescribe    Route: Oral    traMADol (ULTRAM) 50 MG tablet  -- -- 12/17/2024 --       Sig: Take 1 tablet (50 mg) by mouth every 6 hours as needed (for severe pain).    Class: Historical    Notes to Pharmacy: Script called into [facility]    Route: Oral    valGANciclovir (VALCYTE) 450 MG tablet  15 tablet 0 1/3/2025 --   [facility][id number] - [place], [place] - [address] AT [address] [place]    Sig: Take 1 tablet (450 mg) by mouth Every Mon, Wed, Fri Morning.    Class: E-Prescribe    Notes to Pharmacy: Profile Rx: patient will contact pharmacy when needed    Route: Oral          Penicillins, Latex, and Erythromycin   REVIEW OF SYSTEMS (check box if normal)  [x]               GENERAL  [x]                 PULMONARY [x]                GENITOURINARY  [x]                CNS                 [x]                 CARDIAC  [x]                 ENDOCRINE  [x]                EARS,NOSE,THROAT [x]                 GASTROINTESTINAL [x]                 NEUROLOGIC    [x]                MUSCLOSKELTAL  [x]                  HEMATOLOGY      PHYSICAL EXAM (check box if normal)BP (!) 151/78 (BP Location: Right arm, Patient Position: Chair, Cuff Size: Adult Regular)   Pulse 70   Temp 97.8  F (36.6  C) (Oral)   Resp 18   Wt 66.6 kg (146 lb 12.8 oz)   SpO2 99%   BMI 25.06 kg/m          [x]            GENERAL:    [x]       EYES:  ICTERIC   []        YES  []                    NO  [x]           EXTREMITIES: Clubbing []       Y     [x]           N    [x]           EARS, NOSE, THROAT: Membranes Moist    YES   [x]                   NO []                  [x]           LUNGS:  CLEAR    YES       [x]                  NO    []                                [x]           SKIN: Jaundice           YES       []                  NO    [x]                   Rash: YES       []                   NO    [x]                                     [x]             HEART: Regular Rate          YES       [x]                  NO    []                   Incision Clean:  YES       [x]                  NO    []                                [x]                    ABDOMEN: Organomegaly YES       []                  NO    [x]                       [x]                    NEUROLOGICAL:  Nonfocal  YES       [x]                  NO    []                       [x]                    Hernia YES       []                  NO    [x]                   PSYCHIATRIC:  Appropriate  YES       [x]                  NO    []                       OTHER:                                                                                                   PAIN SCALE:: 3       Again, thank you for allowing me to participate in the care of your patient.        Sincerely,        Blas Carney MD    Electronically signed

## 2025-01-13 NOTE — NURSING NOTE
"Chief Complaint   Patient presents with    Follow Up     Liver transplant follow-up     Vital signs:  Temp: 97.8  F (36.6  C) Temp src: Oral BP: (!) 151/78 Pulse: 70   Resp: 18 SpO2: 99 %       Weight: 66.6 kg (146 lb 12.8 oz)  Estimated body mass index is 25.06 kg/m  as calculated from the following:    Height as of 11/13/24: 1.63 m (5' 4.17\").    Weight as of this encounter: 66.6 kg (146 lb 12.8 oz).      Carlitos Gaines RN on 1/13/2025 at 9:25 AM    "

## 2025-01-13 NOTE — PROGRESS NOTES
Transplant Surgery -OUTPATIENT IMMUNOSUPPRESSION PROGRESS NOTE    Date of Visit: 2025    Transplants:  2024 (Liver); Postoperative day:  61  ASSESMENT AND PLAN:  1.Graft Function:Liver allograft: no rejection or technical problems.    2.Immunosuppression Management:keep tacrolimus levels 6-8 ng/dL  3.Hypertension:ok  4.Renal Function:elevated creatinine, needs to see nephrology  5.Lab frequency:twice weekly  6.Other:  Wound healthy    Date: 2025    Transplant:  [x]                             Liver [x]                              Kidney []                             Pancreas []                              Other:             Chief Complaint:Follow Up (Liver transplant follow-up)    Doing well    History of Present Illness:  Patient Active Problem List   Diagnosis    Abnormal INR    Abnormal Pap smear of cervix    Alcoholic cirrhosis (H)    Backache    Chronic gastritis    Chronic liver failure (H)    Chronic pain of left knee    Depression    Diarrhea    Dysphagia    Elevated bilirubin    Elevated liver function tests    Eosinophilic esophagitis    Alcohol dependence, uncomplicated (H)    GERD (gastroesophageal reflux disease)    Hyperlipidemia    Hypomagnesemia    Arthritis    Inflammatory liver disease    Intermittent asthma    Motion sickness    Perimenopausal    Portal hypertension (H)    Cough    Seasonal allergies    Thrombocytopenia    Vision loss    Vitamin D deficiency    End stage liver disease (H)    Immunosuppressed status    Liver replaced by transplant (H)    RUDY (acute kidney injury)    Hyponatremia     SOCIAL /FAMILY HISTORY: [x]                  No recent change    No past medical history on file.  Past Surgical History:   Procedure Laterality Date    BENCH KIDNEY  2024    Procedure: Bench kidney;  Surgeon: Blas Carney MD;  Location: [facility] OR    IR CVC NON TUNNEL PLACEMENT > 5 YRS  11/15/2024    TRANSPLANT LIVER RECIPIENT  DONOR N/A 2024     Procedure: Transplant liver recipient  donor;  Surgeon: Blas Carney MD;  Location:  OR     Social History     Socioeconomic History    Marital status:      Spouse name: Not on file    Number of children: Not on file    Years of education: Not on file    Highest education level: Not on file   Occupational History    Not on file   Tobacco Use    Smoking status: Never    Smokeless tobacco: Never   Vaping Use    Vaping status: Never Used   Substance and Sexual Activity    Alcohol use: Not Currently     Comment: sober 25    Drug use: Never    Sexual activity: Not on file   Other Topics Concern    Not on file   Social History Narrative    Not on file     Social Drivers of Health     Financial Resource Strain: High Risk (2024)    Received from [facility]    Overall Financial Resource Strain (CARDIA)     Difficulty of Paying Living Expenses: Hard   Food Insecurity: No Food Insecurity (2024)    Received from [facility]    Hunger Vital Sign     Worried About Running Out of Food in the Last Year: Never true     Ran Out of Food in the Last Year: Never true   Recent Concern: Food Insecurity - High Risk (2024)    Food Insecurity     Within the past 12 months, did you worry that your food would run out before you got money to buy more?: Yes     Within the past 12 months, did the food you bought just not last and you didn t have money to get more?: Yes   Transportation Needs: No Transportation Needs (2024)    Received from [facility]    Transportation Needs     In the past 12 months, has lack of transportation kept you from medical appointments, meetings, work, or from getting medicines or things needed for daily living?: No   Physical Activity: Inactive (2024)    Received from [facility]    Exercise Vital Sign     Days of Exercise per Week: 0 days     Minutes of Exercise per  Session: 0 min   Stress: No Stress Concern Present (12/11/2024)    [facility]    [place] [facility] [employer] - Occupational Stress Questionnaire     Feeling of Stress : Only a little   Social Connections: Moderately Isolated (12/11/2024)    Received from [facility]    Social Connection and Isolation Panel [NHANES]     Frequency of Communication with Friends and Family: More than three times a week     Frequency of Social Gatherings with Friends and Family: Never     Attends Jehovah's witness Services: More than 4 times per year     Active Member of Clubs or Organizations: No     Attends Club or Organization Meetings: Never     Marital Status:    Interpersonal Safety: Not At Risk (12/11/2024)    Received from [facility]    Humiliation, Afraid, Rape, and Kick questionnaire     Fear of Current or Ex-Partner: No     Emotionally Abused: No     Physically Abused: No     Sexually Abused: No   Housing Stability: Low Risk  (12/11/2024)    Received from [facility]    Housing Stability Vital Sign     Unable to Pay for Housing in the Last Year: No     Number of Times Moved in the Last Year: 0     Homeless in the Last Year: No     Prescription Medications as of 1/13/2025         Rx Number Disp Refills Start End Last Dispensed Date Next Fill Date Owning Pharmacy    acetaminophen (TYLENOL) 500 MG tablet  -- --  --       Sig: Take 500 mg by mouth every 6 hours as needed for mild pain.    Class: Historical    Route: Oral    aspirin (ASA) 325 MG tablet  30 tablet 4 12/11/2024 --   [id number] - [place], [place] - [facility] [address] AT [address]    Sig: Take 1 tablet (325 mg) by mouth daily.    Class: E-Prescribe    Route: Oral    calcium-vitamin D-vitamin K (VIACTIV) 500-500-40 MG-UNT-MCG CHEW  -- --  --       Sig: Take 1 tablet by mouth 2 times daily.    Class: Historical    Route: Oral     escitalopram (LEXAPRO) 20 MG tablet  30 tablet 0 11/29/2024 --   [facility] [place], [place] - [address] [facility] [id number]    Sig: Take 1 tablet (20 mg) by mouth daily.    Class: E-Prescribe    Notes to Pharmacy: Temporary fill    Route: Oral    magnesium oxide (MAG-OX) 400 MG tablet  -- -- 1/30/2024 --       Sig: Take 400 mg by mouth 2 times daily    Class: Historical    Route: Oral    methocarbamol (ROBAXIN) 500 MG tablet  20 tablet 1 12/16/2024 --   [id number] - [email or web address] - [address] AT [address]    Sig: Take 1 tablet (500 mg) by mouth 4 times daily as needed for muscle spasms.    Class: E-Prescribe    Route: Oral    methocarbamol (ROBAXIN) 750 MG tablet  20 tablet 0 11/29/2024 --   [id number] [place] [address] [address] [facility]    Sig: Take 1 tablet (750 mg) by mouth 4 times daily as needed for muscle spasms.    Class: E-Prescribe    Route: Oral    Multiple Vitamin (QUINTABS) TABS  30 tablet 11 12/17/2024 --   [facility] - [address] AT [address]    Sig: Take 1 tablet by mouth daily.    Class: E-Prescribe    Route: Oral    mycophenolic acid (GENERIC EQUIVALENT) 180 MG EC tablet  540 tablet 0 12/11/2024 --   [id number] [place], [place] - [address] AT [address]    Sig: Take 3 tablets (540 mg) by mouth 2 times daily.    Class: E-Prescribe    Notes to Pharmacy: TXP DT 11/13/2024 (Liver) TXP Dischg DT 11/20/2024 DX Liver replaced by transplant Z94.4 [facility] ()    Route: Oral    naloxone (NARCAN) 4 MG/0.1ML nasal spray  20 each 0 12/16/2024 --   [id number] [place] - [address] AT [address]    Sig: [place] 1 spray (4 mg) into one nostril alternating nostrils as needed for opioid reversal. every 2-3 minutes until assistance arrives    Class:  E-Prescribe    Route: Alternating Nostrils    pantoprazole (PROTONIX) 40 MG EC tablet  60 tablet 2 12/11/2024 --   [facility][id number] [place], [place] - [address] AT [address]    Sig: Take 1 tablet (40 mg) by mouth 2 times daily (before meals).    Class: E-Prescribe    Route: Oral    predniSONE (DELTASONE) 5 MG tablet  30 tablet 2 12/11/2024 --   [id number] - [place] - [address] AT [address]    Sig: Take 1 tablet (5 mg) by mouth daily.    Class: E-Prescribe    Notes to Pharmacy: TXP DT 11/13/2024 (Liver) TXP Dischg DT 11/20/2024 DX Liver replaced by transplant Z94.4 [facility] ([place], [place])    Route: Oral    sulfamethoxazole-trimethoprim (BACTRIM) 400-80 MG tablet  30 tablet 1 1/3/2025 --   [facility] #[id number] - [place], [place] - [address] AT [address]    Sig: Take 1 tablet by mouth Every Mon, Wed, Fri Morning.    Class: E-Prescribe    Notes to Pharmacy: Profile Rx: patient will contact pharmacy when needed    Route: Oral    tacrolimus (GENERIC EQUIVALENT) 1 MG capsule  540 capsule 3 12/23/2024 --   [facility] #[id number] - [place], [place] - [address] AT [address]    Sig: Take 3 capsules (3 mg) by mouth every 12 hours. Total dose 8 mg BID    Class: E-Prescribe    Notes to Pharmacy: TXP DT 11/13/2024 (Liver) TXP Dischg DT 11/20/2024 DX Liver replaced by transplant Z94.4 [facility] ([place], [place])    Route: Oral    tacrolimus (GENERIC EQUIVALENT) 5 MG capsule  60 capsule 11 1/13/2025 --   [facility][id number] - [place], [place] - [address] AT [address]    Sig: Take 1 capsule (5 mg) by mouth 2 times daily. Total dose 8 mg BID    Class: E-Prescribe    Route: Oral    traMADol (ULTRAM) 50 MG tablet  -- -- 12/17/2024 --       Sig: Take 1 tablet (50 mg) by mouth every 6 hours as needed  (for severe pain).    Class: Historical    Notes to Pharmacy: Script called into [facility]    Route: Oral    valGANciclovir (VALCYTE) 450 MG tablet  15 tablet 0 1/3/2025 --   [id number] - [place] - [address] AT [facility] & [place]    Sig: Take 1 tablet (450 mg) by mouth Every Mon, Wed, Fri Morning.    Class: E-Prescribe    Notes to Pharmacy: Profile Rx: patient will contact pharmacy when needed    Route: Oral          Penicillins, Latex, and Erythromycin   REVIEW OF SYSTEMS (check box if normal)  [x]               GENERAL  [x]                 PULMONARY [x]                GENITOURINARY  [x]                CNS                 [x]                 CARDIAC  [x]                 ENDOCRINE  [x]                EARS,NOSE,THROAT [x]                 GASTROINTESTINAL [x]                 NEUROLOGIC    [x]                MUSCLOSKELTAL  [x]                  HEMATOLOGY      PHYSICAL EXAM (check box if normal)BP (!) 151/78 (BP Location: Right arm, Patient Position: Chair, Cuff Size: Adult Regular)   Pulse 70   Temp 97.8  F (36.6  C) (Oral)   Resp 18   Wt 66.6 kg (146 lb 12.8 oz)   SpO2 99%   BMI 25.06 kg/m          [x]            GENERAL:    [x]       EYES:  ICTERIC   []        YES  []                    NO  [x]           EXTREMITIES: Clubbing []       Y     [x]           N    [x]           EARS, NOSE, THROAT: Membranes Moist    YES   [x]                   NO []                  [x]           LUNGS:  CLEAR    YES       [x]                  NO    []                                [x]           SKIN: Jaundice           YES       []                  NO    [x]                   Rash: YES       []                  NO    [x]                                     [x]             HEART: Regular Rate          YES       [x]                  NO    []                   Incision Clean:  YES       [x]                  NO    []                                [x]                    ABDOMEN: Organomegaly YES        []                  NO    [x]                       [x]                    NEUROLOGICAL:  Nonfocal  YES       [x]                  NO    []                       [x]                    Hernia YES       []                  NO    [x]                   PSYCHIATRIC:  Appropriate  YES       [x]                  NO    []                       OTHER:                                                                                                   PAIN SCALE:: 3

## 2025-01-14 ENCOUNTER — LAB (OUTPATIENT)
Dept: LAB | Facility: CLINIC | Age: 55
End: 2025-01-14
Payer: COMMERCIAL

## 2025-01-14 ENCOUNTER — TELEPHONE (OUTPATIENT)
Dept: GASTROENTEROLOGY | Facility: CLINIC | Age: 55
End: 2025-01-14
Payer: COMMERCIAL

## 2025-01-14 DIAGNOSIS — Z94.4 LIVER REPLACED BY TRANSPLANT (H): ICD-10-CM

## 2025-01-14 PROCEDURE — 80197 ASSAY OF TACROLIMUS: CPT

## 2025-01-14 NOTE — TELEPHONE ENCOUNTER
Pre visit planning completed.      Procedure details:    Patient scheduled for Upper endoscopy (EGD) on 01/30/2025.     Arrival time: 1015. Procedure time 1145    Facility location: [facility]; [address] [place] [address], 3[address] Floor, [place] [address]. Check in location: Main entrance at registration desk.    Sedation type: MAC    Pre op exam needed? No.    Indication for procedure: Liver replaced by transplant      Chart review:     Electronic implanted devices? No    Recent diagnosis of diverticulitis within the last 6 weeks? No      Medication review:    Diabetic? No    Anticoagulants? No    Weight loss medication/injectable? No GLP-1 medication per patient's medication list. Nursing to verify with pre-assessment call.    Other medication HOLDING recommendations:  N/A      Prep for procedure:     Bowel prep recommendation: N/A  Due to:  EGD    Procedure information and instructions sent via nathalie Kim RN  Endoscopy Procedure Pre Assessment   [phone] option 2

## 2025-01-15 LAB
TACROLIMUS BLD-MCNC: 7.5 UG/L (ref 5–15)
TME LAST DOSE: NORMAL H
TME LAST DOSE: NORMAL H

## 2025-01-15 NOTE — TELEPHONE ENCOUNTER
Pre assessment completed for upcoming procedure.   (Please see previous telephone encounter notes for complete details)      Procedure details:    Arrival time and facility location reviewed.    Pre op exam needed? No.    Designated  policy reviewed. Instructed to have someone stay 24  hours post procedure.       Medication review:    Medications reviewed. Please see supporting documentation below. Holding recommendations discussed (if applicable).       Prep for procedure:     Procedure prep instructions reviewed.        Any additional information needed:  N/A      Patient verbalized understanding and had no questions or concerns at this time.      Cassia Marcum LPN  Endoscopy Procedure Pre Assessment   [phone] option 2

## 2025-01-22 PROCEDURE — 80197 ASSAY OF TACROLIMUS: CPT | Performed by: TRANSPLANT SURGERY

## 2025-01-23 ENCOUNTER — LAB (OUTPATIENT)
Dept: LAB | Facility: CLINIC | Age: 55
End: 2025-01-23
Payer: COMMERCIAL

## 2025-01-23 DIAGNOSIS — Z94.4 LIVER REPLACED BY TRANSPLANT (H): ICD-10-CM

## 2025-01-23 LAB
TACROLIMUS BLD-MCNC: 6.9 UG/L (ref 5–15)
TME LAST DOSE: NORMAL H
TME LAST DOSE: NORMAL H

## 2025-01-28 ENCOUNTER — TELEPHONE (OUTPATIENT)
Dept: TRANSPLANT | Facility: CLINIC | Age: 55
End: 2025-01-28

## 2025-01-28 ENCOUNTER — OFFICE VISIT (OUTPATIENT)
Dept: GASTROENTEROLOGY | Facility: CLINIC | Age: 55
End: 2025-01-28
Attending: STUDENT IN AN ORGANIZED HEALTH CARE EDUCATION/TRAINING PROGRAM
Payer: COMMERCIAL

## 2025-01-28 ENCOUNTER — LAB (OUTPATIENT)
Dept: LAB | Facility: CLINIC | Age: 55
End: 2025-01-28
Attending: TRANSPLANT SURGERY
Payer: COMMERCIAL

## 2025-01-28 ENCOUNTER — VIRTUAL VISIT (OUTPATIENT)
Dept: PHARMACY | Facility: CLINIC | Age: 55
End: 2025-01-28
Payer: COMMERCIAL

## 2025-01-28 ENCOUNTER — OFFICE VISIT (OUTPATIENT)
Dept: TRANSPLANT | Facility: CLINIC | Age: 55
End: 2025-01-28
Attending: TRANSPLANT SURGERY
Payer: COMMERCIAL

## 2025-01-28 ENCOUNTER — ANCILLARY PROCEDURE (OUTPATIENT)
Dept: GENERAL RADIOLOGY | Facility: CLINIC | Age: 55
End: 2025-01-28
Attending: TRANSPLANT SURGERY
Payer: COMMERCIAL

## 2025-01-28 VITALS
BODY MASS INDEX: 24.04 KG/M2 | SYSTOLIC BLOOD PRESSURE: 144 MMHG | WEIGHT: 140.8 LBS | HEART RATE: 72 BPM | HEIGHT: 64 IN | OXYGEN SATURATION: 98 % | DIASTOLIC BLOOD PRESSURE: 77 MMHG

## 2025-01-28 VITALS
WEIGHT: 140.12 LBS | DIASTOLIC BLOOD PRESSURE: 77 MMHG | SYSTOLIC BLOOD PRESSURE: 144 MMHG | BODY MASS INDEX: 24.05 KG/M2 | HEART RATE: 72 BPM | OXYGEN SATURATION: 98 %

## 2025-01-28 DIAGNOSIS — Z94.4 LIVER REPLACED BY TRANSPLANT (H): ICD-10-CM

## 2025-01-28 DIAGNOSIS — Z94.4 LIVER REPLACED BY TRANSPLANT (H): Primary | ICD-10-CM

## 2025-01-28 DIAGNOSIS — R52 PAIN: ICD-10-CM

## 2025-01-28 DIAGNOSIS — K72.10 CHRONIC LIVER FAILURE WITHOUT HEPATIC COMA (H): ICD-10-CM

## 2025-01-28 DIAGNOSIS — E55.9 VITAMIN D DEFICIENCY: ICD-10-CM

## 2025-01-28 DIAGNOSIS — E83.42 HYPOMAGNESEMIA: ICD-10-CM

## 2025-01-28 DIAGNOSIS — D84.9 IMMUNOSUPPRESSED STATUS: ICD-10-CM

## 2025-01-28 DIAGNOSIS — K21.9 GASTROESOPHAGEAL REFLUX DISEASE, UNSPECIFIED WHETHER ESOPHAGITIS PRESENT: ICD-10-CM

## 2025-01-28 DIAGNOSIS — K70.30 ALCOHOLIC CIRRHOSIS, UNSPECIFIED WHETHER ASCITES PRESENT (H): Primary | ICD-10-CM

## 2025-01-28 DIAGNOSIS — E87.1 HYPONATREMIA: ICD-10-CM

## 2025-01-28 DIAGNOSIS — Z78.9 TAKES DIETARY SUPPLEMENTS: ICD-10-CM

## 2025-01-28 DIAGNOSIS — K29.50 CHRONIC GASTRITIS, PRESENCE OF BLEEDING UNSPECIFIED, UNSPECIFIED GASTRITIS TYPE: ICD-10-CM

## 2025-01-28 DIAGNOSIS — R10.13 ABDOMINAL PAIN, EPIGASTRIC: ICD-10-CM

## 2025-01-28 DIAGNOSIS — N17.9 AKI (ACUTE KIDNEY INJURY): Primary | ICD-10-CM

## 2025-01-28 DIAGNOSIS — N17.9 AKI (ACUTE KIDNEY INJURY): ICD-10-CM

## 2025-01-28 LAB
ALBUMIN MFR UR ELPH: 8.3 MG/DL
ALBUMIN SERPL BCG-MCNC: 4.1 G/DL (ref 3.5–5.2)
ALBUMIN UR-MCNC: NEGATIVE MG/DL
ALP SERPL-CCNC: 63 U/L (ref 40–150)
ALT SERPL W P-5'-P-CCNC: 8 U/L (ref 0–50)
ANION GAP SERPL CALCULATED.3IONS-SCNC: 9 MMOL/L (ref 7–15)
APPEARANCE UR: CLEAR
AST SERPL W P-5'-P-CCNC: 18 U/L (ref 0–45)
BILIRUB DIRECT SERPL-MCNC: <0.2 MG/DL (ref 0–0.3)
BILIRUB SERPL-MCNC: 0.3 MG/DL
BILIRUB UR QL STRIP: NEGATIVE
BUN SERPL-MCNC: 28.7 MG/DL (ref 6–20)
CALCIUM SERPL-MCNC: 9.4 MG/DL (ref 8.8–10.4)
CHLORIDE SERPL-SCNC: 105 MMOL/L (ref 98–107)
COLOR UR AUTO: NORMAL
CREAT SERPL-MCNC: 2.03 MG/DL (ref 0.51–0.95)
CREAT UR-MCNC: 76.6 MG/DL
EGFRCR SERPLBLD CKD-EPI 2021: 29 ML/MIN/1.73M2
ERYTHROCYTE [DISTWIDTH] IN BLOOD BY AUTOMATED COUNT: 15.9 % (ref 10–15)
GLUCOSE SERPL-MCNC: 93 MG/DL (ref 70–99)
GLUCOSE UR STRIP-MCNC: NEGATIVE MG/DL
HCO3 SERPL-SCNC: 21 MMOL/L (ref 22–29)
HCT VFR BLD AUTO: 29.7 % (ref 35–47)
HGB BLD-MCNC: 9.4 G/DL (ref 11.7–15.7)
HGB UR QL STRIP: NEGATIVE
KETONES UR STRIP-MCNC: NEGATIVE MG/DL
LEUKOCYTE ESTERASE UR QL STRIP: NEGATIVE
MAGNESIUM SERPL-MCNC: 1.6 MG/DL (ref 1.7–2.3)
MCH RBC QN AUTO: 27.8 PG (ref 26.5–33)
MCHC RBC AUTO-ENTMCNC: 31.6 G/DL (ref 31.5–36.5)
MCV RBC AUTO: 88 FL (ref 78–100)
NITRATE UR QL: NEGATIVE
PH UR STRIP: 5 [PH] (ref 5–7)
PHOSPHATE SERPL-MCNC: 3.8 MG/DL (ref 2.5–4.5)
PLATELET # BLD AUTO: 109 10E3/UL (ref 150–450)
POTASSIUM SERPL-SCNC: 4.6 MMOL/L (ref 3.4–5.3)
PROT SERPL-MCNC: 6.7 G/DL (ref 6.4–8.3)
PROT/CREAT 24H UR: 0.11 MG/MG CR (ref 0–0.2)
RBC # BLD AUTO: 3.38 10E6/UL (ref 3.8–5.2)
SODIUM SERPL-SCNC: 135 MMOL/L (ref 135–145)
SODIUM UR-SCNC: 32 MMOL/L
SP GR UR STRIP: 1.01 (ref 1–1.03)
TACROLIMUS BLD-MCNC: 7.6 UG/L (ref 5–15)
TME LAST DOSE: NORMAL H
TME LAST DOSE: NORMAL H
UROBILINOGEN UR STRIP-MCNC: NORMAL MG/DL
WBC # BLD AUTO: 2.5 10E3/UL (ref 4–11)

## 2025-01-28 PROCEDURE — 80197 ASSAY OF TACROLIMUS: CPT | Performed by: TRANSPLANT SURGERY

## 2025-01-28 PROCEDURE — 74019 RADEX ABDOMEN 2 VIEWS: CPT | Performed by: RADIOLOGY

## 2025-01-28 PROCEDURE — 84156 ASSAY OF PROTEIN URINE: CPT | Performed by: INTERNAL MEDICINE

## 2025-01-28 PROCEDURE — 82248 BILIRUBIN DIRECT: CPT | Performed by: PATHOLOGY

## 2025-01-28 PROCEDURE — 99213 OFFICE O/P EST LOW 20 MIN: CPT | Performed by: INTERNAL MEDICINE

## 2025-01-28 PROCEDURE — 83735 ASSAY OF MAGNESIUM: CPT | Performed by: PATHOLOGY

## 2025-01-28 PROCEDURE — 84300 ASSAY OF URINE SODIUM: CPT | Performed by: INTERNAL MEDICINE

## 2025-01-28 PROCEDURE — 80053 COMPREHEN METABOLIC PANEL: CPT | Performed by: PATHOLOGY

## 2025-01-28 PROCEDURE — 99000 SPECIMEN HANDLING OFFICE-LAB: CPT | Performed by: PATHOLOGY

## 2025-01-28 PROCEDURE — 81003 URINALYSIS AUTO W/O SCOPE: CPT | Performed by: INTERNAL MEDICINE

## 2025-01-28 PROCEDURE — 99215 OFFICE O/P EST HI 40 MIN: CPT | Mod: 24 | Performed by: INTERNAL MEDICINE

## 2025-01-28 PROCEDURE — 99214 OFFICE O/P EST MOD 30 MIN: CPT | Mod: 24 | Performed by: STUDENT IN AN ORGANIZED HEALTH CARE EDUCATION/TRAINING PROGRAM

## 2025-01-28 PROCEDURE — 85027 COMPLETE CBC AUTOMATED: CPT | Performed by: PATHOLOGY

## 2025-01-28 PROCEDURE — 99605 MTMS BY PHARM NP 15 MIN: CPT | Mod: 93 | Performed by: PHARMACIST

## 2025-01-28 PROCEDURE — G2211 COMPLEX E/M VISIT ADD ON: HCPCS | Performed by: STUDENT IN AN ORGANIZED HEALTH CARE EDUCATION/TRAINING PROGRAM

## 2025-01-28 PROCEDURE — 84100 ASSAY OF PHOSPHORUS: CPT | Performed by: PATHOLOGY

## 2025-01-28 PROCEDURE — 99213 OFFICE O/P EST LOW 20 MIN: CPT | Performed by: STUDENT IN AN ORGANIZED HEALTH CARE EDUCATION/TRAINING PROGRAM

## 2025-01-28 PROCEDURE — 36415 COLL VENOUS BLD VENIPUNCTURE: CPT | Performed by: PATHOLOGY

## 2025-01-28 RX ORDER — TACROLIMUS 1 MG/1
2 CAPSULE ORAL EVERY 12 HOURS
Qty: 360 CAPSULE | Refills: 3 | Status: SHIPPED | OUTPATIENT
Start: 2025-01-28

## 2025-01-28 RX ORDER — CHOLECALCIFEROL (VITAMIN D3) 50 MCG
1 TABLET ORAL DAILY
COMMUNITY

## 2025-01-28 RX ORDER — TACROLIMUS 5 MG/1
5 CAPSULE ORAL 2 TIMES DAILY
Qty: 180 CAPSULE | Refills: 3 | Status: SHIPPED | OUTPATIENT
Start: 2025-01-28

## 2025-01-28 ASSESSMENT — PAIN SCALES - GENERAL
PAINLEVEL_OUTOF10: NO PAIN (0)
PAINLEVEL_OUTOF10: NO PAIN (0)

## 2025-01-28 NOTE — NURSING NOTE
Chief Complaint   Patient presents with    RECHECK       BP (!) 144/77   Pulse 72   Wt 63.6 kg (140 lb 1.9 oz)   SpO2 98%   BMI 24.05 kg/m      Carlton Ayon on 1/28/2025 at 11:58 AM

## 2025-01-28 NOTE — PATIENT INSTRUCTIONS
Hold bactrim for now   UA and UPCR today   Awaiting for tac levels  Repeat labs in a week, if not improving, will consider IV fluids and or biopsy.     
no

## 2025-01-28 NOTE — PROGRESS NOTES
[facility] Follow Up Visit    Date of Visit: January 28, 2025    Chief Complaint: Follow up liver transplant for ARLD    Subjective: Ms. Sutherland is a 54-year-old woman with a history of alcohol use disorder, alcohol-related liver disease, now s/p liver transplant, who returns for follow up. This is her first visit back to hepatology    Initial History:     She was first noted to have elevated liver enzymes with her primary care provider in the past.  Also had steatosis on ultrasound.  She told me that her primary care doctor told her to cut back on her drinking, it is not clear her doctor is aware that she was drinking 2 bottles of wine a day.  She states she tried but she was unable to.  She has been drinking heavily since her 30s.  She presented the end of November with jaundice.  At that time was found to have a bilirubin of 10.9.  Iron saturation was elevated at 90%.  Ferritin was 351.  She is an H63D heterozygote she had testing for autoimmune liver disease that was negative.  KEY is positive.  Ceruloplasmin is normal.  Alpha-1 antitrypsin is normal.  Peth was 230 on admission November 29.    She has been sober since his admission.  She is doing well with her sobriety.  She engaged in alcohol treatment through [facility].  She started doing outpatient treatment in the evenings after work, has graduated to lower intensity and is about to graduate from that.  She has not done any treatment before.  She is back to working full-time.  Overall is doing better since his admission but feels fatigued at times.    Swelling in legs worse after standing all day, better with exercise.  Taking Lasix and spironolactone    Hepatitis C antibody was - February 2023 and then + November 2023.  RNA was - November 2023.    She has a history of H. pylori infection and is had multiple positive breath test.  Stool test was - March 2024.    She works full-time.  She is not .  She has 6 kids ranging  16-27    She has an apparent history of eosinophilic esophagitis versus eosinophilia due to acid reflux.  She has had recurrent food impactions.  She had an EGD for food impaction in 2016.  Biopsy showed up to 70 eosinophils per high-power field.  She had a follow-up EGD in 2016 that had mucosal changes concerning for EOE.  Also showed grade D esophagitis.  The biopsies at this time showed eosinophils but less concerning for eosinophilic esophagitis.  Biopsies were taken she had an EGD in 2018 that showed steak in the lower third of esophagus.  They noted mucosal rings.  Also showed LA grade B esophagitis.  Esophageal biopsy showed mild esophagitis with fewer than 5 eosinophils per high-power field.  Biopsies showed H. Pylori. She has required dilation for esophageal stricture before.  EGD at the end of 2023 did not show findings concerning for eosinophilic esophagitis    DDLT 11/13/2024  A(1). LIVER AND GALLBLADDER, EXPLANT:  - Established cirrhosis; morphologically cryptogenic, clinically alcohol-related.  - Surgical margins of resection are unremarkable.  - Chronic cholecystitis with cholelithiasis; one reactive cystic lymph node (0/1).    Prophylaxis: viral (valganciclovir x 3 months), pneumocystis (Bactrim, on hold for RUDY)     Transplant coordinator: Barb Lazaro [phone]  Biliary stent: No  Donor status: DBD  CMV D -/ R +  EBV D +/ R+  Anticoagulation plan:  mg daily x6 months    Perihepatic hematoma post operatively  Had issues with RUDY post op, see nephrology    Back and work, but part time    Having stomach pain after eating, no nausea, vomiting. Some diarrhea. No dysphagia    Checking blood pressure at home - it is lower 129/85, 130/70    ROS: 14 point ROS negative except for positives noted in HPI.    PMHx:  No DM  No heart disease  No cancer  Asthma  HPV  Alcohol use disorder  Herniated lumbar disc  Meniscal tear  COVID  Depression  Concern for eosinophilic esophagitis    Past Surgical  History:   Procedure Laterality Date   ARTHROSCOPIC MENISCUS REPAIR, KNEE Right 2018   ARTHROSCOPIC SURGERY Left 2023   Meniscus procedue.   TOOTH ROOT REMOVAL     FamHx:  Heart disease - MGF  of MI at age 61, other family members with heart disease  MAunt Lung cancer  No family history of liver disease, liver cancer    SocHx:  Social History     Socioeconomic History    Marital status:      Spouse name: Not on file    Number of children: Not on file    Years of education: Not on file    Highest education level: Not on file   Occupational History    Not on file   Tobacco Use    Smoking status: Never    Smokeless tobacco: Never   Vaping Use    Vaping status: Never Used   Substance and Sexual Activity    Alcohol use: Not Currently     Comment: sober 23    Drug use: Never    Sexual activity: Not on file   Other Topics Concern    Not on file   Social History Narrative    Not on file     Social Drivers of Health     Financial Resource Strain: High Risk (2024)    Received from [employer] and [facility]    Overall Financial Resource Strain (CARDIA)     Difficulty of Paying Living Expenses: Hard   Food Insecurity: No Food Insecurity (2024)    [facility]    Hunger Vital Sign     Worried About Running Out of Food in the Last Year: Never true     Ran Out of Food in the Last Year: Never true   Recent Concern: Food Insecurity - High Risk (2024)    Food Insecurity     Within the past 12 months, did you worry that your food would run out before you got money to buy more?: Yes     Within the past 12 months, did the food you bought just not last and you didn t have money to get more?: Yes   Transportation Needs: No Transportation Needs (2024)    [facility] [employer] [facility]    Transportation Needs     In the past 12 months, has lack of transportation kept you from medical appointments, meetings, work, or from getting  medicines or things needed for daily living?: No   Physical Activity: Inactive (12/11/2024)    Received from [facility] [place]"    Exercise Vital Sign     Days of Exercise per Week: 0 days     Minutes of Exercise per Session: 0 min   Stress: No Stress Concern Present (12/11/2024)    Received from "[facility] [place]"    [employer] - Occupational Stress Questionnaire     Feeling of Stress : Only a little   Social Connections: Moderately Isolated (12/11/2024)    Received from "[employer] [facility] [place]"    Social Connection and Isolation Panel [NHANES]     Frequency of Communication with Friends and Family: More than three times a week     Frequency of Social Gatherings with Friends and Family: Never     Attends Yarsani Services: More than 4 times per year     Active Member of Clubs or Organizations: No     Attends Club or Organization Meetings: Never     Marital Status:    Interpersonal Safety: Not At Risk (12/11/2024)    Received from "[employer] [facility] [place]"    Humiliation, Afraid, Rape, and Kick questionnaire     Fear of Current or Ex-Partner: No     Emotionally Abused: No     Physically Abused: No     Sexually Abused: No   Housing Stability: Low Risk  (12/11/2024)    Received from "[employer] [place]"    Housing Stability Vital Sign     Unable to Pay for Housing in the Last Year: No     Number of Times Moved in the Last Year: 0     Homeless in the Last Year: No       Medications:  Current Outpatient Medications   Medication Sig Dispense Refill    acetaminophen (TYLENOL) 500 MG tablet Take 500 mg by mouth every 6 hours as needed for mild pain.      aspirin (ASA) 325 MG tablet Take 1 tablet (325 mg) by mouth daily. 30 tablet 4    calcium-vitamin D-vitamin K (VIACTIV) 500-500-40 MG-UNT-MCG CHEW Take 1 tablet by mouth 2 times daily.      escitalopram (LEXAPRO) 20 MG tablet Take 1 tablet (20 mg) by mouth daily. 30 tablet 0     magnesium oxide (MAG-OX) 400 MG tablet Take 400 mg by mouth 2 times daily      methocarbamol (ROBAXIN) 500 MG tablet Take 1 tablet (500 mg) by mouth 4 times daily as needed for muscle spasms. 20 tablet 1    methocarbamol (ROBAXIN) 750 MG tablet Take 1 tablet (750 mg) by mouth 4 times daily as needed for muscle spasms. 20 tablet 0    Multiple Vitamin (QUINTABS) TABS Take 1 tablet by mouth daily. 30 tablet 11    mycophenolic acid (GENERIC EQUIVALENT) 180 MG EC tablet Take 3 tablets (540 mg) by mouth 2 times daily. 540 tablet 0    naloxone (NARCAN) 4 MG/0.1ML nasal spray Spray 1 spray (4 mg) into one nostril alternating nostrils as needed for opioid reversal. every 2-3 minutes until assistance arrives 20 each 0    pantoprazole (PROTONIX) 40 MG EC tablet Take 1 tablet (40 mg) by mouth 2 times daily (before meals). 60 tablet 2    predniSONE (DELTASONE) 5 MG tablet Take 1 tablet (5 mg) by mouth daily. 30 tablet 2    sulfamethoxazole-trimethoprim (BACTRIM) 400-80 MG tablet Take 1 tablet by mouth Every Mon, Wed, Fri Morning. 30 tablet 1    tacrolimus (GENERIC EQUIVALENT) 1 MG capsule Take 3 capsules (3 mg) by mouth every 12 hours. Total dose 8 mg  capsule 3    tacrolimus (GENERIC EQUIVALENT) 5 MG capsule Take 1 capsule (5 mg) by mouth 2 times daily. Total dose 8 mg BID 60 capsule 11    traMADol (ULTRAM) 50 MG tablet Take 1 tablet (50 mg) by mouth every 6 hours as needed (for severe pain).      valGANciclovir (VALCYTE) 450 MG tablet Take 1 tablet (450 mg) by mouth Every Mon, Wed, Fri Morning. 15 tablet 0     No current facility-administered medications for this visit.     No OTCs, herbals    Allergies:  Allergies   Allergen Reactions    Penicillins Rash and Unknown     Has tolerated since    Latex Other (See Comments)     History of asthma.    Erythromycin Nausea and Vomiting     Has tolerated since this       Objective:  There were no vitals taken for this visit.  Constitutional: pleasant woman in NAD  Eyes:  icteric  Respiratory: Normal respiratory excursion   MSK: normal range of motion of visualized extremities  Abd: Non distended  Ext: 1+ edema  Skin: No jaundice  Psychiatric: normal mood and orientation    Labs: Reviewed in EHR - MELD 24    Imaging:    RUQ US 1/2024      IMPRESSION:   Mild surface nodularity of the liver suggestive of underlying cirrhosis.     No gross suspicious hepatic lesions.     Layering echogenic material within the gallbladder most consistent with   sludge/tiny calculi however, no pain was reported over the gallbladder during   scanning.    MRI liver 12/2023    FINDINGS:   Abdomen is obese, which decreases image sensitivity, and there is some   breathing motion artifact also.  Liver is enlarged measuring 20.8 cm   craniocaudally.  Diffuse fatty infiltration of the liver with mildly nodular   contour, but no focal mass or abnormal enhancement is seen in the liver.  No   restricted diffusion.         Splenomegaly, with spleen measuring 14.6 cm in length.  No focal masses.   Adrenals and pancreas are unremarkable.  Gallbladder is moderately distended   with some debris in the dependent portion.  No gallbladder wall thickening or   pericholecystic inflammation.  Aorta is normal in caliber.  Kidneys are normal   in size.  No hydroureteronephrosis.  Some cortical scarring in the left upper   pole is present.  Intra and extrahepatic bile ducts are nondilated.     IMPRESSION:   No definite focal hepatic abnormality.  Diffuse fatty infiltration of the   liver.  Hepatosplenomegaly.      Endoscopy:    EGD 11/2023      Findings:        The esophagus was normal.        Diffuse mildly congested mucosa was found in the entire examined        stomach. Biopsies were taken with a pediatric cold forceps for        Helicobacter pylori cultures (In saline) and stains (in formalin)        The first portion of the duodenum and second portion of the duodenum        were normal.     Impression:            - Normal  esophagus. No varices.                          - Congestive gastropathy. Biopsied.                          - Normal first portion of the duodenum and second                          portion of the duodenum.    Final Diagnosis     A. STOMACH, ANTRUM, BIOPSY  --CHRONIC, INACTIVE GASTRITIS  --POSITIVE FOR HELICOBACTER PYLORI              Colonoscopy 3/2024    Findings:        The perianal and digital rectal examinations were normal. Pertinent        negatives include normal sphincter tone, no palpable rectal lesions and        no anal lesion or abnormality.        A 6 mm polyp was found in the ascending colon. The polyp was sessile.        The polyp was removed with a hot snare. Resection and retrieval were        complete. To prevent bleeding considering her portal hypertension/plt,        one hemostatic clip was successfully placed. There was no bleeding at        the end of the procedure.        #2, 3 to 7 mm polyp was found in the descending colon. The polyp were        sessile. The 3mm polyp was removed with a jumbo cold forceps, mild        continous bleeding that stopped with clip. The 7mm polyp was removed        with a hot snare. Resection and retrieval were complete. To prevent        bleeding post-intervention, another hemostatic clips was successfully        placed. There was no bleeding at the end of the procedure.        A 20 mm polyp was found in the sigmoid colon at about 50cm from the anal        verge. The polyp was pedunculated. The polyp was removed with a hot        snare. Resection and retrieval were complete. Mild continous bleeding.        Area was successfully injected with 2 mL of a 0.1 mg/mL solution of        epinephrine for hemostasis. To prevent bleeding post-intervention, three        hemostatic clips were successfully placed. There was no bleeding at the        end of the procedure. Area was successfully injected with 2 mL Spot        (carbon black) for tattooing.     Impression:             - One 6 mm polyp in the ascending colon, removed with                          a hot snare. Resected and retrieved. Clip was placed.                          - One 3 to 7 mm polyp in the descending colon, removed                          with a jumbo cold forceps and removed with a hot                          snare. Resected and retrieved. Clips were placed.                          - One 20 mm polyp in the sigmoid colon, removed with a                          hot snare. Resected and retrieved. Injected. Clips                          were placed.       Final Diagnosis     A. COLON, ASCENDING/RIGHT, BIOPSY  --TUBULAR ADENOMA     B. COLON, DESCENDING/LEFT, BIOPSY  --TUBULAR ADENOMAS     C. COLON, SIGMOID, BIOPSY  --TUBULOVILLOUS ADENOMA, INKED MARGIN UNINVOLVED           Independently reviewed labs and imaging.     Assessment/Plan:  Ms. Sutherland is a 54-year-old woman with a history of alcohol use disorder and compensated alcohol-related liver disease, currently listed for transplant, who returns for follow up.     She is s/p DDLT 11/13/2024. Doing well post op - no rejection, biliary strictures. Having issues with elevated creatinine post transplant    Doing well with sobriety.     - Continue tacrolimus 8 mg BID - goal 6-8  - Start myfortic wean at 3 months  - Continue prednisone 5 mg daily - may use lower tacrolimus with prednisone for renal sparing. Discussed importance of good blood pressure control and hydration  - Annual skin exam  - stay up to date with paps, mammogram  - Doing well with sobriety  - EGD this week with gastric bx to evaluate history of HP and recurrent abdominal pain  -  mg daily x 6 months  - Valcyte x 3 months  - bactrim x 6 months     RTC in 6 months       Dr. Elisa Santiago MD  Transplant Hepatology    The longitudinal plan of care for the diagnosis(es)/condition(s) as documented were addressed during this visit. Due to the added complexity in care, I will continue to support  Hannah in the subsequent management and with ongoing continuity of care.

## 2025-01-28 NOTE — PROGRESS NOTES
TRANSPLANT NEPHROLOGY CLINIC VISIT     Assessment & Plan   # RUDY: CKD Stage 3a - Variable. Creatinine variable in setting of hemodynamic changes given watery Bms and decreased water intake and being on tacrolimus. Repeat UA, UPCR and also urine sodium today. Urine and UPCR was WNL. Urine sodium is pending, if < 20, will arrange for IV fluids.    - Pre-transplant baseline Creatinine: ~ 0.6-0.9, likely been at 1.5-2 range for past few checks.   - Proteinuria: Normal (<0.2 grams)   - Kidney Tx Biopsy Hx: No biopsy history.    # Liver Tx: She was hospitalized in November 2023 for acute hepatitis 2/2 to alcohol use at which time she was diagnosed with underlying cirrhosis.   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    - Transaminases stable.     # Immunosuppression: Tacrolimus immediate release (goal as per liver transplant), Mycophenolic acid (dose 540 mg every 12 hours), and Prednisone (dose 5 mg daily)   - Induction with Recent Transplant:  Per Liver Tx Protocol   - Continue with intensive monitoring of immunosuppression for efficacy and toxicity.   - Historical Changes in Immunosuppression:  Lower tacrolimus level due to RUDY.   - Changes: Not at this time, but given anemia consider running Tacrolimus not higher than 6 microgram/L as her free exposure is likely higher.     # Infection Prevention:  Estimated Creatinine Clearance: 32.5 mL/min (A) (based on SCr of 2.03 mg/dL (H)).  - PJP: Sulfa/TMP (Bactrim) 400-80 mg every day. Hold this weekend as we await the kidney function to improve. Her CrCl is likely < 30 at this time given her muscle mass.  - CMV: Valganciclovir (Valcyte) 450 mg every day.   - Fungal: None      - CMV IgG Ab High Risk Discordance (D+/R-): No  CMV Serostatus: Positive  - EBV IgG Ab High Risk Discordance (D+/R-): No  EBV Serostatus: Positive    # Blood Pressure: Controlled;  Goal BP: > 100, but < 130 systolic   - Changes: Not at this time    # Anemia in Chronic  Disease and loss: Hgb: Stable, low      MAGUI: No   - Iron studies: Low iron saturation   - Not on iron.    # Mineral Bone Disorder:    - Secondary renal hyperparathyroidism; PTH level: Not checked recently        On treatment: None  - Vitamin D; level: Low normal        On supplement: Yes  - Calcium; level: Normal        On supplement: Yes calcium-vitamin d-vitamin k 500-500-40 mg twice a day.  - Phosphorus; level: Normal        On supplement: No    # Electrolytes:   - Potassium; level: Normal        On supplement: No  - Magnesium; level: Low        On supplement: Yes magnesium oxide 400 mg twice a day.  - Bicarbonate; level: Low        On supplement: No    # Hyponatremia: It had dropped down to 130 mmol/L at outside labs, but its back to 134 today. Likely due to decrease osmolar intake and increase fluid intake of up to 80 oz in the last few days.     # Other Significant PMH:  - Right pleural effusion: Noted on US 11/15, small volume.   - Prolonged QTc: QTc 496 on EKG 11/13.   - Depression: Lexapro resumed 11/17 (after Qtc improved).      # Transplant History:  Etiology of Live Failure: alcohol-related liver disease.  Tx: Liver Tx  Transplant: 11/13/2024 (Liver)  Significant transplant-related complications: None    Transplant Office Phone Number: [phone]    Assessment and plan was discussed with the patient and she voiced her understanding and agreement.    Return visit: Return in about 2 months (around 3/28/2025).    Clary Emerson MD    The longitudinal plan of care for the diagnosis(es)/condition(s) as documented were addressed during this visit. Due to the added complexity in care, I will continue to support Hannah in the subsequent management and with ongoing continuity of care.      Chief Complaint   Ms. Sutherland is a 54 year old here for RUDY and elevated creatinine.     History of Present Illness    Ms. Sutherland reports feeling stable overall. Likely decreased po intake over the weekend.  Since last  clinic visit:   Hospitalizations: No   New Medical Issues: No  Chest pain or shortness of breath: No  Lower extremity swelling: No  Weight change: No  Nausea and vomiting: No  Diarrhea: Yes, 3 watery Bms per day on average  Heartburn symptoms: No  Fever, sweats or chills: No  Urinary complaints: No    Home BP:  130's systolic      Problem List   Patient Active Problem List   Diagnosis    Abnormal INR    Abnormal Pap smear of cervix    Alcoholic cirrhosis (H)    Backache    Chronic gastritis    Chronic liver failure (H)    Chronic pain of left knee    Depression    Diarrhea    Dysphagia    Elevated bilirubin    Elevated liver function tests    Eosinophilic esophagitis    Alcohol dependence, uncomplicated (H)    GERD (gastroesophageal reflux disease)    Hyperlipidemia    Hypomagnesemia    Arthritis    Inflammatory liver disease    Intermittent asthma    Motion sickness    Perimenopausal    Portal hypertension (H)    Cough    Seasonal allergies    Thrombocytopenia    Vision loss    Vitamin D deficiency    End stage liver disease (H)    Immunosuppressed status    Liver replaced by transplant (H)    RUDY (acute kidney injury)    Hyponatremia       Allergies   Allergies   Allergen Reactions    Penicillins Rash and Unknown     Has tolerated since    Latex Other (See Comments)     History of asthma.    Erythromycin Nausea and Vomiting     Has tolerated since this       Medications   Current Outpatient Medications   Medication Sig Dispense Refill    acetaminophen (TYLENOL) 500 MG tablet Take 500 mg by mouth every 6 hours as needed for mild pain.      aspirin (ASA) 325 MG tablet Take 1 tablet (325 mg) by mouth daily. 30 tablet 4    escitalopram (LEXAPRO) 20 MG tablet Take 1 tablet (20 mg) by mouth daily. 30 tablet 0    magnesium oxide (MAG-OX) 400 MG tablet Take 400 mg by mouth 2 times daily      methocarbamol (ROBAXIN) 500 MG tablet Take 1 tablet (500 mg) by mouth 4 times daily as needed for muscle spasms. 20 tablet 1     Multiple Vitamin (QUINTABS) TABS Take 1 tablet by mouth daily. 30 tablet 11    mycophenolic acid (GENERIC EQUIVALENT) 180 MG EC tablet Take 3 tablets (540 mg) by mouth 2 times daily. (Patient taking differently: Take 360 mg by mouth 3 times daily.) 540 tablet 0    naloxone (NARCAN) 4 MG/0.1ML nasal spray Spray 1 spray (4 mg) into one nostril alternating nostrils as needed for opioid reversal. every 2-3 minutes until assistance arrives 20 each 0    pantoprazole (PROTONIX) 40 MG EC tablet Take 1 tablet (40 mg) by mouth 2 times daily (before meals). 60 tablet 2    predniSONE (DELTASONE) 5 MG tablet Take 1 tablet (5 mg) by mouth daily. 30 tablet 2    sulfamethoxazole-trimethoprim (BACTRIM) 400-80 MG tablet Take 1 tablet by mouth Every Mon, Wed, Fri Morning. 30 tablet 1    tacrolimus (GENERIC EQUIVALENT) 1 MG capsule Take 3 capsules (3 mg) by mouth every 12 hours. Total dose 8 mg  capsule 3    tacrolimus (GENERIC EQUIVALENT) 5 MG capsule Take 1 capsule (5 mg) by mouth 2 times daily. Total dose 8 mg BID 60 capsule 11    valGANciclovir (VALCYTE) 450 MG tablet Take 1 tablet (450 mg) by mouth Every Mon, Wed, Fri Morning. 15 tablet 0    vitamin D3 (CHOLECALCIFEROL) 50 mcg (2000 units) tablet Take 1 tablet by mouth daily.       No current facility-administered medications for this visit.     There are no discontinued medications.    Physical Exam   Vital Signs: BP (!) 144/77   Pulse 72   Wt 63.6 kg (140 lb 1.9 oz)   SpO2 98%   BMI 24.05 kg/m      GENERAL APPEARANCE: alert and no distress  HENT: mouth without ulcers or lesions  RESP: lungs clear to auscultation - no rales, rhonchi or wheezes  CV: regular rhythm, normal rate, no rub, no murmur  EDEMA: no LE edema bilaterally  ABDOMEN: soft, nondistended, nontender,   MS: extremities normal - no gross deformities noted  SKIN: no rash  DIALYSIS ACCESS: none    Data         Latest Ref Rng & Units 1/28/2025     9:21 AM 1/22/2025     8:15 AM 1/14/2025     8:39 AM   Renal    Sodium 135 - 145 mmol/L 135      Na (external) 136 - 146 mmol/L  136  135    K 3.4 - 5.3 mmol/L 4.6      K (external) 3.5 - 5.1 mmol/L  4.2  3.9    Cl 98 - 107 mmol/L 105  108  104    Cl (external) 98 - 107 mmol/L 105  108  104    CO2 22 - 29 mmol/L 21      CO2 (external) 22 - 29 mmol/L  20  21    Urea Nitrogen 6.0 - 20.0 mg/dL 28.7      BUN (external) 10.0 - 20.0 mg/dL  23.3  23.2    Creatinine 0.51 - 0.95 mg/dL 2.03      Cr (external) 0.57 - 1.11 mg/dL  1.5  1.68    Glucose 70 - 99 mg/dL 93      Glucose (external) 70 - 100 mg/dL  88  86    Calcium 8.8 - 10.4 mg/dL 9.4      Ca (external) 8.6 - 10.5 mg/dL  9.3  9.3    Magnesium 1.7 - 2.3 mg/dL 1.6      Mg (external) 1.8 - 2.6 mg/dL  1.5  1.4          Latest Ref Rng & Units 1/28/2025     9:21 AM 1/22/2025     8:15 AM 1/14/2025     8:39 AM   Bone Health   Phosphorus 2.5 - 4.5 mg/dL 3.8      Phos (external) 2.9 - 5.2 mg/dL  3.6  3.8          Latest Ref Rng & Units 1/28/2025     9:21 AM 1/22/2025     8:15 AM 1/14/2025     8:39 AM   Heme   WBC 4.0 - 11.0 10e3/uL 2.5      WBC (external) 3.7 - 12.1 10(3)/uL  2.2  2.4    Hgb 11.7 - 15.7 g/dL 9.4      Hgb (external) 11.2 - 15.8 g/dL  9.5  9.3    Plt 150 - 450 10e3/uL 109      Plt (external) 179 - 450 10(3)/uL  128  138          Latest Ref Rng & Units 1/28/2025     9:21 AM 1/22/2025     8:15 AM 1/14/2025     8:39 AM   Liver   AP 40 - 150 U/L 63      AP (external) 50 - 136 U/L  61  58  C   TBili <=1.2 mg/dL 0.3      TBili (external) 0.2 - 1.2 mg/dL  0.4  0.4  C   Bilirubin Direct 0.00 - 0.30 mg/dL <0.20      DBili (external) 0.0 - 0.5 mg/dL  0.2  0.2  C   ALT 0 - 50 U/L 8      ALT (external) 8 - 45 U/L  9  10  C   AST 0 - 45 U/L 18      AST (external) 5 - 41 U/L  16  15  C   Tot Protein 6.4 - 8.3 g/dL 6.7      Tot Protein (external) 6.0 - 8.0 g/dL  6.6  6.6  C   Albumin 3.5 - 5.2 g/dL 4.1      Albumin (external) 3.5 - 5.0 g/dL  3.9  4.0  C      C Corrected result         Latest Ref Rng & Units 11/14/2024     4:04 AM  11/13/2024     1:16 AM 5/21/2024     7:57 AM   Pancreas   A1C <5.7 %   4.6    Amylase 28 - 100 U/L 51  92     Lipase (Roche) 13 - 60 U/L 26            Latest Ref Rng & Units 12/20/2024    12:06 PM 5/21/2024     7:57 AM   Iron studies   Iron 37 - 145 ug/dL 31  47    Iron Sat Index 15 - 46 % 16  13    Ferritin 11 - 328 ng/mL 735  31          Latest Ref Rng & Units 11/13/2024     1:16 AM 5/21/2024     7:57 AM   [facility] Virology   EBV CAPSID ANTIBODY IGG No detectable antibody. Positive  Positive      Failed to redirect to the Timeline version of the REVFS SmartLink.  Recent Labs   Lab Test 01/09/25  0935 01/14/25  0839 01/22/25  0808   DOSTAC 1/8/2025 1/13/2025 1/21/2025   TACROL 5.4 7.5 6.9       Prescription drug management  42 minutes spent by me on the date of the encounter doing chart review, history and exam, documentation and further activities per the note

## 2025-01-28 NOTE — PATIENT INSTRUCTIONS
"Recommendations from today's MTM visit:                                                      Start Vitamin D3 50mcg (2000 international unit(s)) once daily.     Follow-up: as needed     It was great speaking with you today.  I value your experience and would be very thankful for your time in providing feedback in our clinic survey. In the next few days, you may receive an email or text message from [employer] with a link to a survey related to your  clinical pharmacist.\"     To schedule another MTM appointment, please call the clinic directly or you may call the MTM scheduling line at [phone] or toll-free at [phone].     My Clinical Pharmacist's contact information:                                                      Please feel free to contact me with any questions or concerns you have.      Ray Keen, PharmD  MTM Pharmacist    Phone: [phone]     "

## 2025-01-28 NOTE — PROGRESS NOTES
Medication Therapy Management (MTM) Encounter    ASSESSMENT:                            Medication Adherence/Access: No issues identified.    Liver Transplant:    Stable.     Supplements:   Hx of low vitamin D levels, recommend supplementing.     GERD    Stable. Should improve as IMS meds are tapered.      Pain:   Stable.     PLAN:                            Start Vitamin D3 50mcg (2000 international unit(s)) once daily.     Follow-up: as needed    SUBJECTIVE/OBJECTIVE:                          Hannah Sutherland is a 54 year old female seen for a follow-up visit.       Reason for visit: 2-3 months post transplant.    Allergies/ADRs: Reviewed in chart  Past Medical History: Reviewed in chart  Tobacco: She reports that she has never smoked. She has never used smokeless tobacco.  Alcohol: not currently using    Medication Adherence/Access: no issues reported.    Liver Transplant:    Tacrolimus 8 mg twice daily  Mycophenolic acid 360 mg 3 times daily  Prednisone 5 mg every morning.   Pt reports her stomach hurts after eating and taking meds.   Transplant date: 11/13/24  Vascular Prophylaxis: Aspirin 325mg daily. Denies gastrointestinal bleed sx.   CMV prophylaxis: Valcyte 450 mg three times per week Treat 3 months post tx.  Valcyte birth defects discussed: Yes   PJP prophylaxis: Bactrim SS  three times per week 6 months post txp  Tx Coordinator: Lesley Mcgarry MD: Dr. Carney, Dr. Santiago, Using Med Card: Yes  Immunizations: annual flu shot 2024; [id number]:  2021; Prevnar 20: 2024; TDaP:  2020; Shingrix: x2, HBV: immunity, COVID: Primary x 2, Bivalent x 1, and 23-24x2, 24-25 x 1    Estimated Creatinine Clearance: 29.7 mL/min (A) (based on SCr of 2.09 mg/dL (H)).     Supplements:   Mag Oxide 400mg twice daily  ( 2 hours from MMF)  Not taking Calcium/D   MVI daily  Lab Results   Component Value Date    MAG 1.5 (L) 12/23/2024     GERD    Pantoprazole 40 mg twice daily   Tums PRN  Patient reports stomach pain  after eating, has to take meds with food.   Patient feels that current regimen is effective.     Pain:   Acetaminophen upper back pain. Once daily, to twice a week.   Methocarbamol 500mg for breakthrough pain.   Pain is 0-10.    Today's Vitals: There were no vitals taken for this visit.  ----------------    I spent 11 minutes with this patient today. All changes were made via collaborative practice agreement with Dr. Santiago.     A summary of these recommendations was sent via [employer].    Mary MorenoD  [facility] Pharmacist    Phone: [phone]     Telemedicine Visit Details  The patient's medications can be safely assessed via a telemedicine encounter.  Type of service:  Telephone visit  Originating Location (pt. Location): Home    Distant Location (provider location):  On-site  Start Time: 9:34 AM  End Time: 9:45 AM     Medication Therapy Recommendations  Takes dietary supplements   1 Rationale: Untreated condition - Needs additional medication therapy - Indication   Recommendation: Start Medication - Vitamin D3 25 MCG (1000 UT) Chew   Status: Accepted - no CPA Needed   Identified Date: 1/28/2025 Completed Date: 1/28/2025

## 2025-01-28 NOTE — NURSING NOTE
Chief Complaint   Patient presents with    RECHECK       BP (!) 144/77   Pulse 72   Wt 63.6 kg (140 lb 1.9 oz)   SpO2 98%   BMI 24.05 kg/m      Carlton Ayon on 1/28/2025 at 11:04 AM

## 2025-01-28 NOTE — LETTER
1/28/2025      Hannah Sutherland  [address]  [facility] [address]      Dear Colleague,    Thank you for referring your patient, Hannah Sutherland, to the [facility] [place]. Please see a copy of my visit note below.    [facility] Follow Up Visit    Date of Visit: January 28, 2025    Chief Complaint: Follow up liver transplant for ARLD    Subjective: Ms. Sutherland is a 54-year-old woman with a history of alcohol use disorder, alcohol-related liver disease, now s/p liver transplant, who returns for follow up. This is her first visit back to hepatology    Initial History:     She was first noted to have elevated liver enzymes with her primary care provider in the past.  Also had steatosis on ultrasound.  She told me that her primary care doctor told her to cut back on her drinking, it is not clear her doctor is aware that she was drinking 2 bottles of wine a day.  She states she tried but she was unable to.  She has been drinking heavily since her 30s.  She presented the end of November with jaundice.  At that time was found to have a bilirubin of 10.9.  Iron saturation was elevated at 90%.  Ferritin was 351.  She is an H63D heterozygote she had testing for autoimmune liver disease that was negative.  KEY is positive.  Ceruloplasmin is normal.  Alpha-1 antitrypsin is normal.  Peth was 230 on admission November 29.    She has been sober since his admission.  She is doing well with her sobriety.  She engaged in alcohol treatment through [facility].  She started doing outpatient treatment in the evenings after work, has graduated to lower intensity and is about to graduate from that.  She has not done any treatment before.  She is back to working full-time.  Overall is doing better since his admission but feels fatigued at times.    Swelling in legs worse after standing all day, better with exercise.  Taking Lasix and spironolactone    Hepatitis C  antibody was - February 2023 and then + November 2023.  RNA was - November 2023.    She has a history of H. pylori infection and is had multiple positive breath test.  Stool test was - March 2024.    She works full-time.  She is not .  She has 6 kids ranging 16-27    She has an apparent history of eosinophilic esophagitis versus eosinophilia due to acid reflux.  She has had recurrent food impactions.  She had an EGD for food impaction in 2016.  Biopsy showed up to 70 eosinophils per high-power field.  She had a follow-up EGD in 2016 that had mucosal changes concerning for EOE.  Also showed grade D esophagitis.  The biopsies at this time showed eosinophils but less concerning for eosinophilic esophagitis.  Biopsies were taken she had an EGD in 2018 that showed steak in the lower third of esophagus.  They noted mucosal rings.  Also showed LA grade B esophagitis.  Esophageal biopsy showed mild esophagitis with fewer than 5 eosinophils per high-power field.  Biopsies showed H. Pylori. She has required dilation for esophageal stricture before.  EGD at the end of 2023 did not show findings concerning for eosinophilic esophagitis    DDLT 11/13/2024  A(1). LIVER AND GALLBLADDER, EXPLANT:  - Established cirrhosis; morphologically cryptogenic, clinically alcohol-related.  - Surgical margins of resection are unremarkable.  - Chronic cholecystitis with cholelithiasis; one reactive cystic lymph node (0/1).    Prophylaxis: viral (valganciclovir x 3 months), pneumocystis (Bactrim, on hold for RUDY)     Transplant coordinator: Barb Lazaro [phone]  Biliary stent: No  Donor status: DBD  CMV D -/ R +  EBV D +/ R+  Anticoagulation plan:  mg daily x6 months    Perihepatic hematoma post operatively  Had issues with RUDY post op, see nephrology    Back and work, but part time    Having stomach pain after eating, no nausea, vomiting. Some diarrhea. No dysphagia    Checking blood pressure at home - it is lower 129/85,  130/70    ROS: 14 point ROS negative except for positives noted in HPI.    PMHx:  No DM  No heart disease  No cancer  Asthma  HPV  Alcohol use disorder  Herniated lumbar disc  Meniscal tear  COVID  Depression  Concern for eosinophilic esophagitis    Past Surgical History:   Procedure Laterality Date   ARTHROSCOPIC MENISCUS REPAIR, KNEE Right 2018   ARTHROSCOPIC SURGERY Left 2023   Meniscus procedue.   TOOTH ROOT REMOVAL     FamHx:  Heart disease - MGF  of MI at age 61, other family members with heart disease  MAunt Lung cancer  No family history of liver disease, liver cancer    SocHx:  Social History     Socioeconomic History     Marital status:      Spouse name: Not on file     Number of children: Not on file     Years of education: Not on file     Highest education level: Not on file   Occupational History     Not on file   Tobacco Use     Smoking status: Never     Smokeless tobacco: Never   Vaping Use     Vaping status: Never Used   Substance and Sexual Activity     Alcohol use: Not Currently     Comment: sober 23     Drug use: Never     Sexual activity: Not on file   Other Topics Concern     Not on file   Social History Narrative     Not on file     Social Drivers of Health     Financial Resource Strain: High Risk (2024)    Received from [facility]    Overall Financial Resource Strain (CARDIA)      Difficulty of Paying Living Expenses: Hard   Food Insecurity: No Food Insecurity (2024)    Received from [facility]    Hunger Vital Sign      Worried About Running Out of Food in the Last Year: Never true      Ran Out of Food in the Last Year: Never true   Recent Concern: Food Insecurity - High Risk (2024)    Food Insecurity      Within the past 12 months, did you worry that your food would run out before you got money to buy more?: Yes      Within the past 12 months, did the food you bought just not last and you didn t have  money to get more?: Yes   Transportation Needs: No Transportation Needs (12/11/2024)    [facility]    Transportation Needs      In the past 12 months, has lack of transportation kept you from medical appointments, meetings, work, or from getting medicines or things needed for daily living?: No   Physical Activity: Inactive (12/11/2024)    Received from [facility]    Exercise Vital Sign      Days of Exercise per Week: 0 days      Minutes of Exercise per Session: 0 min   Stress: No Stress Concern Present (12/11/2024)    Received from [facility]    [place] citizen [place] [employer] - Occupational Stress Questionnaire      Feeling of Stress : Only a little   Social Connections: Moderately Isolated (12/11/2024)    Received from [facility]    Social Connection and Isolation Panel [NHANES]      Frequency of Communication with Friends and Family: More than three times a week      Frequency of Social Gatherings with Friends and Family: Never      Attends Muslim Services: More than 4 times per year      Active Member of Clubs or Organizations: No      Attends Club or Organization Meetings: Never      Marital Status:    Interpersonal Safety: Not At Risk (12/11/2024)    [facility]    Humiliation, Afraid, Rape, and Kick questionnaire      Fear of Current or Ex-Partner: No      Emotionally Abused: No      Physically Abused: No      Sexually Abused: No   Housing Stability: Low Risk  (12/11/2024)    [facility]    Housing Stability Vital Sign      Unable to Pay for Housing in the Last Year: No      Number of Times Moved in the Last Year: 0      Homeless in the Last Year: No       Medications:  Current Outpatient Medications   Medication Sig Dispense Refill     acetaminophen (TYLENOL) 500 MG tablet Take 500 mg by mouth every 6 hours as needed  for mild pain.       aspirin (ASA) 325 MG tablet Take 1 tablet (325 mg) by mouth daily. 30 tablet 4     calcium-vitamin D-vitamin K (VIACTIV) 500-500-40 MG-UNT-MCG CHEW Take 1 tablet by mouth 2 times daily.       escitalopram (LEXAPRO) 20 MG tablet Take 1 tablet (20 mg) by mouth daily. 30 tablet 0     magnesium oxide (MAG-OX) 400 MG tablet Take 400 mg by mouth 2 times daily       methocarbamol (ROBAXIN) 500 MG tablet Take 1 tablet (500 mg) by mouth 4 times daily as needed for muscle spasms. 20 tablet 1     methocarbamol (ROBAXIN) 750 MG tablet Take 1 tablet (750 mg) by mouth 4 times daily as needed for muscle spasms. 20 tablet 0     Multiple Vitamin (QUINTABS) TABS Take 1 tablet by mouth daily. 30 tablet 11     mycophenolic acid (GENERIC EQUIVALENT) 180 MG EC tablet Take 3 tablets (540 mg) by mouth 2 times daily. 540 tablet 0     naloxone (NARCAN) 4 MG/0.1ML nasal spray Spray 1 spray (4 mg) into one nostril alternating nostrils as needed for opioid reversal. every 2-3 minutes until assistance arrives 20 each 0     pantoprazole (PROTONIX) 40 MG EC tablet Take 1 tablet (40 mg) by mouth 2 times daily (before meals). 60 tablet 2     predniSONE (DELTASONE) 5 MG tablet Take 1 tablet (5 mg) by mouth daily. 30 tablet 2     sulfamethoxazole-trimethoprim (BACTRIM) 400-80 MG tablet Take 1 tablet by mouth Every Mon, Wed, Fri Morning. 30 tablet 1     tacrolimus (GENERIC EQUIVALENT) 1 MG capsule Take 3 capsules (3 mg) by mouth every 12 hours. Total dose 8 mg  capsule 3     tacrolimus (GENERIC EQUIVALENT) 5 MG capsule Take 1 capsule (5 mg) by mouth 2 times daily. Total dose 8 mg BID 60 capsule 11     traMADol (ULTRAM) 50 MG tablet Take 1 tablet (50 mg) by mouth every 6 hours as needed (for severe pain).       valGANciclovir (VALCYTE) 450 MG tablet Take 1 tablet (450 mg) by mouth Every Mon, Wed, Fri Morning. 15 tablet 0     No current facility-administered medications for this visit.     No OTCs,  herbals    Allergies:  Allergies   Allergen Reactions     Penicillins Rash and Unknown     Has tolerated since     Latex Other (See Comments)     History of asthma.     Erythromycin Nausea and Vomiting     Has tolerated since this       Objective:  There were no vitals taken for this visit.  Constitutional: pleasant woman in NAD  Eyes: icteric  Respiratory: Normal respiratory excursion   MSK: normal range of motion of visualized extremities  Abd: Non distended  Ext: 1+ edema  Skin: No jaundice  Psychiatric: normal mood and orientation    Labs: Reviewed in EHR - MELD 24    Imaging:    RUQ US 1/2024      IMPRESSION:   Mild surface nodularity of the liver suggestive of underlying cirrhosis.     No gross suspicious hepatic lesions.     Layering echogenic material within the gallbladder most consistent with   sludge/tiny calculi however, no pain was reported over the gallbladder during   scanning.    MRI liver 12/2023    FINDINGS:   Abdomen is obese, which decreases image sensitivity, and there is some   breathing motion artifact also.  Liver is enlarged measuring 20.8 cm   craniocaudally.  Diffuse fatty infiltration of the liver with mildly nodular   contour, but no focal mass or abnormal enhancement is seen in the liver.  No   restricted diffusion.         Splenomegaly, with spleen measuring 14.6 cm in length.  No focal masses.   Adrenals and pancreas are unremarkable.  Gallbladder is moderately distended   with some debris in the dependent portion.  No gallbladder wall thickening or   pericholecystic inflammation.  Aorta is normal in caliber.  Kidneys are normal   in size.  No hydroureteronephrosis.  Some cortical scarring in the left upper   pole is present.  Intra and extrahepatic bile ducts are nondilated.     IMPRESSION:   No definite focal hepatic abnormality.  Diffuse fatty infiltration of the   liver.  Hepatosplenomegaly.      Endoscopy:    EGD 11/2023      Findings:        The esophagus was normal.         Diffuse mildly congested mucosa was found in the entire examined        stomach. Biopsies were taken with a pediatric cold forceps for        Helicobacter pylori cultures (In saline) and stains (in formalin)        The first portion of the duodenum and second portion of the duodenum        were normal.     Impression:            - Normal esophagus. No varices.                          - Congestive gastropathy. Biopsied.                          - Normal first portion of the duodenum and second                          portion of the duodenum.    Final Diagnosis     A. STOMACH, ANTRUM, BIOPSY  --CHRONIC, INACTIVE GASTRITIS  --POSITIVE FOR HELICOBACTER PYLORI              Colonoscopy 3/2024    Findings:        The perianal and digital rectal examinations were normal. Pertinent        negatives include normal sphincter tone, no palpable rectal lesions and        no anal lesion or abnormality.        A 6 mm polyp was found in the ascending colon. The polyp was sessile.        The polyp was removed with a hot snare. Resection and retrieval were        complete. To prevent bleeding considering her portal hypertension/plt,        one hemostatic clip was successfully placed. There was no bleeding at        the end of the procedure.        #2, 3 to 7 mm polyp was found in the descending colon. The polyp were        sessile. The 3mm polyp was removed with a jumbo cold forceps, mild        continous bleeding that stopped with clip. The 7mm polyp was removed        with a hot snare. Resection and retrieval were complete. To prevent        bleeding post-intervention, another hemostatic clips was successfully        placed. There was no bleeding at the end of the procedure.        A 20 mm polyp was found in the sigmoid colon at about 50cm from the anal        verge. The polyp was pedunculated. The polyp was removed with a hot        snare. Resection and retrieval were complete. Mild continous bleeding.        Area was  successfully injected with 2 mL of a 0.1 mg/mL solution of        epinephrine for hemostasis. To prevent bleeding post-intervention, three        hemostatic clips were successfully placed. There was no bleeding at the        end of the procedure. Area was successfully injected with 2 mL Spot        (carbon black) for tattooing.     Impression:            - One 6 mm polyp in the ascending colon, removed with                          a hot snare. Resected and retrieved. Clip was placed.                          - One 3 to 7 mm polyp in the descending colon, removed                          with a jumbo cold forceps and removed with a hot                          snare. Resected and retrieved. Clips were placed.                          - One 20 mm polyp in the sigmoid colon, removed with a                          hot snare. Resected and retrieved. Injected. Clips                          were placed.       Final Diagnosis     A. COLON, ASCENDING/RIGHT, BIOPSY  --TUBULAR ADENOMA     B. COLON, DESCENDING/LEFT, BIOPSY  --TUBULAR ADENOMAS     C. COLON, SIGMOID, BIOPSY  --TUBULOVILLOUS ADENOMA, INKED MARGIN UNINVOLVED           Independently reviewed labs and imaging.     Assessment/Plan:  Ms. Sutherland is a 54-year-old woman with a history of alcohol use disorder and compensated alcohol-related liver disease, currently listed for transplant, who returns for follow up.     She is s/p DDLT 11/13/2024. Doing well post op - no rejection, biliary strictures. Having issues with elevated creatinine post transplant    Doing well with sobriety.     - Continue tacrolimus 8 mg BID - goal 6-8  - Start myfortic wean at 3 months  - Continue prednisone 5 mg daily - may use lower tacrolimus with prednisone for renal sparing. Discussed importance of good blood pressure control and hydration  - Annual skin exam  - stay up to date with paps, mammogram  - Doing well with sobriety  - EGD this week with gastric bx to evaluate history of HP and  recurrent abdominal pain  -  mg daily x 6 months  - Valcyte x 3 months  - bactrim x 6 months     RTC in 6 months       Dr. Elisa Santiago MD  Transplant Hepatology    The longitudinal plan of care for the diagnosis(es)/condition(s) as documented were addressed during this visit. Due to the added complexity in care, I will continue to support Hannah in the subsequent management and with ongoing continuity of care.          Again, thank you for allowing me to participate in the care of your patient.        Sincerely,        Elisa Santiago MD    Electronically signed

## 2025-01-28 NOTE — TELEPHONE ENCOUNTER
ISSUE:   Tacrolimus IR level 7.6 on 1/28 at approximately 13 hours, goal 6-8, dose 8 mg BID.    PLAN:   Call Patient and confirm this was an accurate 12-hour trough.   Verify Tacrolimus IR dose 8 mg BID.   Confirm no new medications or or missed doses.   Confirm no new illness / infection / diarrhea.   If accurate trough and accurate dose, decrease Tacrolimus IR dose to 7 mg BID     Is this more than a 50% increase or decrease in current IS dose: No    Repeat labs in 1 weeks.     Barb Lazaro RN      OUTCOME:   Spoke with Patient, they confirm accurate trough level and current dose 8 mg BID.   Patient confirmed dose change to 7 mg BID.  Patient agreed to repeat labs in 1 weeks.   Orders sent to preferred pharmacy for dose change and lab for repeat labs.   Patient voiced understanding of plan.     Georgina Holloway LPN

## 2025-01-28 NOTE — LETTER
1/28/2025      Hannah Sutherland  [address]  [place] [address]      Dear Colleague,    Thank you for referring your patient, Hannah Sutherland, to the [facility]. Please see a copy of my visit note below.    TRANSPLANT NEPHROLOGY CLINIC VISIT     Assessment & Plan  # RUDY: CKD Stage 3a - Variable. Creatinine variable in setting of hemodynamic changes given watery Bms and decreased water intake and being on tacrolimus. Repeat UA, UPCR and also urine sodium today. Urine and UPCR was WNL. Urine sodium is pending, if < 20, will arrange for IV fluids.    - Pre-transplant baseline Creatinine: ~ 0.6-0.9, likely been at 1.5-2 range for past few checks.   - Proteinuria: Normal (<0.2 grams)   - Kidney Tx Biopsy Hx: No biopsy history.    # Liver Tx: She was hospitalized in November 2023 for acute hepatitis 2/2 to alcohol use at which time she was diagnosed with underlying cirrhosis.   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    - Transaminases stable.     # Immunosuppression: Tacrolimus immediate release (goal as per liver transplant), Mycophenolic acid (dose 540 mg every 12 hours), and Prednisone (dose 5 mg daily)   - Induction with Recent Transplant:  Per Liver Tx Protocol   - Continue with intensive monitoring of immunosuppression for efficacy and toxicity.   - Historical Changes in Immunosuppression:  Lower tacrolimus level due to RUDY.   - Changes: Not at this time, but given anemia consider running Tacrolimus not higher than 6 microgram/L as her free exposure is likely higher.     # Infection Prevention:  Estimated Creatinine Clearance: 32.5 mL/min (A) (based on SCr of 2.03 mg/dL (H)).  - PJP: Sulfa/TMP (Bactrim) 400-80 mg every day. Hold this weekend as we await the kidney function to improve. Her CrCl is likely < 30 at this time given her muscle mass.  - CMV: Valganciclovir (Valcyte) 450 mg every day.   - Fungal: None      - CMV IgG Ab  High Risk Discordance (D+/R-): No  CMV Serostatus: Positive  - EBV IgG Ab High Risk Discordance (D+/R-): No  EBV Serostatus: Positive    # Blood Pressure: Controlled;  Goal BP: > 100, but < 130 systolic   - Changes: Not at this time    # Anemia in Chronic Disease and loss: Hgb: Stable, low      MAGUI: No   - Iron studies: Low iron saturation   - Not on iron.    # Mineral Bone Disorder:    - Secondary renal hyperparathyroidism; PTH level: Not checked recently        On treatment: None  - Vitamin D; level: Low normal        On supplement: Yes  - Calcium; level: Normal        On supplement: Yes calcium-vitamin d-vitamin k 500-500-40 mg twice a day.  - Phosphorus; level: Normal        On supplement: No    # Electrolytes:   - Potassium; level: Normal        On supplement: No  - Magnesium; level: Low        On supplement: Yes magnesium oxide 400 mg twice a day.  - Bicarbonate; level: Low        On supplement: No    # Hyponatremia: It had dropped down to 130 mmol/L at outside labs, but its back to 134 today. Likely due to decrease osmolar intake and increase fluid intake of up to 80 oz in the last few days.     # Other Significant PMH:  - Right pleural effusion: Noted on US 11/15, small volume.   - Prolonged QTc: QTc 496 on EKG 11/13.   - Depression: Lexapro resumed 11/17 (after Qtc improved).      # Transplant History:  Etiology of Live Failure: alcohol-related liver disease.  Tx: Liver Tx  Transplant: 11/13/2024 (Liver)  Significant transplant-related complications: None    Transplant Office Phone Number: [phone]    Assessment and plan was discussed with the patient and she voiced her understanding and agreement.    Return visit: Return in about 2 months (around 3/28/2025).    Clary Emerson MD    The longitudinal plan of care for the diagnosis(es)/condition(s) as documented were addressed during this visit. Due to the added complexity in care, I will continue to support Hannah in the subsequent management and  with ongoing continuity of care.      Chief Complaint  Ms. Sutherland is a 54 year old here for RUDY and elevated creatinine.     History of Present Illness   Ms. Sutherland reports feeling stable overall. Likely decreased po intake over the weekend.  Since last clinic visit:   Hospitalizations: No   New Medical Issues: No  Chest pain or shortness of breath: No  Lower extremity swelling: No  Weight change: No  Nausea and vomiting: No  Diarrhea: Yes, 3 watery Bms per day on average  Heartburn symptoms: No  Fever, sweats or chills: No  Urinary complaints: No    Home BP:  130's systolic      Problem List  Patient Active Problem List   Diagnosis     Abnormal INR     Abnormal Pap smear of cervix     Alcoholic cirrhosis (H)     Backache     Chronic gastritis     Chronic liver failure (H)     Chronic pain of left knee     Depression     Diarrhea     Dysphagia     Elevated bilirubin     Elevated liver function tests     Eosinophilic esophagitis     Alcohol dependence, uncomplicated (H)     GERD (gastroesophageal reflux disease)     Hyperlipidemia     Hypomagnesemia     Arthritis     Inflammatory liver disease     Intermittent asthma     Motion sickness     Perimenopausal     Portal hypertension (H)     Cough     Seasonal allergies     Thrombocytopenia     Vision loss     Vitamin D deficiency     End stage liver disease (H)     Immunosuppressed status     Liver replaced by transplant (H)     RUDY (acute kidney injury)     Hyponatremia       Allergies  Allergies   Allergen Reactions     Penicillins Rash and Unknown     Has tolerated since     Latex Other (See Comments)     History of asthma.     Erythromycin Nausea and Vomiting     Has tolerated since this       Medications  Current Outpatient Medications   Medication Sig Dispense Refill     acetaminophen (TYLENOL) 500 MG tablet Take 500 mg by mouth every 6 hours as needed for mild pain.       aspirin (ASA) 325 MG tablet Take 1 tablet (325 mg) by mouth daily. 30 tablet 4     escitalopram  (LEXAPRO) 20 MG tablet Take 1 tablet (20 mg) by mouth daily. 30 tablet 0     magnesium oxide (MAG-OX) 400 MG tablet Take 400 mg by mouth 2 times daily       methocarbamol (ROBAXIN) 500 MG tablet Take 1 tablet (500 mg) by mouth 4 times daily as needed for muscle spasms. 20 tablet 1     Multiple Vitamin (QUINTABS) TABS Take 1 tablet by mouth daily. 30 tablet 11     mycophenolic acid (GENERIC EQUIVALENT) 180 MG EC tablet Take 3 tablets (540 mg) by mouth 2 times daily. (Patient taking differently: Take 360 mg by mouth 3 times daily.) 540 tablet 0     naloxone (NARCAN) 4 MG/0.1ML nasal spray Spray 1 spray (4 mg) into one nostril alternating nostrils as needed for opioid reversal. every 2-3 minutes until assistance arrives 20 each 0     pantoprazole (PROTONIX) 40 MG EC tablet Take 1 tablet (40 mg) by mouth 2 times daily (before meals). 60 tablet 2     predniSONE (DELTASONE) 5 MG tablet Take 1 tablet (5 mg) by mouth daily. 30 tablet 2     sulfamethoxazole-trimethoprim (BACTRIM) 400-80 MG tablet Take 1 tablet by mouth Every Mon, Wed, Fri Morning. 30 tablet 1     tacrolimus (GENERIC EQUIVALENT) 1 MG capsule Take 3 capsules (3 mg) by mouth every 12 hours. Total dose 8 mg  capsule 3     tacrolimus (GENERIC EQUIVALENT) 5 MG capsule Take 1 capsule (5 mg) by mouth 2 times daily. Total dose 8 mg BID 60 capsule 11     valGANciclovir (VALCYTE) 450 MG tablet Take 1 tablet (450 mg) by mouth Every Mon, Wed, Fri Morning. 15 tablet 0     vitamin D3 (CHOLECALCIFEROL) 50 mcg (2000 units) tablet Take 1 tablet by mouth daily.       No current facility-administered medications for this visit.     There are no discontinued medications.    Physical Exam  Vital Signs: BP (!) 144/77   Pulse 72   Wt 63.6 kg (140 lb 1.9 oz)   SpO2 98%   BMI 24.05 kg/m      GENERAL APPEARANCE: alert and no distress  HENT: mouth without ulcers or lesions  RESP: lungs clear to auscultation - no rales, rhonchi or wheezes  CV: regular rhythm, normal rate, no  rub, no murmur  EDEMA: no LE edema bilaterally  ABDOMEN: soft, nondistended, nontender,   MS: extremities normal - no gross deformities noted  SKIN: no rash  DIALYSIS ACCESS: none    Data        Latest Ref Rng & Units 1/28/2025     9:21 AM 1/22/2025     8:15 AM 1/14/2025     8:39 AM   Renal   Sodium 135 - 145 mmol/L 135      Na (external) 136 - 146 mmol/L  136  135    K 3.4 - 5.3 mmol/L 4.6      K (external) 3.5 - 5.1 mmol/L  4.2  3.9    Cl 98 - 107 mmol/L 105  108  104    Cl (external) 98 - 107 mmol/L 105  108  104    CO2 22 - 29 mmol/L 21      CO2 (external) 22 - 29 mmol/L  20  21    Urea Nitrogen 6.0 - 20.0 mg/dL 28.7      BUN (external) 10.0 - 20.0 mg/dL  23.3  23.2    Creatinine 0.51 - 0.95 mg/dL 2.03      Cr (external) 0.57 - 1.11 mg/dL  1.5  1.68    Glucose 70 - 99 mg/dL 93      Glucose (external) 70 - 100 mg/dL  88  86    Calcium 8.8 - 10.4 mg/dL 9.4      Ca (external) 8.6 - 10.5 mg/dL  9.3  9.3    Magnesium 1.7 - 2.3 mg/dL 1.6      Mg (external) 1.8 - 2.6 mg/dL  1.5  1.4          Latest Ref Rng & Units 1/28/2025     9:21 AM 1/22/2025     8:15 AM 1/14/2025     8:39 AM   Bone Health   Phosphorus 2.5 - 4.5 mg/dL 3.8      Phos (external) 2.9 - 5.2 mg/dL  3.6  3.8          Latest Ref Rng & Units 1/28/2025     9:21 AM 1/22/2025     8:15 AM 1/14/2025     8:39 AM   Heme   WBC 4.0 - 11.0 10e3/uL 2.5      WBC (external) 3.7 - 12.1 10(3)/uL  2.2  2.4    Hgb 11.7 - 15.7 g/dL 9.4      Hgb (external) 11.2 - 15.8 g/dL  9.5  9.3    Plt 150 - 450 10e3/uL 109      Plt (external) 179 - 450 10(3)/uL  128  138          Latest Ref Rng & Units 1/28/2025     9:21 AM 1/22/2025     8:15 AM 1/14/2025     8:39 AM   Liver   AP 40 - 150 U/L 63      AP (external) 50 - 136 U/L  61  58  C   TBili <=1.2 mg/dL 0.3      TBili (external) 0.2 - 1.2 mg/dL  0.4  0.4  C   Bilirubin Direct 0.00 - 0.30 mg/dL <0.20      DBili (external) 0.0 - 0.5 mg/dL  0.2  0.2  C   ALT 0 - 50 U/L 8      ALT (external) 8 - 45 U/L  9  10  C   AST 0 - 45 U/L 18       AST (external) 5 - 41 U/L  16  15  C   Tot Protein 6.4 - 8.3 g/dL 6.7      Tot Protein (external) 6.0 - 8.0 g/dL  6.6  6.6  C   Albumin 3.5 - 5.2 g/dL 4.1      Albumin (external) 3.5 - 5.0 g/dL  3.9  4.0  C      C Corrected result         Latest Ref Rng & Units 11/14/2024     4:04 AM 11/13/2024     1:16 AM 5/21/2024     7:57 AM   Pancreas   A1C <5.7 %   4.6    Amylase 28 - 100 U/L 51  92     Lipase (Roche) 13 - 60 U/L 26            Latest Ref Rng & Units 12/20/2024    12:06 PM 5/21/2024     7:57 AM   Iron studies   Iron 37 - 145 ug/dL 31  47    Iron Sat Index 15 - 46 % 16  13    Ferritin 11 - 328 ng/mL 735  31          Latest Ref Rng & Units 11/13/2024     1:16 AM 5/21/2024     7:57 AM   [facility] Txp Virology   EBV CAPSID ANTIBODY IGG No detectable antibody. Positive  Positive      Failed to redirect to the Timeline version of the REVFS SmartLink.  Recent Labs   Lab Test 01/09/25  0935 01/14/25  0839 01/22/25  0808   DOSTAC 1/8/2025 1/13/2025 1/21/2025   TACROL 5.4 7.5 6.9       Prescription drug management  42 minutes spent by me on the date of the encounter doing chart review, history and exam, documentation and further activities per the note      Again, thank you for allowing me to participate in the care of your patient.        Sincerely,        Clary Emerson MD    Electronically signed

## 2025-01-28 NOTE — NURSING NOTE
"Chief Complaint   Patient presents with    RECHECK     three month post liver transplant     BP (!) 144/77 (BP Location: Right arm, Patient Position: Sitting)   Pulse 72   Ht 1.626 m (5' 4\")   Wt 63.9 kg (140 lb 12.8 oz)   SpO2 98%   BMI 24.17 kg/m    Marques Keenan CMA on 1/28/2025 at 10:02 AM    "

## 2025-01-30 ENCOUNTER — HOSPITAL ENCOUNTER (OUTPATIENT)
Facility: CLINIC | Age: 55
Discharge: HOME | End: 2025-01-30
Attending: INTERNAL MEDICINE | Admitting: INTERNAL MEDICINE
Payer: COMMERCIAL

## 2025-01-30 ENCOUNTER — ANESTHESIA (OUTPATIENT)
Dept: GASTROENTEROLOGY | Facility: CLINIC | Age: 55
End: 2025-01-30
Payer: COMMERCIAL

## 2025-01-30 ENCOUNTER — ANESTHESIA EVENT (OUTPATIENT)
Dept: GASTROENTEROLOGY | Facility: CLINIC | Age: 55
End: 2025-01-30
Payer: COMMERCIAL

## 2025-01-30 VITALS
SYSTOLIC BLOOD PRESSURE: 141 MMHG | OXYGEN SATURATION: 99 % | DIASTOLIC BLOOD PRESSURE: 78 MMHG | HEART RATE: 72 BPM | RESPIRATION RATE: 14 BRPM

## 2025-01-30 PROCEDURE — 250N000011 HC RX IP 250 OP 636: Performed by: NURSE ANESTHETIST, CERTIFIED REGISTERED

## 2025-01-30 PROCEDURE — 43239 EGD BIOPSY SINGLE/MULTIPLE: CPT | Performed by: INTERNAL MEDICINE

## 2025-01-30 PROCEDURE — 88342 IMHCHEM/IMCYTCHM 1ST ANTB: CPT | Mod: 26 | Performed by: PATHOLOGY

## 2025-01-30 PROCEDURE — 88305 TISSUE EXAM BY PATHOLOGIST: CPT | Mod: TC | Performed by: INTERNAL MEDICINE

## 2025-01-30 PROCEDURE — 88342 IMHCHEM/IMCYTCHM 1ST ANTB: CPT | Mod: TC | Performed by: INTERNAL MEDICINE

## 2025-01-30 PROCEDURE — 258N000003 HC RX IP 258 OP 636: Performed by: NURSE ANESTHETIST, CERTIFIED REGISTERED

## 2025-01-30 PROCEDURE — 88305 TISSUE EXAM BY PATHOLOGIST: CPT | Mod: 26 | Performed by: PATHOLOGY

## 2025-01-30 PROCEDURE — 370N000017 HC ANESTHESIA TECHNICAL FEE, PER MIN: Performed by: INTERNAL MEDICINE

## 2025-01-30 RX ORDER — SODIUM CHLORIDE, SODIUM LACTATE, POTASSIUM CHLORIDE, CALCIUM CHLORIDE 600; 310; 30; 20 MG/100ML; MG/100ML; MG/100ML; MG/100ML
INJECTION, SOLUTION INTRAVENOUS CONTINUOUS PRN
Status: DISCONTINUED | OUTPATIENT
Start: 2025-01-30 | End: 2025-01-30

## 2025-01-30 RX ORDER — PROPOFOL 10 MG/ML
INJECTION, EMULSION INTRAVENOUS CONTINUOUS PRN
Status: DISCONTINUED | OUTPATIENT
Start: 2025-01-30 | End: 2025-01-30

## 2025-01-30 RX ORDER — PROPOFOL 10 MG/ML
INJECTION, EMULSION INTRAVENOUS PRN
Status: DISCONTINUED | OUTPATIENT
Start: 2025-01-30 | End: 2025-01-30

## 2025-01-30 RX ADMIN — PROPOFOL 200 MCG/KG/MIN: 10 INJECTION, EMULSION INTRAVENOUS at 11:06

## 2025-01-30 RX ADMIN — PROPOFOL 30 MG: 10 INJECTION, EMULSION INTRAVENOUS at 11:10

## 2025-01-30 RX ADMIN — PROPOFOL 50 MG: 10 INJECTION, EMULSION INTRAVENOUS at 11:09

## 2025-01-30 RX ADMIN — SODIUM CHLORIDE, POTASSIUM CHLORIDE, SODIUM LACTATE AND CALCIUM CHLORIDE: 600; 310; 30; 20 INJECTION, SOLUTION INTRAVENOUS at 11:03

## 2025-01-30 ASSESSMENT — ACTIVITIES OF DAILY LIVING (ADL)
ADLS_ACUITY_SCORE: 63

## 2025-01-30 NOTE — ANESTHESIA POSTPROCEDURE EVALUATION
Patient: Hannah Sutherland    Procedure: Procedure(s):  ESOPHAGOGASTRODUODENOSCOPY, WITH BIOPSY       Anesthesia Type:  MAC    Note:  Disposition: Outpatient   Postop Pain Control: Uneventful            Sign Out: Well controlled pain   PONV: No   Neuro/Psych: Uneventful            Sign Out: Acceptable/Baseline neuro status   Airway/Respiratory: Uneventful            Sign Out: Acceptable/Baseline resp. status   CV/Hemodynamics: Uneventful            Sign Out: Acceptable CV status; No obvious hypovolemia; No obvious fluid overload   Other NRE: NONE   DID A NON-ROUTINE EVENT OCCUR? No       Last vitals:  Vitals Value Taken Time   /80 01/30/25 1122   Temp     Pulse 72 01/30/25 1120   Resp 14 01/30/25 1120   SpO2 99 % 01/30/25 1127   Vitals shown include unfiled device data.    Electronically Signed By: Juan Thorne MD  January 30, 2025  11:27 AM

## 2025-01-30 NOTE — ANESTHESIA CARE TRANSFER NOTE
Patient: Hannah Sutherland    Procedure: Procedure(s):  ESOPHAGOGASTRODUODENOSCOPY, WITH BIOPSY       Diagnosis: Liver replaced by transplant (H) [Z94.4]  Diagnosis Additional Information: No value filed.    Anesthesia Type:   MAC     Note:    Oropharynx: oropharynx clear of all foreign objects  Level of Consciousness: awake  Oxygen Supplementation: face mask  Level of Supplemental Oxygen (L/min / FiO2): 4  Independent Airway: airway patency satisfactory and stable  Dentition: dentition unchanged  Vital Signs Stable: post-procedure vital signs reviewed and stable  Report to RN Given: handoff report given  Patient transferred to: Phase II    Handoff Report: Identifed the Patient, Identified the Reponsible Provider, Reviewed the pertinent medical history, Discussed the surgical course, Reviewed Intra-OP anesthesia mangement and issues during anesthesia, Set expectations for post-procedure period and Allowed opportunity for questions and acknowledgement of understanding      Vitals:  Vitals Value Taken Time   /80 01/30/25 1122   Temp 36.3    Pulse 73    Resp 15    SpO2 99 % 01/30/25 1123   Vitals shown include unfiled device data.    Electronically Signed By: OZHREH Guerrero CRNA  January 30, 2025  11:23 AM

## 2025-01-30 NOTE — ANESTHESIA PREPROCEDURE EVALUATION
Anesthesia Pre-Procedure Evaluation    Patient: Hannah Sutherland   MRN: [id number] : 1970        Procedure : Procedure(s):  Esophagoscopy, gastroscopy, duodenoscopy (EGD), combined          No past medical history on file.   Past Surgical History:   Procedure Laterality Date     BENCH KIDNEY  2024    Procedure: Bench kidney;  Surgeon: Blas Carney MD;  Location: [facility] OR     IR CVC NON TUNNEL PLACEMENT > 5 YRS  11/15/2024     TRANSPLANT LIVER RECIPIENT  DONOR N/A 2024    Procedure: Transplant liver recipient  donor;  Surgeon: Blas Carney MD;  Location: [facility] OR      Allergies   Allergen Reactions     Penicillins Rash and Unknown     Has tolerated since     Latex Other (See Comments)     History of asthma.     Erythromycin Nausea and Vomiting     Has tolerated since this      Social History     Tobacco Use     Smoking status: Never     Smokeless tobacco: Never   Substance Use Topics     Alcohol use: Not Currently     Comment: sober 23      Wt Readings from Last 1 Encounters:   25 63.6 kg (140 lb 1.9 oz)        Anesthesia Evaluation   Pt has had prior anesthetic. Type: General.    History of anesthetic complications  - PONV.      ROS/MED HX  ENT/Pulmonary: Comment: Vision loss    (+)                      asthma                  Neurologic:  - neg neurologic ROS     Cardiovascular:     (+) Dyslipidemia hypertension- -   -  - -                                 Previous cardiac testing   Echo: Date: 24 Results:  Interpretation Summary  Global and regional left ventricular function is normal with an EF of 60-65%.  Global right ventricular function is normal.  No significant valvular abnormalities present.  Estimated mean right atrial pressure is normal.  No pericardial effusion is present.  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    Stress Test:  Date: Results:    ECG Reviewed:  Date: Results:    Cath:  Date:  Results:      METS/Exercise Tolerance:     Hematologic:     (+)      anemia,          Musculoskeletal:   (+)  arthritis,             GI/Hepatic: Comment: Eosinophilic esophagitis   Dysphagia  MELD 3.0: 23 at 2024  9:16 AM  MELD-Na: 20 at 2024  9:16 AM  Calculated from:  Serum Creatinine: 0.75 mg/dL (Using min of 1 mg/dL) at 2024  9:16 AM  Serum Sodium: 137 mmol/L at 2024  9:16 AM  Total Bilirubin: 7.3 mg/dL at 2024  9:16 AM  Serum Albumin: 2.8 g/dL at 2024  9:16 AM  INR(ratio): 1.7 at 2024  9:16 AM  Age at listin years  Sex: Female at 2024  9:16 AM      (+) GERD,          hepatitis type Alcoholic, liver disease,       Renal/Genitourinary:  - neg Renal ROS   (+) renal disease, type: ARF,            Endo:  - neg endo ROS     Psychiatric/Substance Use:     (+) psychiatric history depression and anxiety       Infectious Disease:  - neg infectious disease ROS     Malignancy:  - neg malignancy ROS     Other:            Physical Exam    Airway        Mallampati: II   TM distance: > 3 FB   Neck ROM: full   Mouth opening: > 3 cm    Respiratory Devices and Support         Dental       (+) Completely normal teeth      Cardiovascular          Rhythm and rate: regular and normal     Pulmonary           breath sounds clear to auscultation       OUTSIDE LABS:  CBC:   Lab Results   Component Value Date    WBC 2.5 (L) 2025    WBC 4.5 2024    HGB 9.4 (L) 2025    HGB 7.6 (L) 2024    HCT 29.7 (L) 2025    HCT 23.8 (L) 2024     (L) 2025     2024     BMP:   Lab Results   Component Value Date     2025     (L) 2024    POTASSIUM 4.6 2025    POTASSIUM 5.3 2024    CHLORIDE 105 2025    CHLORIDE 108 (H) 2025    CO2 21 (L) 2025    CO2 21 (L) 2024    BUN 28.7 (H) 2025    BUN 30.1 (H) 2024    CR 2.03 (H) 2025    CR 2.09 (H) 2024    GLC 93 2025      (H) 12/23/2024     COAGS:   Lab Results   Component Value Date    PTT 33 11/14/2024    INR 1.1 01/09/2025    FIBR 244 11/14/2024     POC:   Lab Results   Component Value Date    HCGS Negative 05/21/2024     HEPATIC:   Lab Results   Component Value Date    ALBUMIN 4.1 01/28/2025    PROTTOTAL 6.7 01/28/2025    ALT 8 01/28/2025    AST 18 01/28/2025    ALKPHOS 63 01/28/2025    BILITOTAL 0.3 01/28/2025     OTHER:   Lab Results   Component Value Date    PH 7.37 11/13/2024    LACT 0.5 (L) 11/15/2024    A1C 4.6 05/21/2024    TIMOTHY 9.4 01/28/2025    PHOS 3.8 01/28/2025    MAG 1.6 (L) 01/28/2025    LIPASE 26 11/14/2024    AMYLASE 51 11/14/2024    TSH 2.08 05/21/2024       Anesthesia Plan    ASA Status:  4       Anesthesia Type: MAC.     - Reason for MAC: straight local not clinically adequate   Induction: Intravenous, Propofol.   Maintenance: TIVA.   Techniques and Equipment:     - Lines/Monitors: BIS     - Blood: Blood in Room, PRBC, Cell Saver, PLT, T&S     - Drips/Meds: Norepi, Vasopressin, Epinephrine (calcium)     Consents    Anesthesia Plan(s) and associated risks, benefits, and realistic alternatives discussed. Questions answered and patient/representative(s) expressed understanding.     - Discussed: Risks, Benefits and Alternatives for BOTH SEDATION and the PROCEDURE were discussed     - Discussed with:  Patient      - Extended Intubation/Ventilatory Support Discussed: No.      - Patient is DNR/DNI Status: No     Use of blood products discussed: No .     Postoperative Care    Pain management: IV analgesics, Oral pain medications.   PONV prophylaxis: Ondansetron (or other 5HT-3), Background Propofol Infusion     Comments:               Juan Thorne MD    I have reviewed the pertinent notes and labs in the chart from the past 30 days and (re)examined the patient.  Any updates or changes from those notes are reflected in this note.    Clinically Significant Risk Factors Present on Admission             #  Hypomagnesemia: Lowest Mg = 1.6 mg/dL in last 2 days, will replace as needed     # Thrombocytopenia: Lowest platelets = 109 in last 2 days, will monitor for bleeding                 # Asthma: noted on problem list

## 2025-01-30 NOTE — OR NURSING
Procedure: egd with gastric antrum and body bxs  Sedation: monitored anesthesia care  Specimens: verified, sent to lab.   O2: per crna  Tolerated procedure: well  Pt to recovery area in stable condition accompanied by RN and crna, bedside report given to recovery RN  Other:  n/a    All belongings with patient at time of transfer.

## 2025-02-03 LAB
PATH REPORT.ADDENDUM SPEC: NORMAL
PATH REPORT.COMMENTS IMP SPEC: NORMAL
PATH REPORT.COMMENTS IMP SPEC: NORMAL
PATH REPORT.FINAL DX SPEC: NORMAL
PATH REPORT.GROSS SPEC: NORMAL
PATH REPORT.RELEVANT HX SPEC: NORMAL
PHOTO IMAGE: NORMAL

## 2025-02-04 LAB — UPPER GI ENDOSCOPY: NORMAL

## 2025-02-05 ENCOUNTER — LAB (OUTPATIENT)
Dept: LAB | Facility: CLINIC | Age: 55
End: 2025-02-05
Payer: COMMERCIAL

## 2025-02-05 DIAGNOSIS — Z94.4 LIVER REPLACED BY TRANSPLANT (H): ICD-10-CM

## 2025-02-05 LAB
TACROLIMUS BLD-MCNC: 5.9 UG/L (ref 5–15)
TME LAST DOSE: NORMAL H
TME LAST DOSE: NORMAL H

## 2025-02-05 PROCEDURE — 80197 ASSAY OF TACROLIMUS: CPT

## 2025-02-05 PROCEDURE — 36415 COLL VENOUS BLD VENIPUNCTURE: CPT

## 2025-02-11 DIAGNOSIS — Z94.4 LIVER REPLACED BY TRANSPLANT (H): ICD-10-CM

## 2025-02-11 RX ORDER — MYCOPHENOLIC ACID 180 MG/1
360 TABLET, DELAYED RELEASE ORAL 2 TIMES DAILY
Qty: 60 TABLET | Refills: 0 | Status: SHIPPED | OUTPATIENT
Start: 2025-02-11

## 2025-02-11 NOTE — PROGRESS NOTES
Hannah is 90 days post transplant today. [employer] message sent to Hannah to decrease her mycophenolic acid to 360 mg BID. She confirmed dose change and will repeat labs next week.

## 2025-02-14 ENCOUNTER — LAB (OUTPATIENT)
Dept: LAB | Facility: CLINIC | Age: 55
End: 2025-02-14
Payer: COMMERCIAL

## 2025-02-14 DIAGNOSIS — N17.9 AKI (ACUTE KIDNEY INJURY): ICD-10-CM

## 2025-02-14 DIAGNOSIS — Z94.4 LIVER REPLACED BY TRANSPLANT (H): Primary | ICD-10-CM

## 2025-02-14 PROCEDURE — 80197 ASSAY OF TACROLIMUS: CPT | Performed by: TRANSPLANT SURGERY

## 2025-02-15 LAB
TACROLIMUS BLD-MCNC: 6.7 UG/L (ref 5–15)
TME LAST DOSE: NORMAL H
TME LAST DOSE: NORMAL H

## 2025-02-20 ENCOUNTER — LAB (OUTPATIENT)
Dept: LAB | Facility: CLINIC | Age: 55
End: 2025-02-20
Payer: COMMERCIAL

## 2025-02-20 DIAGNOSIS — Z94.4 LIVER REPLACED BY TRANSPLANT (H): Primary | ICD-10-CM

## 2025-02-20 LAB
TACROLIMUS BLD-MCNC: 3.7 UG/L (ref 5–15)
TME LAST DOSE: ABNORMAL H
TME LAST DOSE: ABNORMAL H

## 2025-02-20 PROCEDURE — 80197 ASSAY OF TACROLIMUS: CPT | Performed by: TRANSPLANT SURGERY

## 2025-03-07 ENCOUNTER — LAB (OUTPATIENT)
Dept: LAB | Facility: CLINIC | Age: 55
End: 2025-03-07
Payer: COMMERCIAL

## 2025-03-07 DIAGNOSIS — Z94.4 LIVER REPLACED BY TRANSPLANT (H): ICD-10-CM

## 2025-03-07 PROCEDURE — 80197 ASSAY OF TACROLIMUS: CPT

## 2025-03-08 LAB
TACROLIMUS BLD-MCNC: 6.4 UG/L (ref 5–15)
TME LAST DOSE: NORMAL H
TME LAST DOSE: NORMAL H

## 2025-03-10 ENCOUNTER — TELEPHONE (OUTPATIENT)
Dept: TRANSPLANT | Facility: CLINIC | Age: 55
End: 2025-03-10
Payer: COMMERCIAL

## 2025-03-10 DIAGNOSIS — Z94.4 LIVER REPLACED BY TRANSPLANT (H): ICD-10-CM

## 2025-03-10 RX ORDER — PREDNISONE 5 MG/1
2.5 TABLET ORAL DAILY
Qty: 5 TABLET | Refills: 0 | Status: SHIPPED | OUTPATIENT
Start: 2025-03-10

## 2025-03-10 NOTE — TELEPHONE ENCOUNTER
Liver labs remain good off mycophenolic acid. Dr. Santiago ok with starting prednisone wean.     Message to Hannah to decrease prednisone to 2.5 mg daily.     Updated lab orders faxed.

## 2025-03-10 NOTE — LETTER
OUTPATIENT LABORATORY TEST ORDER *Updated*  [facility]  [phone]    Patient Name: Hannah Sutherland   YOB: 1970     [facility] MR# [if applicable]: [id number]   Date & Time: March 10, 2025  12:42 PM  Expiration Date: 1 year after date issued       Diagnosis: Liver Transplant (ICD-10 Z94.4)   Aftercare following organ transplant (ICD-10 Z48.288)   Long term use of medications (ICD-10 Z79.899)   Contact with and (suspected) exposure to other viral communicable   diseases (Z20.828)      We ask your assistance in obtaining the following laboratory tests, which are part of our routine surveillance program for Solid Organ Transplant patients.     Please fax each result to [phone], same day as resulted/available    Critical lab results page [phone]    Month 4 post-transplant (3/3-3/28/2025)  Labs weekly   CBC with Platelets   Basic Metabolic Panel   Phosphorous  Magnesium  Hepatic panel   Tacrolimus drug level - 12-hour trough, please document time of last dose     *Please draw t-cell subset (CD4) the week of 3/10/25    Months 5 post-transplant (3/31-4/30/2025)  Labs every other week  CBC with Platelets   Basic Metabolic Panel   Phosphorous  Magnesium  Hepatic panel   Tacrolimus drug level - 12-hour trough, please document time of last dose   Phosphatidylethanol (PETH) approximately 4/7/25      Months 6-12 post-transplant (5/1-11/30/25)  Labs monthly and PRN  CBC with Platelets   Basic Metabolic Panel   Phosphorous  Magnesium  Hepatic panel   Tacrolimus drug level - 12-hour trough, please document time of last dose   Phosphatidylethanol (PETH)    12-months post-transplant due approximately 11/13/25  Fasting Lipid Panel  Urine protein/creatinine ratio  UA with reflex to micro  Hepatitis B DNA PCR on all patients    If you have any questions, please call [facility]- [phone] or [phone], Fax- [phone].      Elisa Santiago MD

## 2025-03-12 DIAGNOSIS — Z94.4 LIVER REPLACED BY TRANSPLANT (H): ICD-10-CM

## 2025-03-12 RX ORDER — PANTOPRAZOLE SODIUM 40 MG/1
40 TABLET, DELAYED RELEASE ORAL
Qty: 180 TABLET | Refills: 3 | Status: SHIPPED | OUTPATIENT
Start: 2025-03-12

## 2025-03-14 ENCOUNTER — LAB (OUTPATIENT)
Dept: LAB | Facility: CLINIC | Age: 55
End: 2025-03-14
Payer: COMMERCIAL

## 2025-03-14 DIAGNOSIS — Z94.4 LIVER REPLACED BY TRANSPLANT (H): ICD-10-CM

## 2025-03-14 PROCEDURE — 80197 ASSAY OF TACROLIMUS: CPT

## 2025-03-15 LAB
TACROLIMUS BLD-MCNC: 11.4 UG/L (ref 5–15)
TME LAST DOSE: NORMAL H
TME LAST DOSE: NORMAL H

## 2025-03-17 ENCOUNTER — TELEPHONE (OUTPATIENT)
Dept: TRANSPLANT | Facility: CLINIC | Age: 55
End: 2025-03-17
Payer: COMMERCIAL

## 2025-03-17 DIAGNOSIS — Z94.4 LIVER REPLACED BY TRANSPLANT (H): ICD-10-CM

## 2025-03-17 RX ORDER — TACROLIMUS 5 MG/1
5 CAPSULE ORAL 2 TIMES DAILY
Qty: 180 CAPSULE | Refills: 3 | Status: SHIPPED | OUTPATIENT
Start: 2025-03-17

## 2025-03-17 RX ORDER — TACROLIMUS 1 MG/1
1 CAPSULE ORAL EVERY 12 HOURS
Qty: 180 CAPSULE | Refills: 3 | Status: SHIPPED | OUTPATIENT
Start: 2025-03-17

## 2025-03-17 NOTE — TELEPHONE ENCOUNTER
ISSUE:   Tacrolimus IR level 11.4 on 3/14, goal 6-8, dose 7 mg BID.    PLAN:   Call Patient and confirm this was an accurate 12-hour trough.   Verify Tacrolimus IR dose 7 mg BID.   Confirm no new medications or or missed doses.   Confirm no new illness / infection / diarrhea.   If accurate trough and accurate dose, decrease Tacrolimus IR dose to 6 mg BID     Is this more than a 50% increase or decrease in current IS dose: No    Repeat labs in 1 weeks.    *Stop prednisone*      OUTCOME:   Spoke with Patient, they confirm accurate trough level and current dose 7 mg BID.   Patient confirmed dose change to 6 mg BID.  Patient agreed to repeat labs in 1 weeks.   Orders sent to preferred pharmacy for dose change and lab for repeat labs.   Patient voiced understanding of plan.     Hannah confirmed stopping prednisone via [email or web address] message.

## 2025-03-20 ENCOUNTER — LAB (OUTPATIENT)
Dept: LAB | Facility: CLINIC | Age: 55
End: 2025-03-20
Payer: COMMERCIAL

## 2025-03-20 LAB
TACROLIMUS BLD-MCNC: 6.3 UG/L (ref 5–15)
TME LAST DOSE: NORMAL H
TME LAST DOSE: NORMAL H

## 2025-03-20 PROCEDURE — 80197 ASSAY OF TACROLIMUS: CPT | Performed by: TRANSPLANT SURGERY

## 2025-03-30 ENCOUNTER — TELEPHONE (OUTPATIENT)
Dept: TRANSPLANT | Facility: CLINIC | Age: 55
End: 2025-03-30
Payer: COMMERCIAL

## 2025-03-30 NOTE — TELEPHONE ENCOUNTER
Hannah paged on call RNCC regarding vaginal bleeding, blood in stool.     Bright red blood in stool, has some hemorrhoids, formed and loose stool, no straining. Right side near scar tender. Had colonoscopy and polyp removal on 3/13. Just a little blood on toilet paper. Likely hemorrhoid related. Advised Hannah to follow up with colonoscopy provider.      Has some period like cramping. Vaginal bleeding, hasn't had a period in a couple years. Explained that liver dysfunction can affect hormones which can lead to no period. Once liver dysfunction resolves, periods can resume. Advised to follow up with OB/Gyn, she has an appt with her OB/Gyn next week.

## 2025-04-07 ENCOUNTER — LAB (OUTPATIENT)
Dept: LAB | Facility: CLINIC | Age: 55
End: 2025-04-07
Payer: COMMERCIAL

## 2025-04-07 PROCEDURE — 80197 ASSAY OF TACROLIMUS: CPT | Performed by: STUDENT IN AN ORGANIZED HEALTH CARE EDUCATION/TRAINING PROGRAM

## 2025-04-08 LAB
TACROLIMUS BLD-MCNC: 6.8 UG/L (ref 5–15)
TME LAST DOSE: NORMAL H
TME LAST DOSE: NORMAL H

## 2025-04-14 ENCOUNTER — MYC MEDICAL ADVICE (OUTPATIENT)
Dept: OTHER | Age: 55
End: 2025-04-14

## 2025-04-14 DIAGNOSIS — Z94.4 LIVER REPLACED BY TRANSPLANT (H): Primary | ICD-10-CM

## 2025-04-17 ENCOUNTER — LAB (OUTPATIENT)
Dept: LAB | Facility: CLINIC | Age: 55
End: 2025-04-17
Payer: COMMERCIAL

## 2025-04-17 DIAGNOSIS — Z94.4 LIVER REPLACED BY TRANSPLANT (H): ICD-10-CM

## 2025-04-17 PROCEDURE — 80197 ASSAY OF TACROLIMUS: CPT

## 2025-04-18 LAB
TACROLIMUS BLD-MCNC: 6.9 UG/L (ref 5–15)
TME LAST DOSE: NORMAL H
TME LAST DOSE: NORMAL H

## 2025-04-29 ENCOUNTER — LAB (OUTPATIENT)
Dept: LAB | Facility: CLINIC | Age: 55
End: 2025-04-29
Payer: COMMERCIAL

## 2025-04-29 LAB
TACROLIMUS BLD-MCNC: 3.5 UG/L (ref 5–15)
TME LAST DOSE: ABNORMAL H
TME LAST DOSE: ABNORMAL H

## 2025-04-29 PROCEDURE — 80197 ASSAY OF TACROLIMUS: CPT | Performed by: TRANSPLANT SURGERY

## 2025-04-29 PROCEDURE — 36415 COLL VENOUS BLD VENIPUNCTURE: CPT | Performed by: TRANSPLANT SURGERY

## 2025-04-30 DIAGNOSIS — Z94.4 LIVER REPLACED BY TRANSPLANT (H): Primary | ICD-10-CM

## 2025-04-30 RX ORDER — TACROLIMUS 1 MG/1
1 CAPSULE ORAL EVERY EVENING
Qty: 90 CAPSULE | Refills: 3 | Status: SHIPPED | OUTPATIENT
Start: 2025-04-30

## 2025-04-30 NOTE — PROGRESS NOTES
ISSUE:   Tacrolimus IR level 3.5 on 4/29, goal 5, dose 5 mg BID.    PLAN:   Call Patient and confirm this was an accurate 12-hour trough, was a 10.5 hour level.   Verify Tacrolimus IR dose 5 mg BID.   Confirm no new medications or or missed doses.   Confirm no new illness / infection / diarrhea.   If accurate trough and accurate dose, increase Tacrolimus IR dose to 5 mg AM, 6 mg PM.     Is this more than a 50% increase or decrease in current IS dose: No    Repeat labs in 1 weeks.      OUTCOME:   Spoke with Patient, they confirm accurate trough level and current dose 5 mg BID.   Patient confirmed dose change to 5 mg AM, 6 mg PM.  Patient agreed to repeat labs in 1 weeks.   Orders sent to preferred pharmacy for dose change and lab for repeat labs.   Patient voiced understanding of plan.

## 2025-04-30 NOTE — LETTER
OUTPATIENT LABORATORY TEST ORDER   [facility]  [phone]    Patient Name: Hannah Sutherland   YOB: 1970     [facility] MR# [if applicable]: [id number]   Date & Time: April 30, 2025  11:19 AM  Expiration Date: 1 year after date issued       Diagnosis: Liver Transplant (ICD-10 Z94.4)   Aftercare following organ transplant (ICD-10 Z48.288)   Long term use of medications (ICD-10 Z79.899)   Contact with and (suspected) exposure to other viral communicable   diseases (Z20.828)      We ask your assistance in obtaining the following laboratory tests, which are part of our routine surveillance program for Solid Organ Transplant patients.     Please fax each result to [phone], same day as resulted/available    Critical lab results page [phone]    Please draw the following labs the week of 5/5/2025:     Basic metabolic panel   Hepatic panel   Tacrolimus level    If you have any questions, please call [facility]- [phone] or [phone], Fax- [phone].      Elisa Santiago MD     normal latch/lips widely flanged

## 2025-05-07 ENCOUNTER — LAB (OUTPATIENT)
Dept: LAB | Facility: CLINIC | Age: 55
End: 2025-05-07
Payer: COMMERCIAL

## 2025-05-07 LAB
TACROLIMUS BLD-MCNC: 4.8 UG/L (ref 5–15)
TME LAST DOSE: ABNORMAL H
TME LAST DOSE: ABNORMAL H

## 2025-05-07 PROCEDURE — 36415 COLL VENOUS BLD VENIPUNCTURE: CPT | Performed by: TRANSPLANT SURGERY

## 2025-05-07 PROCEDURE — 80197 ASSAY OF TACROLIMUS: CPT | Performed by: TRANSPLANT SURGERY

## 2025-05-08 ENCOUNTER — RESULTS FOLLOW-UP (OUTPATIENT)
Dept: TRANSPLANT | Facility: CLINIC | Age: 55
End: 2025-05-08

## 2025-05-12 ENCOUNTER — RESULTS FOLLOW-UP (OUTPATIENT)
Dept: TRANSPLANT | Facility: CLINIC | Age: 55
End: 2025-05-12

## 2025-05-12 DIAGNOSIS — N17.9 AKI (ACUTE KIDNEY INJURY): ICD-10-CM

## 2025-05-12 DIAGNOSIS — Z94.4 LIVER REPLACED BY TRANSPLANT (H): ICD-10-CM

## 2025-05-15 ENCOUNTER — OFFICE VISIT (OUTPATIENT)
Dept: TRANSPLANT | Facility: CLINIC | Age: 55
End: 2025-05-15
Attending: INTERNAL MEDICINE
Payer: COMMERCIAL

## 2025-05-15 VITALS
TEMPERATURE: 97.9 F | WEIGHT: 139.1 LBS | BODY MASS INDEX: 23.75 KG/M2 | SYSTOLIC BLOOD PRESSURE: 134 MMHG | HEIGHT: 64 IN | DIASTOLIC BLOOD PRESSURE: 82 MMHG | OXYGEN SATURATION: 100 % | HEART RATE: 57 BPM

## 2025-05-15 DIAGNOSIS — K70.30 ALCOHOLIC CIRRHOSIS, UNSPECIFIED WHETHER ASCITES PRESENT (H): ICD-10-CM

## 2025-05-15 DIAGNOSIS — Z94.4 LIVER REPLACED BY TRANSPLANT (H): ICD-10-CM

## 2025-05-15 DIAGNOSIS — N17.9 AKI (ACUTE KIDNEY INJURY): ICD-10-CM

## 2025-05-15 DIAGNOSIS — D84.9 IMMUNOSUPPRESSION: ICD-10-CM

## 2025-05-15 DIAGNOSIS — K76.6 PORTAL HYPERTENSION (H): ICD-10-CM

## 2025-05-15 DIAGNOSIS — E83.42 HYPOMAGNESEMIA: ICD-10-CM

## 2025-05-15 DIAGNOSIS — D69.6 THROMBOCYTOPENIA: ICD-10-CM

## 2025-05-15 DIAGNOSIS — E78.5 HYPERLIPIDEMIA, UNSPECIFIED HYPERLIPIDEMIA TYPE: ICD-10-CM

## 2025-05-15 DIAGNOSIS — K21.9 GASTROESOPHAGEAL REFLUX DISEASE, UNSPECIFIED WHETHER ESOPHAGITIS PRESENT: ICD-10-CM

## 2025-05-15 DIAGNOSIS — F32.A DEPRESSION, UNSPECIFIED DEPRESSION TYPE: ICD-10-CM

## 2025-05-15 DIAGNOSIS — E55.9 VITAMIN D DEFICIENCY: ICD-10-CM

## 2025-05-15 DIAGNOSIS — D63.8 ANEMIA OF CHRONIC DISEASE: Primary | ICD-10-CM

## 2025-05-15 DIAGNOSIS — N18.32 STAGE 3B CHRONIC KIDNEY DISEASE (H): ICD-10-CM

## 2025-05-15 LAB
ALBUMIN MFR UR ELPH: <6 MG/DL
ALBUMIN UR-MCNC: NEGATIVE MG/DL
APPEARANCE UR: CLEAR
BACTERIA #/AREA URNS HPF: ABNORMAL /HPF
BILIRUB UR QL STRIP: NEGATIVE
COLOR UR AUTO: ABNORMAL
CREAT UR-MCNC: 37.1 MG/DL
GLUCOSE UR STRIP-MCNC: NEGATIVE MG/DL
HGB UR QL STRIP: NEGATIVE
KETONES UR STRIP-MCNC: NEGATIVE MG/DL
LEUKOCYTE ESTERASE UR QL STRIP: NEGATIVE
NITRATE UR QL: NEGATIVE
PH UR STRIP: 5 [PH] (ref 5–7)
PROT/CREAT 24H UR: NORMAL MG/G{CREAT}
RBC URINE: 0 /HPF
SP GR UR STRIP: 1.01 (ref 1–1.03)
SQUAMOUS EPITHELIAL: 1 /HPF
UROBILINOGEN UR STRIP-MCNC: NORMAL MG/DL
WBC URINE: <1 /HPF

## 2025-05-15 PROCEDURE — 84156 ASSAY OF PROTEIN URINE: CPT

## 2025-05-15 PROCEDURE — 81003 URINALYSIS AUTO W/O SCOPE: CPT

## 2025-05-15 PROCEDURE — 99213 OFFICE O/P EST LOW 20 MIN: CPT | Performed by: INTERNAL MEDICINE

## 2025-05-15 RX ORDER — BUPROPION HYDROCHLORIDE 150 MG/1
150 TABLET, EXTENDED RELEASE ORAL 2 TIMES DAILY
COMMUNITY
Start: 2025-04-09 | End: 2026-04-09

## 2025-05-15 ASSESSMENT — PAIN SCALES - GENERAL: PAINLEVEL_OUTOF10: NO PAIN (0)

## 2025-05-15 NOTE — PROGRESS NOTES
TRANSPLANT NEPHROLOGY CLINIC VISIT     Assessment & Plan   # RUDY: CKD Stage 3b - Stable.    - Pre-transplant baseline Creatinine: ~ 0.6-0.9, likely been at 1.5-2 range for past few checks.   - Proteinuria: Normal (<0.2 grams), but will repeat today   - Kidney Tx Biopsy Hx: No biopsy history.    # Liver Tx: She was hospitalized in November 2023 for acute hepatitis 2/2 to alcohol use at which time she was diagnosed with underlying cirrhosis.   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    - Transaminases stable.     # Immunosuppression: Tacrolimus immediate release (goal as per liver transplant), Mycophenolic acid (dose 540 mg every 12 hours), and Prednisone (dose 5 mg daily)   - Induction with Recent Transplant:  Per Liver Tx Protocol   - Continue with intensive monitoring of immunosuppression for efficacy and toxicity.   - Historical Changes in Immunosuppression: Lower tacrolimus level due to RUDY.   - Changes: No    # Infection Prevention:  Estimated Creatinine Clearance: 32.3 mL/min (A) (based on SCr of 2.03 mg/dL (H)).  - PJP: None   - CMV: None, prophylaxis completed   - Fungal: None      - CMV IgG Ab High Risk Discordance (D+/R-): No  CMV Serostatus: Positive  - EBV IgG Ab High Risk Discordance (D+/R-): No  EBV Serostatus: Positive    # Blood Pressure: Controlled;  Goal BP: > 100, but < 130 systolic   - Changes: Not at this time    # Anemia in Chronic Disease and loss: Hgb: Stable, low      MAGUI: No   - Iron studies: Low iron saturation, but high ferritin, repeat with next set of labs. If ferritin is high, will consider to send her to anemia services.    # Mineral Bone Disorder:    - Secondary renal hyperparathyroidism; PTH level: Not checked recently        On treatment: None  - Vitamin D; level: Low normal, repeat now       On supplement: No  - Calcium; level: Normal        On supplement: No   - Phosphorus; level: Normal        On supplement: No    # Electrolytes:   -  Potassium; level: Normal        On supplement: No  - Magnesium; level: Low        On supplement: No   - Bicarbonate; level: Low normal        On supplement: No    # Hyponatremia: resolved    # Other Significant PMH:   - Fatigue and night sweats : been stable since transplant, no recent worsening but not improving eight. Check CMV and EBV PCR and TSH levels. Encouraged her to increase her endurance as well.  - Right pleural effusion: Noted on US 11/15, small volume.   - Prolonged QTc: QTc 496 on EKG 11/13.   - Depression: Lexapro resumed 11/17 (after Qtc improved).      # Transplant History:  Etiology of Live Failure: alcohol-related liver disease.  Tx: Liver Tx  Transplant: 11/13/2024 (Liver)  Significant transplant-related complications: None    Transplant Office Phone Number: [phone]    Assessment and plan was discussed with the patient and she voiced her understanding and agreement.    Return visit: Return in about 3 months (around 8/15/2025).    Clary Emerson MD    The longitudinal plan of care for the diagnosis(es)/condition(s) as documented were addressed during this visit. Due to the added complexity in care, I will continue to support Hannah in the subsequent management and with ongoing continuity of care.    Chief Complaint   Ms. Sutherland is a 54 year old here for RUDY and elevated creatinine.     History of Present Illness    Ms. Sutherland reports feeling stable overall. Continue to have fatigue since transplant which did not improve. No associated symptoms noted at this time.  Since last clinic visit:   Hospitalizations: No   New Medical Issues: No  Chest pain or shortness of breath: No  Lower extremity swelling: No  Weight change: No  Nausea and vomiting: No  Diarrhea: No  Heartburn symptoms: No  Fever, sweats or chills: yes, night sweats since transplant  Urinary complaints: No    Home BP: 130's systolic      Problem List   Patient Active Problem List   Diagnosis    Abnormal INR    Abnormal Pap smear  of cervix    Alcoholic cirrhosis (H)    Backache    Chronic gastritis    Chronic liver failure (H)    Chronic pain of left knee    Depression    Diarrhea    Dysphagia    Elevated bilirubin    Elevated liver function tests    Eosinophilic esophagitis    Alcohol dependence, uncomplicated (H)    GERD (gastroesophageal reflux disease)    Hyperlipidemia    Hypomagnesemia    Arthritis    Inflammatory liver disease    Intermittent asthma    Motion sickness    Perimenopausal    Portal hypertension (H)    Cough    Seasonal allergies    Thrombocytopenia    Vision loss    Vitamin D deficiency    End stage liver disease (H)    Immunosuppressed status    Liver replaced by transplant (H)    RUDY (acute kidney injury)    Hyponatremia       Allergies   Allergies   Allergen Reactions    Penicillins Rash and Unknown     Has tolerated since    Latex Other (See Comments)     History of asthma.    Erythromycin Nausea and Vomiting     Has tolerated since this       Medications   Current Outpatient Medications   Medication Sig Dispense Refill    acetaminophen (TYLENOL) 500 MG tablet Take 500 mg by mouth every 6 hours as needed for mild pain.      aspirin (ASA) 325 MG tablet Take 1 tablet (325 mg) by mouth daily. 30 tablet 4    buPROPion (WELLBUTRIN SR) 150 MG 12 hr tablet Take 150 mg by mouth 2 times daily.      escitalopram (LEXAPRO) 20 MG tablet Take 1 tablet (20 mg) by mouth daily. 30 tablet 0    Multiple Vitamin (QUINTABS) TABS Take 1 tablet by mouth daily. 30 tablet 11    pantoprazole (PROTONIX) 40 MG EC tablet Take 1 tablet (40 mg) by mouth 2 times daily (before meals). 180 tablet 3    predniSONE (DELTASONE) 5 MG tablet Take 1 tablet (5 mg) by mouth daily. 30 tablet 3    tacrolimus (GENERIC EQUIVALENT) 1 MG capsule Take 1 capsule (1 mg) by mouth every evening. Total dose 6 gm every evening 90 capsule 3    tacrolimus (GENERIC EQUIVALENT) 5 MG capsule Take 1 capsule (5 mg) by mouth 2 times daily. 180 capsule 3    magnesium oxide  "(MAG-OX) 400 MG tablet Take 400 mg by mouth 2 times daily (Patient not taking: Reported on 5/15/2025)      vitamin D3 (CHOLECALCIFEROL) 50 mcg (2000 units) tablet Take 1 tablet by mouth daily. (Patient not taking: Reported on 5/15/2025)       No current facility-administered medications for this visit.     There are no discontinued medications.    Physical Exam   Vital Signs: /82 (BP Location: Right arm, Patient Position: Sitting, Cuff Size: Adult Small)   Pulse 57   Temp 97.9  F (36.6  C) (Oral)   Ht 1.626 m (5' 4\")   Wt 63.1 kg (139 lb 1.6 oz)   SpO2 100%   BMI 23.88 kg/m      GENERAL APPEARANCE: alert and no distress  HENT: atraumatic  RESP: lungs clear to auscultation - no rales, rhonchi or wheezes  CV: regular rhythm, normal rate, no rub, no murmur  EDEMA: no LE edema bilaterally  ABDOMEN: soft, nondistended, nontender,   MS: extremities normal - no gross deformities noted  SKIN: no rash  DIALYSIS ACCESS: none    Data         Latest Ref Rng & Units 5/7/2025     7:42 AM 4/29/2025     7:22 AM 4/17/2025     7:22 AM   Renal   Na (external) 136 - 146 mmol/L 139  137  138    K (external) 3.5 - 5.1 mmol/L 4.4  4.3  4.5    Cl 98 - 107 mmol/L 108  108  108    Cl (external) 98 - 107 mmol/L 108  108  108    CO2 (external) 22 - 29 mmol/L 22  23  24    BUN (external) 10.0 - 20.0 mg/dL 28.0  24.0  33.4    Cr (external) 0.57 - 1.11 mg/dL 1.55  1.59  1.92    Glucose (external) 70 - 100 mg/dL 82  84  86    Ca (external) 8.6 - 10.5 mg/dL 8.8  9.2  9.4    Mg (external) 1.8 - 2.6 mg/dL  1.6           Latest Ref Rng & Units 4/29/2025     7:22 AM 4/7/2025     7:44 AM 3/20/2025     7:33 AM   Bone Health   Phos (external) 2.9 - 5.2 mg/dl 3.5  4.1  4.0          Latest Ref Rng & Units 4/29/2025     7:22 AM 4/17/2025     7:22 AM 4/7/2025     7:44 AM   Heme   WBC (external) 3.7 - 12.1 10(3)/uL 2.8  2.4  1.9    Hgb (external) 11.2 - 15.8 g/dL 9.7  9.9  9.8    Plt (external) 179 - 450 10(3)/uL 118  109  109    ABSOLUTE " NEUTROPHILS (EXTERNAL) 1.8 - 9.2 10(3)/uL  1.1     ABSOLUTE LYMPHOCYTES (EXTERNAL) 1.2 - 3.9 10(3)/uL  1.0     ABSOLUTE MONOCYTES (EXTERNAL) 0.0 - 1.1 10(3)/uL  0.2     ABSOLUTE EOSINOPHILS (EXTERNAL) 0.0 - 0.8 10(3)/uL  0.1     ABSOLUTE BASOPHILS (EXTERNAL) 0.0 - 0.2 10(3)/uL  0.0           Latest Ref Rng & Units 5/7/2025     7:42 AM 4/29/2025     7:22 AM 4/17/2025     7:22 AM   Liver   AP (external) 50 - 136 U/L 98  87  86    TBili (external) 0.2 - 1.2 mg/dL 0.5  0.4  0.4    DBili (external) 0.0 - 0.5 mg/dL 0.2  0.1  0.2    ALT (external) 8 - 45 U/L 12  10  10    AST (external) 5 - 41 U/L 11  12  13    Tot Protein (external) 6.0 - 8.0 g/dL 6.5  6.2  6.2    Albumin (external) 3.5 - 5.0 g/dL 4.0  3.9  3.8          Latest Ref Rng & Units 11/14/2024     4:04 AM 11/13/2024     1:16 AM 5/21/2024     7:57 AM   Pancreas   A1C <5.7 %   4.6    Amylase 28 - 100 U/L 51  92     Lipase (Roche) 13 - 60 U/L 26            Latest Ref Rng & Units 12/20/2024    12:06 PM 5/21/2024     7:57 AM   Iron studies   Iron 37 - 145 ug/dL 31  47    Iron Sat Index 15 - 46 % 16  13    Ferritin 11 - 328 ng/mL 735  31          Latest Ref Rng & Units 4/17/2025     7:22 AM 11/13/2024     1:16 AM 5/21/2024     7:57 AM   [facility] Virology   CMV DNA Quant Ext Undetected IU/mL Undetected      LOG IU/ML OF CMVQNT (EXTERNAL) log IU/mL Undetected      EBV CAPSID ANTIBODY IGG No detectable antibody.  Positive  Positive      Failed to redirect to the Timeline version of the REVFS SmartLink.  Recent Labs   Lab Test 04/17/25  0720 04/29/25  0720 05/07/25  0742   DOSTAC 4/16/2025 4/28/2025 5/6/2025   TACROL 6.9 3.5* 4.8*       Prescription drug management  42 minutes spent by me on the date of the encounter doing chart review, history and exam, documentation and further activities per the note

## 2025-05-15 NOTE — PROGRESS NOTES
"Received message from Dr. Emerson after Hannah's visit with her today:    \"Testing her iron stores given ongoing anemia, if iron is repeat, she likely need anemia services for MAGUI therapy.   Please let me know if you have any other questions   Added CMV, EBV and TSH for fatigue, that can be collected with her next set of labs\"    Dr. Emerson ordered the labs in [employer] system, order faxed to local lab to be drawn with next set of labs. Vitamin D level also ordered/faxed per Dr. Emerson's order in [facility].      "

## 2025-05-15 NOTE — NURSING NOTE
"Chief Complaint   Patient presents with    RECHECK     Follow up. PT reports no new or worsening symptoms;      Vitals:    05/15/25 1203   BP: 134/82   BP Location: Right arm   Patient Position: Sitting   Cuff Size: Adult Small   Pulse: 57   Temp: 97.9  F (36.6  C)   TempSrc: Oral   SpO2: 100%   Weight: 63.1 kg (139 lb 1.6 oz)   Height: 1.626 m (5' 4\")       BP Readings from Last 3 Encounters:   05/15/25 134/82   01/30/25 (!) 141/78   01/28/25 (!) 144/77       /82 (BP Location: Right arm, Patient Position: Sitting, Cuff Size: Adult Small)   Pulse 57   Temp 97.9  F (36.6  C) (Oral)   Ht 1.626 m (5' 4\")   Wt 63.1 kg (139 lb 1.6 oz)   SpO2 100%   BMI 23.88 kg/m       Nory Holcomb    "

## 2025-05-15 NOTE — LETTER
OUTPATIENT LABORATORY TEST ORDER   [facility]  [phone]    Patient Name: Hannah Sutherland   YOB: 1970     [facility] MR# [if applicable]: [id number]   Date & Time: May 15, 2025  1:59 PM  Expiration Date: 1 year after date issued       Diagnosis: Liver Transplant (ICD-10 Z94.4)   Aftercare following organ transplant (ICD-10 Z48.288)   Long term use of medications (ICD-10 Z79.899)   Contact with and (suspected) exposure to other viral communicable   diseases (Z20.828)      We ask your assistance in obtaining the following laboratory tests, which are part of our routine surveillance program for Solid Organ Transplant patients.     Please fax each result to [phone], same day as resulted/available    Critical lab results page [phone]    Please draw the following labs with next set of transplant labs the week of 5/26/25:     Iron and iron binding capacity   Ferritin   TSH with reflex free T4   Vitamin D   EBV quantitative PCR   CMV quantitative PCR    If you have any questions, please call [facility]- [phone] or [phone], Fax- [phone].      Clary Emerson M.D.   of Medicine  Nephrology and Hypertension  Department of Medicine  [facility]    Call  with any questions. Thanks!

## 2025-05-15 NOTE — LETTER
5/15/2025      Hannah Sutherland  [address]  [facility] [address]      Dear Colleague,    Thank you for referring your patient, Hannah Sutherland, to the [facility]. Please see a copy of my visit note below.    TRANSPLANT NEPHROLOGY CLINIC VISIT     Assessment & Plan  # RUDY: CKD Stage 3b - Stable.    - Pre-transplant baseline Creatinine: ~ 0.6-0.9, likely been at 1.5-2 range for past few checks.   - Proteinuria: Normal (<0.2 grams), but will repeat today   - Kidney Tx Biopsy Hx: No biopsy history.    # Liver Tx: She was hospitalized in November 2023 for acute hepatitis 2/2 to alcohol use at which time she was diagnosed with underlying cirrhosis.   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    - Transaminases stable.     # Immunosuppression: Tacrolimus immediate release (goal as per liver transplant), Mycophenolic acid (dose 540 mg every 12 hours), and Prednisone (dose 5 mg daily)   - Induction with Recent Transplant:  Per Liver Tx Protocol   - Continue with intensive monitoring of immunosuppression for efficacy and toxicity.   - Historical Changes in Immunosuppression: Lower tacrolimus level due to RUDY.   - Changes: No    # Infection Prevention:  Estimated Creatinine Clearance: 32.3 mL/min (A) (based on SCr of 2.03 mg/dL (H)).  - PJP: None   - CMV: None, prophylaxis completed   - Fungal: None      - CMV IgG Ab High Risk Discordance (D+/R-): No  CMV Serostatus: Positive  - EBV IgG Ab High Risk Discordance (D+/R-): No  EBV Serostatus: Positive    # Blood Pressure: Controlled;  Goal BP: > 100, but < 130 systolic   - Changes: Not at this time    # Anemia in Chronic Disease and loss: Hgb: Stable, low      MAGUI: No   - Iron studies: Low iron saturation, but high ferritin, repeat with next set of labs. If ferritin is high, will consider to send her to anemia services.    # Mineral Bone Disorder:    - Secondary renal hyperparathyroidism; PTH  level: Not checked recently        On treatment: None  - Vitamin D; level: Low normal, repeat now       On supplement: No  - Calcium; level: Normal        On supplement: No   - Phosphorus; level: Normal        On supplement: No    # Electrolytes:   - Potassium; level: Normal        On supplement: No  - Magnesium; level: Low        On supplement: No   - Bicarbonate; level: Low normal        On supplement: No    # Hyponatremia: resolved    # Other Significant PMH:   - Fatigue and night sweats : been stable since transplant, no recent worsening but not improving eight. Check CMV and EBV PCR and TSH levels. Encouraged her to increase her endurance as well.  - Right pleural effusion: Noted on US 11/15, small volume.   - Prolonged QTc: QTc 496 on EKG 11/13.   - Depression: Lexapro resumed 11/17 (after Qtc improved).      # Transplant History:  Etiology of Live Failure: alcohol-related liver disease.  Tx: Liver Tx  Transplant: 11/13/2024 (Liver)  Significant transplant-related complications: None    Transplant Office Phone Number: [phone]    Assessment and plan was discussed with the patient and she voiced her understanding and agreement.    Return visit: Return in about 3 months (around 8/15/2025).    Clary Emerson MD    The longitudinal plan of care for the diagnosis(es)/condition(s) as documented were addressed during this visit. Due to the added complexity in care, I will continue to support Hannah in the subsequent management and with ongoing continuity of care.    Chief Complaint  Ms. Sutherland is a 54 year old here for RUDY and elevated creatinine.     History of Present Illness   Ms. Sutherland reports feeling stable overall. Continue to have fatigue since transplant which did not improve. No associated symptoms noted at this time.  Since last clinic visit:   Hospitalizations: No   New Medical Issues: No  Chest pain or shortness of breath: No  Lower extremity swelling: No  Weight change: No  Nausea and vomiting:  No  Diarrhea: No  Heartburn symptoms: No  Fever, sweats or chills: yes, night sweats since transplant  Urinary complaints: No    Home BP: 130's systolic      Problem List  Patient Active Problem List   Diagnosis     Abnormal INR     Abnormal Pap smear of cervix     Alcoholic cirrhosis (H)     Backache     Chronic gastritis     Chronic liver failure (H)     Chronic pain of left knee     Depression     Diarrhea     Dysphagia     Elevated bilirubin     Elevated liver function tests     Eosinophilic esophagitis     Alcohol dependence, uncomplicated (H)     GERD (gastroesophageal reflux disease)     Hyperlipidemia     Hypomagnesemia     Arthritis     Inflammatory liver disease     Intermittent asthma     Motion sickness     Perimenopausal     Portal hypertension (H)     Cough     Seasonal allergies     Thrombocytopenia     Vision loss     Vitamin D deficiency     End stage liver disease (H)     Immunosuppressed status     Liver replaced by transplant (H)     RUDY (acute kidney injury)     Hyponatremia       Allergies  Allergies   Allergen Reactions     Penicillins Rash and Unknown     Has tolerated since     Latex Other (See Comments)     History of asthma.     Erythromycin Nausea and Vomiting     Has tolerated since this       Medications  Current Outpatient Medications   Medication Sig Dispense Refill     acetaminophen (TYLENOL) 500 MG tablet Take 500 mg by mouth every 6 hours as needed for mild pain.       aspirin (ASA) 325 MG tablet Take 1 tablet (325 mg) by mouth daily. 30 tablet 4     buPROPion (WELLBUTRIN SR) 150 MG 12 hr tablet Take 150 mg by mouth 2 times daily.       escitalopram (LEXAPRO) 20 MG tablet Take 1 tablet (20 mg) by mouth daily. 30 tablet 0     Multiple Vitamin (QUINTABS) TABS Take 1 tablet by mouth daily. 30 tablet 11     pantoprazole (PROTONIX) 40 MG EC tablet Take 1 tablet (40 mg) by mouth 2 times daily (before meals). 180 tablet 3     predniSONE (DELTASONE) 5 MG tablet Take 1 tablet (5 mg) by  "mouth daily. 30 tablet 3     tacrolimus (GENERIC EQUIVALENT) 1 MG capsule Take 1 capsule (1 mg) by mouth every evening. Total dose 6 gm every evening 90 capsule 3     tacrolimus (GENERIC EQUIVALENT) 5 MG capsule Take 1 capsule (5 mg) by mouth 2 times daily. 180 capsule 3     magnesium oxide (MAG-OX) 400 MG tablet Take 400 mg by mouth 2 times daily (Patient not taking: Reported on 5/15/2025)       vitamin D3 (CHOLECALCIFEROL) 50 mcg (2000 units) tablet Take 1 tablet by mouth daily. (Patient not taking: Reported on 5/15/2025)       No current facility-administered medications for this visit.     There are no discontinued medications.    Physical Exam  Vital Signs: /82 (BP Location: Right arm, Patient Position: Sitting, Cuff Size: Adult Small)   Pulse 57   Temp 97.9  F (36.6  C) (Oral)   Ht 1.626 m (5' 4\")   Wt 63.1 kg (139 lb 1.6 oz)   SpO2 100%   BMI 23.88 kg/m      GENERAL APPEARANCE: alert and no distress  HENT: atraumatic  RESP: lungs clear to auscultation - no rales, rhonchi or wheezes  CV: regular rhythm, normal rate, no rub, no murmur  EDEMA: no LE edema bilaterally  ABDOMEN: soft, nondistended, nontender,   MS: extremities normal - no gross deformities noted  SKIN: no rash  DIALYSIS ACCESS: none    Data        Latest Ref Rng & Units 5/7/2025     7:42 AM 4/29/2025     7:22 AM 4/17/2025     7:22 AM   Renal   Na (external) 136 - 146 mmol/L 139  137  138    K (external) 3.5 - 5.1 mmol/L 4.4  4.3  4.5    Cl 98 - 107 mmol/L 108  108  108    Cl (external) 98 - 107 mmol/L 108  108  108    CO2 (external) 22 - 29 mmol/L 22  23  24    BUN (external) 10.0 - 20.0 mg/dL 28.0  24.0  33.4    Cr (external) 0.57 - 1.11 mg/dL 1.55  1.59  1.92    Glucose (external) 70 - 100 mg/dL 82  84  86    Ca (external) 8.6 - 10.5 mg/dL 8.8  9.2  9.4    Mg (external) 1.8 - 2.6 mg/dL  1.6           Latest Ref Rng & Units 4/29/2025     7:22 AM 4/7/2025     7:44 AM 3/20/2025     7:33 AM   Bone Health   Phos (external) 2.9 - 5.2 mg/dl " 3.5  4.1  4.0          Latest Ref Rng & Units 4/29/2025     7:22 AM 4/17/2025     7:22 AM 4/7/2025     7:44 AM   Heme   WBC (external) 3.7 - 12.1 10(3)/uL 2.8  2.4  1.9    Hgb (external) 11.2 - 15.8 g/dL 9.7  9.9  9.8    Plt (external) 179 - 450 10(3)/uL 118  109  109    ABSOLUTE NEUTROPHILS (EXTERNAL) 1.8 - 9.2 10(3)/uL  1.1     ABSOLUTE LYMPHOCYTES (EXTERNAL) 1.2 - 3.9 10(3)/uL  1.0     ABSOLUTE MONOCYTES (EXTERNAL) 0.0 - 1.1 10(3)/uL  0.2     ABSOLUTE EOSINOPHILS (EXTERNAL) 0.0 - 0.8 10(3)/uL  0.1     ABSOLUTE BASOPHILS (EXTERNAL) 0.0 - 0.2 10(3)/uL  0.0           Latest Ref Rng & Units 5/7/2025     7:42 AM 4/29/2025     7:22 AM 4/17/2025     7:22 AM   Liver   AP (external) 50 - 136 U/L 98  87  86    TBili (external) 0.2 - 1.2 mg/dL 0.5  0.4  0.4    DBili (external) 0.0 - 0.5 mg/dL 0.2  0.1  0.2    ALT (external) 8 - 45 U/L 12  10  10    AST (external) 5 - 41 U/L 11  12  13    Tot Protein (external) 6.0 - 8.0 g/dL 6.5  6.2  6.2    Albumin (external) 3.5 - 5.0 g/dL 4.0  3.9  3.8          Latest Ref Rng & Units 11/14/2024     4:04 AM 11/13/2024     1:16 AM 5/21/2024     7:57 AM   Pancreas   A1C <5.7 %   4.6    Amylase 28 - 100 U/L 51  92     Lipase (Roche) 13 - 60 U/L 26            Latest Ref Rng & Units 12/20/2024    12:06 PM 5/21/2024     7:57 AM   Iron studies   Iron 37 - 145 ug/dL 31  47    Iron Sat Index 15 - 46 % 16  13    Ferritin 11 - 328 ng/mL 735  31          Latest Ref Rng & Units 4/17/2025     7:22 AM 11/13/2024     1:16 AM 5/21/2024     7:57 AM   [facility] Virology   CMV DNA Quant Ext Undetected IU/mL Undetected      LOG IU/ML OF CMVQNT (EXTERNAL) log IU/mL Undetected      EBV CAPSID ANTIBODY IGG No detectable antibody.  Positive  Positive      Failed to redirect to the Timeline version of the REVFS SmartLink.  Recent Labs   Lab Test 04/17/25  0720 04/29/25  0720 05/07/25  0742   DOSTAC 4/16/2025 4/28/2025 5/6/2025   TACROL 6.9 3.5* 4.8*       Prescription drug management  42 minutes spent by me on the  date of the encounter doing chart review, history and exam, documentation and further activities per the note    Again, thank you for allowing me to participate in the care of your patient.        Sincerely,        Clary Emerson MD    Electronically signed

## 2025-05-22 ENCOUNTER — RESULTS FOLLOW-UP (OUTPATIENT)
Dept: TRANSPLANT | Facility: CLINIC | Age: 55
End: 2025-05-22

## 2025-05-22 ENCOUNTER — LAB (OUTPATIENT)
Dept: LAB | Facility: CLINIC | Age: 55
End: 2025-05-22
Payer: COMMERCIAL

## 2025-05-22 ENCOUNTER — TELEPHONE (OUTPATIENT)
Dept: TRANSPLANT | Facility: CLINIC | Age: 55
End: 2025-05-22
Payer: COMMERCIAL

## 2025-05-22 DIAGNOSIS — Z94.4 LIVER REPLACED BY TRANSPLANT (H): Primary | ICD-10-CM

## 2025-05-22 DIAGNOSIS — D63.8 ANEMIA OF CHRONIC DISEASE: ICD-10-CM

## 2025-05-22 PROCEDURE — 80197 ASSAY OF TACROLIMUS: CPT | Performed by: TRANSPLANT SURGERY

## 2025-05-22 ASSESSMENT — ENCOUNTER SYMPTOMS: NEW SYMPTOMS OF CORONARY ARTERY DISEASE: 0

## 2025-05-23 ENCOUNTER — RESULTS FOLLOW-UP (OUTPATIENT)
Dept: TRANSPLANT | Facility: CLINIC | Age: 55
End: 2025-05-23

## 2025-05-23 LAB
TACROLIMUS BLD-MCNC: 7.1 UG/L (ref 5–15)
TME LAST DOSE: NORMAL H
TME LAST DOSE: NORMAL H

## 2025-05-23 RX ORDER — FERROUS GLUCONATE 324(38)MG
324 TABLET ORAL
Qty: 90 TABLET | Refills: 3 | Status: SHIPPED | OUTPATIENT
Start: 2025-05-23

## 2025-06-18 ENCOUNTER — LAB (OUTPATIENT)
Dept: LAB | Facility: CLINIC | Age: 55
End: 2025-06-18
Payer: COMMERCIAL

## 2025-06-18 DIAGNOSIS — Z94.4 LIVER REPLACED BY TRANSPLANT (H): Primary | ICD-10-CM

## 2025-06-18 LAB
TACROLIMUS BLD-MCNC: 5 UG/L (ref 5–15)
TME LAST DOSE: NORMAL H
TME LAST DOSE: NORMAL H

## 2025-06-18 PROCEDURE — 80197 ASSAY OF TACROLIMUS: CPT | Performed by: TRANSPLANT SURGERY

## 2025-06-18 PROCEDURE — 36415 COLL VENOUS BLD VENIPUNCTURE: CPT | Performed by: TRANSPLANT SURGERY

## 2025-06-19 ENCOUNTER — RESULTS FOLLOW-UP (OUTPATIENT)
Dept: TRANSPLANT | Facility: CLINIC | Age: 55
End: 2025-06-19

## 2025-07-07 ENCOUNTER — MYC MEDICAL ADVICE (OUTPATIENT)
Dept: OTHER | Age: 55
End: 2025-07-07

## 2025-07-17 ENCOUNTER — LAB (OUTPATIENT)
Dept: LAB | Facility: CLINIC | Age: 55
End: 2025-07-17
Payer: COMMERCIAL

## 2025-07-17 PROCEDURE — 36415 COLL VENOUS BLD VENIPUNCTURE: CPT | Performed by: TRANSPLANT SURGERY

## 2025-07-21 DIAGNOSIS — Z94.4 LIVER REPLACED BY TRANSPLANT (H): Primary | ICD-10-CM

## 2025-07-21 NOTE — PROGRESS NOTES
Hannah needs lab orders sent for tacrolimus dose change follow up. Lab orders faxed. Lab orders updated.

## 2025-07-21 NOTE — LETTER
OUTPATIENT LABORATORY TEST ORDER   [facility]  [phone]    Patient Name: Hannah Sutherland   YOB: 1970     [facility] MR# [if applicable]: [id number]   Date & Time: July 21, 2025  2:46 PM  Expiration Date: 1 year after date issued       Diagnosis: Liver Transplant (ICD-10 Z94.4)   Aftercare following organ transplant (ICD-10 Z48.288)   Long term use of medications (ICD-10 Z79.899)   Contact with and (suspected) exposure to other viral communicable   diseases (Z20.828)      We ask your assistance in obtaining the following laboratory tests, which are part of our routine surveillance program for Solid Organ Transplant patients.     Please fax each result to [phone], same day as resulted/available    Critical lab results page [phone]    Please draw the following labs between 7/30 and 8/8:    CBC with Platelets   Basic Metabolic Panel   Phosphorous  Magnesium  Hepatic panel   Tacrolimus drug level - trough level, please document time of last dose       If you have any questions, please call [facility]- [phone] or [phone], Fax- [phone].      Elisa Santiago MD

## 2025-08-14 ENCOUNTER — LAB (OUTPATIENT)
Dept: LAB | Facility: CLINIC | Age: 55
End: 2025-08-14
Payer: COMMERCIAL

## 2025-08-14 DIAGNOSIS — Z94.4 LIVER REPLACED BY TRANSPLANT (H): ICD-10-CM

## 2025-08-14 LAB
TACROLIMUS BLD-MCNC: 4.4 UG/L (ref 5–15)
TME LAST DOSE: ABNORMAL H
TME LAST DOSE: ABNORMAL H

## 2025-08-14 PROCEDURE — 80197 ASSAY OF TACROLIMUS: CPT

## 2025-08-18 ENCOUNTER — VIRTUAL VISIT (OUTPATIENT)
Dept: GASTROENTEROLOGY | Facility: CLINIC | Age: 55
End: 2025-08-18
Attending: STUDENT IN AN ORGANIZED HEALTH CARE EDUCATION/TRAINING PROGRAM
Payer: COMMERCIAL

## 2025-08-18 VITALS — WEIGHT: 135 LBS | BODY MASS INDEX: 23.17 KG/M2

## 2025-08-18 DIAGNOSIS — Z94.4 LIVER REPLACED BY TRANSPLANT (H): ICD-10-CM

## 2025-08-18 RX ORDER — PREDNISONE 5 MG/1
2.5 TABLET ORAL DAILY
Qty: 30 TABLET | Refills: 3 | Status: SHIPPED | OUTPATIENT
Start: 2025-08-18

## 2025-08-18 ASSESSMENT — PAIN SCALES - GENERAL: PAINLEVEL_OUTOF10: NO PAIN (0)

## 2025-08-21 ENCOUNTER — OFFICE VISIT (OUTPATIENT)
Dept: TRANSPLANT | Facility: CLINIC | Age: 55
End: 2025-08-21
Attending: INTERNAL MEDICINE
Payer: COMMERCIAL

## 2025-08-21 VITALS
DIASTOLIC BLOOD PRESSURE: 75 MMHG | HEART RATE: 62 BPM | BODY MASS INDEX: 23.17 KG/M2 | SYSTOLIC BLOOD PRESSURE: 124 MMHG | HEIGHT: 64 IN | TEMPERATURE: 97.9 F

## 2025-08-21 DIAGNOSIS — N17.9 AKI (ACUTE KIDNEY INJURY): ICD-10-CM

## 2025-08-21 DIAGNOSIS — Z94.4 LIVER REPLACED BY TRANSPLANT (H): ICD-10-CM

## 2025-08-21 DIAGNOSIS — E55.9 VITAMIN D DEFICIENCY: ICD-10-CM

## 2025-08-21 DIAGNOSIS — D69.6 THROMBOCYTOPENIA: ICD-10-CM

## 2025-08-21 DIAGNOSIS — N18.32 STAGE 3B CHRONIC KIDNEY DISEASE (H): ICD-10-CM

## 2025-08-21 DIAGNOSIS — E78.5 HYPERLIPIDEMIA, UNSPECIFIED HYPERLIPIDEMIA TYPE: ICD-10-CM

## 2025-08-21 DIAGNOSIS — J45.20 INTERMITTENT ASTHMA, UNSPECIFIED ASTHMA SEVERITY, UNSPECIFIED WHETHER COMPLICATED: ICD-10-CM

## 2025-08-21 DIAGNOSIS — E83.42 HYPOMAGNESEMIA: ICD-10-CM

## 2025-08-21 DIAGNOSIS — R13.10 DYSPHAGIA, UNSPECIFIED TYPE: Primary | ICD-10-CM

## 2025-08-21 DIAGNOSIS — D84.9 IMMUNOSUPPRESSION: ICD-10-CM

## 2025-08-21 DIAGNOSIS — D63.8 ANEMIA OF CHRONIC DISEASE: ICD-10-CM

## 2025-08-21 DIAGNOSIS — K21.9 GASTROESOPHAGEAL REFLUX DISEASE, UNSPECIFIED WHETHER ESOPHAGITIS PRESENT: ICD-10-CM

## 2025-08-21 PROCEDURE — 99213 OFFICE O/P EST LOW 20 MIN: CPT | Performed by: INTERNAL MEDICINE

## 2025-08-21 RX ORDER — ALBUTEROL SULFATE 90 UG/1
2 AEROSOL, METERED RESPIRATORY (INHALATION)
COMMUNITY
Start: 2025-04-09

## 2025-08-21 ASSESSMENT — PAIN SCALES - GENERAL: PAINLEVEL_OUTOF10: NO PAIN (0)

## 2025-08-25 ENCOUNTER — PATIENT OUTREACH (OUTPATIENT)
Dept: CARE COORDINATION | Facility: CLINIC | Age: 55
End: 2025-08-25
Payer: COMMERCIAL

## (undated) DEVICE — ESU HANDPIECE ABC BEND-A-BEAM 6" 134006

## (undated) DEVICE — SYR 01ML 27GA 0.5" NDL TBC 309623

## (undated) DEVICE — CATH FOGARTY EMBOLECTOMY 3FR 80CM LATEX FREE NL3EMB80

## (undated) DEVICE — DRAPE C-SECTION W/IOBAN CLEAR SCREEN 6697CA

## (undated) DEVICE — SU SILK 4-0 TIE 12X30" A303H

## (undated) DEVICE — SU SILK 3-0 SH CR 8X30" C017D

## (undated) DEVICE — LABEL MEDICATION SYSTEM 3303-P

## (undated) DEVICE — ESU ELEC BLADE 6" COATED E1450-6

## (undated) DEVICE — SU PROLENE 5-0 RB-1DA 36"  8556H

## (undated) DEVICE — SPONGE RAY-TEC 4X8" 7318

## (undated) DEVICE — DRAIN JACKSON PRATT CHANNEL 19FR ROUND HUBLESS SIL JP-2230

## (undated) DEVICE — BLADE CLIPPER DISP 4406

## (undated) DEVICE — SU PROLENE 6-0 RB-2DA 18" 8714H

## (undated) DEVICE — DRAPE SHEET REV FOLD 3/4 9349

## (undated) DEVICE — DEFIB PRO-PADZ LVP LQD GEL ADULT 8900-2105-01

## (undated) DEVICE — SUCTION TIP YANKAUER W/O VENT K86

## (undated) DEVICE — SU PROLENE 6-0 RB-2DA 30" 8711H

## (undated) DEVICE — SU SILK 1 TIE 6X30" A307H

## (undated) DEVICE — ESU GROUND PAD ADULT W/CORD E7507

## (undated) DEVICE — SU SILK 3-0 SH CR 8X18" C013D

## (undated) DEVICE — TEST TUBE W/SCREW CAP 17361

## (undated) DEVICE — TUBE FEEDING NEOCONNECT POLY ENFIT 8FR 24" PFTM8.0P-NC

## (undated) DEVICE — FILTER HEPA FLUID TRAP NEPTUNE 0703-040-001

## (undated) DEVICE — CLIP ENDO HEMO-LOC PURPLE LG 544240

## (undated) DEVICE — DRAIN JACKSON PRATT RESERVOIR 400ML SU130-1000

## (undated) DEVICE — SU PDS II 6-0 RB-2DA 30" Z149H

## (undated) DEVICE — STPL ENDO LINEAR CUT ECHELON GST 45MM COMPACT PCEE45A

## (undated) DEVICE — SU SILK 2-0 TIE 12X30" A305H

## (undated) DEVICE — DRSG TEGADERM 8X12" 1629

## (undated) DEVICE — DRSG MEDIPORE 3 1/2X13 3/4" 3573

## (undated) DEVICE — SU SILK 3-0 TIE 12X30" A304H

## (undated) DEVICE — SU PDS II 1 TP-1 54" Z879G

## (undated) DEVICE — SURGICEL ABSORBABLE HEMOSTAT SNOW 4"X4" 2083

## (undated) DEVICE — DRAPE IOBAN C-SECTION W/POUCH 30X35" 6657

## (undated) DEVICE — SPONGE LAP 18X18" X8435

## (undated) DEVICE — SUCTION MANIFOLD NEPTUNE 2 SYS 4 PORT 0702-020-000

## (undated) DEVICE — Device

## (undated) DEVICE — RETR VISCERA FISH MED 3204

## (undated) DEVICE — SU PROLENE 3-0 SH-1 30" 8762H

## (undated) DEVICE — ESU GROUND PAD THERMOGUARD PLUS ABC PEDS 7-382

## (undated) DEVICE — CLIP APPLIER 09 3/8" SM LIGACLIP MCS20

## (undated) DEVICE — CLIP APPLIER 11" MED LIGACLIP MCM30

## (undated) DEVICE — SU PROLENE 7-0 BV-1DA 30" 8703H

## (undated) DEVICE — SUCTION TIP YANKAUER VIAGUARD W/SLEEVE SMP-2006-001SS

## (undated) DEVICE — ESU HOLSTER PLASTIC DISP E2400

## (undated) DEVICE — DRAPE ISOLATION BAG 1003

## (undated) DEVICE — LINEN TOWEL PACK X6 WHITE 5487

## (undated) DEVICE — ESU LIGASURE IMPACT OPEN SEALER/DVDR CVD LG JAW LF4418

## (undated) DEVICE — ESU PENCIL SMOKE EVAC W/ROCKER SWITCH 0703-047-000

## (undated) DEVICE — SU ETHILON 3-0 PS-1 18" 1663H

## (undated) DEVICE — SU VICRYL+ 0 8-27 CT1/CR UND VCPP4

## (undated) DEVICE — SU PROLENE 3-0 RB-1DA 36"  8558H

## (undated) DEVICE — CLIP APPLIER 13" LG LIGACLIP MCL20

## (undated) DEVICE — STPL SKIN 35W ROTATING HEAD PRW35

## (undated) DEVICE — ESU ELEC BLADE HEX-LOCKING 2.5" E1450X

## (undated) DEVICE — DRSG PRIMAPORE 02X3" 7133

## (undated) DEVICE — SURGICEL HEMOSTAT 4X8" 1952

## (undated) DEVICE — LINEN TOWEL PACK X30 5481

## (undated) DEVICE — DRAPE IOBAN INCISE LARGE 6658

## (undated) DEVICE — SU VICRYL+ 3-0 27IN SH UND VCP416H

## (undated) DEVICE — WIPES FOLEY CARE SURESTEP PROVON DFC100

## (undated) DEVICE — DRAPE FLUID WARMING 52 X 60" ORS-321

## (undated) DEVICE — CLIP ENDO HEMO-LOC GREEN MED/LG 544230

## (undated) DEVICE — SOL NACL 0.9% IRRIG 1000ML BOTTLE 2F7124

## (undated) DEVICE — TUBING IRRIG CYSTO/BLADDER SET 81" LF 2C4040

## (undated) DEVICE — SU PROLENE 4-0 SHDA 36" 8521H

## (undated) DEVICE — SU SILK 0 TIE 6X30" A306H

## (undated) DEVICE — SU PROLENE 4-0 RB-1DA 36" 8557H

## (undated) DEVICE — DECANTER BAG 2002S

## (undated) DEVICE — NDL COUNTER 20CT 31142493

## (undated) RX ORDER — PAPAVERINE HYDROCHLORIDE 30 MG/ML
INJECTION INTRAMUSCULAR; INTRAVENOUS
Status: DISPENSED
Start: 2024-11-13

## (undated) RX ORDER — LIDOCAINE HYDROCHLORIDE 10 MG/ML
INJECTION, SOLUTION EPIDURAL; INFILTRATION; INTRACAUDAL; PERINEURAL
Status: DISPENSED
Start: 2024-11-15

## (undated) RX ORDER — FENTANYL CITRATE 50 UG/ML
INJECTION, SOLUTION INTRAMUSCULAR; INTRAVENOUS
Status: DISPENSED
Start: 2024-11-15

## (undated) RX ORDER — HEPARIN SODIUM 1000 [USP'U]/ML
INJECTION, SOLUTION INTRAVENOUS; SUBCUTANEOUS
Status: DISPENSED
Start: 2024-11-13

## (undated) RX ORDER — NITROGLYCERIN 0.4 MG/1
TABLET SUBLINGUAL
Status: DISPENSED
Start: 2024-05-28

## (undated) RX ORDER — HEPARIN SODIUM 1000 [USP'U]/ML
INJECTION, SOLUTION INTRAVENOUS; SUBCUTANEOUS
Status: DISPENSED
Start: 2024-11-15

## (undated) RX ORDER — METOPROLOL TARTRATE 1 MG/ML
INJECTION, SOLUTION INTRAVENOUS
Status: DISPENSED
Start: 2024-05-28

## (undated) RX ORDER — VERAPAMIL HYDROCHLORIDE 2.5 MG/ML
INJECTION, SOLUTION INTRAVENOUS
Status: DISPENSED
Start: 2024-11-13